# Patient Record
Sex: MALE | Race: WHITE | NOT HISPANIC OR LATINO
[De-identification: names, ages, dates, MRNs, and addresses within clinical notes are randomized per-mention and may not be internally consistent; named-entity substitution may affect disease eponyms.]

---

## 2017-10-09 ENCOUNTER — RX ONLY (RX ONLY)
Age: 62
End: 2017-10-09

## 2017-10-09 ENCOUNTER — DOCTOR'S OFFICE (OUTPATIENT)
Dept: URBAN - NONMETROPOLITAN AREA CLINIC 1 | Facility: CLINIC | Age: 62
Setting detail: OPHTHALMOLOGY
End: 2017-10-09
Payer: COMMERCIAL

## 2017-10-09 DIAGNOSIS — E11.3393: ICD-10-CM

## 2017-10-09 DIAGNOSIS — H43.11: ICD-10-CM

## 2017-10-09 DIAGNOSIS — H25.13: ICD-10-CM

## 2017-10-09 PROCEDURE — 92225 OPHTHALMOSCOPY EXTENDED INITIAL: CPT | Performed by: OPHTHALMOLOGY

## 2017-10-09 PROCEDURE — 99204 OFFICE O/P NEW MOD 45 MIN: CPT | Performed by: OPHTHALMOLOGY

## 2017-10-09 PROCEDURE — 92134 CPTRZ OPH DX IMG PST SGM RTA: CPT | Performed by: OPHTHALMOLOGY

## 2017-10-09 ASSESSMENT — REFRACTION_MANIFEST
OS_VA3: 20/
OD_VA2: 20/
OD_VA1: 20/
OS_VA3: 20/
OD_VA3: 20/
OD_VA1: 20/
OU_VA: 20/
OS_VA2: 20/
OS_VA3: 20/
OD_VA3: 20/
OS_VA2: 20/
OU_VA: 20/
OU_VA: 20/
OD_VA2: 20/
OS_VA1: 20/
OD_VA3: 20/
OD_VA2: 20/
OD_VA1: 20/
OS_VA2: 20/
OS_VA1: 20/
OS_VA1: 20/

## 2017-10-09 ASSESSMENT — REFRACTION_CURRENTRX
OD_OVR_VA: 20/
OS_OVR_VA: 20/

## 2017-10-09 ASSESSMENT — VISUAL ACUITY
OD_BCVA: 20/40-2
OS_BCVA: 20/60

## 2017-10-09 ASSESSMENT — CONFRONTATIONAL VISUAL FIELD TEST (CVF)
OD_FINDINGS: FULL
OS_FINDINGS: FULL

## 2017-10-20 ENCOUNTER — DOCTOR'S OFFICE (OUTPATIENT)
Dept: URBAN - NONMETROPOLITAN AREA CLINIC 1 | Facility: CLINIC | Age: 62
Setting detail: OPHTHALMOLOGY
End: 2017-10-20

## 2017-10-20 ENCOUNTER — DOCTOR'S OFFICE (OUTPATIENT)
Dept: URBAN - NONMETROPOLITAN AREA CLINIC 1 | Facility: CLINIC | Age: 62
Setting detail: OPHTHALMOLOGY
End: 2017-10-20
Payer: COMMERCIAL

## 2017-10-20 DIAGNOSIS — H52.4: ICD-10-CM

## 2017-10-20 DIAGNOSIS — H33.311: ICD-10-CM

## 2017-10-20 DIAGNOSIS — H43.811: ICD-10-CM

## 2017-10-20 DIAGNOSIS — H43.11: ICD-10-CM

## 2017-10-20 DIAGNOSIS — H25.12: ICD-10-CM

## 2017-10-20 DIAGNOSIS — H25.11: ICD-10-CM

## 2017-10-20 DIAGNOSIS — E11.3393: ICD-10-CM

## 2017-10-20 DIAGNOSIS — H43.812: ICD-10-CM

## 2017-10-20 PROCEDURE — 92250 FUNDUS PHOTOGRAPHY W/I&R: CPT | Performed by: OPHTHALMOLOGY

## 2017-10-20 PROCEDURE — 92014 COMPRE OPH EXAM EST PT 1/>: CPT | Performed by: OPHTHALMOLOGY

## 2017-10-20 PROCEDURE — 92226 OPHTHALMOSCOPY EXT SUBSEQUENT: CPT | Performed by: OPHTHALMOLOGY

## 2017-10-20 PROCEDURE — 92235 FLUORESCEIN ANGRPH MLTIFRAME: CPT | Performed by: OPHTHALMOLOGY

## 2017-10-20 PROCEDURE — CATARACT K CATARACT KIT: Performed by: OPHTHALMOLOGY

## 2017-10-20 PROCEDURE — 67145 PROPH RTA DTCHMNT PC: CPT | Performed by: OPHTHALMOLOGY

## 2017-10-20 ASSESSMENT — REFRACTION_MANIFEST
OD_VA1: 20/
OD_VA1: 20/
OS_VA2: 20/
OD_VA3: 20/
OS_VA3: 20/
OS_VA3: 20/
OS_VA2: 20/
OU_VA: 20/
OU_VA: 20/
OS_VA1: 20/
OD_VA2: 20/
OD_VA2: 20/
OS_VA1: 20/
OS_VA1: 20/
OD_VA3: 20/
OU_VA: 20/
OS_VA2: 20/
OD_VA1: 20/
OD_VA2: 20/
OD_VA3: 20/
OS_VA3: 20/

## 2017-10-20 ASSESSMENT — REFRACTION_CURRENTRX
OD_OVR_VA: 20/
OS_OVR_VA: 20/
OS_OVR_VA: 20/
OD_OVR_VA: 20/
OS_OVR_VA: 20/
OD_OVR_VA: 20/

## 2017-10-20 ASSESSMENT — VISUAL ACUITY
OS_BCVA: 20/60
OD_BCVA: 20/40-2

## 2017-10-20 ASSESSMENT — CONFRONTATIONAL VISUAL FIELD TEST (CVF)
OS_FINDINGS: FULL
OD_FINDINGS: FULL

## 2017-11-30 ENCOUNTER — DOCTOR'S OFFICE (OUTPATIENT)
Dept: URBAN - NONMETROPOLITAN AREA CLINIC 1 | Facility: CLINIC | Age: 62
Setting detail: OPHTHALMOLOGY
End: 2017-11-30

## 2017-11-30 ENCOUNTER — DOCTOR'S OFFICE (OUTPATIENT)
Dept: URBAN - NONMETROPOLITAN AREA CLINIC 1 | Facility: CLINIC | Age: 62
Setting detail: OPHTHALMOLOGY
End: 2017-11-30
Payer: COMMERCIAL

## 2017-11-30 DIAGNOSIS — H43.811: ICD-10-CM

## 2017-11-30 DIAGNOSIS — E11.3392: ICD-10-CM

## 2017-11-30 DIAGNOSIS — H52.4: ICD-10-CM

## 2017-11-30 DIAGNOSIS — E11.3393: ICD-10-CM

## 2017-11-30 DIAGNOSIS — H43.11: ICD-10-CM

## 2017-11-30 DIAGNOSIS — H43.812: ICD-10-CM

## 2017-11-30 DIAGNOSIS — E11.3391: ICD-10-CM

## 2017-11-30 DIAGNOSIS — G45.9: ICD-10-CM

## 2017-11-30 DIAGNOSIS — H33.311: ICD-10-CM

## 2017-11-30 PROCEDURE — 99024 POSTOP FOLLOW-UP VISIT: CPT | Performed by: OPHTHALMOLOGY

## 2017-11-30 PROCEDURE — 92134 CPTRZ OPH DX IMG PST SGM RTA: CPT | Performed by: OPHTHALMOLOGY

## 2017-11-30 PROCEDURE — 92226 OPHTHALMOSCOPY EXT SUBSEQUENT: CPT | Performed by: OPHTHALMOLOGY

## 2017-11-30 PROCEDURE — CATARACT K CATARACT KIT: Performed by: OPHTHALMOLOGY

## 2017-11-30 ASSESSMENT — CONFRONTATIONAL VISUAL FIELD TEST (CVF)
OS_FINDINGS: FULL
OD_FINDINGS: FULL

## 2017-11-30 ASSESSMENT — REFRACTION_MANIFEST
OS_VA1: 20/
OD_VA3: 20/
OS_VA3: 20/
OU_VA: 20/
OD_VA1: 20/
OS_VA1: 20/
OU_VA: 20/
OD_VA2: 20/
OD_VA2: 20/
OD_VA3: 20/
OS_VA3: 20/
OD_VA2: 20/
OD_VA3: 20/
OD_VA1: 20/
OS_VA2: 20/
OD_VA1: 20/
OS_VA3: 20/
OS_VA2: 20/
OU_VA: 20/
OS_VA1: 20/
OS_VA2: 20/

## 2017-11-30 ASSESSMENT — REFRACTION_CURRENTRX
OD_OVR_VA: 20/
OS_OVR_VA: 20/
OS_OVR_VA: 20/
OD_OVR_VA: 20/
OS_OVR_VA: 20/
OD_OVR_VA: 20/

## 2017-11-30 ASSESSMENT — VISUAL ACUITY
OS_BCVA: 20/60
OD_BCVA: 20/50-2

## 2017-12-15 ENCOUNTER — DOCTOR'S OFFICE (OUTPATIENT)
Dept: URBAN - NONMETROPOLITAN AREA CLINIC 1 | Facility: CLINIC | Age: 62
Setting detail: OPHTHALMOLOGY
End: 2017-12-15
Payer: COMMERCIAL

## 2017-12-15 DIAGNOSIS — G45.9: ICD-10-CM

## 2017-12-15 PROCEDURE — 92083 EXTENDED VISUAL FIELD XM: CPT | Performed by: OPHTHALMOLOGY

## 2018-01-08 ENCOUNTER — DOCTOR'S OFFICE (OUTPATIENT)
Dept: URBAN - NONMETROPOLITAN AREA CLINIC 1 | Facility: CLINIC | Age: 63
Setting detail: OPHTHALMOLOGY
End: 2018-01-08
Payer: COMMERCIAL

## 2018-01-08 DIAGNOSIS — G45.9: ICD-10-CM

## 2018-01-08 DIAGNOSIS — E11.3391: ICD-10-CM

## 2018-01-08 DIAGNOSIS — H43.812: ICD-10-CM

## 2018-01-08 DIAGNOSIS — H43.11: ICD-10-CM

## 2018-01-08 DIAGNOSIS — H33.311: ICD-10-CM

## 2018-01-08 DIAGNOSIS — H43.811: ICD-10-CM

## 2018-01-08 DIAGNOSIS — E11.3392: ICD-10-CM

## 2018-01-08 DIAGNOSIS — E11.3393: ICD-10-CM

## 2018-01-08 PROCEDURE — 92226 OPHTHALMOSCOPY EXT SUBSEQUENT: CPT | Performed by: OPHTHALMOLOGY

## 2018-01-08 PROCEDURE — 92134 CPTRZ OPH DX IMG PST SGM RTA: CPT | Performed by: OPHTHALMOLOGY

## 2018-01-08 PROCEDURE — 99024 POSTOP FOLLOW-UP VISIT: CPT | Performed by: OPHTHALMOLOGY

## 2018-01-08 ASSESSMENT — REFRACTION_MANIFEST
OS_VA1: 20/
OD_VA3: 20/
OD_VA1: 20/
OS_VA2: 20/
OS_VA3: 20/
OU_VA: 20/
OD_VA2: 20/
OS_VA1: 20/
OD_VA3: 20/
OD_VA1: 20/
OS_VA2: 20/
OS_VA2: 20/
OU_VA: 20/
OD_VA1: 20/
OU_VA: 20/
OD_VA2: 20/
OS_VA3: 20/
OS_VA1: 20/
OS_VA3: 20/
OD_VA3: 20/
OD_VA2: 20/

## 2018-01-08 ASSESSMENT — REFRACTION_CURRENTRX
OS_OVR_VA: 20/
OD_OVR_VA: 20/
OS_OVR_VA: 20/
OD_OVR_VA: 20/
OS_OVR_VA: 20/
OD_OVR_VA: 20/

## 2018-01-08 ASSESSMENT — VISUAL ACUITY
OS_BCVA: 20/60
OD_BCVA: 20/50-2

## 2018-01-08 ASSESSMENT — CONFRONTATIONAL VISUAL FIELD TEST (CVF)
OD_FINDINGS: FULL
OS_FINDINGS: FULL

## 2018-02-16 ENCOUNTER — DOCTOR'S OFFICE (OUTPATIENT)
Dept: URBAN - NONMETROPOLITAN AREA CLINIC 1 | Facility: CLINIC | Age: 63
Setting detail: OPHTHALMOLOGY
End: 2018-02-16
Payer: COMMERCIAL

## 2018-02-16 DIAGNOSIS — H04.121: ICD-10-CM

## 2018-02-16 DIAGNOSIS — H01.005: ICD-10-CM

## 2018-02-16 DIAGNOSIS — H43.813: ICD-10-CM

## 2018-02-16 DIAGNOSIS — G45.9: ICD-10-CM

## 2018-02-16 DIAGNOSIS — E11.3393: ICD-10-CM

## 2018-02-16 DIAGNOSIS — H01.002: ICD-10-CM

## 2018-02-16 DIAGNOSIS — H43.812: ICD-10-CM

## 2018-02-16 DIAGNOSIS — H01.004: ICD-10-CM

## 2018-02-16 DIAGNOSIS — H43.811: ICD-10-CM

## 2018-02-16 DIAGNOSIS — H01.001: ICD-10-CM

## 2018-02-16 DIAGNOSIS — H33.311: ICD-10-CM

## 2018-02-16 PROCEDURE — 92226 OPHTHALMOSCOPY EXT SUBSEQUENT: CPT | Performed by: OPHTHALMOLOGY

## 2018-02-16 PROCEDURE — 92134 CPTRZ OPH DX IMG PST SGM RTA: CPT | Performed by: OPHTHALMOLOGY

## 2018-02-16 PROCEDURE — 92014 COMPRE OPH EXAM EST PT 1/>: CPT | Performed by: OPHTHALMOLOGY

## 2018-02-16 ASSESSMENT — CONFRONTATIONAL VISUAL FIELD TEST (CVF)
OS_FINDINGS: FULL
OD_FINDINGS: FULL

## 2018-02-16 ASSESSMENT — REFRACTION_MANIFEST
OS_VA1: 20/
OS_VA3: 20/
OU_VA: 20/
OS_VA3: 20/
OS_VA2: 20/
OD_VA2: 20/
OD_VA1: 20/
OD_VA2: 20/
OD_VA3: 20/
OS_VA1: 20/
OU_VA: 20/
OD_VA1: 20/
OD_VA1: 20/
OD_VA2: 20/
OU_VA: 20/
OD_VA3: 20/
OS_VA2: 20/
OS_VA1: 20/
OS_VA2: 20/
OD_VA3: 20/
OS_VA3: 20/

## 2018-02-16 ASSESSMENT — REFRACTION_CURRENTRX
OS_OVR_VA: 20/
OD_OVR_VA: 20/
OS_OVR_VA: 20/
OD_OVR_VA: 20/
OD_OVR_VA: 20/
OS_OVR_VA: 20/

## 2018-02-16 ASSESSMENT — VISUAL ACUITY
OD_BCVA: 20/50
OS_BCVA: 20/50

## 2018-02-16 ASSESSMENT — LID EXAM ASSESSMENTS
OD_BLEPHARITIS: 2+ 3+
OS_BLEPHARITIS: 2+ 3+

## 2018-02-23 ENCOUNTER — DOCTOR'S OFFICE (OUTPATIENT)
Dept: URBAN - NONMETROPOLITAN AREA CLINIC 1 | Facility: CLINIC | Age: 63
Setting detail: OPHTHALMOLOGY
End: 2018-02-23
Payer: COMMERCIAL

## 2018-02-23 ENCOUNTER — RX ONLY (RX ONLY)
Age: 63
End: 2018-02-23

## 2018-02-23 DIAGNOSIS — H04.121: ICD-10-CM

## 2018-02-23 DIAGNOSIS — H01.001: ICD-10-CM

## 2018-02-23 DIAGNOSIS — H01.004: ICD-10-CM

## 2018-02-23 DIAGNOSIS — H04.122: ICD-10-CM

## 2018-02-23 DIAGNOSIS — H01.002: ICD-10-CM

## 2018-02-23 DIAGNOSIS — H01.005: ICD-10-CM

## 2018-02-23 PROCEDURE — 99213 OFFICE O/P EST LOW 20 MIN: CPT | Performed by: OPHTHALMOLOGY

## 2018-02-23 ASSESSMENT — LID EXAM ASSESSMENTS
OS_BLEPHARITIS: LLL LUL 1+
OD_BLEPHARITIS: RLL RUL 1+

## 2018-03-09 ASSESSMENT — REFRACTION_MANIFEST
OS_VA3: 20/
OD_VA1: 20/
OS_VA1: 20/
OS_VA3: 20/
OD_VA2: 20/
OD_VA3: 20/
OU_VA: 20/
OS_VA2: 20/
OS_VA2: 20/
OD_VA2: 20/
OU_VA: 20/
OD_VA1: 20/
OS_VA2: 20/
OU_VA: 20/
OD_VA2: 20/
OS_VA3: 20/
OS_VA1: 20/
OD_VA1: 20/
OD_VA3: 20/
OD_VA3: 20/
OS_VA1: 20/

## 2018-03-09 ASSESSMENT — REFRACTION_CURRENTRX
OS_OVR_VA: 20/
OD_OVR_VA: 20/
OS_OVR_VA: 20/
OS_OVR_VA: 20/

## 2018-03-09 ASSESSMENT — VISUAL ACUITY
OS_BCVA: 20/60
OD_BCVA: 20/50

## 2018-03-16 ENCOUNTER — DOCTOR'S OFFICE (OUTPATIENT)
Dept: URBAN - NONMETROPOLITAN AREA CLINIC 1 | Facility: CLINIC | Age: 63
Setting detail: OPHTHALMOLOGY
End: 2018-03-16
Payer: COMMERCIAL

## 2018-03-16 DIAGNOSIS — H01.004: ICD-10-CM

## 2018-03-16 DIAGNOSIS — H43.811: ICD-10-CM

## 2018-03-16 DIAGNOSIS — H01.001: ICD-10-CM

## 2018-03-16 DIAGNOSIS — H04.123: ICD-10-CM

## 2018-03-16 DIAGNOSIS — H43.813: ICD-10-CM

## 2018-03-16 DIAGNOSIS — H43.812: ICD-10-CM

## 2018-03-16 DIAGNOSIS — H01.002: ICD-10-CM

## 2018-03-16 DIAGNOSIS — E11.3393: ICD-10-CM

## 2018-03-16 DIAGNOSIS — H33.311: ICD-10-CM

## 2018-03-16 DIAGNOSIS — H01.005: ICD-10-CM

## 2018-03-16 PROCEDURE — 92014 COMPRE OPH EXAM EST PT 1/>: CPT | Performed by: OPHTHALMOLOGY

## 2018-03-16 PROCEDURE — 92134 CPTRZ OPH DX IMG PST SGM RTA: CPT | Performed by: OPHTHALMOLOGY

## 2018-03-16 PROCEDURE — 92226 OPHTHALMOSCOPY EXT SUBSEQUENT: CPT | Performed by: OPHTHALMOLOGY

## 2018-03-16 ASSESSMENT — REFRACTION_MANIFEST
OD_VA3: 20/
OU_VA: 20/
OS_VA3: 20/
OD_VA1: 20/
OD_VA3: 20/
OU_VA: 20/
OS_VA3: 20/
OS_VA2: 20/
OS_VA2: 20/
OD_VA2: 20/
OS_VA1: 20/
OD_VA3: 20/
OS_VA1: 20/
OD_VA2: 20/
OS_VA1: 20/
OD_VA1: 20/
OD_VA2: 20/
OS_VA3: 20/
OS_VA2: 20/
OU_VA: 20/
OD_VA1: 20/

## 2018-03-16 ASSESSMENT — LID EXAM ASSESSMENTS
OD_BLEPHARITIS: RLL RUL 1+
OS_BLEPHARITIS: LLL LUL 1+

## 2018-03-16 ASSESSMENT — VISUAL ACUITY
OS_BCVA: 20/60
OD_BCVA: 20/50

## 2018-03-16 ASSESSMENT — CONFRONTATIONAL VISUAL FIELD TEST (CVF)
OS_FINDINGS: FULL
OD_FINDINGS: FULL

## 2018-03-16 ASSESSMENT — REFRACTION_CURRENTRX
OS_OVR_VA: 20/
OD_OVR_VA: 20/
OS_OVR_VA: 20/
OS_OVR_VA: 20/
OD_OVR_VA: 20/
OD_OVR_VA: 20/

## 2018-04-03 ENCOUNTER — RX ONLY (RX ONLY)
Age: 63
End: 2018-04-03

## 2018-04-03 ENCOUNTER — DOCTOR'S OFFICE (OUTPATIENT)
Dept: URBAN - NONMETROPOLITAN AREA CLINIC 1 | Facility: CLINIC | Age: 63
Setting detail: OPHTHALMOLOGY
End: 2018-04-03
Payer: COMMERCIAL

## 2018-04-03 DIAGNOSIS — H04.121: ICD-10-CM

## 2018-04-03 DIAGNOSIS — H01.002: ICD-10-CM

## 2018-04-03 DIAGNOSIS — H01.001: ICD-10-CM

## 2018-04-03 DIAGNOSIS — H04.122: ICD-10-CM

## 2018-04-03 DIAGNOSIS — H01.005: ICD-10-CM

## 2018-04-03 DIAGNOSIS — H01.004: ICD-10-CM

## 2018-04-03 PROCEDURE — 92012 INTRM OPH EXAM EST PATIENT: CPT | Performed by: OPHTHALMOLOGY

## 2018-04-03 ASSESSMENT — LID EXAM ASSESSMENTS
OS_BLEPHARITIS: LLL LUL T
OD_BLEPHARITIS: RLL RUL T

## 2018-04-04 ASSESSMENT — REFRACTION_CURRENTRX
OS_OVR_VA: 20/
OD_OVR_VA: 20/
OD_OVR_VA: 20/
OS_OVR_VA: 20/
OS_OVR_VA: 20/
OD_OVR_VA: 20/

## 2018-04-04 ASSESSMENT — REFRACTION_MANIFEST
OD_VA1: 20/
OD_VA1: 20/
OS_VA2: 20/
OS_VA2: 20/
OD_VA2: 20/
OU_VA: 20/
OS_VA2: 20/
OS_VA1: 20/
OD_VA3: 20/
OD_VA1: 20/
OS_VA1: 20/
OS_VA3: 20/
OS_VA3: 20/
OU_VA: 20/
OS_VA3: 20/
OD_VA3: 20/
OU_VA: 20/
OS_VA1: 20/
OD_VA3: 20/

## 2018-04-04 ASSESSMENT — VISUAL ACUITY
OS_BCVA: 20/70-1
OD_BCVA: 20/50

## 2018-05-11 ENCOUNTER — DOCTOR'S OFFICE (OUTPATIENT)
Dept: URBAN - NONMETROPOLITAN AREA CLINIC 1 | Facility: CLINIC | Age: 63
Setting detail: OPHTHALMOLOGY
End: 2018-05-11
Payer: COMMERCIAL

## 2018-05-11 ENCOUNTER — RX ONLY (RX ONLY)
Age: 63
End: 2018-05-11

## 2018-05-11 DIAGNOSIS — H01.004: ICD-10-CM

## 2018-05-11 DIAGNOSIS — H01.005: ICD-10-CM

## 2018-05-11 DIAGNOSIS — H25.13: ICD-10-CM

## 2018-05-11 DIAGNOSIS — H04.123: ICD-10-CM

## 2018-05-11 DIAGNOSIS — E11.3393: ICD-10-CM

## 2018-05-11 DIAGNOSIS — H01.001: ICD-10-CM

## 2018-05-11 DIAGNOSIS — H01.002: ICD-10-CM

## 2018-05-11 PROCEDURE — 92014 COMPRE OPH EXAM EST PT 1/>: CPT | Performed by: OPHTHALMOLOGY

## 2018-05-11 PROCEDURE — CATARACT K CATARACT KIT: Performed by: OPHTHALMOLOGY

## 2018-05-11 PROCEDURE — 92134 CPTRZ OPH DX IMG PST SGM RTA: CPT | Performed by: OPHTHALMOLOGY

## 2018-05-11 ASSESSMENT — REFRACTION_AUTOREFRACTION
OS_CYLINDER: -0.75
OD_CYLINDER: -1.00
OS_AXIS: 38
OS_SPHERE: -6.25
OD_SPHERE: -6.75
OD_AXIS: 129

## 2018-05-11 ASSESSMENT — REFRACTION_MANIFEST
OS_VA2: 20/
OD_VA2: 20/
OD_VA2: 20/
OS_VA1: 20/
OU_VA: 20/
OD_VA3: 20/
OS_VA2: 20/
OD_VA3: 20/
OU_VA: 20/
OD_VA1: 20/
OS_VA3: 20/
OU_VA: 20/
OD_VA3: 20/
OS_VA2: 20/
OS_VA3: 20/
OS_VA1: 20/
OD_VA1: 20/
OS_VA3: 20/
OS_VA1: 20/
OD_VA2: 20/
OD_VA1: 20/

## 2018-05-11 ASSESSMENT — VISUAL ACUITY
OS_BCVA: 20/70+1
OD_BCVA: 20/50+2

## 2018-05-11 ASSESSMENT — REFRACTION_CURRENTRX
OS_OVR_VA: 20/
OD_OVR_VA: 20/
OS_OVR_VA: 20/
OS_AXIS: 13
OS_CYLINDER: -1.50
OS_SPHERE: -7.25
OD_AXIS: 130
OD_OVR_VA: 20/
OD_SPHERE: -6.75
OD_OVR_VA: 20/
OD_CYLINDER: -1.00
OS_OVR_VA: 20/

## 2018-05-11 ASSESSMENT — LID EXAM ASSESSMENTS
OS_BLEPHARITIS: LLL LUL T
OD_BLEPHARITIS: RLL RUL T

## 2018-05-11 ASSESSMENT — CONFRONTATIONAL VISUAL FIELD TEST (CVF)
OD_FINDINGS: FULL
OS_FINDINGS: FULL

## 2018-05-11 ASSESSMENT — SPHEQUIV_DERIVED
OS_SPHEQUIV: -6.625
OD_SPHEQUIV: -7.25

## 2018-06-05 ENCOUNTER — DOCTOR'S OFFICE (OUTPATIENT)
Dept: URBAN - NONMETROPOLITAN AREA CLINIC 1 | Facility: CLINIC | Age: 63
Setting detail: OPHTHALMOLOGY
End: 2018-06-05
Payer: COMMERCIAL

## 2018-06-05 DIAGNOSIS — H25.11: ICD-10-CM

## 2018-06-05 PROCEDURE — 92136 OPHTHALMIC BIOMETRY: CPT | Performed by: OPHTHALMOLOGY

## 2018-06-14 ENCOUNTER — AMBUL SURGICAL CARE (OUTPATIENT)
Dept: URBAN - NONMETROPOLITAN AREA SURGERY 1 | Facility: SURGERY | Age: 63
Setting detail: OPHTHALMOLOGY
End: 2018-06-14
Payer: COMMERCIAL

## 2018-06-14 DIAGNOSIS — H25.11: ICD-10-CM

## 2018-06-14 DIAGNOSIS — H25.041: ICD-10-CM

## 2018-06-14 PROCEDURE — 66984 XCAPSL CTRC RMVL W/O ECP: CPT | Performed by: OPHTHALMOLOGY

## 2018-06-14 PROCEDURE — G8918 PT W/O PREOP ORDER IV AB PRO: HCPCS | Performed by: OPHTHALMOLOGY

## 2018-06-14 PROCEDURE — G8907 PT DOC NO EVENTS ON DISCHARG: HCPCS | Performed by: OPHTHALMOLOGY

## 2018-06-15 ENCOUNTER — OPTICAL OFFICE (OUTPATIENT)
Dept: URBAN - NONMETROPOLITAN AREA CLINIC 4 | Facility: CLINIC | Age: 63
Setting detail: OPHTHALMOLOGY
End: 2018-06-15

## 2018-06-15 ENCOUNTER — RX ONLY (RX ONLY)
Age: 63
End: 2018-06-15

## 2018-06-15 ENCOUNTER — DOCTOR'S OFFICE (OUTPATIENT)
Dept: URBAN - NONMETROPOLITAN AREA CLINIC 1 | Facility: CLINIC | Age: 63
Setting detail: OPHTHALMOLOGY
End: 2018-06-15
Payer: COMMERCIAL

## 2018-06-15 ENCOUNTER — DOCTOR'S OFFICE (OUTPATIENT)
Dept: URBAN - NONMETROPOLITAN AREA CLINIC 1 | Facility: CLINIC | Age: 63
Setting detail: OPHTHALMOLOGY
End: 2018-06-15

## 2018-06-15 DIAGNOSIS — H25.12: ICD-10-CM

## 2018-06-15 DIAGNOSIS — Z96.1: ICD-10-CM

## 2018-06-15 DIAGNOSIS — H52.01: ICD-10-CM

## 2018-06-15 PROCEDURE — V2020 VISION SVCS FRAMES PURCHASES: HCPCS | Performed by: OPTOMETRIST

## 2018-06-15 PROCEDURE — 92136 OPHTHALMIC BIOMETRY: CPT | Performed by: OPHTHALMOLOGY

## 2018-06-15 PROCEDURE — 99024 POSTOP FOLLOW-UP VISIT: CPT | Performed by: PHYSICIAN ASSISTANT

## 2018-06-15 PROCEDURE — V2100 LENS SPHER SINGLE PLANO 4.00: HCPCS | Performed by: OPTOMETRIST

## 2018-06-15 ASSESSMENT — LID EXAM ASSESSMENTS
OS_BLEPHARITIS: LLL LUL T
OD_BLEPHARITIS: RLL RUL T

## 2018-06-18 ASSESSMENT — REFRACTION_MANIFEST
OD_VA2: 20/
OS_VA2: 20/
OS_VA3: 20/
OU_VA: 20/
OD_VA3: 20/
OD_VA3: 20/
OS_VA1: 20/
OD_VA1: 20/
OD_VA3: 20/
OS_VA2: 20/
OU_VA: 20/
OD_VA1: 20/
OD_VA2: 20/
OD_VA2: 20/
OU_VA: 20/
OS_VA3: 20/
OD_VA1: 20/
OS_VA1: 20/
OS_VA3: 20/
OS_VA1: 20/
OS_VA2: 20/

## 2018-06-18 ASSESSMENT — REFRACTION_CURRENTRX
OD_OVR_VA: 20/
OD_SPHERE: -6.75
OS_OVR_VA: 20/
OS_SPHERE: -7.25
OS_CYLINDER: -1.50
OS_AXIS: 13
OD_OVR_VA: 20/
OS_OVR_VA: 20/
OS_OVR_VA: 20/
OD_CYLINDER: -1.00
OD_AXIS: 130
OD_OVR_VA: 20/

## 2018-06-18 ASSESSMENT — REFRACTION_AUTOREFRACTION
OD_CYLINDER: -2.00
OD_AXIS: 98
OD_SPHERE: -0.50
OS_SPHERE: -6.25
OS_AXIS: 38
OS_CYLINDER: -0.75

## 2018-06-18 ASSESSMENT — SPHEQUIV_DERIVED
OD_SPHEQUIV: -1.5
OS_SPHEQUIV: -6.625

## 2018-06-18 ASSESSMENT — VISUAL ACUITY
OS_BCVA: 20/50-1
OD_BCVA: 20/50+2

## 2018-06-21 ENCOUNTER — AMBUL SURGICAL CARE (OUTPATIENT)
Dept: URBAN - NONMETROPOLITAN AREA SURGERY 1 | Facility: SURGERY | Age: 63
Setting detail: OPHTHALMOLOGY
End: 2018-06-21
Payer: COMMERCIAL

## 2018-06-21 ENCOUNTER — OPTICAL OFFICE (OUTPATIENT)
Dept: URBAN - NONMETROPOLITAN AREA CLINIC 4 | Facility: CLINIC | Age: 63
Setting detail: OPHTHALMOLOGY
End: 2018-06-21

## 2018-06-21 DIAGNOSIS — H25.042: ICD-10-CM

## 2018-06-21 DIAGNOSIS — H52.02: ICD-10-CM

## 2018-06-21 DIAGNOSIS — H25.12: ICD-10-CM

## 2018-06-21 PROCEDURE — 66984 XCAPSL CTRC RMVL W/O ECP: CPT | Performed by: OPHTHALMOLOGY

## 2018-06-21 PROCEDURE — V2020 VISION SVCS FRAMES PURCHASES: HCPCS | Performed by: OPTOMETRIST

## 2018-06-21 PROCEDURE — G8918 PT W/O PREOP ORDER IV AB PRO: HCPCS | Performed by: OPHTHALMOLOGY

## 2018-06-21 PROCEDURE — V2100 LENS SPHER SINGLE PLANO 4.00: HCPCS | Performed by: OPTOMETRIST

## 2018-06-21 PROCEDURE — G8907 PT DOC NO EVENTS ON DISCHARG: HCPCS | Performed by: OPHTHALMOLOGY

## 2018-06-22 ENCOUNTER — DOCTOR'S OFFICE (OUTPATIENT)
Dept: URBAN - NONMETROPOLITAN AREA CLINIC 1 | Facility: CLINIC | Age: 63
Setting detail: OPHTHALMOLOGY
End: 2018-06-22

## 2018-06-22 DIAGNOSIS — H26.491: ICD-10-CM

## 2018-06-22 DIAGNOSIS — Z96.1: ICD-10-CM

## 2018-06-22 PROBLEM — H25.12 CATARACT NUCLEAR SCLEROSIS; LEFT EYE: Status: RESOLVED | Noted: 2017-10-09 | Resolved: 2018-06-22

## 2018-06-22 PROCEDURE — 99024 POSTOP FOLLOW-UP VISIT: CPT | Performed by: OPTOMETRIST

## 2018-06-22 ASSESSMENT — REFRACTION_CURRENTRX
OD_OVR_VA: 20/
OD_CYLINDER: -1.00
OS_AXIS: 13
OD_SPHERE: -6.75
OS_SPHERE: -7.25
OD_OVR_VA: 20/
OS_CYLINDER: -1.50
OD_OVR_VA: 20/
OD_AXIS: 130
OS_OVR_VA: 20/

## 2018-06-22 ASSESSMENT — REFRACTION_MANIFEST
OD_VA1: 20/
OS_VA2: 20/
OD_VA2: 20/
OS_VA1: 20/
OD_VA1: 20/
OS_VA3: 20/
OS_VA1: 20/
OD_VA2: 20/
OS_VA2: 20/
OS_VA3: 20/
OD_VA3: 20/
OU_VA: 20/
OS_VA2: 20/
OD_VA3: 20/
OU_VA: 20/
OD_VA3: 20/
OD_VA1: 20/
OD_VA2: 20/
OU_VA: 20/
OS_VA1: 20/
OS_VA3: 20/

## 2018-06-22 ASSESSMENT — LID EXAM ASSESSMENTS
OS_BLEPHARITIS: LLL LUL T
OD_BLEPHARITIS: RLL RUL T

## 2018-06-22 ASSESSMENT — REFRACTION_AUTOREFRACTION
OD_SPHERE: -0.75
OS_AXIS: 009
OD_AXIS: 175
OD_CYLINDER: -1.25
OS_CYLINDER: -2.25
OS_SPHERE: +0.50

## 2018-06-22 ASSESSMENT — SPHEQUIV_DERIVED
OD_SPHEQUIV: -1.375
OS_SPHEQUIV: -0.625

## 2018-06-22 ASSESSMENT — VISUAL ACUITY
OD_BCVA: 20/30-2
OS_BCVA: 20/70+1

## 2018-07-10 ENCOUNTER — DOCTOR'S OFFICE (OUTPATIENT)
Dept: URBAN - NONMETROPOLITAN AREA CLINIC 1 | Facility: CLINIC | Age: 63
Setting detail: OPHTHALMOLOGY
End: 2018-07-10

## 2018-07-10 DIAGNOSIS — Z96.1: ICD-10-CM

## 2018-07-10 PROCEDURE — 99024 POSTOP FOLLOW-UP VISIT: CPT | Performed by: PHYSICIAN ASSISTANT

## 2018-07-10 ASSESSMENT — LID EXAM ASSESSMENTS
OD_BLEPHARITIS: RLL RUL T
OS_BLEPHARITIS: LLL LUL T

## 2018-07-10 ASSESSMENT — LID POSITION - PTOSIS
OD_PTOSIS: T
OS_PTOSIS: T

## 2018-07-11 ASSESSMENT — REFRACTION_MANIFEST
OD_VA2: 20/
OD_VA1: 20/
OS_VA3: 20/
OS_VA1: 20/
OS_VA2: 20/
OD_VA3: 20/
OS_VA1: 20/
OS_VA3: 20/
OS_VA3: 20/
OU_VA: 20/
OD_VA3: 20/
OS_VA2: 20/
OD_VA3: 20/
OU_VA: 20/
OS_VA1: 20/
OD_VA2: 20/
OD_VA2: 20/
OD_VA1: 20/
OS_VA2: 20/
OU_VA: 20/
OD_VA1: 20/

## 2018-07-11 ASSESSMENT — VISUAL ACUITY
OD_BCVA: 20/30+2
OS_BCVA: 20/70+2

## 2018-07-11 ASSESSMENT — REFRACTION_CURRENTRX
OD_SPHERE: -6.75
OS_OVR_VA: 20/
OD_OVR_VA: 20/
OS_SPHERE: -7.25
OS_CYLINDER: -1.50
OS_OVR_VA: 20/
OD_OVR_VA: 20/
OS_AXIS: 13
OD_AXIS: 130
OD_OVR_VA: 20/
OD_CYLINDER: -1.00
OS_OVR_VA: 20/

## 2018-07-11 ASSESSMENT — REFRACTION_AUTOREFRACTION
OS_SPHERE: +0.50
OS_AXIS: 009
OD_SPHERE: -0.75
OD_AXIS: 175
OS_CYLINDER: -2.25
OD_CYLINDER: -1.25

## 2018-07-11 ASSESSMENT — SPHEQUIV_DERIVED
OD_SPHEQUIV: -1.375
OS_SPHEQUIV: -0.625

## 2018-08-17 ENCOUNTER — OPTICAL OFFICE (OUTPATIENT)
Dept: URBAN - NONMETROPOLITAN AREA CLINIC 4 | Facility: CLINIC | Age: 63
Setting detail: OPHTHALMOLOGY
End: 2018-08-17

## 2018-08-17 ENCOUNTER — DOCTOR'S OFFICE (OUTPATIENT)
Dept: URBAN - NONMETROPOLITAN AREA CLINIC 1 | Facility: CLINIC | Age: 63
Setting detail: OPHTHALMOLOGY
End: 2018-08-17
Payer: COMMERCIAL

## 2018-08-17 DIAGNOSIS — Z96.1: ICD-10-CM

## 2018-08-17 DIAGNOSIS — H52.223: ICD-10-CM

## 2018-08-17 PROBLEM — H04.122 DRY EYE; RIGHT EYE, LEFT EYE: Status: ACTIVE | Noted: 2018-02-16

## 2018-08-17 PROBLEM — H04.121 DRY EYE; RIGHT EYE, LEFT EYE: Status: ACTIVE | Noted: 2018-02-16

## 2018-08-17 PROBLEM — H01.005 BLEPHARITIS; RIGHT UPPER LID, RIGHT LOWER LID, LEFT UPPER LID, LEFT LOWER LID: Status: ACTIVE | Noted: 2018-02-16

## 2018-08-17 PROBLEM — H26.491 AFTER-CATARACT,OBSCURING VISION; RIGHT EYE: Status: ACTIVE | Noted: 2018-06-22

## 2018-08-17 PROBLEM — H01.002 BLEPHARITIS; RIGHT UPPER LID, RIGHT LOWER LID, LEFT UPPER LID, LEFT LOWER LID: Status: ACTIVE | Noted: 2018-02-16

## 2018-08-17 PROBLEM — E11.3391 DM TYPE 2; RIGHT MOD WITHOUT ME, LEFT MOD WITHOUT ME: Status: ACTIVE | Noted: 2017-10-20

## 2018-08-17 PROBLEM — G45.9 TIA: Status: ACTIVE | Noted: 2017-11-30

## 2018-08-17 PROBLEM — H43.811 POSTERIOR VITREOUS DETACHMENT; RIGHT EYE, LEFT EYE: Status: ACTIVE | Noted: 2017-10-09

## 2018-08-17 PROBLEM — H01.001 BLEPHARITIS; RIGHT UPPER LID, RIGHT LOWER LID, LEFT UPPER LID, LEFT LOWER LID: Status: ACTIVE | Noted: 2018-02-16

## 2018-08-17 PROBLEM — E11.3393 DM TYPE 2; RIGHT MOD WITHOUT ME, LEFT MOD WITHOUT ME: Status: ACTIVE | Noted: 2017-10-20

## 2018-08-17 PROBLEM — H33.311 HORSESHOE TEAR WITHOUT DETACHMENT; RIGHT EYE: Status: ACTIVE | Noted: 2017-10-20

## 2018-08-17 PROBLEM — H43.812 POSTERIOR VITREOUS DETACHMENT; RIGHT EYE, LEFT EYE: Status: ACTIVE | Noted: 2017-10-09

## 2018-08-17 PROBLEM — H01.004 BLEPHARITIS; RIGHT UPPER LID, RIGHT LOWER LID, LEFT UPPER LID, LEFT LOWER LID: Status: ACTIVE | Noted: 2018-02-16

## 2018-08-17 PROBLEM — E11.3392 DM TYPE 2; RIGHT MOD WITHOUT ME, LEFT MOD WITHOUT ME: Status: ACTIVE | Noted: 2017-10-20

## 2018-08-17 PROCEDURE — 92015 DETERMINE REFRACTIVE STATE: CPT | Performed by: OPTOMETRIST

## 2018-08-17 PROCEDURE — V2103 SPHEROCYLINDR 4.00D/12-2.00D: HCPCS | Performed by: OPTOMETRIST

## 2018-08-17 PROCEDURE — V2020 VISION SVCS FRAMES PURCHASES: HCPCS | Performed by: OPTOMETRIST

## 2018-08-17 ASSESSMENT — VISUAL ACUITY
OD_BCVA: 20/25
OS_BCVA: 20/50

## 2018-08-17 ASSESSMENT — REFRACTION_CURRENTRX
OS_OVR_VA: 20/
OS_AXIS: 13
OS_CYLINDER: -1.50
OS_OVR_VA: 20/
OD_OVR_VA: 20/
OD_CYLINDER: -1.00
OS_OVR_VA: 20/
OD_OVR_VA: 20/
OS_SPHERE: -7.25
OD_SPHERE: -6.75
OD_AXIS: 130
OD_OVR_VA: 20/

## 2018-08-17 ASSESSMENT — REFRACTION_AUTOREFRACTION
OS_SPHERE: 0.00
OD_AXIS: 165
OD_CYLINDER: -1.00
OD_SPHERE: -0.25
OS_CYLINDER: -1.75
OS_AXIS: 005

## 2018-08-17 ASSESSMENT — REFRACTION_MANIFEST
OD_VA2: 20/
OS_VA2: 20/
OD_VA1: 20/
OD_VA3: 20/
OS_VA1: 20/
OD_VA2: 20/
OS_VA1: 20/
OD_VA3: 20/
OU_VA: 20/
OS_VA3: 20/
OD_VA1: 20/
OS_VA3: 20/
OS_VA2: 20/
OU_VA: 20/

## 2018-08-17 ASSESSMENT — REFRACTION_OUTSIDERX
OD_SPHERE: -0.75
OD_VA2: 20/40+1
OU_VA: 20/
OS_SPHERE: PLANO
OS_AXIS: 005
OD_ADD: +2.50
OD_VA1: 20/40+1
OS_VA2: 20/25
OS_CYLINDER: -0.50
OS_ADD: +2.50
OS_VA1: 20/25
OD_VA3: 20/
OD_AXIS: 155
OD_CYLINDER: -0.75
OS_VA3: 20/

## 2018-08-17 ASSESSMENT — SPHEQUIV_DERIVED
OS_SPHEQUIV: -0.875
OD_SPHEQUIV: -0.75

## 2020-05-26 DIAGNOSIS — C34.11 MALIGNANT NEOPLASM OF UPPER LOBE, RIGHT BRONCHUS OR LUNG (HCC): ICD-10-CM

## 2020-06-22 ENCOUNTER — APPOINTMENT (OUTPATIENT)
Dept: LAB | Facility: HOSPITAL | Age: 65
End: 2020-06-22
Payer: MEDICARE

## 2020-06-22 ENCOUNTER — TRANSCRIBE ORDERS (OUTPATIENT)
Dept: ADMINISTRATIVE | Facility: HOSPITAL | Age: 65
End: 2020-06-22

## 2020-06-22 ENCOUNTER — HOSPITAL ENCOUNTER (OUTPATIENT)
Dept: CT IMAGING | Facility: HOSPITAL | Age: 65
Discharge: HOME | End: 2020-06-22
Payer: MEDICARE

## 2020-06-22 DIAGNOSIS — C34.11 MALIGNANT NEOPLASM OF UPPER LOBE OF RIGHT LUNG (HCC): Primary | ICD-10-CM

## 2020-06-22 DIAGNOSIS — C34.11 MALIGNANT NEOPLASM OF UPPER LOBE, RIGHT BRONCHUS OR LUNG (HCC): ICD-10-CM

## 2020-06-22 DIAGNOSIS — C34.11 MALIGNANT NEOPLASM OF UPPER LOBE OF RIGHT LUNG (HCC): ICD-10-CM

## 2020-06-22 LAB
ALBUMIN SERPL BCP-MCNC: 3.8 G/DL (ref 3.5–5)
ALP SERPL-CCNC: 64 U/L (ref 46–116)
ALT SERPL W P-5'-P-CCNC: 51 U/L (ref 12–78)
ANION GAP SERPL CALCULATED.3IONS-SCNC: 5 MMOL/L (ref 4–13)
AST SERPL W P-5'-P-CCNC: 26 U/L (ref 5–45)
BASOPHILS # BLD AUTO: 0.1 THOUSANDS/ΜL (ref 0–0.1)
BASOPHILS NFR BLD AUTO: 1 % (ref 0–1)
BILIRUB SERPL-MCNC: 0.66 MG/DL (ref 0.2–1)
BUN SERPL-MCNC: 15 MG/DL (ref 5–25)
CALCIUM SERPL-MCNC: 8.8 MG/DL (ref 8.3–10.1)
CHLORIDE SERPL-SCNC: 101 MMOL/L (ref 100–108)
CO2 SERPL-SCNC: 34 MMOL/L (ref 21–32)
CREAT SERPL-MCNC: 0.91 MG/DL (ref 0.6–1.3)
EOSINOPHIL # BLD AUTO: 0.5 THOUSAND/ΜL (ref 0–0.61)
EOSINOPHIL NFR BLD AUTO: 7 % (ref 0–6)
ERYTHROCYTE [DISTWIDTH] IN BLOOD BY AUTOMATED COUNT: 16.5 % (ref 11.6–15.1)
GFR SERPL CREATININE-BSD FRML MDRD: 88 ML/MIN/1.73SQ M
GLUCOSE P FAST SERPL-MCNC: 130 MG/DL (ref 65–99)
HCT VFR BLD AUTO: 39.4 % (ref 36.5–49.3)
HGB BLD-MCNC: 13.4 G/DL (ref 12–17)
IMM GRANULOCYTES # BLD AUTO: 0.03 THOUSAND/UL (ref 0–0.2)
IMM GRANULOCYTES NFR BLD AUTO: 0 % (ref 0–2)
LYMPHOCYTES # BLD AUTO: 1.58 THOUSANDS/ΜL (ref 0.6–4.47)
LYMPHOCYTES NFR BLD AUTO: 21 % (ref 14–44)
MCH RBC QN AUTO: 33.7 PG (ref 26.8–34.3)
MCHC RBC AUTO-ENTMCNC: 34 G/DL (ref 31.4–37.4)
MCV RBC AUTO: 99 FL (ref 82–98)
MONOCYTES # BLD AUTO: 0.87 THOUSAND/ΜL (ref 0.17–1.22)
MONOCYTES NFR BLD AUTO: 12 % (ref 4–12)
NEUTROPHILS # BLD AUTO: 4.49 THOUSANDS/ΜL (ref 1.85–7.62)
NEUTS SEG NFR BLD AUTO: 59 % (ref 43–75)
NRBC BLD AUTO-RTO: 0 /100 WBCS
PLATELET # BLD AUTO: 173 THOUSANDS/UL (ref 149–390)
PMV BLD AUTO: 9 FL (ref 8.9–12.7)
POTASSIUM SERPL-SCNC: 3.5 MMOL/L (ref 3.5–5.3)
PROT SERPL-MCNC: 7 G/DL (ref 6.4–8.2)
RBC # BLD AUTO: 3.98 MILLION/UL (ref 3.88–5.62)
SODIUM SERPL-SCNC: 140 MMOL/L (ref 136–145)
WBC # BLD AUTO: 7.57 THOUSAND/UL (ref 4.31–10.16)

## 2020-06-22 PROCEDURE — 71260 CT THORAX DX C+: CPT

## 2020-06-22 PROCEDURE — 80053 COMPREHEN METABOLIC PANEL: CPT

## 2020-06-22 PROCEDURE — 85025 COMPLETE CBC W/AUTO DIFF WBC: CPT

## 2020-06-22 PROCEDURE — 36415 COLL VENOUS BLD VENIPUNCTURE: CPT

## 2020-06-22 RX ADMIN — IOHEXOL 85 ML: 350 INJECTION, SOLUTION INTRAVENOUS at 08:06

## 2021-09-14 ENCOUNTER — APPOINTMENT (EMERGENCY)
Dept: CT IMAGING | Facility: HOSPITAL | Age: 66
DRG: 871 | End: 2021-09-14
Payer: MEDICARE

## 2021-09-14 ENCOUNTER — APPOINTMENT (EMERGENCY)
Dept: RADIOLOGY | Facility: HOSPITAL | Age: 66
DRG: 871 | End: 2021-09-14
Payer: MEDICARE

## 2021-09-14 ENCOUNTER — HOSPITAL ENCOUNTER (INPATIENT)
Facility: HOSPITAL | Age: 66
LOS: 4 days | Discharge: HOME | DRG: 871 | End: 2021-09-18
Attending: EMERGENCY MEDICINE | Admitting: FAMILY MEDICINE
Payer: MEDICARE

## 2021-09-14 DIAGNOSIS — R09.02 HYPOXIA: ICD-10-CM

## 2021-09-14 DIAGNOSIS — J44.9 CHRONIC OBSTRUCTIVE PULMONARY DISEASE, UNSPECIFIED COPD TYPE (HCC): ICD-10-CM

## 2021-09-14 DIAGNOSIS — J18.9 PNEUMONIA: Primary | ICD-10-CM

## 2021-09-14 DIAGNOSIS — J96.01 ACUTE HYPOXEMIC RESPIRATORY FAILURE DUE TO COVID-19 (HCC): ICD-10-CM

## 2021-09-14 DIAGNOSIS — U07.1 ACUTE HYPOXEMIC RESPIRATORY FAILURE DUE TO COVID-19 (HCC): ICD-10-CM

## 2021-09-14 DIAGNOSIS — U07.1 PNEUMONIA DUE TO COVID-19 VIRUS: ICD-10-CM

## 2021-09-14 DIAGNOSIS — E11.9 TYPE 2 DIABETES MELLITUS WITHOUT COMPLICATION, WITHOUT LONG-TERM CURRENT USE OF INSULIN (HCC): ICD-10-CM

## 2021-09-14 DIAGNOSIS — J44.1 COPD WITH ACUTE EXACERBATION (HCC): ICD-10-CM

## 2021-09-14 DIAGNOSIS — R59.0 HILAR ADENOPATHY: ICD-10-CM

## 2021-09-14 DIAGNOSIS — E87.6 HYPOKALEMIA: ICD-10-CM

## 2021-09-14 DIAGNOSIS — Z72.0 TOBACCO USE: ICD-10-CM

## 2021-09-14 DIAGNOSIS — J12.82 PNEUMONIA DUE TO COVID-19 VIRUS: ICD-10-CM

## 2021-09-14 DIAGNOSIS — U07.1 COVID-19 VIRUS INFECTION: ICD-10-CM

## 2021-09-14 DIAGNOSIS — Z85.118 HISTORY OF LUNG CANCER: ICD-10-CM

## 2021-09-14 LAB
ALBUMIN SERPL BCP-MCNC: 3.8 G/DL (ref 3.5–5)
ALP SERPL-CCNC: 80 U/L (ref 46–116)
ALT SERPL W P-5'-P-CCNC: 94 U/L (ref 12–78)
ANION GAP SERPL CALCULATED.3IONS-SCNC: 11 MMOL/L (ref 4–13)
APTT PPP: 29 SECONDS (ref 23–37)
AST SERPL W P-5'-P-CCNC: 58 U/L (ref 5–45)
BACTERIA UR QL AUTO: ABNORMAL /HPF
BASOPHILS # BLD AUTO: 0.09 THOUSANDS/ΜL (ref 0–0.1)
BASOPHILS NFR BLD AUTO: 1 % (ref 0–1)
BILIRUB SERPL-MCNC: 1.15 MG/DL (ref 0.2–1)
BILIRUB UR QL STRIP: NEGATIVE
BUN SERPL-MCNC: 16 MG/DL (ref 5–25)
CALCIUM SERPL-MCNC: 9.4 MG/DL (ref 8.3–10.1)
CHLORIDE SERPL-SCNC: 95 MMOL/L (ref 100–108)
CLARITY UR: ABNORMAL
CO2 SERPL-SCNC: 31 MMOL/L (ref 21–32)
COLOR UR: YELLOW
CREAT SERPL-MCNC: 1.17 MG/DL (ref 0.6–1.3)
CRP SERPL QL: 19.7 MG/L
D DIMER PPP FEU-MCNC: 2.67 UG/ML FEU
EOSINOPHIL # BLD AUTO: 0.24 THOUSAND/ΜL (ref 0–0.61)
EOSINOPHIL NFR BLD AUTO: 2 % (ref 0–6)
ERYTHROCYTE [DISTWIDTH] IN BLOOD BY AUTOMATED COUNT: 12.9 % (ref 11.6–15.1)
FLUAV RNA RESP QL NAA+PROBE: NEGATIVE
FLUBV RNA RESP QL NAA+PROBE: NEGATIVE
GFR SERPL CREATININE-BSD FRML MDRD: 65 ML/MIN/1.73SQ M
GLUCOSE SERPL-MCNC: 132 MG/DL (ref 65–140)
GLUCOSE SERPL-MCNC: 216 MG/DL (ref 65–140)
GLUCOSE UR STRIP-MCNC: NEGATIVE MG/DL
HCT VFR BLD AUTO: 50.1 % (ref 36.5–49.3)
HGB BLD-MCNC: 16.6 G/DL (ref 12–17)
HGB UR QL STRIP.AUTO: ABNORMAL
IMM GRANULOCYTES # BLD AUTO: 0.11 THOUSAND/UL (ref 0–0.2)
IMM GRANULOCYTES NFR BLD AUTO: 1 % (ref 0–2)
INR PPP: 0.94 (ref 0.84–1.19)
KETONES UR STRIP-MCNC: NEGATIVE MG/DL
LACTATE SERPL-SCNC: 1.6 MMOL/L (ref 0.5–2)
LACTATE SERPL-SCNC: 4.3 MMOL/L (ref 0.5–2)
LEUKOCYTE ESTERASE UR QL STRIP: ABNORMAL
LIPASE SERPL-CCNC: 78 U/L (ref 73–393)
LYMPHOCYTES # BLD AUTO: 2.67 THOUSANDS/ΜL (ref 0.6–4.47)
LYMPHOCYTES NFR BLD AUTO: 27 % (ref 14–44)
MCH RBC QN AUTO: 30 PG (ref 26.8–34.3)
MCHC RBC AUTO-ENTMCNC: 33.1 G/DL (ref 31.4–37.4)
MCV RBC AUTO: 90 FL (ref 82–98)
MONOCYTES # BLD AUTO: 0.82 THOUSAND/ΜL (ref 0.17–1.22)
MONOCYTES NFR BLD AUTO: 8 % (ref 4–12)
NEUTROPHILS # BLD AUTO: 6.09 THOUSANDS/ΜL (ref 1.85–7.62)
NEUTS SEG NFR BLD AUTO: 61 % (ref 43–75)
NITRITE UR QL STRIP: POSITIVE
NON-SQ EPI CELLS URNS QL MICRO: ABNORMAL /HPF
NRBC BLD AUTO-RTO: 0 /100 WBCS
NT-PROBNP SERPL-MCNC: 192 PG/ML
PH UR STRIP.AUTO: 7 [PH]
PLATELET # BLD AUTO: 407 THOUSANDS/UL (ref 149–390)
PMV BLD AUTO: 9.3 FL (ref 8.9–12.7)
POTASSIUM SERPL-SCNC: 3 MMOL/L (ref 3.5–5.3)
PROT SERPL-MCNC: 8.4 G/DL (ref 6.4–8.2)
PROT UR STRIP-MCNC: NEGATIVE MG/DL
PROTHROMBIN TIME: 12.5 SECONDS (ref 11.6–14.5)
RBC # BLD AUTO: 5.54 MILLION/UL (ref 3.88–5.62)
RBC #/AREA URNS AUTO: ABNORMAL /HPF
RSV RNA RESP QL NAA+PROBE: NEGATIVE
SARS-COV-2 RNA RESP QL NAA+PROBE: POSITIVE
SODIUM SERPL-SCNC: 137 MMOL/L (ref 136–145)
SP GR UR STRIP.AUTO: <=1.005 (ref 1–1.03)
TROPONIN I SERPL-MCNC: 0.02 NG/ML
UROBILINOGEN UR QL STRIP.AUTO: 1 E.U./DL
WBC # BLD AUTO: 10.02 THOUSAND/UL (ref 4.31–10.16)
WBC #/AREA URNS AUTO: ABNORMAL /HPF
WBC CLUMPS # UR AUTO: PRESENT /UL

## 2021-09-14 PROCEDURE — 99285 EMERGENCY DEPT VISIT HI MDM: CPT | Performed by: EMERGENCY MEDICINE

## 2021-09-14 PROCEDURE — 1124F ACP DISCUSS-NO DSCNMKR DOCD: CPT | Performed by: EMERGENCY MEDICINE

## 2021-09-14 PROCEDURE — 81001 URINALYSIS AUTO W/SCOPE: CPT | Performed by: EMERGENCY MEDICINE

## 2021-09-14 PROCEDURE — 80053 COMPREHEN METABOLIC PANEL: CPT | Performed by: EMERGENCY MEDICINE

## 2021-09-14 PROCEDURE — 71275 CT ANGIOGRAPHY CHEST: CPT

## 2021-09-14 PROCEDURE — 85025 COMPLETE CBC W/AUTO DIFF WBC: CPT | Performed by: EMERGENCY MEDICINE

## 2021-09-14 PROCEDURE — 99285 EMERGENCY DEPT VISIT HI MDM: CPT

## 2021-09-14 PROCEDURE — 87086 URINE CULTURE/COLONY COUNT: CPT | Performed by: EMERGENCY MEDICINE

## 2021-09-14 PROCEDURE — 93005 ELECTROCARDIOGRAM TRACING: CPT

## 2021-09-14 PROCEDURE — 0241U HB NFCT DS VIR RESP RNA 4 TRGT: CPT | Performed by: EMERGENCY MEDICINE

## 2021-09-14 PROCEDURE — 71045 X-RAY EXAM CHEST 1 VIEW: CPT

## 2021-09-14 PROCEDURE — XW033E5 INTRODUCTION OF REMDESIVIR ANTI-INFECTIVE INTO PERIPHERAL VEIN, PERCUTANEOUS APPROACH, NEW TECHNOLOGY GROUP 5: ICD-10-PCS | Performed by: STUDENT IN AN ORGANIZED HEALTH CARE EDUCATION/TRAINING PROGRAM

## 2021-09-14 PROCEDURE — 83605 ASSAY OF LACTIC ACID: CPT | Performed by: EMERGENCY MEDICINE

## 2021-09-14 PROCEDURE — 99223 1ST HOSP IP/OBS HIGH 75: CPT | Performed by: NURSE PRACTITIONER

## 2021-09-14 PROCEDURE — 85379 FIBRIN DEGRADATION QUANT: CPT | Performed by: EMERGENCY MEDICINE

## 2021-09-14 PROCEDURE — 84484 ASSAY OF TROPONIN QUANT: CPT | Performed by: EMERGENCY MEDICINE

## 2021-09-14 PROCEDURE — 83690 ASSAY OF LIPASE: CPT | Performed by: EMERGENCY MEDICINE

## 2021-09-14 PROCEDURE — 85610 PROTHROMBIN TIME: CPT | Performed by: EMERGENCY MEDICINE

## 2021-09-14 PROCEDURE — 86140 C-REACTIVE PROTEIN: CPT | Performed by: NURSE PRACTITIONER

## 2021-09-14 PROCEDURE — 85730 THROMBOPLASTIN TIME PARTIAL: CPT | Performed by: EMERGENCY MEDICINE

## 2021-09-14 PROCEDURE — 36415 COLL VENOUS BLD VENIPUNCTURE: CPT | Performed by: EMERGENCY MEDICINE

## 2021-09-14 PROCEDURE — 83880 ASSAY OF NATRIURETIC PEPTIDE: CPT | Performed by: EMERGENCY MEDICINE

## 2021-09-14 PROCEDURE — 87040 BLOOD CULTURE FOR BACTERIA: CPT | Performed by: EMERGENCY MEDICINE

## 2021-09-14 PROCEDURE — 82948 REAGENT STRIP/BLOOD GLUCOSE: CPT

## 2021-09-14 RX ORDER — FINASTERIDE 5 MG/1
5 TABLET, FILM COATED ORAL DAILY
Status: DISCONTINUED | OUTPATIENT
Start: 2021-09-15 | End: 2021-09-18 | Stop reason: HOSPADM

## 2021-09-14 RX ORDER — CEFTRIAXONE 1 G/50ML
1000 INJECTION, SOLUTION INTRAVENOUS ONCE
Status: COMPLETED | OUTPATIENT
Start: 2021-09-14 | End: 2021-09-14

## 2021-09-14 RX ORDER — FLUTICASONE FUROATE AND VILANTEROL 200; 25 UG/1; UG/1
1 POWDER RESPIRATORY (INHALATION) DAILY
Status: DISCONTINUED | OUTPATIENT
Start: 2021-09-15 | End: 2021-09-18 | Stop reason: HOSPADM

## 2021-09-14 RX ORDER — POTASSIUM CHLORIDE 20 MEQ/1
40 TABLET, EXTENDED RELEASE ORAL ONCE
Status: COMPLETED | OUTPATIENT
Start: 2021-09-14 | End: 2021-09-14

## 2021-09-14 RX ORDER — DEXAMETHASONE SODIUM PHOSPHATE 4 MG/ML
6 INJECTION, SOLUTION INTRA-ARTICULAR; INTRALESIONAL; INTRAMUSCULAR; INTRAVENOUS; SOFT TISSUE EVERY 24 HOURS
Status: DISCONTINUED | OUTPATIENT
Start: 2021-09-14 | End: 2021-09-18

## 2021-09-14 RX ORDER — FINASTERIDE 5 MG/1
TABLET, FILM COATED ORAL
COMMUNITY
Start: 2021-07-07

## 2021-09-14 RX ORDER — ATORVASTATIN CALCIUM 40 MG/1
40 TABLET, FILM COATED ORAL DAILY
COMMUNITY

## 2021-09-14 RX ORDER — ASPIRIN 81 MG/1
81 TABLET ORAL DAILY
Status: DISCONTINUED | OUTPATIENT
Start: 2021-09-15 | End: 2021-09-18 | Stop reason: HOSPADM

## 2021-09-14 RX ORDER — FUROSEMIDE 40 MG/1
TABLET ORAL
COMMUNITY
Start: 2021-06-10

## 2021-09-14 RX ORDER — ALBUTEROL SULFATE 2.5 MG/3ML
1 SOLUTION RESPIRATORY (INHALATION) ONCE
Status: COMPLETED | OUTPATIENT
Start: 2021-09-14 | End: 2021-09-14

## 2021-09-14 RX ORDER — ALBUTEROL SULFATE 90 UG/1
2 AEROSOL, METERED RESPIRATORY (INHALATION) EVERY 4 HOURS PRN
Status: DISCONTINUED | OUTPATIENT
Start: 2021-09-14 | End: 2021-09-18 | Stop reason: HOSPADM

## 2021-09-14 RX ORDER — TAMSULOSIN HYDROCHLORIDE 0.4 MG/1
0.4 CAPSULE ORAL DAILY
Status: DISCONTINUED | OUTPATIENT
Start: 2021-09-15 | End: 2021-09-18 | Stop reason: HOSPADM

## 2021-09-14 RX ORDER — FOLIC ACID 1 MG/1
TABLET ORAL DAILY
COMMUNITY
End: 2022-04-08 | Stop reason: HOSPADM

## 2021-09-14 RX ORDER — IPRATROPIUM BROMIDE AND ALBUTEROL SULFATE .5; 3 MG/3ML; MG/3ML
1 SOLUTION RESPIRATORY (INHALATION) ONCE
Status: COMPLETED | OUTPATIENT
Start: 2021-09-14 | End: 2021-09-14

## 2021-09-14 RX ORDER — GABAPENTIN 100 MG/1
CAPSULE ORAL
COMMUNITY
Start: 2021-07-12 | End: 2022-04-08 | Stop reason: HOSPADM

## 2021-09-14 RX ORDER — ALBUTEROL SULFATE 90 UG/1
2 AEROSOL, METERED RESPIRATORY (INHALATION) EVERY 6 HOURS PRN
COMMUNITY

## 2021-09-14 RX ORDER — ATORVASTATIN CALCIUM 40 MG/1
40 TABLET, FILM COATED ORAL
Status: DISCONTINUED | OUTPATIENT
Start: 2021-09-14 | End: 2021-09-18 | Stop reason: HOSPADM

## 2021-09-14 RX ORDER — FUROSEMIDE 40 MG/1
40 TABLET ORAL DAILY
Status: DISCONTINUED | OUTPATIENT
Start: 2021-09-15 | End: 2021-09-18 | Stop reason: HOSPADM

## 2021-09-14 RX ORDER — ONDANSETRON HYDROCHLORIDE 8 MG/1
TABLET, FILM COATED ORAL EVERY 8 HOURS PRN
COMMUNITY

## 2021-09-14 RX ORDER — MELOXICAM 15 MG/1
TABLET ORAL
COMMUNITY
Start: 2021-07-01

## 2021-09-14 RX ORDER — GABAPENTIN 100 MG/1
100 CAPSULE ORAL 2 TIMES DAILY
Status: DISCONTINUED | OUTPATIENT
Start: 2021-09-14 | End: 2021-09-18 | Stop reason: HOSPADM

## 2021-09-14 RX ORDER — TAMSULOSIN HYDROCHLORIDE 0.4 MG/1
CAPSULE ORAL
COMMUNITY
Start: 2021-05-05

## 2021-09-14 RX ADMIN — GABAPENTIN 100 MG: 100 CAPSULE ORAL at 18:39

## 2021-09-14 RX ADMIN — ENOXAPARIN SODIUM 105 MG: 120 INJECTION SUBCUTANEOUS at 21:03

## 2021-09-14 RX ADMIN — INSULIN LISPRO 2 UNITS: 100 INJECTION, SOLUTION INTRAVENOUS; SUBCUTANEOUS at 22:56

## 2021-09-14 RX ADMIN — REMDESIVIR 200 MG: 100 INJECTION, POWDER, LYOPHILIZED, FOR SOLUTION INTRAVENOUS at 19:48

## 2021-09-14 RX ADMIN — DEXAMETHASONE SODIUM PHOSPHATE 6 MG: 4 INJECTION INTRA-ARTICULAR; INTRALESIONAL; INTRAMUSCULAR; INTRAVENOUS; SOFT TISSUE at 18:38

## 2021-09-14 RX ADMIN — SODIUM CHLORIDE 1000 ML: 0.9 INJECTION, SOLUTION INTRAVENOUS at 16:16

## 2021-09-14 RX ADMIN — POTASSIUM CHLORIDE 40 MEQ: 1500 TABLET, EXTENDED RELEASE ORAL at 16:16

## 2021-09-14 RX ADMIN — CEFTRIAXONE 1000 MG: 1 INJECTION, SOLUTION INTRAVENOUS at 17:00

## 2021-09-14 RX ADMIN — DOXYCYCLINE 100 MG: 100 INJECTION, POWDER, LYOPHILIZED, FOR SOLUTION INTRAVENOUS at 18:39

## 2021-09-14 RX ADMIN — ATORVASTATIN CALCIUM 40 MG: 40 TABLET, FILM COATED ORAL at 21:03

## 2021-09-14 RX ADMIN — IOHEXOL 100 ML: 350 INJECTION, SOLUTION INTRAVENOUS at 16:17

## 2021-09-14 RX ADMIN — SODIUM CHLORIDE 1500 ML: 0.9 INJECTION, SOLUTION INTRAVENOUS at 16:16

## 2021-09-14 NOTE — ED PROVIDER NOTES
History  Chief Complaint   Patient presents with    Shortness of Breath     Pt comes from his PCP office with SOB for 3 weeks, on arrival pt was saturating at 83% on RA  Hx of lung cancer as well     Patient complains of worsening shortness of breath over the past 3 weeks  States he has a cough that is mainly nonproductive but occasionally brings up clear phlegm  No fevers or chills  No nausea or vomiting or diarrhea  Does have a history of COPD but no home oxygen  Was at his PCPs office today and his pulse ox was reading 83%  No leg swelling  Takes Lasix because of leg swelling  No history of CHF  Complains of dyspnea on exertion and PND and orthopnea  History provided by:  Patient   used: No    Shortness of Breath  Severity:  Mild  Onset quality:  Gradual  Duration:  1 month  Timing:  Constant  Progression:  Worsening  Chronicity:  New  Context: URI    Context: not animal exposure and not occupational exposure    Relieved by:  Nothing  Exacerbated by: Exertion and sitting up  Ineffective treatments:  None tried  Associated symptoms: cough and sputum production    Associated symptoms: no abdominal pain, no chest pain, no diaphoresis, no ear pain, no fever, no headaches, no hemoptysis, no neck pain, no rash, no sore throat, no syncope, no swollen glands, no vomiting and no wheezing        Prior to Admission Medications   Prescriptions Last Dose Informant Patient Reported? Taking?    Aspirin Buf,CaCarb-MgCarb-MgO, 81 MG TABS   Yes No   Sig: Take 81 mg by mouth   albuterol (PROVENTIL HFA,VENTOLIN HFA) 90 mcg/act inhaler   Yes No   Sig: Inhale 2 puffs every 6 (six) hours as needed   finasteride (PROSCAR) 5 mg tablet   Yes Yes   Sig: take 1 tablet by mouth once daily   folic acid (FOLVITE) 1 mg tablet   Yes Yes   Sig: Take by mouth daily   furosemide (LASIX) 40 mg tablet   Yes Yes   Sig: take 1 tablet by mouth once daily   gabapentin (NEURONTIN) 100 mg capsule   Yes Yes   Sig: take 1 capsule by mouth twice a day   meloxicam (MOBIC) 15 mg tablet   Yes Yes   Sig: take 1 tablet by mouth once daily   metFORMIN (GLUCOPHAGE) 500 mg tablet   Yes No   Sig: Take 500 mg by mouth   ondansetron (ZOFRAN) 8 mg tablet   Yes Yes   Sig: Take by mouth every 8 (eight) hours as needed for nausea or vomiting   tamsulosin (FLOMAX) 0 4 mg   Yes Yes   Sig: take 1 capsule by mouth once daily   tiotropium (SPIRIVA) 18 mcg inhalation capsule   Yes Yes   Sig: Place 18 mcg into inhaler and inhale daily      Facility-Administered Medications: None       Past Medical History:   Diagnosis Date    CHF (congestive heart failure) ([facility] )     Diabetes mellitus ([facility] )     Lung cancer ([place] [address] )        History reviewed  No pertinent surgical history  History reviewed  No pertinent family history  I have reviewed and agree with the history as documented  E-Cigarette/Vaping     E-Cigarette/Vaping Substances     Social History     Tobacco Use    Smoking status: Current Every Day Smoker     Packs/day: 1 00    Smokeless tobacco: Never Used   Substance Use Topics    Alcohol use: Not Currently    Drug use: Not Currently       Review of Systems   Constitutional: Negative for chills, diaphoresis and fever  HENT: Negative for ear pain, hearing loss, sore throat, trouble swallowing and voice change  Eyes: Negative for pain and discharge  Respiratory: Positive for cough, sputum production and shortness of breath  Negative for hemoptysis and wheezing  Cardiovascular: Negative for chest pain, palpitations and syncope  Gastrointestinal: Negative for abdominal pain, blood in stool, constipation, diarrhea, nausea and vomiting  Genitourinary: Negative for dysuria, flank pain, frequency and hematuria  Musculoskeletal: Negative for joint swelling, neck pain and neck stiffness  Skin: Negative for rash and wound  Neurological: Negative for dizziness, seizures, syncope, facial asymmetry and headaches     Psychiatric/Behavioral: Negative for hallucinations, self-injury and suicidal ideas  All other systems reviewed and are negative  Physical Exam  Physical Exam  Vitals and nursing note reviewed  Constitutional:       General: He is not in acute distress  Appearance: He is well-developed  HENT:      Head: Normocephalic and atraumatic  Right Ear: External ear normal       Left Ear: External ear normal    Eyes:      General: No scleral icterus  Right eye: No discharge  Left eye: No discharge  Extraocular Movements: Extraocular movements intact  Conjunctiva/sclera: Conjunctivae normal    Cardiovascular:      Rate and Rhythm: Normal rate and regular rhythm  Heart sounds: Normal heart sounds  No murmur heard  Pulmonary:      Effort: Tachypnea and accessory muscle usage present  Breath sounds: No wheezing or rales  Comments: Decreased left base with crackles on bibasilar regions  Abdominal:      General: Bowel sounds are normal  There is no distension  Palpations: Abdomen is soft  Tenderness: There is no abdominal tenderness  There is no guarding or rebound  Musculoskeletal:         General: No deformity  Normal range of motion  Cervical back: Normal range of motion and neck supple  Skin:     General: Skin is warm and dry  Findings: No rash  Neurological:      General: No focal deficit present  Mental Status: He is alert and oriented to person, place, and time  Cranial Nerves: No cranial nerve deficit  Psychiatric:         Mood and Affect: Mood normal          Behavior: Behavior normal          Thought Content:  Thought content normal          Judgment: Judgment normal          Vital Signs  ED Triage Vitals [09/14/21 1511]   Temperature Pulse Respirations Blood Pressure SpO2   98 6 °F (37 °C) 94 20 125/81 93 %      Temp Source Heart Rate Source Patient Position - Orthostatic VS BP Location FiO2 (%)   Oral Monitor Lying Left arm --      Pain Score No Pain           Vitals:    09/14/21 1511 09/14/21 1615 09/14/21 1630   BP: 125/81 124/81 134/85   Pulse: 94 105 92   Patient Position - Orthostatic VS: Lying Lying Lying         Visual Acuity      ED Medications  Medications   sodium chloride 0 9 % bolus 1,000 mL (1,000 mL Intravenous New Bag 9/14/21 1616)   sodium chloride 0 9 % bolus 1,500 mL (1,500 mL Intravenous New Bag 9/14/21 1616)   cefTRIAXone (ROCEPHIN) IVPB (premix in dextrose) 1,000 mg 50 mL (has no administration in time range)   doxycycline (VIBRAMYCIN) 100 mg in sodium chloride 0 9 % 100 mL IVPB (has no administration in time range)   ipratropium-albuterol (FOR EMS ONLY) (DUO-NEB) 0 5-2 5 mg/3 mL inhalation solution 3 mL (0 mL Does not apply Given to EMS 9/14/21 1610)   albuterol (FOR EMS ONLY) (2 5 mg/3 mL) 0 083 % inhalation solution 2 5 mg (0 mg Does not apply Given to EMS 9/14/21 1610)   potassium chloride (K-DUR,KLOR-CON) CR tablet 40 mEq (40 mEq Oral Given 9/14/21 1616)   iohexol (OMNIPAQUE) 350 MG/ML injection (SINGLE-DOSE) 100 mL (100 mL Intravenous Given 9/14/21 1617)       Diagnostic Studies  Results Reviewed     Procedure Component Value Units Date/Time    Blood culture #1 [[id number]] Collected: 09/14/21 1615    Lab Status: In process Specimen: Blood from Arm, Right Updated: 09/14/21 1622    Blood culture #2 [[id number]] Collected: 09/14/21 1615    Lab Status: In process Specimen: Blood from Arm, Right Updated: 09/14/21 1622    COVID19, Influ[facility] A/B, [facility] [[id number]]  (Abnormal) Collected: 09/14/21 1523    Lab Status: Final result Specimen: Nares from Nasopharyngeal Swab Updated: 09/14/21 1620     SARS-CoV-2 Positive     INFLUENZA A PCR Negative     INFLUENZA B PCR Negative     RSV PCR Negative    Narrative: This test has been authorized by FDA under an EUA (Emergency Use Assay) for use by authorized laboratories    Clinical caution and judgement should be used with the interpretation of these results with consideration of the clinical impression and other laboratory testing  Testing reported as "Positive" or "Negative" has been proven to be accurate according to standard laboratory validation requirements  All testing is performed with control materials showing appropriate reactivity at standard intervals  D-Dimer [[id number]]  (Abnormal) Collected: 09/14/21 1523    Lab Status: Final result Specimen: Blood from Arm, Right Updated: 09/14/21 1609     D-Dimer, Quant 2 67 ug/ml FEU     Protime-INR [[id number]]  (Normal) Collected: 09/14/21 1523    Lab Status: Final result Specimen: Blood from Arm, Right Updated: 09/14/21 1605     Protime 12 5 seconds      INR 0 94    APTT [[id number]]  (Normal) Collected: 09/14/21 1523    Lab Status: Final result Specimen: Blood from Arm, Right Updated: 09/14/21 1605     PTT 29 seconds     Lactic acid [[id number]]  (Abnormal) Collected: 09/14/21 1523    Lab Status: Final result Specimen: Blood from Arm, Right Updated: 09/14/21 1601     LACTIC ACID 4 3 mmol/L     Narrative:      Result may be elevated if tourniquet was used during collection      Lactic acid 2 Hours [[id number]]     Lab Status: No result Specimen: Blood     Comprehensive metabolic panel [[id number]]  (Abnormal) Collected: 09/14/21 1523    Lab Status: Final result Specimen: Blood from Arm, Right Updated: 09/14/21 1559     Sodium 137 mmol/L      Potassium 3 0 mmol/L      Chloride 95 mmol/L      CO2 31 mmol/L      ANION GAP 11 mmol/L      BUN 16 mg/dL      Creatinine 1 17 mg/dL      Glucose 132 mg/dL      Calcium 9 4 mg/dL      AST 58 U/L      ALT 94 U/L      Alkaline Phosphatase 80 U/L      Total Protein 8 4 g/dL      Albumin 3 8 g/dL      Total Bilirubin 1 15 mg/dL      eGFR 65 ml/min/1 73sq m     Narrative:      [place] guidelines for Chronic Kidney Disease (CKD):     Stage 1 with normal or high GFR (GFR > 90 mL/min/1 73 square meters)    Stage 2 Mild CKD (GFR = 60-89 mL/min/1 73 square meters)    Stage 3A Moderate CKD (GFR = 45-59 mL/min/1 73 square meters)    Stage 3B Moderate CKD (GFR = 30-44 mL/min/1 73 square meters)    Stage 4 Severe CKD (GFR = 15-29 mL/min/1 73 square meters)    Stage 5 End Stage CKD (GFR <15 mL/min/1 73 square meters)  Note: GFR calculation is accurate only with a steady state creatinine    Lipase [[id number]]  (Normal) Collected: 09/14/21 1523    Lab Status: Final result Specimen: Blood from Arm, Right Updated: 09/14/21 1559     Lipase 78 u/L     NT-BNP PRO [[id number]]  (Abnormal) Collected: 09/14/21 1523    Lab Status: Final result Specimen: Blood from Arm, Right Updated: 09/14/21 1559     NT-proBNP 192 pg/mL     Troponin I [[id number]]  (Normal) Collected: 09/14/21 1523    Lab Status: Final result Specimen: Blood from Arm, Right Updated: 09/14/21 1554     Troponin I 0 02 ng/mL     CBC and differential [[id number]]  (Abnormal) Collected: 09/14/21 1523    Lab Status: Final result Specimen: Blood from Arm, Right Updated: 09/14/21 1535     WBC 10 02 Thousand/uL      RBC 5 54 Million/uL      Hemoglobin 16 6 g/dL      Hematocrit 50 1 %      MCV 90 fL      MCH 30 0 pg      MCHC 33 1 g/dL      RDW 12 9 %      MPV 9 3 fL      Platelets 753 Thousands/uL      nRBC 0 /100 WBCs      Neutrophils Relative 61 %      Immat GRANS % 1 %      Lymphocytes Relative 27 %      Monocytes Relative 8 %      Eosinophils Relative 2 %      Basophils Relative 1 %      Neutrophils Absolute 6 09 Thousands/µL      Immature Grans Absolute 0 11 Thousand/uL      Lymphocytes Absolute 2 67 Thousands/µL      Monocytes Absolute 0 82 Thousand/µL      Eosinophils Absolute 0 24 Thousand/µL      Basophils Absolute 0 09 Thousands/µL     UA w Reflex to Microscopic w Reflex to Culture [[id number]]     Lab Status: No result Specimen: Urine                  CTA ED chest PE study   Final Result by Robin Prince MD (09/14 1647)      No PE identified  New hilar and mediastinal lymphadenopathy        Chronic treated neoplasm in the right upper lobe, questioned contour change largest right upper lobe opacity  Recurrent neoplasm cannot be completely excluded  Consider short interval follow-up in 3 months and/or PET/CT  No new mass identified  Other nonemergent and chronic findings above  Workstation performed: [id number]         XR chest 1 view portable   Final Result by Shira Ocasio MD (09/14 7861)      Vague groundglass opacity in left  Right upper lobe opacity probably represents scarring from the patient's treated lung carcinoma  Some additional density could be related to some superimposed inflammation  Early pneumonia is possible  Covid 19 is not excluded      This was discussed with Dr Marla Angelo at 3:50 PM                  Workstation performed: [id number]                    Procedures  ECG 12 Lead Documentation Only    Date/Time: 9/14/2021 3:14 PM  Performed by: Ericka Jeong MD  Authorized by: Ericka Jeong MD     ECG reviewed by me, the ED Provider: yes    Patient location:  ED  Previous ECG:     Previous ECG:  Unavailable  Interpretation:     Interpretation: non-specific    Rate:     ECG rate:  90  Rhythm:     Rhythm: sinus rhythm    Ectopy:     Ectopy: none    QRS:     QRS axis:  Normal             ED Course  ED Course as of Sep 14 1649   Tue Sep 14, 2021   1606 Patient is morbidly obese with a BMI greater than 30  Will use a ideal body weight for the IV fluid bolus  SBIRT 20yo+      Most Recent Value   SBIRT (24 yo +)   In order to provide better care to our patients, we are screening all of our patients for alcohol and drug use  Would it be okay to ask you these screening questions? Yes Filed at: 09/14/2021 1625   Initial Alcohol Screen: US AUDIT-C    1  How often do you have a drink containing alcohol?  0 Filed at: 09/14/2021 1625   2  How many drinks containing alcohol do you have on a typical day you are drinking? 0 Filed at: 09/14/2021 1625   3a   Male UNDER 65: How often do you have five or more drinks on one occasion? 0 Filed at: 09/14/2021 1625   3b  FEMALE Any Age, or MALE 65+: How often do you have 4 or more drinks on one occassion? 0 Filed at: 09/14/2021 1625   Audit-C Score  0 Filed at: 09/14/2021 1625   WILDA: How many times in the past year have you    Used an illegal drug or used a prescription medication for non-medical reasons? Never Filed at: 09/14/2021 1625                    MDM  Number of Diagnoses or Management Options     Amount and/or Complexity of Data Reviewed  Clinical lab tests: reviewed  Review and summarize past medical records: yes        Disposition  Final diagnoses:   Pneumonia   Hypokalemia   Hypoxia   COVID-19 virus infection   Pneumonia due to COVID-19 virus   Hilar adenopathy     Time reflects when diagnosis was documented in both MDM as applicable and the Disposition within this note     Time User Action Codes Description Comment    9/14/2021  4:09 PM Alta Lambing Add [J18 9] Pneumonia     9/14/2021  4:09 PM Visperas, Perla Pouch Add [E87 6] Hypokalemia     9/14/2021  4:21 PM Visperas, Perla Pouch Add [R09 02] Hypoxia     9/14/2021  4:21 PM VisperasNinfa Add [U07 1] COVID-19 virus infection     9/14/2021  4:21 PM Visperas, Perla Pouch Add [U07 1,  J12 82] Pneumonia due to COVID-19 virus     9/14/2021  4:49 PM Visperas, Perla Pouch Add [R59 0] Hilar adenopathy       ED Disposition     ED Disposition Condition Date/Time Comment    Admit Stable Tue Sep 14, 2021  4:27 PM Case was discussed with Dr Mamta Gomez and the patient's admission status was agreed to be to the service of Dr Candida Chavira    None         Patient's Medications   Discharge Prescriptions    No medications on file     No discharge procedures on file      PDMP Review     None          ED Provider  Electronically Signed by           Joesph Waters MD  09/14/21 2997

## 2021-09-15 PROBLEM — A41.9 SEPSIS WITH ACUTE ORGAN DYSFUNCTION (HCC): Status: ACTIVE | Noted: 2021-09-15

## 2021-09-15 PROBLEM — E11.9 TYPE 2 DIABETES MELLITUS, WITHOUT LONG-TERM CURRENT USE OF INSULIN (HCC): Status: ACTIVE | Noted: 2021-09-15

## 2021-09-15 PROBLEM — N40.1 BPH WITH OBSTRUCTION/LOWER URINARY TRACT SYMPTOMS: Status: ACTIVE | Noted: 2021-09-15

## 2021-09-15 PROBLEM — J44.1 COPD WITH ACUTE EXACERBATION (HCC): Status: ACTIVE | Noted: 2021-09-15

## 2021-09-15 PROBLEM — N13.8 BPH WITH OBSTRUCTION/LOWER URINARY TRACT SYMPTOMS: Status: ACTIVE | Noted: 2021-09-15

## 2021-09-15 PROBLEM — Z72.0 TOBACCO ABUSE: Status: ACTIVE | Noted: 2021-09-15

## 2021-09-15 PROBLEM — Z85.118 HISTORY OF LUNG CANCER: Status: ACTIVE | Noted: 2021-09-15

## 2021-09-15 PROBLEM — R65.20 SEPSIS WITH ACUTE ORGAN DYSFUNCTION (HCC): Status: ACTIVE | Noted: 2021-09-15

## 2021-09-15 LAB
ALBUMIN SERPL BCP-MCNC: 2.8 G/DL (ref 3.5–5)
ALP SERPL-CCNC: 66 U/L (ref 46–116)
ALT SERPL W P-5'-P-CCNC: 76 U/L (ref 12–78)
ANION GAP SERPL CALCULATED.3IONS-SCNC: 8 MMOL/L (ref 4–13)
AST SERPL W P-5'-P-CCNC: 39 U/L (ref 5–45)
BASOPHILS # BLD AUTO: 0.06 THOUSANDS/ΜL (ref 0–0.1)
BASOPHILS NFR BLD AUTO: 1 % (ref 0–1)
BILIRUB SERPL-MCNC: 0.56 MG/DL (ref 0.2–1)
BUN SERPL-MCNC: 18 MG/DL (ref 5–25)
CALCIUM ALBUM COR SERPL-MCNC: 9.4 MG/DL (ref 8.3–10.1)
CALCIUM SERPL-MCNC: 8.4 MG/DL (ref 8.3–10.1)
CHLORIDE SERPL-SCNC: 101 MMOL/L (ref 100–108)
CO2 SERPL-SCNC: 28 MMOL/L (ref 21–32)
CREAT SERPL-MCNC: 1.04 MG/DL (ref 0.6–1.3)
CRP SERPL QL: 17.4 MG/L
EOSINOPHIL # BLD AUTO: 0.02 THOUSAND/ΜL (ref 0–0.61)
EOSINOPHIL NFR BLD AUTO: 0 % (ref 0–6)
ERYTHROCYTE [DISTWIDTH] IN BLOOD BY AUTOMATED COUNT: 12.9 % (ref 11.6–15.1)
EST. AVERAGE GLUCOSE BLD GHB EST-MCNC: 180 MG/DL
GFR SERPL CREATININE-BSD FRML MDRD: 74 ML/MIN/1.73SQ M
GLUCOSE SERPL-MCNC: 140 MG/DL (ref 65–140)
GLUCOSE SERPL-MCNC: 172 MG/DL (ref 65–140)
GLUCOSE SERPL-MCNC: 193 MG/DL (ref 65–140)
GLUCOSE SERPL-MCNC: 206 MG/DL (ref 65–140)
GLUCOSE SERPL-MCNC: 220 MG/DL (ref 65–140)
HBA1C MFR BLD: 7.9 %
HCT VFR BLD AUTO: 43.5 % (ref 36.5–49.3)
HGB BLD-MCNC: 14.2 G/DL (ref 12–17)
IMM GRANULOCYTES # BLD AUTO: 0.08 THOUSAND/UL (ref 0–0.2)
IMM GRANULOCYTES NFR BLD AUTO: 1 % (ref 0–2)
LYMPHOCYTES # BLD AUTO: 1.35 THOUSANDS/ΜL (ref 0.6–4.47)
LYMPHOCYTES NFR BLD AUTO: 16 % (ref 14–44)
MAGNESIUM SERPL-MCNC: 1.9 MG/DL (ref 1.6–2.6)
MCH RBC QN AUTO: 29.6 PG (ref 26.8–34.3)
MCHC RBC AUTO-ENTMCNC: 32.6 G/DL (ref 31.4–37.4)
MCV RBC AUTO: 91 FL (ref 82–98)
MONOCYTES # BLD AUTO: 0.61 THOUSAND/ΜL (ref 0.17–1.22)
MONOCYTES NFR BLD AUTO: 7 % (ref 4–12)
NEUTROPHILS # BLD AUTO: 6.4 THOUSANDS/ΜL (ref 1.85–7.62)
NEUTS SEG NFR BLD AUTO: 75 % (ref 43–75)
NRBC BLD AUTO-RTO: 0 /100 WBCS
PLATELET # BLD AUTO: 398 THOUSANDS/UL (ref 149–390)
PMV BLD AUTO: 9.5 FL (ref 8.9–12.7)
POTASSIUM SERPL-SCNC: 3.6 MMOL/L (ref 3.5–5.3)
PROT SERPL-MCNC: 6.7 G/DL (ref 6.4–8.2)
RBC # BLD AUTO: 4.8 MILLION/UL (ref 3.88–5.62)
SODIUM SERPL-SCNC: 137 MMOL/L (ref 136–145)
WBC # BLD AUTO: 8.52 THOUSAND/UL (ref 4.31–10.16)

## 2021-09-15 PROCEDURE — 82948 REAGENT STRIP/BLOOD GLUCOSE: CPT

## 2021-09-15 PROCEDURE — 80053 COMPREHEN METABOLIC PANEL: CPT | Performed by: NURSE PRACTITIONER

## 2021-09-15 PROCEDURE — 99233 SBSQ HOSP IP/OBS HIGH 50: CPT | Performed by: FAMILY MEDICINE

## 2021-09-15 PROCEDURE — 83735 ASSAY OF MAGNESIUM: CPT | Performed by: NURSE PRACTITIONER

## 2021-09-15 PROCEDURE — 86140 C-REACTIVE PROTEIN: CPT | Performed by: NURSE PRACTITIONER

## 2021-09-15 PROCEDURE — 85025 COMPLETE CBC W/AUTO DIFF WBC: CPT | Performed by: NURSE PRACTITIONER

## 2021-09-15 PROCEDURE — 83036 HEMOGLOBIN GLYCOSYLATED A1C: CPT | Performed by: NURSE PRACTITIONER

## 2021-09-15 RX ORDER — ALBUTEROL SULFATE 90 UG/1
2 AEROSOL, METERED RESPIRATORY (INHALATION) 4 TIMES DAILY
Status: DISCONTINUED | OUTPATIENT
Start: 2021-09-15 | End: 2021-09-18 | Stop reason: HOSPADM

## 2021-09-15 RX ORDER — INSULIN GLARGINE 100 [IU]/ML
10 INJECTION, SOLUTION SUBCUTANEOUS EVERY MORNING
Status: DISCONTINUED | OUTPATIENT
Start: 2021-09-15 | End: 2021-09-18 | Stop reason: HOSPADM

## 2021-09-15 RX ADMIN — INSULIN LISPRO 2 UNITS: 100 INJECTION, SOLUTION INTRAVENOUS; SUBCUTANEOUS at 17:31

## 2021-09-15 RX ADMIN — REMDESIVIR 100 MG: 100 INJECTION, POWDER, LYOPHILIZED, FOR SOLUTION INTRAVENOUS at 17:31

## 2021-09-15 RX ADMIN — ALBUTEROL SULFATE 2 PUFF: 90 AEROSOL, METERED RESPIRATORY (INHALATION) at 08:49

## 2021-09-15 RX ADMIN — ALBUTEROL SULFATE 2 PUFF: 90 AEROSOL, METERED RESPIRATORY (INHALATION) at 21:32

## 2021-09-15 RX ADMIN — DEXAMETHASONE SODIUM PHOSPHATE 6 MG: 4 INJECTION INTRA-ARTICULAR; INTRALESIONAL; INTRAMUSCULAR; INTRAVENOUS; SOFT TISSUE at 17:31

## 2021-09-15 RX ADMIN — INSULIN LISPRO 2 UNITS: 100 INJECTION, SOLUTION INTRAVENOUS; SUBCUTANEOUS at 21:32

## 2021-09-15 RX ADMIN — INSULIN GLARGINE 10 UNITS: 100 INJECTION, SOLUTION SUBCUTANEOUS at 08:44

## 2021-09-15 RX ADMIN — ALBUTEROL SULFATE 2 PUFF: 90 AEROSOL, METERED RESPIRATORY (INHALATION) at 12:02

## 2021-09-15 RX ADMIN — TIOTROPIUM BROMIDE 18 MCG: 18 CAPSULE ORAL; RESPIRATORY (INHALATION) at 08:48

## 2021-09-15 RX ADMIN — GABAPENTIN 100 MG: 100 CAPSULE ORAL at 17:31

## 2021-09-15 RX ADMIN — ASPIRIN 81 MG: 81 TABLET, COATED ORAL at 08:44

## 2021-09-15 RX ADMIN — GABAPENTIN 100 MG: 100 CAPSULE ORAL at 08:44

## 2021-09-15 RX ADMIN — ENOXAPARIN SODIUM 105 MG: 120 INJECTION SUBCUTANEOUS at 08:45

## 2021-09-15 RX ADMIN — ENOXAPARIN SODIUM 105 MG: 120 INJECTION SUBCUTANEOUS at 21:32

## 2021-09-15 RX ADMIN — INSULIN LISPRO 1 UNITS: 100 INJECTION, SOLUTION INTRAVENOUS; SUBCUTANEOUS at 12:02

## 2021-09-15 RX ADMIN — FLUTICASONE FUROATE AND VILANTEROL TRIFENATATE 1 PUFF: 200; 25 POWDER RESPIRATORY (INHALATION) at 08:48

## 2021-09-15 RX ADMIN — FUROSEMIDE 40 MG: 40 TABLET ORAL at 08:44

## 2021-09-15 RX ADMIN — ATORVASTATIN CALCIUM 40 MG: 40 TABLET, FILM COATED ORAL at 21:32

## 2021-09-15 RX ADMIN — TAMSULOSIN HYDROCHLORIDE 0.4 MG: 0.4 CAPSULE ORAL at 08:44

## 2021-09-15 RX ADMIN — FINASTERIDE 5 MG: 5 TABLET, FILM COATED ORAL at 08:44

## 2021-09-15 RX ADMIN — ALBUTEROL SULFATE 2 PUFF: 90 AEROSOL, METERED RESPIRATORY (INHALATION) at 17:31

## 2021-09-15 NOTE — ASSESSMENT & PLAN NOTE
Lab Results   Component Value Date    HGBA1C 7 9 (H) 09/15/2021       Recent Labs     09/14/21  2210 09/15/21  0757 09/15/21  1036   POCGLU 216* 140 172*       Blood Sugar Average: Last 72 hrs:  (P) 176     Sliding scale coverage and Accu-Cheks  Hold home regimen metformin and glipizide  Initiate basal insulin coverage in setting of glucocorticoid use  Hypoglycemia protocol

## 2021-09-15 NOTE — ASSESSMENT & PLAN NOTE
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

## 2021-09-15 NOTE — ASSESSMENT & PLAN NOTE
· COPD with exacerbation   · Albuterol MDI q i d   With positive COVID  · Spiriva daily  · Inhaled corticosteroid daily  · Decadron per COVID protocol  · Wean to room air as tolerated

## 2021-09-15 NOTE — PLAN OF CARE
Problem: PAIN - ADULT  Goal: Verbalizes/displays adequate comfort level or baseline comfort level  Description: Interventions:  - Encourage patient to monitor pain and request assistance  - Assess pain using appropriate pain scale  - Administer analgesics based on type and severity of pain and evaluate response  - Implement non-pharmacological measures as appropriate and evaluate response  - Consider cultural and social influences on pain and pain management  - Notify physician/advanced practitioner if interventions unsuccessful or patient reports new pain  Outcome: Progressing     Problem: INFECTION - ADULT  Goal: Absence or prevention of progression during hospitalization  Description: INTERVENTIONS:  - Assess and monitor for signs and symptoms of infection  - Monitor lab/diagnostic results  - Monitor all insertion sites, i e  indwelling lines, tubes, and drains  - Monitor endotracheal if appropriate and nasal secretions for changes in amount and color  - [place] appropriate cooling/warming therapies per order  - Administer medications as ordered  - Instruct and encourage patient and family to use good hand hygiene technique  - Identify and instruct in appropriate isolation precautions for identified infection/condition  Outcome: Progressing  Goal: Absence of fever/infection during neutropenic period  Description: INTERVENTIONS:  - Monitor WBC    Outcome: Progressing     Problem: SAFETY ADULT  Goal: Patient will remain free of falls  Description: INTERVENTIONS:  - Educate patient/family on patient safety including physical limitations  - Instruct patient to call for assistance with activity   - Consult OT/PT to assist with strengthening/mobility   - Keep Call bell within reach  - Keep bed low and locked with side rails adjusted as appropriate  - Keep care items and personal belongings within reach  - Initiate and maintain comfort rounds  - Make Fall Risk Sign visible to staff  - Offer Toileting every 2 Hours, in advance of need  - Initiate/Maintain bed alarm  - Apply yellow socks and bracelet for high fall risk patients  - Consider moving patient to room near nurses station  Outcome: Progressing  Goal: Maintain or return to baseline ADL function  Description: INTERVENTIONS:  -  Assess patient's ability to carry out ADLs; assess patient's baseline for ADL function and identify physical deficits which impact ability to perform ADLs (bathing, care of mouth/teeth, toileting, grooming, dressing, etc )  - Assess/evaluate cause of self-care deficits   - Assess range of motion  - Assess patient's mobility; develop plan if impaired  - Assess patient's need for assistive devices and provide as appropriate  - Encourage maximum independence but intervene and supervise when necessary  - Involve family in performance of ADLs  - Assess for home care needs following discharge   - Consider OT consult to assist with ADL evaluation and planning for discharge  - Provide patient education as appropriate  Outcome: Progressing  Goal: Maintains/Returns to pre admission functional level  Description: INTERVENTIONS:  - Perform BMAT or MOVE assessment daily    - Set and communicate daily mobility goal to care team and patient/family/caregiver     - Collaborate with rehabilitation services on mobility goals if consulted  - Out of bed for toileting  - Record patient progress and toleration of activity level   Outcome: Progressing     Problem: DISCHARGE PLANNING  Goal: Discharge to home or other facility with appropriate resources  Description: INTERVENTIONS:  - Identify barriers to discharge w/patient and caregiver  - Arrange for needed discharge resources and transportation as appropriate  - Identify discharge learning needs (meds, wound care, etc )  - Arrange for interpretive services to assist at discharge as needed  - Refer to Case Management Department for coordinating discharge planning if the patient needs post-hospital services based on physician/advanced practitioner order or complex needs related to functional status, cognitive ability, or social support system  Outcome: Progressing     Problem: Knowledge Deficit  Goal: Patient/family/caregiver demonstrates understanding of disease process, treatment plan, medications, and discharge instructions  Description: Complete learning assessment and assess knowledge base    Interventions:  - Provide teaching at level of understanding  - Provide teaching via preferred learning methods  Outcome: Progressing     Problem: RESPIRATORY - ADULT  Goal: Achieves optimal ventilation and oxygenation  Description: INTERVENTIONS:  - Assess for changes in respiratory status  - Assess for changes in mentation and behavior  - Position to facilitate oxygenation and minimize respiratory effort  - Oxygen administered by appropriate delivery if ordered  - Initiate smoking cessation education as indicated  - Encourage broncho-pulmonary hygiene including cough, deep breathe, Incentive Spirometry  - Assess the need for suctioning and aspirate as needed  - Assess and instruct to report SOB or any respiratory difficulty  - Respiratory Therapy support as indicated  Outcome: Progressing     Problem: MOBILITY - ADULT  Goal: Maintain or return to baseline ADL function  Description: INTERVENTIONS:  -  Assess patient's ability to carry out ADLs; assess patient's baseline for ADL function and identify physical deficits which impact ability to perform ADLs (bathing, care of mouth/teeth, toileting, grooming, dressing, etc )  - Assess/evaluate cause of self-care deficits   - Assess range of motion  - Assess patient's mobility; develop plan if impaired  - Assess patient's need for assistive devices and provide as appropriate  - Encourage maximum independence but intervene and supervise when necessary  - Involve family in performance of ADLs  - Assess for home care needs following discharge   - Consider OT consult to assist with ADL evaluation and planning for discharge  - Provide patient education as appropriate  Outcome: Progressing  Goal: Maintains/Returns to pre admission functional level  Description: INTERVENTIONS:  - Perform BMAT or MOVE assessment daily    - Set and communicate daily mobility goal to care team and patient/family/caregiver     - Collaborate with rehabilitation services on mobility goals if consulted    - Out of bed for toileting  - Record patient progress and toleration of activity level   Outcome: Progressing

## 2021-09-15 NOTE — ASSESSMENT & PLAN NOTE
· Lactic acidosis 4 6, tachypnea, tachycardia, hypoxia  · COVID pneumonia  · Trend leukocytosis, procalcitonin, blood cultures  · Lactic acidosis resolved with fluid resuscitation for ideal body weight with BMI 31 82

## 2021-09-15 NOTE — ASSESSMENT & PLAN NOTE
Lab Results   Component Value Date    HGBA1C 7 0 (H) 12/09/2018       Recent Labs     09/14/21  2210   POCGLU 216*       Blood Sugar Average: Last 72 hrs:  (P) 216     Check hemoglobin A1c  Sliding scale coverage and Accu-Cheks  Hold home regimen metformin and glipizide  Initiate basal insulin coverage in setting of glucocorticoid use  Hypoglycemia protocol

## 2021-09-15 NOTE — ASSESSMENT & PLAN NOTE
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   · S/p SBRT and chemotherapy  · Currently under active surveillance  · Follows with Dr Sabiha Cannon oncology

## 2021-09-15 NOTE — H&P
[address]  H&P- [facility] [place] 1955, 76 y o  male MRN: [id number]  Unit/Bed#: -01 Encounter: [id number]  Primary Care Provider: Nidhi Love MD   Date and time admitted to hospital: 9/14/2021  3:04 PM    * Acute hypoxemic respiratory failure due to COVID-19 [facility])  Assessment & Plan  Background:  flu-like symptoms over the past 3 weeks but significant dyspnea and turning gray with coughing per significant other prompting PCP eval where he was found to be hypoxic requiring 3 L and sent to ER  · COVID19- PCR positive 9/14  · Supplemental O2 with  4LNC saturating  90 %; wean as tolerated to maintain saturations >88%  · Imaging     · Started on moderate COVID pathway for treatment  ? Start dexamethasone 6 mg x 10 Days  ? AC full strength enoxaparin  · Daily CBC and CMP  · Continue check inflammatory markers as indicated- CRP is 17 4 on admission  · Remdesivir 200 mg IV day 1 and 100 mg IV on day 2-5   · Encourage ISP/flutter valve  · Encourage proning and early mobilization  · Blood cultures pending; follow up with results   · Consideration for convalescent plasma - No longer eligible as symptom onset >5 days   · Airborne/Droplet precautions ordered    Due to extended length of symptoms approximately 3 weeks and now with significantly worsening dyspnea with minimally elevated CRP, appreciate pulmonary consultation    History of lung cancer  Assessment & Plan  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   · S/p SBRT and chemotherapy  · Currently under active surveillance  · Follows with Dr Bobbi Masterson oncology      BPH with obstruction/lower urinary tract symptoms  Assessment & Plan  · History of TURP  · Continue Flomax and Proscar home regimen  · Urinary retention protocol    COPD with acute exacerbation ([facility] [address] )  Assessment & Plan  · COPD with exacerbation   · Albuterol MDI q i d   With positive COVID  · Spiriva daily  · Inhaled corticosteroid daily  · Decadron per COVID protocol  · Wean to room air as tolerated    Tobacco abuse  Assessment & Plan  · Has not smoked since beginning of COVID illness  · Cessation counseling      Type 2 diabetes mellitus, without long-term current use of insulin ([facility])  Assessment & Plan  Lab Results   Component Value Date    HGBA1C 7 0 (H) 12/09/2018       Recent Labs     09/14/21  2210   POCGLU 216*       Blood Sugar Average: Last 72 hrs:  (P) 216     Check hemoglobin A1c  Sliding scale coverage and Accu-Cheks  Hold home regimen metformin and glipizide  Initiate basal insulin coverage in setting of glucocorticoid use  Hypoglycemia protocol    VTE Pharmacologic Prophylaxis: VTE Score: 5 High Risk (Score >/= 5) - Pharmacological DVT Prophylaxis Ordered: enoxaparin (Lovenox)  Sequential Compression Devices Ordered  Code Status: Level 1 - Full Code     Anticipated Length of Stay: Patient will be admitted on an inpatient basis with an anticipated length of stay of greater than 2 midnights secondary to Respiratory failure, COVID  Total Time for Visit, including Counseling / Coordination of Care: 30 minutes Greater than 50% of this total time spent on direct patient counseling and coordination of care  Chief Complaint:  Dyspnea    History of Present Illness:  Sonja Limon is a 76 y o  male with a PMH of bladder cancer, BPH, COPD, diabetes mellitus, hyperlipidemia, lung cancer who presents with dyspnea  He reports symptoms actually started 3 weeks ago with flu-like illness, he was so ill that he stopped smoking tobacco   He reports feeling somewhat better and was actually preparing to go back to work for his company when he became very dyspneic  His girlfriend told him that he became gray with coughing and he was evaluated by his PCP that found him hypoxic requiring 3 L supplemental oxygen  In the emergency department he was found to have COVID CTA chest negative for PE  Denies loss of taste or smell      Review of Systems:  Review of Systems Constitutional: Positive for activity change, appetite change and fatigue  Negative for chills and fever  HENT: Negative for ear pain and sore throat  Eyes: Negative for pain and visual disturbance  Respiratory: Positive for cough and shortness of breath  Cardiovascular: Negative for chest pain and palpitations  Gastrointestinal: Negative for abdominal pain and vomiting  Genitourinary: Negative for dysuria and hematuria  Musculoskeletal: Negative for arthralgias and back pain  Skin: Negative for color change and rash  Neurological: Positive for weakness  Negative for seizures and syncope  All other systems reviewed and are negative  Past Medical and Surgical History:   Past Medical History:   Diagnosis Date    Bladder cancer ([place] [address] )     BPH (benign prostatic hyperplasia)     COPD (chronic obstructive pulmonary disease) ([place] [address] )     Diabetes mellitus ([place] [address] )     Hyperlipidemia     Lung cancer ([place] [address] )     Rib fractures        Past Surgical History:   Procedure Laterality Date    BRONCHOSCOPY      CATARACT EXTRACTION, BILATERAL      CYSTOSCOPY      TONSILLECTOMY      TRANSURETHRAL RESECTION OF PROSTATE         Meds/Allergies:  Prior to Admission medications    Medication Sig Start Date End Date Taking?  Authorizing Provider   albuterol (PROVENTIL HFA,VENTOLIN HFA) 90 mcg/act inhaler Inhale 2 puffs every 6 (six) hours as needed   Yes Historical Provider, MD   Aspirin Buf,CaCarb-MgCarb-MgO, 81 MG TABS Take 81 mg by mouth   Yes Historical Provider, MD   atorvastatin (LIPITOR) 40 mg tablet Take 40 mg by mouth daily   Yes Historical Provider, MD   finasteride (PROSCAR) 5 mg tablet take 1 tablet by mouth once daily 7/7/21  Yes Historical Provider, MD   folic acid (FOLVITE) 1 mg tablet Take by mouth daily   Yes Historical Provider, MD   furosemide (LASIX) 40 mg tablet take 1 tablet by mouth once daily 6/10/21  Yes Historical Provider, MD   gabapentin (NEURONTIN) 100 mg capsule take 1 capsule by mouth twice a day 7/12/21  Yes Historical Provider, MD   meloxicam (MOBIC) 15 mg tablet take 1 tablet by mouth once daily 7/1/21  Yes Historical Provider, MD   metFORMIN (GLUCOPHAGE) 500 mg tablet Take 500 mg by mouth daily with breakfast    Yes Historical Provider, MD   tamsulosin (FLOMAX) 0 4 mg take 1 capsule by mouth once daily 5/5/21  Yes Historical Provider, MD   ondansetron (ZOFRAN) 8 mg tablet Take by mouth every 8 (eight) hours as needed for nausea or vomiting    Historical Provider, MD   tiotropium (SPIRIVA) 18 mcg inhalation capsule Place 18 mcg into inhaler and inhale daily  Patient not taking: Reported on 9/14/2021    Historical Provider, MD     I have reviewed home medications with patient personally  Allergies: No Known Allergies    Social History:  Marital Status: Single   Patient Pre-hospital Living Situation: Home  Patient Pre-hospital Level of Mobility: walks  Patient Pre-hospital Diet Restrictions:  None  Substance Use History:   Social History     Substance and Sexual Activity   Alcohol Use Not Currently     Social History     Tobacco Use   Smoking Status Current Every Day Smoker    Packs/day: 1 00   Smokeless Tobacco Never Used     Social History     Substance and Sexual Activity   Drug Use Not Currently       Family History:  Family History   Problem Relation Age of Onset    Hypertension Mother     No Known Problems Father        Physical Exam:     Vitals:   Blood Pressure: 121/92 (09/14/21 2207)  Pulse: 81 (09/14/21 2207)  Temperature: 98 5 °F (36 9 °C) (09/14/21 2207)  Temp Source: Oral (09/14/21 1813)  Respirations: 18 (09/14/21 2207)  Height: 6' 1" (185 4 cm) (09/14/21 1511)  Weight - Scale: 109 kg (241 lb 2 9 oz) (09/14/21 1511)  SpO2: 90 % (09/14/21 2207)    Physical Exam  Vitals and nursing note reviewed  Constitutional:       Appearance: Normal appearance  He is obese  He is ill-appearing  HENT:      Head: Normocephalic and atraumatic        Mouth/Throat:      Mouth: Mucous membranes are dry  Eyes:      Conjunctiva/sclera: Conjunctivae normal    Cardiovascular:      Rate and Rhythm: Normal rate and regular rhythm  Pulses: Normal pulses  Heart sounds: Normal heart sounds  Pulmonary:      Effort: Respiratory distress present  Breath sounds: Wheezing, rhonchi and rales present  Abdominal:      General: Abdomen is flat  Palpations: Abdomen is soft  Tenderness: There is no abdominal tenderness  Musculoskeletal:         General: Normal range of motion  Cervical back: Normal range of motion and neck supple  Right lower leg: Edema present  Left lower leg: Edema present  Skin:     General: Skin is warm and dry  Capillary Refill: Capillary refill takes less than 2 seconds  Neurological:      General: No focal deficit present  Mental Status: He is alert and oriented to person, place, and time  Cranial Nerves: No cranial nerve deficit  Sensory: No sensory deficit  Motor: No weakness        Coordination: Coordination normal    Psychiatric:         Mood and Affect: Mood normal          Behavior: Behavior normal        Additional Data:     Lab Results:  Results from last 7 days   Lab Units 09/15/21  0459   WBC Thousand/uL 8 52   HEMOGLOBIN g/dL 14 2   HEMATOCRIT % 43 5   PLATELETS Thousands/uL 398*   NEUTROS PCT % 75   LYMPHS PCT % 16   MONOS PCT % 7   EOS PCT % 0     Results from last 7 days   Lab Units 09/15/21  0459   SODIUM mmol/L 137   POTASSIUM mmol/L 3 6   CHLORIDE mmol/L 101   CO2 mmol/L 28   BUN mg/dL 18   CREATININE mg/dL 1 04   ANION GAP mmol/L 8   CALCIUM mg/dL 8 4   ALBUMIN g/dL 2 8*   TOTAL BILIRUBIN mg/dL 0 56   ALK PHOS U/L 66   ALT U/L 76   AST U/L 39   GLUCOSE RANDOM mg/dL 206*     Results from last 7 days   Lab Units 09/14/21  1523   INR  0 94     Results from last 7 days   Lab Units 09/14/21  2210   POC GLUCOSE mg/dl 216*         Results from last 7 days   Lab Units 09/14/21  1825 09/14/21  1523   LACTIC ACID mmol/L 1 6 4 3*       Imaging: Reviewed radiology reports from this admission including: chest CT scan  CTA ED chest PE study   Final Result by Amy Dixon MD (09/14 2937)      No PE identified  New hilar and mediastinal lymphadenopathy  Chronic treated neoplasm in the right upper lobe, questioned contour change largest right upper lobe opacity  Recurrent neoplasm cannot be completely excluded  Consider short interval follow-up in 3 months and/or PET/CT  No new mass identified  Other nonemergent and chronic findings above  Workstation performed: [id number]         XR chest 1 view portable   Final Result by Anne-Marie Todd MD (09/14 9266)      Vague groundglass opacity in left  Right upper lobe opacity probably represents scarring from the patient's treated lung carcinoma  Some additional density could be related to some superimposed inflammation  Early pneumonia is possible  Covid 19 is not excluded      This was discussed with Dr Miguelina Knapp at 3:50 PM                  Workstation performed: [id number]             EKG and Other Studies Reviewed on Admission:   · EKG: NSR  HR 90     ** Please Note: This note has been constructed using a voice recognition system   **

## 2021-09-15 NOTE — PROGRESS NOTES
[address]  Progress Note - Bob Santiago 1955, 76 y o  male MRN: [id number]  Unit/Bed#: -01 Encounter: [id number]  Primary Care Provider: Jenel Mortimer, MD   Date and time admitted to hospital: 9/14/2021  3:04 PM    COPD with acute exacerbation ([address] )  Assessment & Plan  · COPD with exacerbation   · Albuterol MDI q i d  With positive COVID  · Spiriva daily  · Inhaled corticosteroid daily  · Decadron per COVID protocol  · Wean to room air as tolerated    Acute hypoxemic respiratory failure due to COVID-19 [facility])  Assessment & Plan  Background:  flu-like symptoms over the past 3 weeks but significant dyspnea and turning gray with coughing per significant other prompting PCP eval where he was found to be hypoxic requiring 3 L and sent to ER  · COVID19- PCR positive 9/14  · Supplemental O2 with  4LNC saturating  90 %; wean as tolerated to maintain saturations >88%  · Imaging     · Started on moderate COVID pathway for treatment  ? Start dexamethasone 6 mg x 10 Days  ?  AC full strength enoxaparin  · Daily CBC and CMP  · Continue check inflammatory markers as indicated- CRP is 17 4 on admission  · Remdesivir 200 mg IV day 1 and 100 mg IV on day 2-5   · Encourage ISP/flutter valve  · Encourage proning and early mobilization  · Blood cultures pending; follow up with results   · Consideration for convalescent plasma - No longer eligible as symptom onset >5 days   · Airborne/Droplet precautions ordered    Due to extended length of symptoms approximately 3 weeks and now with significantly worsening dyspnea with minimally elevated CRP, appreciate pulmonary consultation    * Sepsis   Assessment & Plan  · Lactic acidosis 4 6, tachypnea, tachycardia, hypoxia  · COVID pneumonia  · Trend leukocytosis, procalcitonin, blood cultures  · Lactic acidosis resolved with fluid resuscitation for ideal body weight with BMI 31 82    History of lung cancer  Assessment & Plan  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   · S/p SBRT and chemotherapy  · Currently under active surveillance  · Follows with Dr Phan Stone oncology      BPH with obstruction/lower urinary tract symptoms  Assessment & Plan  · History of TURP  · Continue Flomax and Proscar home regimen  · Urinary retention protocol    Tobacco abuse  Assessment & Plan  · Has not smoked since beginning of COVID illness  · Cessation counseling      Type 2 diabetes mellitus, without long-term current use of insulin [facility])  Assessment & Plan  Lab Results   Component Value Date    HGBA1C 7 9 (H) 09/15/2021       Recent Labs     21  2210 09/15/21  0757 09/15/21  1036   POCGLU 216* 140 172*       Blood Sugar Average: Last 72 hrs:  (P) 176     Sliding scale coverage and Accu-Cheks  Hold home regimen metformin and glipizide  Initiate basal insulin coverage in setting of glucocorticoid use  Hypoglycemia protocol          VTE Pharmacologic Prophylaxis: VTE Score: 5 High Risk (Score >/= 5) - Pharmacological DVT Prophylaxis Ordered: enoxaparin (Lovenox)  Sequential Compression Devices Ordered  Patient Centered Rounds: I performed bedside rounds with nursing staff today  Discussions with Specialists or Other Care Team Provider:  None    Education and Discussions with Family / Patient: With patient  Time Spent for Care: 20 minutes  More than 50% of total time spent on counseling and coordination of care as described above  Current Length of Stay: 1 day(s)  Current Patient Status: Inpatient   Certification Statement: The patient will continue to require additional inpatient hospital stay due to To monitor above condition  Discharge Plan: Anticipate discharge in >72 hrs to home  Code Status: Level 1 - Full Code    Subjective:   Seen and evaluated during the round  Patient denies any significant complaint other than mild cough and short of breath      Objective:     Vitals:   Temp (24hrs), Av 2 °F (36 8 °C), Min:97 7 °F (36 5 °C), Max:98 6 °F (37 °C)    Temp:  [97 7 °F (36 5 °C)-98 6 °F (37 °C)] 97 7 °F (36 5 °C)  HR:  [] 78  Resp:  [16-31] 20  BP: (118-140)/(80-97) 122/90  SpO2:  [90 %-95 %] 90 %  Body mass index is 31 82 kg/m²  Input and Output Summary (last 24 hours): Intake/Output Summary (Last 24 hours) at 9/15/2021 1327  Last data filed at 9/15/2021 1319  Gross per 24 hour   Intake 560 ml   Output 2350 ml   Net -1790 ml       Physical Exam:   Physical Exam  Vitals and nursing note reviewed  Constitutional:       Appearance: He is not diaphoretic  HENT:      Head: Normocephalic  Nose: No congestion  Mouth/Throat:      Mouth: Mucous membranes are moist       Pharynx: No oropharyngeal exudate  Eyes:      General: No scleral icterus  Conjunctiva/sclera: Conjunctivae normal       Pupils: Pupils are equal, round, and reactive to light  Cardiovascular:      Rate and Rhythm: Normal rate  Heart sounds: No friction rub  No gallop  Pulmonary:      Effort: Pulmonary effort is normal       Breath sounds: Wheezing and rales present  Abdominal:      General: Abdomen is flat  Bowel sounds are normal  There is no distension  Palpations: There is no mass  Tenderness: There is no abdominal tenderness  Hernia: No hernia is present  Musculoskeletal:         General: Normal range of motion  Cervical back: Normal range of motion  Right lower leg: No edema  Lymphadenopathy:      Cervical: No cervical adenopathy  Skin:     General: Skin is warm  Neurological:      General: No focal deficit present  Mental Status: He is alert and oriented to person, place, and time           Additional Data:     Labs:  Results from last 7 days   Lab Units 09/15/21  0459   WBC Thousand/uL 8 52   HEMOGLOBIN g/dL 14 2   HEMATOCRIT % 43 5   PLATELETS Thousands/uL 398*   NEUTROS PCT % 75   LYMPHS PCT % 16   MONOS PCT % 7   EOS PCT % 0     Results from last 7 days   Lab Units 09/15/21  0459   SODIUM mmol/L 137 POTASSIUM mmol/L 3 6   CHLORIDE mmol/L 101   CO2 mmol/L 28   BUN mg/dL 18   CREATININE mg/dL 1 04   ANION GAP mmol/L 8   CALCIUM mg/dL 8 4   ALBUMIN g/dL 2 8*   TOTAL BILIRUBIN mg/dL 0 56   ALK PHOS U/L 66   ALT U/L 76   AST U/L 39   GLUCOSE RANDOM mg/dL 206*     Results from last 7 days   Lab Units 09/14/21  1523   INR  0 94     Results from last 7 days   Lab Units 09/15/21  1036 09/15/21  0757 09/14/21  2210   POC GLUCOSE mg/dl 172* 140 216*     Results from last 7 days   Lab Units 09/15/21  0459   HEMOGLOBIN A1C % 7 9*     Results from last 7 days   Lab Units 09/14/21  1825 09/14/21  1523   LACTIC ACID mmol/L 1 6 4 3*       Lines/Drains:  Invasive Devices     Peripheral Intravenous Line            Peripheral IV 09/14/21 Right Antecubital <1 day                      Imaging: No pertinent imaging reviewed  Recent Cultures (last 7 days):   Results from last 7 days   Lab Units 09/14/21  1615   BLOOD CULTURE  Received in Microbiology Lab  Culture in Progress  Received in Microbiology Lab  Culture in Progress         Last 24 Hours Medication List:   Current Facility-Administered Medications   Medication Dose Route Frequency Provider Last Rate    albuterol  2 puff Inhalation Q4H PRN Romi Forbes MD      albuterol  2 puff Inhalation 4x Daily Camille S Yusef, CRNP      aspirin  81 mg Oral Daily Romi Forbes MD      atorvastatin  40 mg Oral HS Romi Forbes MD      dexamethasone  6 mg Intravenous Q24H Romi Forbes MD      enoxaparin  1 mg/kg Subcutaneous Q12H [address] Romi Forbes MD      finasteride  5 mg Oral Daily Romi Forbes MD      fluticasone-vilanterol  1 puff Inhalation Daily Camille S Yusef, CRNP      furosemide  40 mg Oral Daily Romi Forbes MD      gabapentin  100 mg Oral BID Romi Forbes MD      insulin glargine  10 Units Subcutaneous QAM Camille S Yusef, CRNP      insulin lispro  1-6 Units Subcutaneous TID AC Camille S Yusef, CRNP      insulin lispro  1-6 Units Subcutaneous HS Camille S Yusef, CRNP      remdesivir  100 mg Intravenous Q24H Thor Penaloza MD      tamsulosin  0 4 mg Oral Daily Thor Penaloza MD      tiotropium  18 mcg Inhalation Daily Thor Penaloza MD          Today, Patient Was Seen By: Ubaldo Almodovar MD    **Please Note: This note may have been constructed using a voice recognition system  **

## 2021-09-15 NOTE — ASSESSMENT & PLAN NOTE
Background:  flu-like symptoms over the past 3 weeks but significant dyspnea and turning gray with coughing per significant other prompting PCP rickey where he was found to be hypoxic requiring 3 L and sent to ER  · COVID19- PCR positive 9/14  · Supplemental O2 with  4LNC saturating  90 %; wean as tolerated to maintain saturations >88%  · Imaging     · Started on moderate COVID pathway for treatment  ? Start dexamethasone 6 mg x 10 Days  ?  AC full strength enoxaparin  · Daily CBC and CMP  · Continue check inflammatory markers as indicated- CRP is 17 4 on admission  · Remdesivir 200 mg IV day 1 and 100 mg IV on day 2-5   · Encourage ISP/flutter valve  · Encourage proning and early mobilization  · Blood cultures pending; follow up with results   · Consideration for convalescent plasma - No longer eligible as symptom onset >5 days   · Airborne/Droplet precautions ordered    Due to extended length of symptoms approximately 3 weeks and now with significantly worsening dyspnea with minimally elevated CRP, appreciate pulmonary consultation

## 2021-09-15 NOTE — PLAN OF CARE
Problem: PAIN - ADULT  Goal: Verbalizes/displays adequate comfort level or baseline comfort level  Description: Interventions:  - Encourage patient to monitor pain and request assistance  - Assess pain using appropriate pain scale  - Administer analgesics based on type and severity of pain and evaluate response  - Implement non-pharmacological measures as appropriate and evaluate response  - Consider cultural and social influences on pain and pain management  - Notify physician/advanced practitioner if interventions unsuccessful or patient reports new pain  9/15/2021 0843 by Denise Monet RN  Outcome: Progressing  9/14/2021 1846 by Denise Monet RN  Outcome: Progressing     Problem: INFECTION - ADULT  Goal: Absence or prevention of progression during hospitalization  Description: INTERVENTIONS:  - Assess and monitor for signs and symptoms of infection  - Monitor lab/diagnostic results  - Monitor all insertion sites, i e  indwelling lines, tubes, and drains  - Monitor endotracheal if appropriate and nasal secretions for changes in amount and color  - [place] appropriate cooling/warming therapies per order  - Administer medications as ordered  - Instruct and encourage patient and family to use good hand hygiene technique  - Identify and instruct in appropriate isolation precautions for identified infection/condition  9/15/2021 0843 by Denise Monet RN  Outcome: Progressing  9/14/2021 1846 by Denise Monet RN  Outcome: Progressing  Goal: Absence of fever/infection during neutropenic period  Description: INTERVENTIONS:  - Monitor WBC    9/15/2021 0843 by Denise Monet RN  Outcome: Progressing  9/14/2021 1846 by Denise Monet RN  Outcome: Progressing     Problem: SAFETY ADULT  Goal: Patient will remain free of falls  Description: INTERVENTIONS:  - Educate patient/family on patient safety including physical limitations  - Instruct patient to call for assistance with activity   - Consult OT/PT to assist with strengthening/mobility   - Keep Call bell within reach  - Keep bed low and locked with side rails adjusted as appropriate  - Keep care items and personal belongings within reach  - Initiate and maintain comfort rounds  - Make Fall Risk Sign visible to staff  - Offer Toileting every   Hours, in advance of need  - Initiate/Maintain   alarm  - Obtain necessary fall risk management equipment:   - Apply yellow socks and bracelet for high fall risk patients  - Consider moving patient to room near nurses station  9/15/2021 0843 by Shalom Lundy RN  Outcome: Progressing  9/14/2021 1846 by Shalom Lundy RN  Outcome: Progressing  Goal: Maintain or return to baseline ADL function  Description: INTERVENTIONS:  -  Assess patient's ability to carry out ADLs; assess patient's baseline for ADL function and identify physical deficits which impact ability to perform ADLs (bathing, care of mouth/teeth, toileting, grooming, dressing, etc )  - Assess/evaluate cause of self-care deficits   - Assess range of motion  - Assess patient's mobility; develop plan if impaired  - Assess patient's need for assistive devices and provide as appropriate  - Encourage maximum independence but intervene and supervise when necessary  - Involve family in performance of ADLs  - Assess for home care needs following discharge   - Consider OT consult to assist with ADL evaluation and planning for discharge  - Provide patient education as appropriate  9/15/2021 0843 by Shalom Lundy RN  Outcome: Progressing  9/14/2021 1846 by Shalom Lundy RN  Outcome: Progressing  Goal: Maintains/Returns to pre admission functional level  Description: INTERVENTIONS:  - Perform BMAT or MOVE assessment daily    - Set and communicate daily mobility goal to care team and patient/family/caregiver  - Collaborate with rehabilitation services on mobility goals if consulted  - Perform Range of Motion   times a day  - Reposition patient every   hours  - Dangle patient    times a day  - Stand patient   times a day  - Ambulate patient     Beka Balint Beka Balint Beka Balint times a day  - Out of bed to chair   times a day   - Out of bed for meals   times a day  - Out of bed for toileting  - Record patient progress and toleration of activity level   9/15/2021 0843 by Randy Paiz RN  Outcome: Progressing  9/14/2021 1846 by Randy Paiz RN  Outcome: Progressing     Problem: DISCHARGE PLANNING  Goal: Discharge to home or other facility with appropriate resources  Description: INTERVENTIONS:  - Identify barriers to discharge w/patient and caregiver  - Arrange for needed discharge resources and transportation as appropriate  - Identify discharge learning needs (meds, wound care, etc )  - Arrange for interpretive services to assist at discharge as needed  - Refer to Case Management Department for coordinating discharge planning if the patient needs post-hospital services based on physician/advanced practitioner order or complex needs related to functional status, cognitive ability, or social support system  9/15/2021 0843 by Randy Paiz RN  Outcome: Progressing  9/14/2021 1846 by Randy Paiz RN  Outcome: Progressing     Problem: Knowledge Deficit  Goal: Patient/family/caregiver demonstrates understanding of disease process, treatment plan, medications, and discharge instructions  Description: Complete learning assessment and assess knowledge base    Interventions:  - Provide teaching at level of understanding  - Provide teaching via preferred learning methods  9/15/2021 0843 by Randy Paiz RN  Outcome: Progressing  9/14/2021 1846 by Randy aPiz RN  Outcome: Progressing     Problem: RESPIRATORY - ADULT  Goal: Achieves optimal ventilation and oxygenation  Description: INTERVENTIONS:  - Assess for changes in respiratory status  - Assess for changes in mentation and behavior  - Position to facilitate oxygenation and minimize respiratory effort  - Oxygen administered by appropriate delivery if ordered  - Initiate smoking cessation education as indicated  - Encourage broncho-pulmonary hygiene including cough, deep breathe, Incentive Spirometry  - Assess the need for suctioning and aspirate as needed  - Assess and instruct to report SOB or any respiratory difficulty  - Respiratory Therapy support as indicated  9/15/2021 0843 by Leno Byrne RN  Outcome: Progressing  9/14/2021 1846 by Leno Byrne RN  Outcome: Progressing     Problem: MOBILITY - ADULT  Goal: Maintain or return to baseline ADL function  Description: INTERVENTIONS:  -  Assess patient's ability to carry out ADLs; assess patient's baseline for ADL function and identify physical deficits which impact ability to perform ADLs (bathing, care of mouth/teeth, toileting, grooming, dressing, etc )  - Assess/evaluate cause of self-care deficits   - Assess range of motion  - Assess patient's mobility; develop plan if impaired  - Assess patient's need for assistive devices and provide as appropriate  - Encourage maximum independence but intervene and supervise when necessary  - Involve family in performance of ADLs  - Assess for home care needs following discharge   - Consider OT consult to assist with ADL evaluation and planning for discharge  - Provide patient education as appropriate  9/15/2021 0843 by Leno Byrne RN  Outcome: Progressing  9/14/2021 1846 by Leno Byrne RN  Outcome: Progressing  Goal: Maintains/Returns to pre admission functional level  Description: INTERVENTIONS:  - Perform BMAT or MOVE assessment daily    - Set and communicate daily mobility goal to care team and patient/family/caregiver  - Collaborate with rehabilitation services on mobility goals if consulted  - Perform Range of Motion   times a day  - Reposition patient every   hours  - Dangle patient   times a day  - Stand patient   times a day  - Ambulate patient   times a day  - Out of bed to chair   times a day   - Out of bed for meals    times a day  - Out of bed for toileting  - Record patient progress and toleration of activity level   9/15/2021 0843 by Jose Wheeler, RN  Outcome: Progressing  9/14/2021 1846 by Jose Wheeler, RN  Outcome: Progressing

## 2021-09-15 NOTE — CASE MANAGEMENT
Case Management Assessment & Discharge Planning Note    Patient name Davis Rordiguez  Location /-65 MRN [id number]  : 1955 Date 9/15/2021       Current Admission Date: 2021  Current Admission Diagnosis:  Acute hypoxemic respiratory failure due to COVID-19 [facility])  Previous Admission - Discharge Date:    LOS (days): 1  Geometric Mean LOS (GMLOS) (days):   Days to GMLOS: Previous Discharge Diagnosis:  No discharge information exists for this patient       OBJECTIVE:    Risk of Unplanned Readmission Score: 9   Bundle(if applicable):    Current admission status: Inpatient       Preferred Pharmacy:   [address], [address]  [address]  [address] [facility] [address]  Phone: [phone] Fax: [phone]    Primary Care Provider: Paxton Lan MD    Primary Insurance: MEDICARE  Secondary Insurance: [address]    ASSESSMENT:  Maricruz Coppola [address] [place] [address] Representative - Other   Primary Phone: [phone] (Mobile)               Advance Directives  Does patient have a [address] [place] [address] Avenue?: No  Was patient offered paperwork?: Yes (pt would like information, paperwork provided)  Does patient currently have a Health Care decision maker?: Yes, please see Health Care Proxy section  Does patient have Advance Directives?: No  Was patient offered paperwork?: Yes (paperwork provided)         Jacinto Abreu [address] of Residence: [facility]  30 Day Readmission: No    Patient Information  Mental Status: Alert  During Assessment patient was accompanied by: Not accompanied during assessment  Assessment information provided by[de-identified] Patient (spoke with pt via phone in room due to +covid)  Primary Caregiver: Family  Caregiver's Name[de-identified] Derk Familia hosler  Caregiver's Relationship to Patient[de-identified] Other (Specify) (significant other)  Caregiver's Telephone Number[de-identified] [phone]  Support Systems: Self, Spouse/significant other  What city do you live in?: [address] entry access options   Select all that apply : Stairs  Number of steps to enter home : 4  Do the steps have railings?: Yes  Type of Current Residence: [address] [place] home  Upon entering residence, is there a bedroom on the main floor (no further steps)?: No  A bedroom is located on the following floor levels of residence (select all that apply):: 2nd [address] [facility] which floors of current residence have a bathroom (select all the apply):: 2nd Floor (1/2 bath on first floor)  Number of steps to 2nd floor from main floor: One Flight  Living Arrangements: Lives w/ Spouse/significant other  Is patient a ?: Yes  Is patient active with [facility])?: No (pt states he use to be active with [facility] but currently has not been seeing them)    Activities of Daily Living Prior to Admission  Functional Status: Independent  Completes ADLs independently?: Yes  Ambulates independently?: Yes  Does patient use assisted devices?: No  Does patient currently own DME?: No  Does patient have a history of Outpatient Therapy (PT/OT)?: No  Does the patient have a history of Short-Term Rehab?: No  Does patient currently have [facility]?: No         Patient Information Continued  Income Source: Pension/residential  Does patient have prescription coverage?: Yes  Does patient receive dialysis treatments?: No  Does patient have a history of substance abuse?: No  Does patient have a history of Mental Health Diagnosis?: No         Means of Transportation  Means of Transport to Appts[de-identified] Family transport  In the past 12 months, has lack of transportation kept you from medical appointments or from getting medications?: No  In the past 12 months, has lack of transportation kept you from meetings, work, or from getting things needed for daily living?: No  Was application for public transport provided?: No    DISCHARGE DETAILS:    Discharge planning discussed with[de-identified] patient       Contacts  Contact Method: Phone (spoke with pt via phone in room)  Reason/Outcome: Discharge [address]         Is the patient interested in Rudy[facility] Bonilla at discharge?: No    DME Referral Provided  Referral made for DME?: No              Discharge Destination Plan[de-identified] Home  Transportation at Discharge?: Yes  Type of Transport: Auto with designated  (pt states he needs his cell phone dropped off by s/o so he can get his contacts   Inquiring about paying for transport home and infored him that it would be a fee)

## 2021-09-16 PROBLEM — N39.0 UTI (URINARY TRACT INFECTION): Status: ACTIVE | Noted: 2021-09-16

## 2021-09-16 LAB
BACTERIA UR CULT: NORMAL
GLUCOSE SERPL-MCNC: 145 MG/DL (ref 65–140)
GLUCOSE SERPL-MCNC: 152 MG/DL (ref 65–140)
GLUCOSE SERPL-MCNC: 198 MG/DL (ref 65–140)
GLUCOSE SERPL-MCNC: 220 MG/DL (ref 65–140)

## 2021-09-16 PROCEDURE — 99223 1ST HOSP IP/OBS HIGH 75: CPT | Performed by: PHYSICIAN ASSISTANT

## 2021-09-16 PROCEDURE — 99232 SBSQ HOSP IP/OBS MODERATE 35: CPT | Performed by: PHYSICIAN ASSISTANT

## 2021-09-16 PROCEDURE — 82948 REAGENT STRIP/BLOOD GLUCOSE: CPT

## 2021-09-16 RX ORDER — CEFTRIAXONE 1 G/50ML
1000 INJECTION, SOLUTION INTRAVENOUS EVERY 24 HOURS
Status: DISCONTINUED | OUTPATIENT
Start: 2021-09-16 | End: 2021-09-18

## 2021-09-16 RX ADMIN — GABAPENTIN 100 MG: 100 CAPSULE ORAL at 08:59

## 2021-09-16 RX ADMIN — ENOXAPARIN SODIUM 105 MG: 120 INJECTION SUBCUTANEOUS at 09:03

## 2021-09-16 RX ADMIN — ENOXAPARIN SODIUM 105 MG: 120 INJECTION SUBCUTANEOUS at 20:02

## 2021-09-16 RX ADMIN — FINASTERIDE 5 MG: 5 TABLET, FILM COATED ORAL at 08:59

## 2021-09-16 RX ADMIN — INSULIN LISPRO 2 UNITS: 100 INJECTION, SOLUTION INTRAVENOUS; SUBCUTANEOUS at 21:40

## 2021-09-16 RX ADMIN — CEFTRIAXONE 1000 MG: 1 INJECTION, SOLUTION INTRAVENOUS at 18:06

## 2021-09-16 RX ADMIN — TAMSULOSIN HYDROCHLORIDE 0.4 MG: 0.4 CAPSULE ORAL at 08:59

## 2021-09-16 RX ADMIN — FLUTICASONE FUROATE AND VILANTEROL TRIFENATATE 1 PUFF: 200; 25 POWDER RESPIRATORY (INHALATION) at 09:00

## 2021-09-16 RX ADMIN — ALBUTEROL SULFATE 2 PUFF: 90 AEROSOL, METERED RESPIRATORY (INHALATION) at 17:45

## 2021-09-16 RX ADMIN — INSULIN LISPRO 1 UNITS: 100 INJECTION, SOLUTION INTRAVENOUS; SUBCUTANEOUS at 17:17

## 2021-09-16 RX ADMIN — FUROSEMIDE 40 MG: 40 TABLET ORAL at 08:59

## 2021-09-16 RX ADMIN — ALBUTEROL SULFATE 2 PUFF: 90 AEROSOL, METERED RESPIRATORY (INHALATION) at 09:00

## 2021-09-16 RX ADMIN — TIOTROPIUM BROMIDE 18 MCG: 18 CAPSULE ORAL; RESPIRATORY (INHALATION) at 09:00

## 2021-09-16 RX ADMIN — ALBUTEROL SULFATE 2 PUFF: 90 AEROSOL, METERED RESPIRATORY (INHALATION) at 20:06

## 2021-09-16 RX ADMIN — GABAPENTIN 100 MG: 100 CAPSULE ORAL at 17:16

## 2021-09-16 RX ADMIN — ASPIRIN 81 MG: 81 TABLET, COATED ORAL at 08:59

## 2021-09-16 RX ADMIN — INSULIN GLARGINE 10 UNITS: 100 INJECTION, SOLUTION SUBCUTANEOUS at 08:59

## 2021-09-16 RX ADMIN — REMDESIVIR 100 MG: 100 INJECTION, POWDER, LYOPHILIZED, FOR SOLUTION INTRAVENOUS at 17:16

## 2021-09-16 RX ADMIN — ALBUTEROL SULFATE 2 PUFF: 90 AEROSOL, METERED RESPIRATORY (INHALATION) at 11:49

## 2021-09-16 RX ADMIN — INSULIN LISPRO 2 UNITS: 100 INJECTION, SOLUTION INTRAVENOUS; SUBCUTANEOUS at 11:49

## 2021-09-16 RX ADMIN — DEXAMETHASONE SODIUM PHOSPHATE 6 MG: 4 INJECTION INTRA-ARTICULAR; INTRALESIONAL; INTRAMUSCULAR; INTRAVENOUS; SOFT TISSUE at 17:16

## 2021-09-16 RX ADMIN — ATORVASTATIN CALCIUM 40 MG: 40 TABLET, FILM COATED ORAL at 20:02

## 2021-09-16 NOTE — ASSESSMENT & PLAN NOTE
Background:  flu-like symptoms over the past 3 weeks but significant dyspnea and turning gray with coughing per significant other prompting [facility] where he was found to be hypoxic requiring 3 L and sent to ER  · COVID19- PCR positive 9/14  · Supplemental O2 with  4LNC saturating  90 %; wean as tolerated to maintain saturations >88%  · Imaging     ? 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 CXR: Vague groundglass opacity in left suspicious for COVID-19 pneumonia  · Started on moderate COVID pathway for treatment  ? Start dexamethasone 6 mg x 10 Days  ?  AC full strength enoxaparin  · Daily CBC and CMP  · Continue check inflammatory markers as indicated- CRP is 17 4 on admission  · Remdesivir 200 mg IV day 1 and 100 mg IV on day 3/5  · Encourage ISP/flutter valve  · Encourage proning and early mobilization  · Blood cultures no growth to date; follow up with results   · Consideration for convalescent plasma - No longer eligible as symptom onset >5 days   · Airborne/Droplet precautions ordered  · Will check desaturation screen in the morning  · Due to extended length of symptoms approximately 3 weeks and now with significantly worsening dyspnea with minimally elevated CRP, appreciate pulmonary consultation- follow recommendations

## 2021-09-16 NOTE — PROGRESS NOTES
[address]  Progress Note - [facility] 1955, 76 y o  male MRN: [id number]  Unit/Bed#: -01 Encounter: [id number]  Primary Care Provider: Nidhi Love MD   Date and time admitted to hospital: 9/14/2021  3:04 PM    * Sepsis [facility])  Assessment & Plan  · Present on admission as evidenced by Lactic acidosis 4 6, tachypnea, tachycardia, hypoxia  · COVID pneumonia  · Trend leukocytosis, procalcitonin, blood cultures  · Lactic acidosis resolved with fluid resuscitation for ideal body weight with BMI 31 82  · Improving, patient is afebrile, tachycardia and tachypnea resolved    Acute hypoxemic respiratory failure due to COVID-19 [facility])  Assessment & Plan  Background:  flu-like symptoms over the past 3 weeks but significant dyspnea and turning gray with coughing per significant other prompting PCP eval where he was found to be hypoxic requiring 3 L and sent to ER  · COVID19- PCR positive 9/14  · Supplemental O2 with  4LNC saturating  90 %; wean as tolerated to maintain saturations >88%  · Imaging     ? 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 CXR: Vague groundglass opacity in left suspicious for COVID-19 pneumonia  · Started on moderate COVID pathway for treatment  ? Start dexamethasone 6 mg x 10 Days  ?  AC full strength enoxaparin  · Daily CBC and CMP  · Continue check inflammatory markers as indicated- CRP is 17 4 on admission  · Remdesivir 200 mg IV day 1 and 100 mg IV on day 2-5   · Encourage ISP/flutter valve  · Encourage proning and early mobilization  · Blood cultures no growth to date; follow up with results   · Consideration for convalescent plasma - No longer eligible as symptom onset >5 days · Airborne/Droplet precautions ordered  · Due to extended length of symptoms approximately 3 weeks and now with significantly worsening dyspnea with minimally elevated CRP, appreciate pulmonary consultation- follow recommendations    COPD with acute exacerbation ([facility] [address] )  Assessment & Plan  · COPD with exacerbation   · Albuterol MDI q i d  With positive COVID  · Spiriva daily  · Inhaled corticosteroid daily  · Decadron per COVID protocol  · Wean to room air as tolerated  · Continue follow-up with pulmonology as an outpatient    History of lung cancer  Assessment & Plan  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   · S/p SBRT and chemotherapy  · Currently under active surveillance  · Follows with Dr Trinity Colindres oncology  · CTA PE study shows:   · New hilar and mediastinal lymphadenopathy  · Chronic treated neoplasm in the right upper lobe, questioned contour change largest right upper lobe opacity  Recurrent neoplasm cannot be completely excluded  Consider short interval follow-up in 3 months and/or PET/CT  No new mass identified  · Patient is to follow-up in regards to the new hilar and mediastinal lymphadenopathy in 3-6 months with oncologist, pulmonologist, and thoracic surgeon for further evaluation      Tobacco abuse  Assessment & Plan  · Has not smoked since beginning of COVID illness which was 3 weeks ago  · Cessation counseling  · If patient is to be discharged with home oxygen- patient was educated that he cannot smoke while on oxygen or near the tank  No flammable substances near the oxygen tank      UTI (urinary tract infection)  Assessment & Plan  · UA on admission with large leukocytes, positive nitrites  · Urine culture does show 30,000-39,000 mixed contaminants x4  · Will treat with IV ceftriaxone    BPH with obstruction/lower urinary tract symptoms  Assessment & Plan  · History of TURP  · Continue Flomax and Proscar home regimen  · Urinary retention protocol    Type 2 diabetes mellitus, without long-term current use of insulin [facility])  Assessment & Plan  Lab Results   Component Value Date    HGBA1C 7 9 (H) 09/15/2021       Recent Labs     09/15/21  2032 09/16/21  0741 09/16/21  1054 09/16/21  1553   POCGLU 220* 145* 198* 152*       Blood Sugar Average: Last 72 hrs:  (P) 179 5     Sliding scale coverage and Accu-Cheks  Hold home regimen metformin and glipizide  Initiate basal insulin coverage in setting of glucocorticoid use  Hypoglycemia protocol          VTE Pharmacologic Prophylaxis: VTE Score: 5 High Risk (Score >/= 5) - Pharmacological DVT Prophylaxis Ordered: enoxaparin (Lovenox)  Sequential Compression Devices Ordered  Patient Centered Rounds: I performed bedside rounds with nursing staff today  Discussions with Specialists or Other Care Team Provider: none    Education and Discussions with Family / Patient: Patient declined call to   Time Spent for Care: 30 minutes  More than 50% of total time spent on counseling and coordination of care as described above  Current Length of Stay: 2 day(s)  Current Patient Status: Inpatient   Certification Statement: The patient will continue to require additional inpatient hospital stay due to Acute hypoxic respiratory failure secondary to COVID-19 pneumonia  Discharge Plan: Anticipate discharge in 24-48 hrs to home  Code Status: Level 1 - Full Code    Subjective:   Patient was resting in bed comfortably  Is currently on 3 liters/minute via nasal cannula  Patient states that he would like to go home  Encourage patient to stay another day to maximize on his treatment with remdesivir and IV steroids  Patient states that he would like to be evaluated for home oxygen, states that he has been having issues with his breathing since even before he was diagnosed with COVID  Patient does have a history of lung cancer and COPD  Patient states that his shortness of breath has improved since admission    Denies any abdominal pain, nausea, vomiting, diarrhea  Tolerating p o  Diet  Objective:     Vitals:   Temp (24hrs), Av 1 °F (36 7 °C), Min:98 °F (36 7 °C), Max:98 3 °F (36 8 °C)    Temp:  [98 °F (36 7 °C)-98 3 °F (36 8 °C)] 98 1 °F (36 7 °C)  HR:  [66-82] 82  Resp:  [17-18] 17  BP: (112-136)/(68-78) 112/68  SpO2:  [90 %-93 %] 93 %  Body mass index is 31 82 kg/m²  Input and Output Summary (last 24 hours): Intake/Output Summary (Last 24 hours) at 2021 1731  Last data filed at 2021 1550  Gross per 24 hour   Intake 240 ml   Output 3415 ml   Net -3175 ml       Physical Exam:   Physical Exam  Constitutional:       General: He is not in acute distress  HENT:      Mouth/Throat:      Mouth: Mucous membranes are moist    Eyes:      General: No scleral icterus  Cardiovascular:      Rate and Rhythm: Normal rate and regular rhythm  Pulses: Normal pulses  Heart sounds: Normal heart sounds  Pulmonary:      Effort: Pulmonary effort is normal       Breath sounds: Normal breath sounds  No wheezing, rhonchi or rales  Abdominal:      General: Abdomen is flat  Bowel sounds are normal       Palpations: Abdomen is soft  Tenderness: There is no abdominal tenderness  Musculoskeletal:         General: Normal range of motion  Right lower leg: No edema  Left lower leg: No edema  Skin:     General: Skin is warm and dry  Capillary Refill: Capillary refill takes less than 2 seconds  Neurological:      General: No focal deficit present  Mental Status: He is alert and oriented to person, place, and time     Psychiatric:         Mood and Affect: Mood normal           Additional Data:     Labs:  Results from last 7 days   Lab Units 09/15/21  0459   WBC Thousand/uL 8 52   HEMOGLOBIN g/dL 14 2   HEMATOCRIT % 43 5   PLATELETS Thousands/uL 398*   NEUTROS PCT % 75   LYMPHS PCT % 16   MONOS PCT % 7   EOS PCT % 0     Results from last 7 days   Lab Units 09/15/21  0459   SODIUM mmol/L 137   POTASSIUM mmol/L 3 6   CHLORIDE mmol/L 101   CO2 mmol/L 28   BUN mg/dL 18   CREATININE mg/dL 1 04   ANION GAP mmol/L 8   CALCIUM mg/dL 8 4   ALBUMIN g/dL 2 8*   TOTAL BILIRUBIN mg/dL 0 56   ALK PHOS U/L 66   ALT U/L 76   AST U/L 39   GLUCOSE RANDOM mg/dL 206*     Results from last 7 days   Lab Units 09/14/21  1523   INR  0 94     Results from last 7 days   Lab Units 09/16/21  1553 09/16/21  1054 09/16/21  0741 09/15/21  2032 09/15/21  1615 09/15/21  1036 09/15/21  0757 09/14/21  2210   POC GLUCOSE mg/dl 152* 198* 145* 220* 193* 172* 140 216*     Results from last 7 days   Lab Units 09/15/21  0459   HEMOGLOBIN A1C % 7 9*     Results from last 7 days   Lab Units 09/14/21  1825 09/14/21  1523   LACTIC ACID mmol/L 1 6 4 3*       Lines/Drains:  Invasive Devices     Peripheral Intravenous Line            Peripheral IV 09/14/21 Right Antecubital 2 days                      Imaging: No pertinent imaging reviewed  Recent Cultures (last 7 days):   Results from last 7 days   Lab Units 09/14/21  2213 09/14/21  1615   BLOOD CULTURE   --  No Growth at 24 hrs  No Growth at 24 hrs     URINE CULTURE  30,000-39,000 cfu/ml   --        Last 24 Hours Medication List:   Current Facility-Administered Medications   Medication Dose Route Frequency Provider Last Rate    albuterol  2 puff Inhalation Q4H PRN Radha Holloway MD      albuterol  2 puff Inhalation 4x Daily Camille S Yusef, CRNP      aspirin  81 mg Oral Daily Radha Holloway MD      atorvastatin  40 mg Oral HS Radha Liz MD      cefTRIAXone  1,000 mg Intravenous Q24H Hudson Nunez PA-C      dexamethasone  6 mg Intravenous Q24H Radha Holloway MD      enoxaparin  1 mg/kg Subcutaneous Q12H [address] Radha Holloway MD      finasteride  5 mg Oral Daily Radha Liz MD      fluticasone-vilanterol  1 puff Inhalation Daily Camille S Yusef, CRNP      furosemide  40 mg Oral Daily Devyn Miller MD      gabapentin  100 mg Oral BID Devyn Liz MD      insulin glargine  10 Units Subcutaneous QAM Camille S Yusef, CRNP      insulin lispro  1-6 Units Subcutaneous TID AC Camille S Yusef, CRNP      insulin lispro  1-6 Units Subcutaneous HS Camille S Yusef, CRNP      remdesivir  100 mg Intravenous Q24H Devyn Liz  mg (09/16/21 1716)    tamsulosin  0 4 mg Oral Daily Devyn Miller MD      tiotropium  18 mcg Inhalation Daily Devyn Miller MD          Today, Patient Was Seen By: Sudhir Lazaro PA-C    **Please Note: This note may have been constructed using a voice recognition system  **

## 2021-09-16 NOTE — ASSESSMENT & PLAN NOTE
· Has not smoked since beginning of COVID illness which was 3 weeks ago  · Cessation counseling  · If patient is to be discharged with home oxygen- patient was educated that he cannot smoke while on oxygen or near the tank  No flammable substances near the oxygen tank

## 2021-09-16 NOTE — ASSESSMENT & PLAN NOTE
· UA on admission with large leukocytes, positive nitrites  · Urine culture does show 30,000-39,000 mixed contaminants x4  · Will treat with IV ceftriaxone

## 2021-09-16 NOTE — PLAN OF CARE
Problem: PAIN - ADULT  Goal: Verbalizes/displays adequate comfort level or baseline comfort level  Description: Interventions:  - Encourage patient to monitor pain and request assistance  - Assess pain using appropriate pain scale  - Administer analgesics based on type and severity of pain and evaluate response  - Implement non-pharmacological measures as appropriate and evaluate response  - Consider cultural and social influences on pain and pain management  - Notify physician/advanced practitioner if interventions unsuccessful or patient reports new pain  Outcome: Progressing     Problem: INFECTION - ADULT  Goal: Absence or prevention of progression during hospitalization  Description: INTERVENTIONS:  - Assess and monitor for signs and symptoms of infection  - Monitor lab/diagnostic results  - Monitor all insertion sites, i e  indwelling lines, tubes, and drains  - Monitor endotracheal if appropriate and nasal secretions for changes in amount and color  - [place] appropriate cooling/warming therapies per order  - Administer medications as ordered  - Instruct and encourage patient and family to use good hand hygiene technique  - Identify and instruct in appropriate isolation precautions for identified infection/condition  Outcome: Progressing  Goal: Absence of fever/infection during neutropenic period  Description: INTERVENTIONS:  - Monitor WBC    Outcome: Progressing     Problem: SAFETY ADULT  Goal: Patient will remain free of falls  Description: INTERVENTIONS:  - Educate patient/family on patient safety including physical limitations  - Instruct patient to call for assistance with activity   - Consult OT/PT to assist with strengthening/mobility   - Keep Call bell within reach  - Keep bed low and locked with side rails adjusted as appropriate  - Keep care items and personal belongings within reach  - Initiate and maintain comfort rounds  - Make Fall Risk Sign visible to staff  - Offer Toileting every    Hours, in advance of need  - Initiate/Maintain   alarm  - Obtain necessary fall risk management equipment:   - Apply yellow socks and bracelet for high fall risk patients  - Consider moving patient to room near nurses station  Outcome: Progressing  Goal: Maintain or return to baseline ADL function  Description: INTERVENTIONS:  -  Assess patient's ability to carry out ADLs; assess patient's baseline for ADL function and identify physical deficits which impact ability to perform ADLs (bathing, care of mouth/teeth, toileting, grooming, dressing, etc )  - Assess/evaluate cause of self-care deficits   - Assess range of motion  - Assess patient's mobility; develop plan if impaired  - Assess patient's need for assistive devices and provide as appropriate  - Encourage maximum independence but intervene and supervise when necessary  - Involve family in performance of ADLs  - Assess for home care needs following discharge   - Consider OT consult to assist with ADL evaluation and planning for discharge  - Provide patient education as appropriate  Outcome: Progressing  Goal: Maintains/Returns to pre admission functional level  Description: INTERVENTIONS:  - Perform BMAT or MOVE assessment daily    - Set and communicate daily mobility goal to care team and patient/family/caregiver  - Collaborate with rehabilitation services on mobility goals if consulted  - Perform Range of Motion   times a day  - Reposition patient every     hours  - Dangle patient   times a day  - Stand patient   times a day  - Ambulate patient   times a day  - Out of bed to chair   times a day   - Out of bed for meals    times a day  - Out of bed for toileting  - Record patient progress and toleration of activity level   Outcome: Progressing     Problem: DISCHARGE PLANNING  Goal: Discharge to home or other facility with appropriate resources  Description: INTERVENTIONS:  - Identify barriers to discharge w/patient and caregiver  - Arrange for needed discharge resources and transportation as appropriate  - Identify discharge learning needs (meds, wound care, etc )  - Arrange for interpretive services to assist at discharge as needed  - Refer to Case Management Department for coordinating discharge planning if the patient needs post-hospital services based on physician/advanced practitioner order or complex needs related to functional status, cognitive ability, or social support system  Outcome: Progressing     Problem: Knowledge Deficit  Goal: Patient/family/caregiver demonstrates understanding of disease process, treatment plan, medications, and discharge instructions  Description: Complete learning assessment and assess knowledge base    Interventions:  - Provide teaching at level of understanding  - Provide teaching via preferred learning methods  Outcome: Progressing     Problem: RESPIRATORY - ADULT  Goal: Achieves optimal ventilation and oxygenation  Description: INTERVENTIONS:  - Assess for changes in respiratory status  - Assess for changes in mentation and behavior  - Position to facilitate oxygenation and minimize respiratory effort  - Oxygen administered by appropriate delivery if ordered  - Initiate smoking cessation education as indicated  - Encourage broncho-pulmonary hygiene including cough, deep breathe, Incentive Spirometry  - Assess the need for suctioning and aspirate as needed  - Assess and instruct to report SOB or any respiratory difficulty  - Respiratory Therapy support as indicated  Outcome: Progressing     Problem: MOBILITY - ADULT  Goal: Maintain or return to baseline ADL function  Description: INTERVENTIONS:  -  Assess patient's ability to carry out ADLs; assess patient's baseline for ADL function and identify physical deficits which impact ability to perform ADLs (bathing, care of mouth/teeth, toileting, grooming, dressing, etc )  - Assess/evaluate cause of self-care deficits   - Assess range of motion  - Assess patient's mobility; develop plan if impaired  - Assess patient's need for assistive devices and provide as appropriate  - Encourage maximum independence but intervene and supervise when necessary  - Involve family in performance of ADLs  - Assess for home care needs following discharge   - Consider OT consult to assist with ADL evaluation and planning for discharge  - Provide patient education as appropriate  Outcome: Progressing  Goal: Maintains/Returns to pre admission functional level  Description: INTERVENTIONS:  - Perform BMAT or MOVE assessment daily    - Set and communicate daily mobility goal to care team and patient/family/caregiver  - Collaborate with rehabilitation services on mobility goals if consulted  - Perform Range of Motion   times a day  - Reposition patient every   hours  - Dangle patient   times a day  - Stand patient   times a day  - Ambulate patient   times a day  - Out of bed to chair   times a day   - Out of bed for meals   times a day  - Out of bed for toileting  - Record patient progress and toleration of activity level   Outcome: Progressing     Problem: Potential for Falls  Goal: Patient will remain free of falls  Description: INTERVENTIONS:  - Educate patient/family on patient safety including physical limitations  - Instruct patient to call for assistance with activity   - Consult OT/PT to assist with strengthening/mobility   - Keep Call bell within reach  - Keep bed low and locked with side rails adjusted as appropriate  - Keep care items and personal belongings within reach  - Initiate and maintain comfort rounds  - Make Fall Risk Sign visible to staff  - Offer Toileting every   Hours, in advance of need  - Initiate/Maintain    alarm  - Obtain necessary fall risk management equipment:     - Apply yellow socks and bracelet for high fall risk patients  - Consider moving patient to room near nurses station  Outcome: Progressing

## 2021-09-16 NOTE — ASSESSMENT & PLAN NOTE
Lab Results   Component Value Date    HGBA1C 7 9 (H) 09/15/2021       Recent Labs     09/15/21  2032 09/16/21  0741 09/16/21  1054 09/16/21  1553   POCGLU 220* 145* 198* 152*       Blood Sugar Average: Last 72 hrs:  (P) 179 5     Sliding scale coverage and Accu-Cheks  Hold home regimen metformin and glipizide  Initiate basal insulin coverage in setting of glucocorticoid use  Hypoglycemia protocol

## 2021-09-16 NOTE — ASSESSMENT & PLAN NOTE
· COPD with exacerbation   · Albuterol MDI q i d   With positive COVID  · Spiriva daily  · Inhaled corticosteroid daily  · Decadron per COVID protocol  · Wean to room air as tolerated  · Continue follow-up with pulmonology as an outpatient

## 2021-09-16 NOTE — ASSESSMENT & PLAN NOTE
· Present on admission as evidenced by Lactic acidosis 4 6, tachypnea, tachycardia, hypoxia  · COVID pneumonia  · Trend leukocytosis, procalcitonin, blood cultures  · Lactic acidosis resolved with fluid resuscitation for ideal body weight with BMI 31 82  · Improving, patient is afebrile, tachycardia and tachypnea resolved

## 2021-09-16 NOTE — CONSULTS
Consultation - Pulmonary Medicine   Bob Santiago 76 y o  male MRN: [id number]  Unit/Bed#: -01 Encounter: [id number]      Assessment / Plan :  1  Acute hypoxic respiratory failure  · Continue maintain SpO2 greater than equal to 88%  · Continue to encourage cough and deep breathing  · Continue self prone positioning    2  COVID-19  · PATIENT IS ON 3 LITERS/MINUTE NASAL CANNULA AT THIS TIME  · Continue COVID-19 moderate pathway  · Continue dexamethasone 6 mg x 10 days  · Continue remdesivir x5 days  · Continue to encourage self prone positioning, early mobilization, incentive spirometry, flutter valve  · Trend procalcitonin  · Patient is no longer a candidate for convalescent plasma  · His therapeutic Lovenox dosing as D-dimer is elevated at 2 67  No PE noted on CT scan  · BNP elevated at 192  · Follow-up blood culture  · CRP 19 7    3  History of lung cancer    4  Urinary tract infection  · Patient with large leukocytes, positive nitrates, blood, and innumerable WBCs  Continue antibiotics per primary care team   Follow-up urine cultures  5  COPD without acute exacerbation  · Continue Spiriva, albuterol, and inhaled steroids  · Follow-up with his pulmonologist as an outpatient    6  Abnormal CT scan of the chest  · Patient has some new lymphadenopathy on CT imaging which is possible in in 3-6 months and following up with his oncologist, pulmonologist, and thoracic surgeon for further evaluation  · Patient does have a chronic neoplasm in his right upper lobe along with new hilar mediastinal lymphadenopathy, no pulmonary embolism      7  Tobacco abuse  · Continue to encourage smoking cessation  · Continue nicotine replacement therapy    History of Present Illness   Physician Requesting Consult: Hugo Lentz MD  Reason for Consult / Principal Problem:  COVID-19  Hx and PE limited by: NA   HPI: Bob Santiago is a 76y o  year old male who presents to the hospital on 09/14/2021 with complaints of shortness of breath and flu-like illness  He reports that this started approximately 3 weeks ago  He stopped smoking tobacco at that time due to his shortness of breath  He became very short of breath and reported that he change to a gray color so he decided to come to the hospital for further evaluation  In the ED he was found to be positive for COVID  CTA was negative for pneumonia or pulmonary embolism  He was started on 3 liters/minute nasal cannula and admitted to the hospital for further evaluation  Patient does report that he follows with a pulmonary doctor  He also follows with a thoracic surgeon  He does have a history of lung cancer and has been in remission for approximately 1 year  He unfortunately still smokes however he quit approximately 3 weeks ago when he started not feeling well  Consults    Review of Systems   Constitutional: Positive for activity change, chills and fatigue  HENT: Positive for congestion and postnasal drip  Respiratory: Positive for chest tightness, shortness of breath and wheezing  Negative for apnea  Cardiovascular: Negative for chest pain, palpitations and leg swelling  Gastrointestinal: Negative for abdominal distention, constipation, diarrhea, nausea and vomiting  Musculoskeletal: Negative for arthralgias  Skin: Negative for rash  Allergic/Immunologic: Negative for immunocompromised state  Neurological: Negative for dizziness  Hematological: Negative for adenopathy  Psychiatric/Behavioral: Negative for agitation  All other systems reviewed and are negative        Historical Information   Past Medical History:   Diagnosis Date    Bladder cancer ([facility] )     BPH (benign prostatic hyperplasia)     COPD (chronic obstructive pulmonary disease) ([facility] )     Diabetes mellitus ([facility] )     Hyperlipidemia     Lung cancer ([facility] )     Rib fractures      Past Surgical History:   Procedure Laterality Date    BRONCHOSCOPY      CATARACT EXTRACTION, BILATERAL      CYSTOSCOPY      TONSILLECTOMY      TRANSURETHRAL RESECTION OF PROSTATE       Social History   Social History     Substance and Sexual Activity   Alcohol Use Not Currently     Social History     Substance and Sexual Activity   Drug Use Not Currently     Social History     Tobacco Use   Smoking Status Current Every Day Smoker    Packs/day: 1 00   Smokeless Tobacco Never Used     Occupational History:  Patient is a previous smoker  He denies any significant environmental or occupational exposure history is      Family History:   Family History   Problem Relation Age of Onset    Hypertension Mother     No Known Problems Father        Meds/Allergies   current meds:   Current Facility-Administered Medications   Medication Dose Route Frequency    albuterol (PROVENTIL HFA,VENTOLIN HFA) inhaler 2 puff  2 puff Inhalation Q4H PRN    albuterol (PROVENTIL HFA,VENTOLIN HFA) inhaler 2 puff  2 puff Inhalation 4x Daily    aspirin (ECOTRIN LOW STRENGTH) EC tablet 81 mg  81 mg Oral Daily    atorvastatin (LIPITOR) tablet 40 mg  40 mg Oral HS    dexamethasone (DECADRON) injection 6 mg  6 mg Intravenous Q24H    enoxaparin (LOVENOX) subcutaneous injection 105 mg  1 mg/kg Subcutaneous Q12H KELI    finasteride (PROSCAR) tablet 5 mg  5 mg Oral Daily    fluticasone-vilanterol (BREO ELLIPTA) 200-25 MCG/INH inhaler 1 puff  1 puff Inhalation Daily    furosemide (LASIX) tablet 40 mg  40 mg Oral Daily    gabapentin (NEURONTIN) capsule 100 mg  100 mg Oral BID    insulin glargine (LANTUS) subcutaneous injection 10 Units 0 1 mL  10 Units Subcutaneous QAM    insulin lispro (HumaLOG) 100 units/mL subcutaneous injection 1-6 Units  1-6 Units Subcutaneous TID AC    insulin lispro (HumaLOG) 100 units/mL subcutaneous injection 1-6 Units  1-6 Units Subcutaneous HS    remdesivir (Veklury) 100 mg in sodium chloride 0 9 % 250 mL IVPB  100 mg Intravenous Q24H    tamsulosin (FLOMAX) capsule 0 4 mg  0 4 mg Oral Daily    tiotropium (SPIRIVA) capsule for inhaler 18 mcg  18 mcg Inhalation Daily       No Known Allergies    Objective   Vitals: Blood pressure 136/73, pulse 66, temperature 98 °F (36 7 °C), resp  rate 18, height 6' 1" (1 854 m), weight 109 kg (241 lb 2 9 oz), SpO2 90 %  ,Body mass index is 31 82 kg/m²  3 liters/minute nasal cannula    Intake/Output Summary (Last 24 hours) at 9/16/2021 1438  Last data filed at 9/16/2021 1201  Gross per 24 hour   Intake 240 ml   Output 2590 ml   Net -2350 ml     Invasive Devices     Peripheral Intravenous Line            Peripheral IV 09/14/21 Right Antecubital 1 day                Physical Exam  Vitals and nursing note reviewed  Constitutional:       Appearance: He is normal weight  He is not toxic-appearing or diaphoretic  HENT:      Head: Normocephalic and atraumatic  Mouth/Throat:      Pharynx: No pharyngeal swelling or oropharyngeal exudate  Neck:      Thyroid: No thyromegaly  Trachea: No tracheal deviation  Cardiovascular:      Rate and Rhythm: Normal rate and regular rhythm  No extrasystoles are present  Pulses: No decreased pulses  Heart sounds: No murmur heard  No friction rub  No gallop  Pulmonary:      Effort: Pulmonary effort is normal       Breath sounds: Normal breath sounds  No decreased breath sounds  Chest:      Chest wall: No mass or deformity  Musculoskeletal:         General: Normal range of motion  Cervical back: Normal range of motion  Right lower leg: No edema  Left lower leg: No edema  Skin:     General: Skin is warm and dry  Capillary Refill: Capillary refill takes less than 2 seconds  Neurological:      General: No focal deficit present  Mental Status: He is alert  Psychiatric:         Mood and Affect: Mood normal          Lab Results: I have personally reviewed pertinent lab results  , ABG: No results found for: PHART, EXM5VPP, PO2ART, NWJ6MMG, U6HGEVUB, BEART, SOURCE, BNP: No results found for: BNP, CBC: No results found for: WBC, HGB, HCT, MCV, PLT, ADJUSTEDWBC, MCH, MCHC, RDW, MPV, NRBC, CMP: No results found for: SODIUM, K, CL, CO2, ANIONGAP, BUN, CREATININE, GLUCOSE, CALCIUM, AST, ALT, ALKPHOS, PROT, BILITOT, EGFR, PT/INR: No results found for: PT, INR, Troponin: No results found for: TROPONINI     Imaging Studies: I have personally reviewed pertinent reports  CT Chest :   FINDINGS:     PULMONARY ARTERIAL TREE:  No pulmonary embolus is seen       LUNGS:    Chronic left upper lobe emphysema      Chronic irregular masslike opacities in the right upper lobe, compatible with known treated neoplasm  The larger opacity has a similar appearance in the coronal and sagittal planes, but in the axial plane has a more convex margin, measures 5 7 x 3 4 cm   axial diameters (3/37)  Accompanying right upper lobe volume loss      Right lower lobe peripheral groundglass patchy opacity has mildly increased  No consolidation or mass      New mild patchy groundglass opacity adjacent to a chronic left rib fracture, most compatible with scarring      PLEURA:  Within normal limits      HEART/GREAT VESSELS:  Mild atherosclerotic change  No acute or suspicious findings      MEDIASTINUM AND MATTHEW:  New enlarged right hilar lymph nodes, greatest 1 4 cm short axis  Smaller left hilar, pretracheal and subcarinal nodes  No supraclavicular or axillary adenopathy      CHEST WALL AND LOWER NECK:   Within normal limits      VISUALIZED STRUCTURES IN THE UPPER ABDOMEN:  Fatty liver and renal cysts  Chronic bilateral renal masses  No acute findings      OSSEOUS STRUCTURES:  No acute or suspicious findings      The study was marked in EPIC for immediate notification      IMPRESSION:     No PE identified      New hilar and mediastinal lymphadenopathy      Chronic treated neoplasm in the right upper lobe, questioned contour change largest right upper lobe opacity  Recurrent neoplasm cannot be completely excluded    Consider short interval follow-up in

## 2021-09-16 NOTE — ASSESSMENT & PLAN NOTE
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   · S/p SBRT and chemotherapy  · Currently under active surveillance  · Follows with Dr Sobeida Cervantes oncology  · CTA PE study shows:   · New hilar and mediastinal lymphadenopathy  · Chronic treated neoplasm in the right upper lobe, questioned contour change largest right upper lobe opacity  Recurrent neoplasm cannot be completely excluded  Consider short interval follow-up in 3 months and/or PET/CT  No new mass identified    · Patient is to follow-up in regards to the new hilar and mediastinal lymphadenopathy in 3-6 months with oncologist, pulmonologist, and thoracic surgeon for further evaluation

## 2021-09-17 LAB
ALBUMIN SERPL BCP-MCNC: 3 G/DL (ref 3.5–5)
ALP SERPL-CCNC: 72 U/L (ref 46–116)
ALT SERPL W P-5'-P-CCNC: 66 U/L (ref 12–78)
ANION GAP SERPL CALCULATED.3IONS-SCNC: 11 MMOL/L (ref 4–13)
AST SERPL W P-5'-P-CCNC: 26 U/L (ref 5–45)
BASOPHILS # BLD AUTO: 0.05 THOUSANDS/ΜL (ref 0–0.1)
BASOPHILS NFR BLD AUTO: 0 % (ref 0–1)
BILIRUB SERPL-MCNC: 0.51 MG/DL (ref 0.2–1)
BUN SERPL-MCNC: 18 MG/DL (ref 5–25)
CALCIUM ALBUM COR SERPL-MCNC: 9.6 MG/DL (ref 8.3–10.1)
CALCIUM SERPL-MCNC: 8.8 MG/DL (ref 8.3–10.1)
CHLORIDE SERPL-SCNC: 101 MMOL/L (ref 100–108)
CO2 SERPL-SCNC: 26 MMOL/L (ref 21–32)
CREAT SERPL-MCNC: 0.9 MG/DL (ref 0.6–1.3)
CRP SERPL QL: 5.7 MG/L
DME PARACHUTE DELIVERY DATE ACTUAL: NORMAL
DME PARACHUTE DELIVERY DATE EXPECTED: NORMAL
DME PARACHUTE DELIVERY DATE REQUESTED: NORMAL
DME PARACHUTE DELIVERY NOTE: NORMAL
DME PARACHUTE ITEM DESCRIPTION: NORMAL
DME PARACHUTE ORDER STATUS: NORMAL
DME PARACHUTE SUPPLIER NAME: NORMAL
DME PARACHUTE SUPPLIER PHONE: NORMAL
EOSINOPHIL # BLD AUTO: 0.02 THOUSAND/ΜL (ref 0–0.61)
EOSINOPHIL NFR BLD AUTO: 0 % (ref 0–6)
ERYTHROCYTE [DISTWIDTH] IN BLOOD BY AUTOMATED COUNT: 12.9 % (ref 11.6–15.1)
GFR SERPL CREATININE-BSD FRML MDRD: 89 ML/MIN/1.73SQ M
GLUCOSE SERPL-MCNC: 107 MG/DL (ref 65–140)
GLUCOSE SERPL-MCNC: 136 MG/DL (ref 65–140)
GLUCOSE SERPL-MCNC: 150 MG/DL (ref 65–140)
GLUCOSE SERPL-MCNC: 170 MG/DL (ref 65–140)
GLUCOSE SERPL-MCNC: 252 MG/DL (ref 65–140)
HCT VFR BLD AUTO: 44 % (ref 36.5–49.3)
HGB BLD-MCNC: 14.8 G/DL (ref 12–17)
IMM GRANULOCYTES # BLD AUTO: 0.11 THOUSAND/UL (ref 0–0.2)
IMM GRANULOCYTES NFR BLD AUTO: 1 % (ref 0–2)
LYMPHOCYTES # BLD AUTO: 1.86 THOUSANDS/ΜL (ref 0.6–4.47)
LYMPHOCYTES NFR BLD AUTO: 15 % (ref 14–44)
MCH RBC QN AUTO: 30.3 PG (ref 26.8–34.3)
MCHC RBC AUTO-ENTMCNC: 33.6 G/DL (ref 31.4–37.4)
MCV RBC AUTO: 90 FL (ref 82–98)
MONOCYTES # BLD AUTO: 0.8 THOUSAND/ΜL (ref 0.17–1.22)
MONOCYTES NFR BLD AUTO: 7 % (ref 4–12)
NEUTROPHILS # BLD AUTO: 9.22 THOUSANDS/ΜL (ref 1.85–7.62)
NEUTS SEG NFR BLD AUTO: 77 % (ref 43–75)
NRBC BLD AUTO-RTO: 0 /100 WBCS
PLATELET # BLD AUTO: 391 THOUSANDS/UL (ref 149–390)
PMV BLD AUTO: 9.3 FL (ref 8.9–12.7)
POTASSIUM SERPL-SCNC: 3.6 MMOL/L (ref 3.5–5.3)
PROCALCITONIN SERPL-MCNC: 0.14 NG/ML
PROT SERPL-MCNC: 7 G/DL (ref 6.4–8.2)
RBC # BLD AUTO: 4.89 MILLION/UL (ref 3.88–5.62)
SODIUM SERPL-SCNC: 138 MMOL/L (ref 136–145)
WBC # BLD AUTO: 12.06 THOUSAND/UL (ref 4.31–10.16)

## 2021-09-17 PROCEDURE — 99232 SBSQ HOSP IP/OBS MODERATE 35: CPT | Performed by: INTERNAL MEDICINE

## 2021-09-17 PROCEDURE — 84145 PROCALCITONIN (PCT): CPT | Performed by: PHYSICIAN ASSISTANT

## 2021-09-17 PROCEDURE — 85025 COMPLETE CBC W/AUTO DIFF WBC: CPT | Performed by: PHYSICIAN ASSISTANT

## 2021-09-17 PROCEDURE — 94760 N-INVAS EAR/PLS OXIMETRY 1: CPT

## 2021-09-17 PROCEDURE — 82948 REAGENT STRIP/BLOOD GLUCOSE: CPT

## 2021-09-17 PROCEDURE — 86140 C-REACTIVE PROTEIN: CPT | Performed by: PHYSICIAN ASSISTANT

## 2021-09-17 PROCEDURE — 99232 SBSQ HOSP IP/OBS MODERATE 35: CPT | Performed by: PHYSICIAN ASSISTANT

## 2021-09-17 PROCEDURE — 80053 COMPREHEN METABOLIC PANEL: CPT | Performed by: PHYSICIAN ASSISTANT

## 2021-09-17 RX ADMIN — CEFTRIAXONE 1000 MG: 1 INJECTION, SOLUTION INTRAVENOUS at 17:31

## 2021-09-17 RX ADMIN — GABAPENTIN 100 MG: 100 CAPSULE ORAL at 17:29

## 2021-09-17 RX ADMIN — ALBUTEROL SULFATE 2 PUFF: 90 AEROSOL, METERED RESPIRATORY (INHALATION) at 17:29

## 2021-09-17 RX ADMIN — ATORVASTATIN CALCIUM 40 MG: 40 TABLET, FILM COATED ORAL at 21:14

## 2021-09-17 RX ADMIN — FINASTERIDE 5 MG: 5 TABLET, FILM COATED ORAL at 08:43

## 2021-09-17 RX ADMIN — DEXAMETHASONE SODIUM PHOSPHATE 6 MG: 4 INJECTION INTRA-ARTICULAR; INTRALESIONAL; INTRAMUSCULAR; INTRAVENOUS; SOFT TISSUE at 17:29

## 2021-09-17 RX ADMIN — FUROSEMIDE 40 MG: 40 TABLET ORAL at 08:43

## 2021-09-17 RX ADMIN — GABAPENTIN 100 MG: 100 CAPSULE ORAL at 08:43

## 2021-09-17 RX ADMIN — ENOXAPARIN SODIUM 105 MG: 120 INJECTION SUBCUTANEOUS at 08:42

## 2021-09-17 RX ADMIN — INSULIN LISPRO 1 UNITS: 100 INJECTION, SOLUTION INTRAVENOUS; SUBCUTANEOUS at 11:46

## 2021-09-17 RX ADMIN — FLUTICASONE FUROATE AND VILANTEROL TRIFENATATE 1 PUFF: 200; 25 POWDER RESPIRATORY (INHALATION) at 08:46

## 2021-09-17 RX ADMIN — INSULIN GLARGINE 10 UNITS: 100 INJECTION, SOLUTION SUBCUTANEOUS at 08:42

## 2021-09-17 RX ADMIN — INSULIN LISPRO 3 UNITS: 100 INJECTION, SOLUTION INTRAVENOUS; SUBCUTANEOUS at 21:17

## 2021-09-17 RX ADMIN — TAMSULOSIN HYDROCHLORIDE 0.4 MG: 0.4 CAPSULE ORAL at 08:43

## 2021-09-17 RX ADMIN — REMDESIVIR 100 MG: 100 INJECTION, POWDER, LYOPHILIZED, FOR SOLUTION INTRAVENOUS at 18:19

## 2021-09-17 RX ADMIN — TIOTROPIUM BROMIDE 18 MCG: 18 CAPSULE ORAL; RESPIRATORY (INHALATION) at 08:46

## 2021-09-17 RX ADMIN — ALBUTEROL SULFATE 2 PUFF: 90 AEROSOL, METERED RESPIRATORY (INHALATION) at 08:48

## 2021-09-17 RX ADMIN — ALBUTEROL SULFATE 2 PUFF: 90 AEROSOL, METERED RESPIRATORY (INHALATION) at 11:46

## 2021-09-17 RX ADMIN — ALBUTEROL SULFATE 2 PUFF: 90 AEROSOL, METERED RESPIRATORY (INHALATION) at 21:18

## 2021-09-17 RX ADMIN — ENOXAPARIN SODIUM 105 MG: 120 INJECTION SUBCUTANEOUS at 20:13

## 2021-09-17 RX ADMIN — ASPIRIN 81 MG: 81 TABLET, COATED ORAL at 08:43

## 2021-09-17 NOTE — RESPIRATORY THERAPY NOTE
Home Oxygen Qualifying Test       Patient name: Molly Lr        : 1955   Date of Test:  2021  Diagnosis:      Home Oxygen Test:    **Medicare Guidelines require item(s) 1-5 on all ambulatory patients or 1 and 2 on non-ambulatory patients  1   Baseline SPO2 on Room Air at rest 85 %  2   SPO2 during exercise on Room Air N/A %  During exercise monitor SpO2  If SPO2 increases >=89% with ambulation do not add supplemental             oxygen  If <= 88% on room air add O2 via NC and titrate patient  Patient must be ambulated with O2 and titrated to > 88% with exertion  3   SPO2 on Oxygen at rest 90 % 3 lpm     4   SPO2 during exercise on Oxygen  90% a liter flow of 4 lpm     5   Exercise performed:          walking          [x]  Supplemental Home Oxygen is indicated  []  Client does not qualify for home oxygen  Respiratory Additional Notes- Pt requires 3LPM at rest and 4LPM NC during ambulation to maintain SpO2>88%    Duane Montana, RT

## 2021-09-17 NOTE — ASSESSMENT & PLAN NOTE
Lab Results   Component Value Date    HGBA1C 7 9 (H) 09/15/2021       Recent Labs     09/16/21 2057 09/17/21  0744 09/17/21  1047 09/17/21  1604   POCGLU 220* 136 150* 107       Blood Sugar Average: Last 72 hrs:  (P) 170 75     Sliding scale coverage and Accu-Cheks  Hold home regimen metformin and glipizide  Initiate basal insulin coverage in setting of glucocorticoid use  Hypoglycemia protocol

## 2021-09-17 NOTE — PROGRESS NOTES
[address]  Progress Note - Pablito Bello 1955, 76 y o  male MRN: [id number]  Unit/Bed#: -01 Encounter: [id number]  Primary Care Provider: Juma Lazcano MD   Date and time admitted to hospital: 9/14/2021  3:04 PM    * Sepsis [facility])  Assessment & Plan  · Present on admission as evidenced by Lactic acidosis 4 6, tachypnea, tachycardia, hypoxia  · COVID pneumonia  · Trend leukocytosis, procalcitonin, blood cultures  · Lactic acidosis resolved with fluid resuscitation for ideal body weight with BMI 31 82  · Improving, patient is afebrile, tachycardia and tachypnea resolved  · Leukocytosis noted today- likely secondary to IV steroid use, afebrile     Acute hypoxemic respiratory failure due to COVID-19 [facility])  Assessment & Plan  Background:  flu-like symptoms over the past 3 weeks but significant dyspnea and turning gray with coughing per significant other prompting PCP eval where he was found to be hypoxic requiring 3 L and sent to ER  · COVID19- PCR positive 9/14  · Supplemental O2 with  4LNC saturating  90 %; wean as tolerated to maintain saturations >88%  · Imaging     ? 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 CXR: Vague groundglass opacity in left suspicious for COVID-19 pneumonia  · Started on moderate COVID pathway for treatment  ? Start dexamethasone 6 mg x 10 Days  ?  AC full strength enoxaparin  · Daily CBC and CMP  · Continue check inflammatory markers as indicated- CRP is 17 4 on admission  · Remdesivir 200 mg IV day 1 and 100 mg IV on day 4/5  · Encourage ISP/flutter valve  · Encourage proning and early mobilization  · Blood cultures no growth to date; follow up with results · Consideration for convalescent plasma - No longer eligible as symptom onset >5 days   · Airborne/Droplet precautions ordered  · Will check desaturation screen 09/17:  Patient will require 3 L of oxygen at rest, 4 L with ambulation- consider repeat desaturation screen tomorrow prior to discharge after receiving last dose of remdesivir   · Due to extended length of symptoms approximately 3 weeks and now with significantly worsening dyspnea with minimally elevated CRP, appreciate pulmonary consultation- follow recommendations    COPD with acute exacerbation ([facility] )  Assessment & Plan  · COPD with exacerbation   · Albuterol MDI q i d  With positive COVID  · Spiriva daily  · Inhaled corticosteroid daily  · Decadron per COVID protocol  · Wean to room air as tolerated  · Continue follow-up with pulmonology as an outpatient    History of lung cancer  Assessment & Plan  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   · S/p SBRT and chemotherapy  · Currently under active surveillance  · Follows with Dr Jimenez Antis oncology  · CTA PE study shows:   · New hilar and mediastinal lymphadenopathy  · Chronic treated neoplasm in the right upper lobe, questioned contour change largest right upper lobe opacity  Recurrent neoplasm cannot be completely excluded  Consider short interval follow-up in 3 months and/or PET/CT  No new mass identified  · Patient is to follow-up in regards to the new hilar and mediastinal lymphadenopathy in 3-6 months with oncologist, pulmonologist, and thoracic surgeon for further evaluation      Tobacco abuse  Assessment & Plan  · Has not smoked since beginning of COVID illness which was 3 weeks ago  · Cessation counseling  · If patient is to be discharged with home oxygen- patient was educated that he cannot smoke while on oxygen or near the tank  No flammable substances near the oxygen tank      UTI (urinary tract infection)  Assessment & Plan  · UA on admission with large leukocytes, positive nitrites  · Urine culture does show 30,000-39,000 mixed contaminants x4  · Will treat with IV ceftriaxone    BPH with obstruction/lower urinary tract symptoms  Assessment & Plan  · History of TURP  · Continue Flomax and Proscar home regimen  · Urinary retention protocol    Type 2 diabetes mellitus, without long-term current use of insulin [facility])  Assessment & Plan  Lab Results   Component Value Date    HGBA1C 7 9 (H) 09/15/2021       Recent Labs     09/16/21  2057 09/17/21  0744 09/17/21  1047 09/17/21  1604   POCGLU 220* 136 150* 107       Blood Sugar Average: Last 72 hrs:  (P) 170 75     Sliding scale coverage and Accu-Cheks  Hold home regimen metformin and glipizide  Initiate basal insulin coverage in setting of glucocorticoid use  Hypoglycemia protocol          VTE Pharmacologic Prophylaxis: VTE Score: 5 High Risk (Score >/= 5) - Pharmacological DVT Prophylaxis Ordered: enoxaparin (Lovenox)  Sequential Compression Devices Ordered  Patient Centered Rounds: I performed bedside rounds with nursing staff today  Discussions with Specialists or Other Care Team Provider: pulmonology     Education and Discussions with Family / Patient: Patient declined call to   Time Spent for Care: 30 minutes  More than 50% of total time spent on counseling and coordination of care as described above  Current Length of Stay: 3 day(s)  Current Patient Status: Inpatient   Certification Statement: The patient will continue to require additional inpatient hospital stay due to Acute hypoxic respiratory failure secondary to COVID-19 infection  Discharge Plan: Anticipate discharge tomorrow to home  Code Status: Level 1 - Full Code    Subjective:   Patient was resting in bed comfortably  No complaints at this time  States he is feeling much better than when he 1st came in  Patient is still requiring 3 L of oxygen at rest and 4 L with ambulation    Discussed with pulmonology and we are in agreement that due to his comorbidities with lung cancer and COPD would benefit from completing the full 5 day course of remdesivir  Patient is agreeable with this plan  Would like to go home tomorrow after his 5th dose remdesivir  Objective:     Vitals:   Temp (24hrs), Av 2 °F (36 8 °C), Min:97 5 °F (36 4 °C), Max:98 7 °F (37 1 °C)    Temp:  [97 5 °F (36 4 °C)-98 7 °F (37 1 °C)] 98 1 °F (36 7 °C)  HR:  [61-81] 76  Resp:  [17-21] 17  BP: (116-137)/(78-80) 137/79  SpO2:  [89 %-92 %] 89 %  Body mass index is 31 82 kg/m²  Input and Output Summary (last 24 hours): Intake/Output Summary (Last 24 hours) at 2021 192  Last data filed at 2021 1700  Gross per 24 hour   Intake 716 ml   Output 2975 ml   Net -2259 ml       Physical Exam:   Physical Exam  Constitutional:       General: He is not in acute distress  HENT:      Mouth/Throat:      Mouth: Mucous membranes are moist    Eyes:      General: No scleral icterus  Cardiovascular:      Rate and Rhythm: Normal rate and regular rhythm  Pulses: Normal pulses  Heart sounds: Normal heart sounds  Pulmonary:      Effort: Pulmonary effort is normal  No respiratory distress  Breath sounds: Normal breath sounds  No wheezing, rhonchi or rales  Abdominal:      General: Abdomen is flat  Bowel sounds are normal       Palpations: Abdomen is soft  Tenderness: There is no abdominal tenderness  Musculoskeletal:         General: Normal range of motion  Right lower leg: No edema  Left lower leg: No edema  Skin:     General: Skin is warm  Neurological:      General: No focal deficit present  Mental Status: He is alert and oriented to person, place, and time     Psychiatric:         Mood and Affect: Mood normal           Additional Data:     Labs:  Results from last 7 days   Lab Units 21  0450   WBC Thousand/uL 12 06*   HEMOGLOBIN g/dL 14 8   HEMATOCRIT % 44 0   PLATELETS Thousands/uL 391*   NEUTROS PCT % 77*   LYMPHS PCT % 15   MONOS PCT % 7 EOS PCT % 0     Results from last 7 days   Lab Units 09/17/21  0450   SODIUM mmol/L 138   POTASSIUM mmol/L 3 6   CHLORIDE mmol/L 101   CO2 mmol/L 26   BUN mg/dL 18   CREATININE mg/dL 0 90   ANION GAP mmol/L 11   CALCIUM mg/dL 8 8   ALBUMIN g/dL 3 0*   TOTAL BILIRUBIN mg/dL 0 51   ALK PHOS U/L 72   ALT U/L 66   AST U/L 26   GLUCOSE RANDOM mg/dL 170*     Results from last 7 days   Lab Units 09/14/21  1523   INR  0 94     Results from last 7 days   Lab Units 09/17/21  1604 09/17/21  1047 09/17/21  0744 09/16/21  2057 09/16/21  1553 09/16/21  1054 09/16/21  0741 09/15/21  2032 09/15/21  1615 09/15/21  1036 09/15/21  0757 09/14/21  2210   POC GLUCOSE mg/dl 107 150* 136 220* 152* 198* 145* 220* 193* 172* 140 216*     Results from last 7 days   Lab Units 09/15/21  0459   HEMOGLOBIN A1C % 7 9*     Results from last 7 days   Lab Units 09/17/21  0450 09/14/21  1825 09/14/21  1523   LACTIC ACID mmol/L  --  1 6 4 3*   PROCALCITONIN ng/ml 0 14  --   --        Lines/Drains:  Invasive Devices     None                       Imaging: No pertinent imaging reviewed  Recent Cultures (last 7 days):   Results from last 7 days   Lab Units 09/14/21  2213 09/14/21  1615   BLOOD CULTURE   --  No Growth at 48 hrs  No Growth at 48 hrs     URINE CULTURE  30,000-39,000 cfu/ml   --        Last 24 Hours Medication List:   Current Facility-Administered Medications   Medication Dose Route Frequency Provider Last Rate    albuterol  2 puff Inhalation Q4H PRN Romelia Meckel Orlena Etienne, MD      albuterol  2 puff Inhalation 4x Daily JADEN Chou      aspirin  81 mg Oral Daily Romelia Meckel Orlena Etienne, MD      atorvastatin  40 mg Oral HS Romelia Meckel Antoinette Pena-Teotico, MD      cefTRIAXone  1,000 mg Intravenous Q24H rIene Watkins PA-C 1,000 mg (09/17/21 1731)    dexamethasone  6 mg Intravenous Q24H Eulalio Liz MD      enoxaparin  1 mg/kg Subcutaneous Q12H [address] Romelia Meckel Charles Alvarenga MD      finasteride  5 mg Oral Daily Monica Anayaoinpeter Liz MD      fluticasone-vilanterol  1 puff Inhalation Daily Camille S Yusef, CRNP      furosemide  40 mg Oral Daily Monica Alvarenga MD      gabapentin  100 mg Oral BID Monica Liz MD      insulin glargine  10 Units Subcutaneous QAM Camille S Yusef, CRNP      insulin lispro  1-6 Units Subcutaneous TID AC Camille S Yusef, CRNP      insulin lispro  1-6 Units Subcutaneous HS Camille S Yusef, CRNP      remdesivir  100 mg Intravenous Q24H Monica iLz  mg (09/16/21 1716)    tamsulosin  0 4 mg Oral Daily Monica Alvarenga MD      tiotropium  18 mcg Inhalation Daily Monica Alvarenga MD          Today, Patient Was Seen By: Alonzo Paz PA-C    **Please Note: This note may have been constructed using a voice recognition system  **

## 2021-09-17 NOTE — ASSESSMENT & PLAN NOTE
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   · S/p SBRT and chemotherapy  · Currently under active surveillance  · Follows with Dr Janina Mckeon oncology  · CTA PE study shows:   · New hilar and mediastinal lymphadenopathy  · Chronic treated neoplasm in the right upper lobe, questioned contour change largest right upper lobe opacity  Recurrent neoplasm cannot be completely excluded  Consider short interval follow-up in 3 months and/or PET/CT  No new mass identified    · Patient is to follow-up in regards to the new hilar and mediastinal lymphadenopathy in 3-6 months with oncologist, pulmonologist, and thoracic surgeon for further evaluation

## 2021-09-17 NOTE — CASE MANAGEMENT
Case Management Progress Note    Patient name Ishaan Carcamo  Location /-09 MRN [id number]  : 1955 Date 2021       LOS (days): 3  Geometric Mean LOS (GMLOS) (days): 5 40  Days to GMLOS:2 5        PROGRESS NOTE:      Pt requires home O2, 3L at rest and 4L with exertion  CM discussed DME companies with pt, pt does not have a preference to any DME company   CM placed O2 order in [place], awaiting reply   [facility] has accepted O2 order and can provide O2 for pt    CM placed two O2 tanks in front of pts door, O2 to go home with pt  CM informed bedside nurse  Pt given a pulse ox also for home use    Freedom of choice was provided in regards to needing a home medical equipment company, pt does not have preference  Pt is aware that we frequently utilized [employer] as we have a working relationship with this company  Patients choice is to use [employer]

## 2021-09-17 NOTE — PROGRESS NOTES
Progress Note - Pulmonary   Samanthalashawn Kent 76 y o  male MRN: [id number]  Unit/Bed#: -01 Encounter: [id number]    Assessment:  Acute hypoxic respiratory failure  COVID pneumonia  History of lung cancer  COPD with exacerbation    Plan:  From a COVID perspective I recommend continued hopitalization to assure stability of oxygen requirements and Remdesivir/Decadron  If stabel in next 24 hours, consider d/c on Trelegy and use nebulizer TID with albuterol at home  Needs followup CT chest in 8 weeks to assure this new adenopathy is reactive from COVID and not cancer recurrence    Chief Complaint:   I am OK    Subjective:   Patient feels breathing is improved but still with o2 reqwuiremetns of 4 liters and wheeze    Objective:     Vitals: Blood pressure 116/80, pulse 61, temperature 97 5 °F (36 4 °C), resp  rate 18, height 6' 1" (1 854 m), weight 109 kg (241 lb 2 9 oz), SpO2 90 %  ,Body mass index is 31 82 kg/m²        Intake/Output Summary (Last 24 hours) at 9/17/2021 1551  Last data filed at 9/17/2021 1435  Gross per 24 hour   Intake 766 ml   Output 3175 ml   Net -2409 ml       Invasive Devices     Peripheral Intravenous Line            Peripheral IV 09/14/21 Right Antecubital 3 days                Physical Exam: /80   Pulse 61   Temp 97 5 °F (36 4 °C)   Resp 18   Ht 6' 1" (1 854 m)   Wt 109 kg (241 lb 2 9 oz)   SpO2 90%   BMI 31 82 kg/m²   General appearance: alert and oriented, in no acute distress  Neck: no adenopathy, no carotid bruit, no JVD, supple, symmetrical, trachea midline and thyroid not enlarged, symmetric, no tenderness/mass/nodules  Lungs: wheezes  Heart: regular rate and rhythm, S1, S2 normal, no murmur, click, rub or gallop  Abdomen: soft, non-tender; bowel sounds normal; no masses,  no organomegaly  Extremities: extremities normal, warm and well-perfused; no cyanosis, clubbing, or edema  Pulses: 2+ and symmetric  Skin: Skin color, texture, turgor normal  No rashes or lesions  Neurologic: Grossly normal     Labs:   CBC:   Lab Results   Component Value Date    WBC 12 06 (H) 09/17/2021    HGB 14 8 09/17/2021    HCT 44 0 09/17/2021    MCV 90 09/17/2021     (H) 09/17/2021    MCH 30 3 09/17/2021    MCHC 33 6 09/17/2021    RDW 12 9 09/17/2021    MPV 9 3 09/17/2021    NRBC 0 09/17/2021   , CMP:   Lab Results   Component Value Date    SODIUM 138 09/17/2021    K 3 6 09/17/2021     09/17/2021    CO2 26 09/17/2021    BUN 18 09/17/2021    CREATININE 0 90 09/17/2021    CALCIUM 8 8 09/17/2021    AST 26 09/17/2021    ALT 66 09/17/2021    ALKPHOS 72 09/17/2021    EGFR 89 09/17/2021     Imaging and other studies: I have personally reviewed pertinent films in PACS

## 2021-09-17 NOTE — PLAN OF CARE
Problem: PAIN - ADULT  Goal: Verbalizes/displays adequate comfort level or baseline comfort level  Description: Interventions:  - Encourage patient to monitor pain and request assistance  - Assess pain using appropriate pain scale  - Administer analgesics based on type and severity of pain and evaluate response  - Implement non-pharmacological measures as appropriate and evaluate response  - Consider cultural and social influences on pain and pain management  - Notify physician/advanced practitioner if interventions unsuccessful or patient reports new pain  Outcome: Progressing     Problem: INFECTION - ADULT  Goal: Absence or prevention of progression during hospitalization  Description: INTERVENTIONS:  - Assess and monitor for signs and symptoms of infection  - Monitor lab/diagnostic results  - Monitor all insertion sites, i e  indwelling lines, tubes, and drains  - Monitor endotracheal if appropriate and nasal secretions for changes in amount and color  - [place] appropriate cooling/warming therapies per order  - Administer medications as ordered  - Instruct and encourage patient and family to use good hand hygiene technique  - Identify and instruct in appropriate isolation precautions for identified infection/condition  Outcome: Progressing  Goal: Absence of fever/infection during neutropenic period  Description: INTERVENTIONS:  - Monitor WBC    Outcome: Progressing     Problem: SAFETY ADULT  Goal: Patient will remain free of falls  Description: INTERVENTIONS:  - Educate patient/family on patient safety including physical limitations  - Instruct patient to call for assistance with activity   - Consult OT/PT to assist with strengthening/mobility   - Keep Call bell within reach  - Keep bed low and locked with side rails adjusted as appropriate  - Keep care items and personal belongings within reach  - Initiate and maintain comfort rounds  - Make Fall Risk Sign visible to staff  - Offer Toileting every 2 Hours, in advance of need  - Initiate/Maintain bed alarm  - Apply yellow socks and bracelet for high fall risk patients  - Consider moving patient to room near nurses station  Outcome: Progressing  Goal: Maintain or return to baseline ADL function  Description: INTERVENTIONS:  -  Assess patient's ability to carry out ADLs; assess patient's baseline for ADL function and identify physical deficits which impact ability to perform ADLs (bathing, care of mouth/teeth, toileting, grooming, dressing, etc )  - Assess/evaluate cause of self-care deficits   - Assess range of motion  - Assess patient's mobility; develop plan if impaired  - Assess patient's need for assistive devices and provide as appropriate  - Encourage maximum independence but intervene and supervise when necessary  - Involve family in performance of ADLs  - Assess for home care needs following discharge   - Consider OT consult to assist with ADL evaluation and planning for discharge  - Provide patient education as appropriate  Outcome: Progressing  Goal: Maintains/Returns to pre admission functional level  Description: INTERVENTIONS:  - Perform BMAT or MOVE assessment daily    - Set and communicate daily mobility goal to care team and patient/family/caregiver     - Collaborate with rehabilitation services on mobility goals if consulted  - Out of bed for toileting  - Record patient progress and toleration of activity level   Outcome: Progressing     Problem: DISCHARGE PLANNING  Goal: Discharge to home or other facility with appropriate resources  Description: INTERVENTIONS:  - Identify barriers to discharge w/patient and caregiver  - Arrange for needed discharge resources and transportation as appropriate  - Identify discharge learning needs (meds, wound care, etc )  - Arrange for interpretive services to assist at discharge as needed  - Refer to Case Management Department for coordinating discharge planning if the patient needs post-hospital services based on physician/advanced practitioner order or complex needs related to functional status, cognitive ability, or social support system  Outcome: Progressing     Problem: Knowledge Deficit  Goal: Patient/family/caregiver demonstrates understanding of disease process, treatment plan, medications, and discharge instructions  Description: Complete learning assessment and assess knowledge base    Interventions:  - Provide teaching at level of understanding  - Provide teaching via preferred learning methods  Outcome: Progressing     Problem: RESPIRATORY - ADULT  Goal: Achieves optimal ventilation and oxygenation  Description: INTERVENTIONS:  - Assess for changes in respiratory status  - Assess for changes in mentation and behavior  - Position to facilitate oxygenation and minimize respiratory effort  - Oxygen administered by appropriate delivery if ordered  - Initiate smoking cessation education as indicated  - Encourage broncho-pulmonary hygiene including cough, deep breathe, Incentive Spirometry  - Assess the need for suctioning and aspirate as needed  - Assess and instruct to report SOB or any respiratory difficulty  - Respiratory Therapy support as indicated  Outcome: Progressing     Problem: MOBILITY - ADULT  Goal: Maintain or return to baseline ADL function  Description: INTERVENTIONS:  -  Assess patient's ability to carry out ADLs; assess patient's baseline for ADL function and identify physical deficits which impact ability to perform ADLs (bathing, care of mouth/teeth, toileting, grooming, dressing, etc )  - Assess/evaluate cause of self-care deficits   - Assess range of motion  - Assess patient's mobility; develop plan if impaired  - Assess patient's need for assistive devices and provide as appropriate  - Encourage maximum independence but intervene and supervise when necessary  - Involve family in performance of ADLs  - Assess for home care needs following discharge   - Consider OT consult to assist with ADL evaluation and planning for discharge  - Provide patient education as appropriate  Outcome: Progressing  Goal: Maintains/Returns to pre admission functional level  Description: INTERVENTIONS:  - Perform BMAT or MOVE assessment daily    - Set and communicate daily mobility goal to care team and patient/family/caregiver     - Collaborate with rehabilitation services on mobility goals if consulted  - Out of bed for toileting  - Record patient progress and toleration of activity level   Outcome: Progressing     Problem: Potential for Falls  Goal: Patient will remain free of falls  Description: INTERVENTIONS:  - Educate patient/family on patient safety including physical limitations  - Instruct patient to call for assistance with activity   - Consult OT/PT to assist with strengthening/mobility   - Keep Call bell within reach  - Keep bed low and locked with side rails adjusted as appropriate  - Keep care items and personal belongings within reach  - Initiate and maintain comfort rounds  - Make Fall Risk Sign visible to staff  - Offer Toileting every 2 Hours, in advance of need  - Initiate/Maintain bed alarm  - Apply yellow socks and bracelet for high fall risk patients  - Consider moving patient to room near nurses station  Outcome: Progressing

## 2021-09-17 NOTE — ASSESSMENT & PLAN NOTE
Background:  flu-like symptoms over the past 3 weeks but significant dyspnea and turning gray with coughing per significant other prompting PCP rickey where he was found to be hypoxic requiring 3 L and sent to ER  · COVID19- PCR positive 9/14  · Supplemental O2 with  4LNC saturating  90 %; wean as tolerated to maintain saturations >88%  · Imaging     ? 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 CXR: Vague groundglass opacity in left suspicious for COVID-19 pneumonia  · Started on moderate COVID pathway for treatment  ? Start dexamethasone 6 mg x 10 Days  ?  AC full strength enoxaparin  · Daily CBC and CMP  · Continue check inflammatory markers as indicated- CRP is 17 4 on admission  · Remdesivir 200 mg IV day 1 and 100 mg IV on day 4/5  · Encourage ISP/flutter valve  · Encourage proning and early mobilization  · Blood cultures no growth to date; follow up with results   · Consideration for convalescent plasma - No longer eligible as symptom onset >5 days   · Airborne/Droplet precautions ordered  · Will check desaturation screen 09/17:  Patient will require 3 L of oxygen at rest, 4 L with ambulation- consider repeat desaturation screen tomorrow prior to discharge after receiving last dose of remdesivir   · Due to extended length of symptoms approximately 3 weeks and now with significantly worsening dyspnea with minimally elevated CRP, appreciate pulmonary consultation- follow recommendations

## 2021-09-17 NOTE — ASSESSMENT & PLAN NOTE
· Present on admission as evidenced by Lactic acidosis 4 6, tachypnea, tachycardia, hypoxia  · COVID pneumonia  · Trend leukocytosis, procalcitonin, blood cultures  · Lactic acidosis resolved with fluid resuscitation for ideal body weight with BMI 31 82  · Improving, patient is afebrile, tachycardia and tachypnea resolved  · Leukocytosis noted today- likely secondary to IV steroid use, afebrile

## 2021-09-18 VITALS
SYSTOLIC BLOOD PRESSURE: 118 MMHG | RESPIRATION RATE: 18 BRPM | HEART RATE: 66 BPM | BODY MASS INDEX: 31.96 KG/M2 | WEIGHT: 241.18 LBS | TEMPERATURE: 97.9 F | DIASTOLIC BLOOD PRESSURE: 78 MMHG | OXYGEN SATURATION: 91 % | HEIGHT: 73 IN

## 2021-09-18 PROBLEM — R65.20 SEVERE SEPSIS (HCC): Status: RESOLVED | Noted: 2021-09-15 | Resolved: 2021-09-18

## 2021-09-18 PROBLEM — A41.9 SEVERE SEPSIS (HCC): Status: RESOLVED | Noted: 2021-09-15 | Resolved: 2021-09-18

## 2021-09-18 LAB
ALBUMIN SERPL BCP-MCNC: 3.3 G/DL (ref 3.5–5)
ALP SERPL-CCNC: 72 U/L (ref 46–116)
ALT SERPL W P-5'-P-CCNC: 88 U/L (ref 12–78)
ANION GAP SERPL CALCULATED.3IONS-SCNC: 8 MMOL/L (ref 4–13)
AST SERPL W P-5'-P-CCNC: 37 U/L (ref 5–45)
BASOPHILS # BLD AUTO: 0.06 THOUSANDS/ΜL (ref 0–0.1)
BASOPHILS NFR BLD AUTO: 1 % (ref 0–1)
BILIRUB SERPL-MCNC: 0.59 MG/DL (ref 0.2–1)
BUN SERPL-MCNC: 19 MG/DL (ref 5–25)
CALCIUM ALBUM COR SERPL-MCNC: 9.7 MG/DL (ref 8.3–10.1)
CALCIUM SERPL-MCNC: 9.1 MG/DL (ref 8.3–10.1)
CHLORIDE SERPL-SCNC: 101 MMOL/L (ref 100–108)
CO2 SERPL-SCNC: 28 MMOL/L (ref 21–32)
CREAT SERPL-MCNC: 0.91 MG/DL (ref 0.6–1.3)
CRP SERPL QL: 3.7 MG/L
EOSINOPHIL # BLD AUTO: 0.01 THOUSAND/ΜL (ref 0–0.61)
EOSINOPHIL NFR BLD AUTO: 0 % (ref 0–6)
ERYTHROCYTE [DISTWIDTH] IN BLOOD BY AUTOMATED COUNT: 13 % (ref 11.6–15.1)
GFR SERPL CREATININE-BSD FRML MDRD: 88 ML/MIN/1.73SQ M
GLUCOSE SERPL-MCNC: 139 MG/DL (ref 65–140)
GLUCOSE SERPL-MCNC: 162 MG/DL (ref 65–140)
GLUCOSE SERPL-MCNC: 173 MG/DL (ref 65–140)
HCT VFR BLD AUTO: 47.2 % (ref 36.5–49.3)
HGB BLD-MCNC: 15.8 G/DL (ref 12–17)
IMM GRANULOCYTES # BLD AUTO: 0.14 THOUSAND/UL (ref 0–0.2)
IMM GRANULOCYTES NFR BLD AUTO: 1 % (ref 0–2)
LYMPHOCYTES # BLD AUTO: 1.85 THOUSANDS/ΜL (ref 0.6–4.47)
LYMPHOCYTES NFR BLD AUTO: 16 % (ref 14–44)
MCH RBC QN AUTO: 30 PG (ref 26.8–34.3)
MCHC RBC AUTO-ENTMCNC: 33.5 G/DL (ref 31.4–37.4)
MCV RBC AUTO: 90 FL (ref 82–98)
MONOCYTES # BLD AUTO: 0.75 THOUSAND/ΜL (ref 0.17–1.22)
MONOCYTES NFR BLD AUTO: 7 % (ref 4–12)
NEUTROPHILS # BLD AUTO: 8.77 THOUSANDS/ΜL (ref 1.85–7.62)
NEUTS SEG NFR BLD AUTO: 75 % (ref 43–75)
NRBC BLD AUTO-RTO: 0 /100 WBCS
PLATELET # BLD AUTO: 405 THOUSANDS/UL (ref 149–390)
PMV BLD AUTO: 9.4 FL (ref 8.9–12.7)
POTASSIUM SERPL-SCNC: 3.9 MMOL/L (ref 3.5–5.3)
PROCALCITONIN SERPL-MCNC: 0.06 NG/ML
PROT SERPL-MCNC: 7.3 G/DL (ref 6.4–8.2)
RBC # BLD AUTO: 5.27 MILLION/UL (ref 3.88–5.62)
SODIUM SERPL-SCNC: 137 MMOL/L (ref 136–145)
WBC # BLD AUTO: 11.58 THOUSAND/UL (ref 4.31–10.16)

## 2021-09-18 PROCEDURE — 86140 C-REACTIVE PROTEIN: CPT | Performed by: PHYSICIAN ASSISTANT

## 2021-09-18 PROCEDURE — 82948 REAGENT STRIP/BLOOD GLUCOSE: CPT

## 2021-09-18 PROCEDURE — 84145 PROCALCITONIN (PCT): CPT | Performed by: PHYSICIAN ASSISTANT

## 2021-09-18 PROCEDURE — 85025 COMPLETE CBC W/AUTO DIFF WBC: CPT | Performed by: PHYSICIAN ASSISTANT

## 2021-09-18 PROCEDURE — 80053 COMPREHEN METABOLIC PANEL: CPT | Performed by: PHYSICIAN ASSISTANT

## 2021-09-18 PROCEDURE — 99239 HOSP IP/OBS DSCHRG MGMT >30: CPT | Performed by: PHYSICIAN ASSISTANT

## 2021-09-18 RX ORDER — ALBUTEROL SULFATE 2.5 MG/3ML
2.5 SOLUTION RESPIRATORY (INHALATION) EVERY 6 HOURS
Qty: 84 ML | Refills: 0 | Status: SHIPPED | OUTPATIENT
Start: 2021-09-18 | End: 2021-09-25

## 2021-09-18 RX ORDER — CEFTRIAXONE 1 G/50ML
1000 INJECTION, SOLUTION INTRAVENOUS EVERY 24 HOURS
Status: DISCONTINUED | OUTPATIENT
Start: 2021-09-18 | End: 2021-09-18 | Stop reason: HOSPADM

## 2021-09-18 RX ORDER — FLUTICASONE FUROATE, UMECLIDINIUM BROMIDE AND VILANTEROL TRIFENATATE 200; 62.5; 25 UG/1; UG/1; UG/1
1 POWDER RESPIRATORY (INHALATION) DAILY
Qty: 60 BLISTER | Refills: 0 | Status: SHIPPED | OUTPATIENT
Start: 2021-09-18 | End: 2021-10-18

## 2021-09-18 RX ADMIN — ALBUTEROL SULFATE 2 PUFF: 90 AEROSOL, METERED RESPIRATORY (INHALATION) at 11:32

## 2021-09-18 RX ADMIN — FLUTICASONE FUROATE AND VILANTEROL TRIFENATATE 1 PUFF: 200; 25 POWDER RESPIRATORY (INHALATION) at 08:29

## 2021-09-18 RX ADMIN — ALBUTEROL SULFATE 2 PUFF: 90 AEROSOL, METERED RESPIRATORY (INHALATION) at 08:28

## 2021-09-18 RX ADMIN — REMDESIVIR 100 MG: 100 INJECTION, POWDER, LYOPHILIZED, FOR SOLUTION INTRAVENOUS at 12:03

## 2021-09-18 RX ADMIN — INSULIN GLARGINE 10 UNITS: 100 INJECTION, SOLUTION SUBCUTANEOUS at 08:28

## 2021-09-18 RX ADMIN — INSULIN LISPRO 1 UNITS: 100 INJECTION, SOLUTION INTRAVENOUS; SUBCUTANEOUS at 11:32

## 2021-09-18 RX ADMIN — FUROSEMIDE 40 MG: 40 TABLET ORAL at 08:32

## 2021-09-18 RX ADMIN — FINASTERIDE 5 MG: 5 TABLET, FILM COATED ORAL at 08:32

## 2021-09-18 RX ADMIN — TAMSULOSIN HYDROCHLORIDE 0.4 MG: 0.4 CAPSULE ORAL at 08:33

## 2021-09-18 RX ADMIN — CEFTRIAXONE 1000 MG: 1 INJECTION, SOLUTION INTRAVENOUS at 11:32

## 2021-09-18 RX ADMIN — ASPIRIN 81 MG: 81 TABLET, COATED ORAL at 08:32

## 2021-09-18 RX ADMIN — ENOXAPARIN SODIUM 105 MG: 120 INJECTION SUBCUTANEOUS at 08:32

## 2021-09-18 RX ADMIN — GABAPENTIN 100 MG: 100 CAPSULE ORAL at 08:32

## 2021-09-18 RX ADMIN — TIOTROPIUM BROMIDE 18 MCG: 18 CAPSULE ORAL; RESPIRATORY (INHALATION) at 08:30

## 2021-09-18 NOTE — DISCHARGE SUMMARY
[address]  Discharge- Marcus Light 1955, 76 y o  male MRN: [id number]  Unit/Bed#: -Ac Encounter: [id number]  Primary Care Provider: Frank Melara MD   Date and time admitted to hospital: 9/14/2021  3:04 PM    * Acute hypoxemic respiratory failure due to COVID-19 [facility])  Assessment & Plan  Background:  flu-like symptoms over the past 3 weeks but significant dyspnea and turning gray with coughing per significant other prompting PCP eval where he was found to be hypoxic requiring 3 L and sent to ER  · COVID19- PCR positive 9/14  · Supplemental O2 with  4LNC saturating  90 %; wean as tolerated to maintain saturations >88%  Home O2 eval today  · CTA PE study: No PE identified  New hilar and mediastinal lymphadenopathy  Chronic treated neoplasm in the right upper lobe, questioned contour change largest right upper lobe opacity  Recurrent neoplasm cannot be completely excluded  Consider short interval follow-up in 3 months and/or PET/CT  No new mass identified  · CXR: Vague groundglass opacity in left suspicious for COVID-19 pneumonia  · Started on moderate COVID pathway for treatment  ? Start dexamethasone 6 mg x 10 Days  No need for prednisone on discharge  Remdesivir completed  ? AC full strength [facility]aparin, [facility]  · Daily CBC and CMP - stable  · CRP downtrended appropriately  · Encourage ISP/flutter valve  Encourage proning and early mobilization  · Blood cultures no growth to date  · Home O2 has been delivered and ready for discharge to home  · Pulm cleared for discharge since stable O2 requirements  · Trelegy home inhaler & Albuterol neb t i d     UTI (urinary tract infection)  Assessment & Plan  · UA on admission with large leukocytes, positive nitrites  · Urine culture showing 30,000-39,000 mixed contaminants x4  Patient has BPH and no urinary symptoms which are deviating from his baseline  · Received 2 doses rocephin so far   Will complete for 3 doses total     History of lung cancer  Assessment & Plan  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   · S/p SBRT and chemotherapy  · Currently under active surveillance  · Follows with Dr Kristin Gama oncology  · CTA PE study shows:   · New hilar and mediastinal lymphadenopathy  · Chronic treated neoplasm in the right upper lobe, questioned contour change largest right upper lobe opacity  Recurrent neoplasm cannot be completely excluded  Consider short interval follow-up in 3 months and/or PET/CT  No new mass identified  · Patient is to follow-up in regards to the new hilar and mediastinal lymphadenopathy in 3-6 months with oncologist, pulmonologist, and thoracic surgeon for further evaluation    BPH with obstruction/lower urinary tract symptoms  Assessment & Plan  · History of TURP  · Continue Flomax and Proscar home regimen  · Urinary retention protocol    COPD with acute exacerbation ([facility] [address] )  Assessment & Plan  · COPD with exacerbation   · O2 on discharge according to desat screen  · Trelegy & albuterol nebs t i d  on discharge  · Continue follow-up with pulmonology as an outpatient    Tobacco abuse  Assessment & Plan  · Has not smoked since beginning of COVID illness which was 3 weeks ago  · Cessation counseling  · If patient is to be discharged with home oxygen- patient was educated that he cannot smoke while on oxygen or near the tank  No flammable substances near the oxygen tank      Type 2 diabetes mellitus, without long-term current use of insulin [facility])  Assessment & Plan  Lab Results   Component Value Date    HGBA1C 7 9 (H) 09/15/2021       Recent Labs     09/17/21  1047 09/17/21  1604 09/17/21  2116 09/18/21  0741   POCGLU 150* 107 252* 139       Blood Sugar Average: Last 72 hrs:  (P) 212 [id number]     Sliding scale coverage and Accu-Cheks  Hold home regimen metformin and glipizide  Initiate basal insulin coverage in setting of glucocorticoid use  Hypoglycemia protocol    Severe sepsis (HCC)-resolved as of 9/18/2021  Assessment & Plan  · Present on admission as evidenced by Lactic acidosis 4 6, tachypnea, tachycardia, hypoxia 2/2 COVID pneumonia  · Lactic cleared  · Leukocytosis still elevated likely 2/2 steroid use  · Blood cultures - negative to date  · Improving, patient is afebrile, tachycardia and tachypnea resolved    Medical Problems     Resolved Problems  Date Reviewed: 9/17/2021        Resolved    Severe sepsis ([address] ) 9/18/2021     Resolved by  Shayy Murray PA-C              Discharging Physician / Practitioner: Shayy Murray PA-C  PCP: Jenel Mortimer, MD  Admission Date:   Admission Orders (From admission, onward)     Ordered        09/14/21 1627  Inpatient Admission  Once                   Discharge Date: 09/18/21    Consultations During Hospital Stay:  · none    Procedures Performed:   · none    Significant Findings / Test Results:   · Elevated hematocrit 50 1 on admission, resolved prior to discharge  Elevated platelets 556, stable prior to discharge  · COVID positive  · Hypokalemia 3 0, the hypochloremia 95, resolved prior to discharge  Elevated AST and ALT, elevated protein, resolved prior to discharge  · Elevated bilirubin 1 15, stable prior to discharge  · Lactic acid 4 3, resolved prior to discharge  · D-dimer elevated 2 67  · CRP 19 7 trended to-17  · Blood culture x2 negative  · UA Leukocytosis large, positive nitrites, trace blood  Culture with mixed contaminant  · Hemoglobin A1c 7 9  · Procalcitonin with reflex normal x2  · Leukocytosis 11 58 on day of discharge secondary to systemic steroid use  CXR Vague groundglass opacity in left      Right upper lobe opacity probably represents scarring from the patient's treated lung carcinoma  Some additional density could be related to some superimposed inflammation      · Early pneumonia is possible    Covid 19 is not excluded  CTA PE - No PE identified      New hilar and mediastinal lymphadenopathy      · Chronic treated neoplasm in the right upper lobe, questioned contour change largest right upper lobe opacity  Recurrent neoplasm cannot be completely excluded  Consider short interval follow-up in 3 months and/or PET/CT  No new mass identified  Incidental Findings:   · Chronic treated neoplasm in the right upper lobe, questioned contour change largest right upper lobe opacity  Recurrent neoplasm cannot be completely excluded  Consider short interval follow-up in 3 months and/or PET/CT  No new mass identified  To be followed up with PCP, pulmonologist as well as oncologist     Test Results Pending at Discharge (will require follow up): · None     Outpatient Tests Requested:  · CT chest in 6 weeks- repeat imaging according to PCP, palm, oncologist    Complications:  none    Reason for Admission:  Dyspnea    Hospital Course:   Griselda Ta is a 76 y o  male patient with past medical history bladder cancer, BPH, COPD, tobacco use, diabetes mellitus, hyperlipidemia, lung cancer who originally presented to the hospital on 9/14/2021 due to dyspnea x3 weeks with flu-like symptoms  Found to be COVID positive the emergency room, requiring and a L of supplemental oxygen for acute respiratory failure with hypoxia  Patient underwent full course of IV remdesivir with Decadron  Pulmonology was consulted, recommended that he be discharged on [facility] instead of his home [facility], as well as T9 the albuterol nebulizing treatments at home for the next week since this COVID pneumonia has also induced COPD exacerbation  He had 3 days ceftriaxone for possible UTI but urine culture grew mixed contaminants, so he was monitored off of IV antibiotics  No urinary symptoms  He was experiencing some hyperglycemia secondary to steroid use for COVID, which was treated with insulin injections while inpatient    This patient was instructed to increase his metformin dose for the next week, as well as follow-up with his primary care provider in order to better manage his type 2 diabetes in the outpatient setting  This patient had incidental finding of hilar lymphadenopathy which might be related to the COVID but should be followed up with repeat CT imaging in 6 weeks ordered by his oncologist, pulmonologist, or PCP in order to monitor in case of recurrent lung cancer  He is still requiring oxygen supplementation for hypoxia, qualify for home oxygen diagnosis case management currently had home oxygen supplies delivered to his house prior to discharge  This patient has had all his questions answered he is amenable to discharge home today  Please see above list of diagnoses and related plan for additional information  Condition at Discharge: good    Discharge Day Visit / Exam:   Subjective:  Patient denies chest pain, shortness of breath, lightheaded or dizziness  Patient is not weak or fatigued  He has good p o  Appetite and has been ambulating around his room without trouble  Vitals: Blood Pressure: 118/78 (09/18/21 0738)  Pulse: 66 (09/18/21 0738)  Temperature: 97 9 °F (36 6 °C) (09/18/21 0738)  Temp Source: Oral (09/14/21 1813)  Respirations: 18 (09/18/21 0738)  Height: 6' 1" (185 4 cm) (09/14/21 1511)  Weight - Scale: 109 kg (241 lb 2 9 oz) (09/14/21 1511)  SpO2: 91 % (09/18/21 0738)  Exam:   Physical Exam  Vitals and nursing note reviewed  Constitutional:       General: He is not in acute distress  Appearance: He is well-developed  He is obese  He is not ill-appearing, toxic-appearing or diaphoretic  Comments: Nad, cheerful   HENT:      Head: Normocephalic and atraumatic  Nose: Nose normal       Mouth/Throat:      Mouth: Mucous membranes are moist    Eyes:      Extraocular Movements: Extraocular movements intact  Conjunctiva/sclera: Conjunctivae normal    Cardiovascular:      Rate and Rhythm: Normal rate and regular rhythm  Heart sounds: Normal heart sounds   No murmur heard  Pulmonary:      Effort: Pulmonary effort is normal  No respiratory distress  Breath sounds: Normal breath sounds  No wheezing, rhonchi or rales  Abdominal:      Palpations: Abdomen is soft  Tenderness: There is no abdominal tenderness  Comments: Central obesity   Musculoskeletal:         General: No swelling or tenderness  Normal range of motion  Cervical back: Neck supple  Skin:     General: Skin is warm and dry  Neurological:      General: No focal deficit present  Mental Status: He is alert  Psychiatric:         Mood and Affect: Mood normal          Behavior: Behavior normal           Discussion with Family: Patient declined call to   Discharge instructions/Information to patient and family:   See after visit summary for information provided to patient and family  Provisions for Follow-Up Care:  See after visit summary for information related to follow-up care and any pertinent home health orders  Disposition:   Home    Planned Readmission: none     Discharge Statement:  I spent 45 minutes discharging the patient  This time was spent on the day of discharge  I had direct contact with the patient on the day of discharge  Greater than 50% of the total time was spent examining patient, answering all patient questions, arranging and discussing plan of care with patient as well as directly providing post-discharge instructions  Additional time then spent on discharge activities  Discharge Medications:  See after visit summary for reconciled discharge medications provided to patient and/or family        **Please Note: This note may have been constructed using a voice recognition system**

## 2021-09-18 NOTE — ASSESSMENT & PLAN NOTE
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   · S/p SBRT and chemotherapy  · Currently under active surveillance  · Follows with Dr Dangelo King oncology  · CTA PE study shows:   · New hilar and mediastinal lymphadenopathy  · Chronic treated neoplasm in the right upper lobe, questioned contour change largest right upper lobe opacity  Recurrent neoplasm cannot be completely excluded  Consider short interval follow-up in 3 months and/or PET/CT  No new mass identified    · Patient is to follow-up in regards to the new hilar and mediastinal lymphadenopathy in 3-6 months with oncologist, pulmonologist, and thoracic surgeon for further evaluation

## 2021-09-18 NOTE — ASSESSMENT & PLAN NOTE
· UA on admission with large leukocytes, positive nitrites  · Urine culture showing 30,000-39,000 mixed contaminants x4  Patient has BPH and no urinary symptoms which are deviating from his baseline  · Received 2 doses rocephin so far   Will complete for 3 doses total

## 2021-09-18 NOTE — ASSESSMENT & PLAN NOTE
Lab Results   Component Value Date    HGBA1C 7 9 (H) 09/15/2021       Recent Labs     09/17/21  1047 09/17/21  1604 09/17/21  2116 09/18/21  0741   POCGLU 150* 107 252* 139       Blood Sugar Average: Last 72 hrs:  (P) 888 [id number]     Sliding scale coverage and Accu-Cheks  Hold home regimen metformin and glipizide  Initiate basal insulin coverage in setting of glucocorticoid use  Hypoglycemia protocol

## 2021-09-18 NOTE — DISCHARGE INSTR - AVS FIRST PAGE
Dear Dallas Nava,     It was our pleasure to care for you here at [facility]  It is our hope that we were always able to exceed the expected standards for your care during your stay  You were hospitalized due to covid 19 pneumonia  You were cared for on the 3rd floor by Manuel Lopez PA-C under the service of Lisa Narayan MD with the [facility] who covers for your primary care physician (PCP), Torito Jacobson MD, while you were hospitalized  If you have any questions or concerns related to this hospitalization, you may contact us at [phone]  For follow up as well as any medication refills, we recommend that you follow up with your primary care physician  A registered nurse will reach out to you by phone within a few days after your discharge to answer any additional questions that you may have after going home  However, at this time we provide for you here, the most important instructions / recommendations at discharge:     · Notable Medication Adjustments -   · Please take metformin 1000 mg daily  According to Wei Bustos nurse practitioner family medicine latest office visit note in the computer, metformin 1000 mg daily is the medication you been taking for diabetes  Continue taking as you were prior to this admission  You can monitor your blood glucose with glucometer at home, and for persistent elevation beyond 2 days after discharge, please call Clarisse Jung for re-evaluation or change to medication regimen  · Please stop taking Breo Ellipta inhaler, and replace it with trilogy inhaler instead  Trilogy inhaler is 1 puff once a day  You can continue Breo Ellipta inhaler until the Trelegy inhaler gets into your pharmacy, then replace it with Trelegy  · Please continue albuterol nebulizing solutions 3 times a day for the next week    Then you can transition to using albuterol nebulizing solutions as needed for wheezing and shortness of breath the way you were using it prior to this admission  · Testing Required after Discharge -   · PFT according to your pulmonologist Dr Johnie Leon  Please call his office in order to schedule a follow-up appointment to discuss this hospitalization as well as to review the abnormal CT scan  He may wish to further adjust your inhaler regimen  · Important follow up information -   · Please call your primary care provider, Jose Medina to schedule a follow-up appointment in approximately 1 week, she will provide medication refills to you  · Please call your heme Onc, Dr Clemente Brown, in order to tell him about the CT scan finding an ask if he needs your next follow-up appointment to be pushed to a sooner date than prior scheduled  · Other Instructions -   · Please continue to have good oral nutrition in order to heal from COVID-19, including adequate fluid intake 52 fluid oz daily of water  · For issues with home oxygen tanks, please call the oxygen supply company whose information is listed in the "follow-up provider" section of this discharge paperwork  · The humidifier attachment to the home oxygen tank, is simply for your comfort if you start to have dry nose or dry sinuses, otherwise you can wear the oxygen without the humidifier  · Please review this entire after visit summary as additional general instructions including medication list, appointments, activity, diet, any pertinent wound care, and other additional recommendations from your care team that may be provided for you        Sincerely,     Leilani Saldana PA-C

## 2021-09-18 NOTE — PLAN OF CARE
Problem: PAIN - ADULT  Goal: Verbalizes/displays adequate comfort level or baseline comfort level  Description: Interventions:  - Encourage patient to monitor pain and request assistance  - Assess pain using appropriate pain scale  - Administer analgesics based on type and severity of pain and evaluate response  - Implement non-pharmacological measures as appropriate and evaluate response  - Consider cultural and social influences on pain and pain management  - Notify physician/advanced practitioner if interventions unsuccessful or patient reports new pain  Outcome: Progressing     Problem: INFECTION - ADULT  Goal: Absence or prevention of progression during hospitalization  Description: INTERVENTIONS:  - Assess and monitor for signs and symptoms of infection  - Monitor lab/diagnostic results  - Monitor all insertion sites, i e  indwelling lines, tubes, and drains  - Monitor endotracheal if appropriate and nasal secretions for changes in amount and color  - Bigelow appropriate cooling/warming therapies per order  - Administer medications as ordered  - Instruct and encourage patient and family to use good hand hygiene technique  - Identify and instruct in appropriate isolation precautions for identified infection/condition  Outcome: Progressing  Goal: Absence of fever/infection during neutropenic period  Description: INTERVENTIONS:  - Monitor WBC    Outcome: Progressing     Problem: SAFETY ADULT  Goal: Patient will remain free of falls  Description: INTERVENTIONS:  - Educate patient/family on patient safety including physical limitations  - Instruct patient to call for assistance with activity   - Consult OT/PT to assist with strengthening/mobility   - Keep Call bell within reach  - Keep bed low and locked with side rails adjusted as appropriate  - Keep care items and personal belongings within reach  - Initiate and maintain comfort rounds  - Make Fall Risk Sign visible to staff  - Apply yellow socks and bracelet for high fall risk patients  - Consider moving patient to room near nurses station  Outcome: Progressing  Goal: Maintain or return to baseline ADL function  Description: INTERVENTIONS:  -  Assess patient's ability to carry out ADLs; assess patient's baseline for ADL function and identify physical deficits which impact ability to perform ADLs (bathing, care of mouth/teeth, toileting, grooming, dressing, etc )  - Assess/evaluate cause of self-care deficits   - Assess range of motion  - Assess patient's mobility; develop plan if impaired  - Assess patient's need for assistive devices and provide as appropriate  - Encourage maximum independence but intervene and supervise when necessary  - Involve family in performance of ADLs  - Assess for home care needs following discharge   - Consider OT consult to assist with ADL evaluation and planning for discharge  - Provide patient education as appropriate  Outcome: Progressing  Goal: Maintains/Returns to pre admission functional level  Description: INTERVENTIONS:  - Perform BMAT or MOVE assessment daily    - Set and communicate daily mobility goal to care team and patient/family/caregiver     - Collaborate with rehabilitation services on mobility goals if consulted  - Out of bed for toileting  - Record patient progress and toleration of activity level   Outcome: Progressing     Problem: DISCHARGE PLANNING  Goal: Discharge to home or other facility with appropriate resources  Description: INTERVENTIONS:  - Identify barriers to discharge w/patient and caregiver  - Arrange for needed discharge resources and transportation as appropriate  - Identify discharge learning needs (meds, wound care, etc )  - Arrange for interpretive services to assist at discharge as needed  - Refer to Case Management Department for coordinating discharge planning if the patient needs post-hospital services based on physician/advanced practitioner order or complex needs related to functional status, cognitive ability, or social support system  Outcome: Progressing     Problem: Knowledge Deficit  Goal: Patient/family/caregiver demonstrates understanding of disease process, treatment plan, medications, and discharge instructions  Description: Complete learning assessment and assess knowledge base    Interventions:  - Provide teaching at level of understanding  - Provide teaching via preferred learning methods  Outcome: Progressing     Problem: RESPIRATORY - ADULT  Goal: Achieves optimal ventilation and oxygenation  Description: INTERVENTIONS:  - Assess for changes in respiratory status  - Assess for changes in mentation and behavior  - Position to facilitate oxygenation and minimize respiratory effort  - Oxygen administered by appropriate delivery if ordered  - Initiate smoking cessation education as indicated  - Encourage broncho-pulmonary hygiene including cough, deep breathe, Incentive Spirometry  - Assess the need for suctioning and aspirate as needed  - Assess and instruct to report SOB or any respiratory difficulty  - Respiratory Therapy support as indicated  Outcome: Progressing     Problem: MOBILITY - ADULT  Goal: Maintain or return to baseline ADL function  Description: INTERVENTIONS:  -  Assess patient's ability to carry out ADLs; assess patient's baseline for ADL function and identify physical deficits which impact ability to perform ADLs (bathing, care of mouth/teeth, toileting, grooming, dressing, etc )  - Assess/evaluate cause of self-care deficits   - Assess range of motion  - Assess patient's mobility; develop plan if impaired  - Assess patient's need for assistive devices and provide as appropriate  - Encourage maximum independence but intervene and supervise when necessary  - Involve family in performance of ADLs  - Assess for home care needs following discharge   - Consider OT consult to assist with ADL evaluation and planning for discharge  - Provide patient education as appropriate  Outcome: Progressing  Goal: Maintains/Returns to pre admission functional level  Description: INTERVENTIONS:  - Perform BMAT or MOVE assessment daily    - Set and communicate daily mobility goal to care team and patient/family/caregiver     - Collaborate with rehabilitation services on mobility goals if consulted  - Out of bed for toileting  - Record patient progress and toleration of activity level   Outcome: Progressing     Problem: Potential for Falls  Goal: Patient will remain free of falls  Description: INTERVENTIONS:  - Educate patient/family on patient safety including physical limitations  - Instruct patient to call for assistance with activity   - Consult OT/PT to assist with strengthening/mobility   - Keep Call bell within reach  - Keep bed low and locked with side rails adjusted as appropriate  - Keep care items and personal belongings within reach  - Initiate and maintain comfort rounds  - Make Fall Risk Sign visible to staff  - Offer Toileting everHours, in advance of need  - Apply yellow socks and bracelet for high fall risk patients  - Consider moving patient to room near nurses station  Outcome: Progressing

## 2021-09-18 NOTE — INCIDENTAL FINDINGS
The following findings require follow up:  Radiographic finding   Finding: New hilar and mediastinal lymphadenopathy      Chronic treated neoplasm in the right upper lobe, questioned contour change largest right upper lobe opacity  Recurrent neoplasm cannot be completely excluded  Consider short interval follow-up in 3 months and/or PET/CT  No new mass identified     Follow up required: Needs followup CT chest in 8 weeks to assure this new adenopathy is reactive from COVID and not cancer recurrence   Follow up should be done within 8 week(s)    Please notify the following clinician to assist with the follow up:   Dr Altagracia Zaman, Evelia Tineo PCP & Dr Viktoria Boland

## 2021-09-18 NOTE — DISCHARGE INSTRUCTIONS
Cigarette Smoking and Your Health, Ambulatory Care   GENERAL INFORMATION:   What are the risks to my health if I smoke? Chemicals in tobacco are addictive and damage every cell in your body  All tobacco products are dangerous to you and to nonsmokers who breathe your secondhand smoke  Even if you are a light smoker or a social smoker, you have an increased risk for cancer, heart disease, and lung disease  If you are pregnant or have diabetes, smoking increases your risk for complications  What are the benefits to my health if I stop smoking? · Lower risk of cancer, heart disease, blood clots, heart attack, and stroke    · Lower risk of diabetes complications, such as kidney, artery, or eye disease, and nerve damage that can result in amputations    · Lower risk of lung infections and diseases, such as pneumonia, asthma, chronic bronchitis, and emphysema           · Increased benefits of chemotherapy if you already have cancer, and decreased risk for cancer returning after treatment    · Improved circulation, allowing more oxygen to be delivered to your body    · Improved ability to heal and to fight infections  What are the benefits to the health of others if I stop smoking? · Lower risk of lung cancer and heart disease in nonsmokers    · Lower risk of miscarriage, early delivery, low birth weight, and stillbirth    · Lower risk of SIDS, obesity, developmental delay, ear infections, colds, pneumonia, bronchitis, asthma, and ADHD in your child  Where can I find more information and support to stop smoking? It is never too late to stop smoking  Ask your healthcare provider for more information if you need help  · [employer]  [employer]  Phone: 1- 388 - 102-1392  Web Address: [employer]  Follow up with your healthcare provider as directed:  Write down your questions so you remember to ask them during your visits  CARE AGREEMENT:   You have the right to help plan your care   Learn about your health condition and how it may be treated  Discuss treatment options with your caregivers to decide what care you want to receive  You always have the right to refuse treatment  The above information is an  only  It is not intended as medical advice for individual conditions or treatments  Talk to your doctor, nurse or pharmacist before following any medical regimen to see if it is safe and effective for you  © 2014 [address] is for End User's use only and may not be sold, redistributed or otherwise used for commercial purposes  All illustrations and images included in CareNotes® are the copyrighted property of A D A M [employer] Inc  or [employer]  COVID-19 (Coronavirus Disease 2019)   WHAT YOU NEED TO KNOW:   Coronavirus disease 2019 (COVID-19) is the disease caused by a coronavirus first discovered in December 2019  Coronaviruses generally cause upper respiratory (nose, throat, and lung) infections, such as a cold  The new virus spreads quickly and easily  The virus can be spread starting 2 days before symptoms even begin  The virus has also changed into several new forms (called variants) since it was discovered  The variants may be more contagious (easily spread) than the original form  Some may also cause more severe illness than others  It is important to follow local, national, and worldwide measures to protect yourself and others from infection  DISCHARGE INSTRUCTIONS:   If you think you or someone you know may be infected:  Do the following to protect others:  · If emergency care is needed,  tell the  about the possible infection, or call ahead and tell the emergency department  · Call a healthcare provider  for instructions if symptoms are mild  Anyone who may be infected should not  arrive without calling first  The provider will need to protect staff members and other patients      · The person who may be infected needs to wear a face covering  while getting medical care  This will help lower the risk of infecting others  Coverings are not used for anyone who is younger than 2 years, has breathing problems, or cannot remove it  The provider can give you instructions for anyone who cannot wear a covering  Call your local emergency number (911 in the [facility] [facility]) or an emergency department if:   · You have trouble breathing or shortness of breath at rest     · You have chest pain or pressure that lasts longer than 5 minutes  · You become confused or hard to wake  · Your lips or face are blue  · You have a fever of 104°F (40°C) or higher  Call your doctor if:   · You do not  have symptoms of COVID-19 but had close physical contact within 14 days with someone who tested positive  · You have questions or concerns about your condition or care  Medicines: You may need any of the following:  · Decongestants  help reduce nasal congestion and help you breathe more easily  If you take decongestant pills, they may make you feel restless or cause problems with your sleep  Do not use decongestant sprays for more than a few days  · Cough suppressants  help reduce coughing  Ask your healthcare provider which type of cough medicine is best for you  · Acetaminophen  decreases pain and fever  It is available without a doctor's order  Ask how much to take and how often to take it  Follow directions  Read the labels of all other medicines you are using to see if they also contain acetaminophen, or ask your doctor or pharmacist  Acetaminophen can cause liver damage if not taken correctly  Do not use more than 4 grams (4,000 milligrams) total of acetaminophen in one day  · NSAIDs , such as ibuprofen, help decrease swelling, pain, and fever  NSAIDs can cause stomach bleeding or kidney problems in certain people  If you take blood thinner medicine, always ask your healthcare provider if NSAIDs are safe for you   Always read the medicine label and follow directions  · Take your medicine as directed  Contact your healthcare provider if you think your medicine is not helping or if you have side effects  Tell him or her if you are allergic to any medicine  Keep a list of the medicines, vitamins, and herbs you take  Include the amounts, and when and why you take them  Bring the list or the pill bottles to follow-up visits  Carry your medicine list with you in case of an emergency  How the 2019 coronavirus spreads: The following are ways the virus is thought to spread, but more information may be coming:  · Droplets are the main way all coronaviruses spread  The virus travels in droplets that form when a person talks, coughs, or sneezes  The droplets can also float in the air for minutes or hours  Infection happens when you breathe in the droplets or get them in your eyes or nose  Close personal contact with an infected person increases your risk for infection  This means being within 6 feet (2 meters) of the person for at least 15 minutes over 24 hours  · Person-to-person contact can spread the virus  For example, a person with the virus on his or her hands can spread it by shaking hands with someone  · The virus can stay on objects and surfaces for a short time  You may become infected by touching the object or surface and then touching your eyes or mouth  · An infected animal may be able to infect a person who touches it  This may happen at live markets or on a farm  Help lower the risk for COVID-19:  The best way to prevent infection is to avoid anyone who is infected, but this can be hard to do  An infected person can spread the virus before signs or symptoms begin, or even if signs or symptoms never develop  The following can help lower the risk for infection:      · Wash your hands often throughout the day  Use soap and water  Rub your soapy hands together, lacing your fingers, for at least 20 seconds  Rinse with warm, running water   Dry your hands with a clean towel or paper towel  Use hand  that contains alcohol if soap and water are not available  Teach children how to wash their hands and use hand   · Cover sneezes and coughs  Turn your face away and cover your mouth and nose with a tissue  Throw the tissue away  Use the bend of your arm if a tissue is not available  Then wash your hands well with soap and water or use hand   Teach children how to cover a cough or sneeze  · Wear a face covering (mask) around anyone who does not live in your home  Use a cloth covering with at least 2 layers  You can also create layers by putting a cloth covering over a disposable non-medical mask  Cover your mouth and your nose  The covering should fit snugly against the bridge of your nose  Securely fasten it under your chin and on the sides of your face  Do not  wear a plastic face shield instead of a covering  Continue social distancing and washing your hands often  A face covering is not a substitute for social distancing safety measures  · Follow worldwide, national, and local social distancing guidelines  Keep at least 6 feet (2 meters) between you and others  Also keep this distance from anyone who comes to your home, such as someone making a delivery  Wear a face covering while you are around others  You will need to wear a covering in restaurants, stores, and other public buildings  You will also need a covering on mass transit, such as a bus, subway, or airplane  Remember to use a covering made from thick material or wear 2 coverings together  · Make a habit of not touching your face  If you get the virus on your hands, you can transfer it to your eyes, nose, or mouth and become infected  You can also transfer it to objects, surfaces, or people  Do not touch your eyes, nose, or mouth without washing your hands first     · Clean and disinfect high-touch surfaces and objects often    Use disinfecting wipes, or make a solution of 4 teaspoons of bleach in 1 quart (4 cups) of water  Clean and disinfect even if you think no one living in or coming to your home is infected with the virus  · Ask about vaccines you may need  Get a COVID-19 vaccine when it is available to you  The current vaccines are given as a shot in 1 or 2 doses  Get the influenza (flu) vaccine as soon as recommended each year, usually starting in September or October  Get the pneumonia vaccine if recommended  Follow social distancing guidelines:  National and local social distancing rules vary  Rules may change over time as restrictions are lifted  Restrictions may return if an outbreak happens where you live  It is important to know and follow all current social distancing rules in your area  The following are general guidelines:  · Stay home if you are sick or think you may have COVID-19  It is important to stay home if you are waiting for a testing appointment or for test results  Even if you do not have symptoms, you can pass the virus to others  · Limit trips out of your home  Have food, medicines, and other supplies delivered and left at your door or other area, if possible  Plan trips out of your home so you make the fewest stops possible to limit close personal contact  Keep track of places you go  This will help contact tracers notify others if you become infected  · Avoid close physical contact with anyone who does not live in your home  Do not shake hands with, hug, or kiss a person as a greeting  If you must use public transportation (such as a bus or subway), try to sit or stand away from others  Only allow necessary people into your home  Wear your face covering, and remind others to wear a face covering  Remind them to wash their hands when they arrive and before they leave  Do not  let someone into your home or go to someone's home just to visit   Even if you both do not feel sick, the virus can pass from one of you to the other  · Avoid in-person gatherings and crowds  Gatherings or crowds of 10 or more individuals can cause the virus to spread  Avoid places such as [place], beaches, sporting events, and tourist attractions  For events such as parties, holiday meals, [employer] services, and conferences, attend virtually (on a computer), if possible  · Ask your healthcare provider for other ways to have appointments  Some providers offer phone, video, or other types of appointments  You may also be able to get prescriptions for a few months of your medicines at a time  · Stay safe if you must go out to work  Keep physical distance between you and other workers as much as possible  Follow your employer's rules so everyone stays safe  If you have COVID-19 and are recovering at home,  healthcare providers will give you specific instructions to follow  The following are general guidelines to remind you how to keep others safe until you are well:  · Wash your hands often  Use soap and water as much as possible  Use hand  that contains alcohol if soap and water are not available  Dry your hands with a clean towel or paper towel  Do not share towels with anyone  If you use paper towels, throw them away in a lined trash can kept in your room or area  Use a covered trash can, if possible  · Do not go out of your home unless it is necessary  Ask someone who is not infected to go out for groceries or supplies, or have them delivered  Do not go to your healthcare provider's office without an appointment  · Only have close physical contact with a person giving direct care, or a baby or child you must care for  Family members and friends should not visit you  If possible, stay in a separate area or room of your home if you live with others  No one should go into the area or room except to give you care  You can visit with others by phone, video chat, e-mail, or similar systems      · Wear a face covering while others are near you  This can help prevent droplets from spreading the virus when you talk, sneeze, or cough  Put the covering on before anyone comes into your room or area  Remind the person to cover his or her nose and mouth before coming in to provide care for you  · Do not share items  Do not share dishes, towels, or other items with anyone  Items need to be washed after you use them  · Protect your baby  Some newborns have tested positive for the virus  It is not known if they became infected before or after birth  The highest risk is when a  has close contact with an infected person  If you are pregnant or breastfeeding, talk to your healthcare provider or obstetrician about any concerns you have  He or she will tell you when to bring your baby in for check-ups and vaccines  He or she will also tell you what to do if you think your baby was infected with the coronavirus  Wash your hands and put on a clean face covering before you breastfeed or care for your baby  · Do not handle live animals unless it is necessary  Some animals, including pets, have been infected with the new coronavirus  Ask someone who is not infected to take care of your pet until you are well  If you must care for a pet, wear a face covering  Wash your hands before and after you give care  Talk to your healthcare provider about how to keep a service animal safe, if needed  · Follow directions from your healthcare provider for being around others after you recover  It is not known if or for how long a recovered person can pass the virus to others  Your provider may give you instructions, such as continuing social distancing and wearing a face covering  He or she will tell you when it is okay to be around others again  This may be 10 to 20 days after symptoms started or you had a positive test  Most symptoms will also need to be gone  Your provider will give you more information      Follow up with your doctor as directed:  Write down your questions so you remember to ask them during your visits  For more information:   · Centers for Disease Control and Prevention  [address] , [address]  Phone: 7- 698 - 594-8642  Web Address: [employer]    © [address] [facility] 2021 Information is for End User's use only and may not be sold, redistributed or otherwise used for commercial purposes  All illustrations and images included in CareNotes® are the copyrighted property of [employer]  or [address] [place], [place] [address]jae magaly   The above information is an  only  It is not intended as medical advice for individual conditions or treatments  Talk to your doctor, nurse or pharmacist before following any medical regimen to see if it is safe and effective for you

## 2021-09-18 NOTE — ASSESSMENT & PLAN NOTE
· Present on admission as evidenced by Lactic acidosis 4 6, tachypnea, tachycardia, hypoxia 2/2 COVID pneumonia  · Lactic cleared  · Leukocytosis still elevated likely 2/2 steroid use    · Blood cultures - negative to date  · Improving, patient is afebrile, tachycardia and tachypnea resolved

## 2021-09-18 NOTE — ASSESSMENT & PLAN NOTE
· COPD with exacerbation   · O2 on discharge according to desat screen  · Trelegy & albuterol nebs t i d  on discharge    · Continue follow-up with pulmonology as an outpatient

## 2021-09-19 LAB
ATRIAL RATE: 91 BPM
BACTERIA BLD CULT: NORMAL
BACTERIA BLD CULT: NORMAL
P AXIS: 63 DEGREES
PR INTERVAL: 146 MS
QRS AXIS: 81 DEGREES
QRSD INTERVAL: 86 MS
QT INTERVAL: 378 MS
QTC INTERVAL: 464 MS
T WAVE AXIS: 74 DEGREES
VENTRICULAR RATE: 91 BPM

## 2021-09-19 PROCEDURE — 93010 ELECTROCARDIOGRAM REPORT: CPT | Performed by: INTERNAL MEDICINE

## 2021-09-22 LAB
ATRIAL RATE: 83 BPM
P AXIS: 47 DEGREES
PR INTERVAL: 142 MS
QRS AXIS: 64 DEGREES
QRSD INTERVAL: 90 MS
QT INTERVAL: 370 MS
QTC INTERVAL: 434 MS
T WAVE AXIS: 82 DEGREES
VENTRICULAR RATE: 83 BPM

## 2022-03-30 ENCOUNTER — HOSPITAL ENCOUNTER (INPATIENT)
Facility: HOSPITAL | Age: 67
LOS: 9 days | Discharge: HOME | DRG: 871 | End: 2022-04-08
Attending: EMERGENCY MEDICINE | Admitting: FAMILY MEDICINE
Payer: MEDICARE

## 2022-03-30 ENCOUNTER — OFFICE VISIT (OUTPATIENT)
Dept: URGENT CARE | Facility: CLINIC | Age: 67
End: 2022-03-30
Payer: MEDICARE

## 2022-03-30 ENCOUNTER — APPOINTMENT (OUTPATIENT)
Dept: RADIOLOGY | Facility: CLINIC | Age: 67
End: 2022-03-30
Payer: MEDICARE

## 2022-03-30 ENCOUNTER — APPOINTMENT (INPATIENT)
Dept: CT IMAGING | Facility: HOSPITAL | Age: 67
DRG: 871 | End: 2022-03-30
Payer: MEDICARE

## 2022-03-30 VITALS
TEMPERATURE: 98.7 F | HEIGHT: 73 IN | RESPIRATION RATE: 30 BRPM | WEIGHT: 258 LBS | DIASTOLIC BLOOD PRESSURE: 80 MMHG | HEART RATE: 114 BPM | BODY MASS INDEX: 34.19 KG/M2 | OXYGEN SATURATION: 87 % | SYSTOLIC BLOOD PRESSURE: 132 MMHG

## 2022-03-30 DIAGNOSIS — Z85.118 HISTORY OF LUNG CANCER: ICD-10-CM

## 2022-03-30 DIAGNOSIS — R09.02 HYPOXEMIA: ICD-10-CM

## 2022-03-30 DIAGNOSIS — J96.01 ACUTE RESPIRATORY FAILURE WITH HYPOXIA (HCC): ICD-10-CM

## 2022-03-30 DIAGNOSIS — N30.00 ACUTE CYSTITIS WITHOUT HEMATURIA: ICD-10-CM

## 2022-03-30 DIAGNOSIS — J44.1 COPD EXACERBATION (HCC): Primary | ICD-10-CM

## 2022-03-30 DIAGNOSIS — J96.01 ACUTE HYPOXEMIC RESPIRATORY FAILURE DUE TO COVID-19 (HCC): ICD-10-CM

## 2022-03-30 DIAGNOSIS — J18.9 PNEUMONIA OF BOTH LUNGS DUE TO INFECTIOUS ORGANISM, UNSPECIFIED PART OF LUNG: Primary | ICD-10-CM

## 2022-03-30 DIAGNOSIS — J44.1 COPD WITH ACUTE EXACERBATION (HCC): ICD-10-CM

## 2022-03-30 DIAGNOSIS — U07.1 ACUTE HYPOXEMIC RESPIRATORY FAILURE DUE TO COVID-19 (HCC): ICD-10-CM

## 2022-03-30 DIAGNOSIS — R09.02 HYPOXIA: ICD-10-CM

## 2022-03-30 DIAGNOSIS — R06.02 SOB (SHORTNESS OF BREATH): ICD-10-CM

## 2022-03-30 DIAGNOSIS — E11.9 TYPE 2 DIABETES MELLITUS WITHOUT COMPLICATION, WITHOUT LONG-TERM CURRENT USE OF INSULIN (HCC): ICD-10-CM

## 2022-03-30 PROBLEM — B97.89 SEPSIS, VIRAL (HCC): Status: ACTIVE | Noted: 2021-09-15

## 2022-03-30 PROBLEM — J10.1 INFLUENZA A: Status: ACTIVE | Noted: 2022-03-30

## 2022-03-30 PROBLEM — R65.20 PUERPERAL SEPSIS WITH ACUTE HYPOXIC RESPIRATORY FAILURE (HCC): Status: ACTIVE | Noted: 2022-03-30

## 2022-03-30 PROBLEM — R65.20 PUERPERAL SEPSIS WITH ACUTE HYPOXIC RESPIRATORY FAILURE (HCC): Status: RESOLVED | Noted: 2022-03-30 | Resolved: 2022-03-30

## 2022-03-30 PROBLEM — A41.89 SEPSIS, VIRAL (HCC): Status: ACTIVE | Noted: 2021-09-15

## 2022-03-30 LAB
4HR DELTA HS TROPONIN: 2 NG/L
ALBUMIN SERPL BCP-MCNC: 4 G/DL (ref 3.5–5)
ALP SERPL-CCNC: 54 U/L (ref 46–116)
ALT SERPL W P-5'-P-CCNC: 44 U/L (ref 12–78)
ANION GAP SERPL CALCULATED.3IONS-SCNC: 10 MMOL/L (ref 4–13)
AST SERPL W P-5'-P-CCNC: 44 U/L (ref 5–45)
BACTERIA UR QL AUTO: ABNORMAL /HPF
BASOPHILS # BLD AUTO: 0.08 THOUSANDS/ΜL (ref 0–0.1)
BASOPHILS NFR BLD AUTO: 1 % (ref 0–1)
BILIRUB SERPL-MCNC: 0.61 MG/DL (ref 0.2–1)
BILIRUB UR QL STRIP: NEGATIVE
BUN SERPL-MCNC: 14 MG/DL (ref 5–25)
CALCIUM SERPL-MCNC: 8.3 MG/DL (ref 8.3–10.1)
CARDIAC TROPONIN I PNL SERPL HS: 21 NG/L
CARDIAC TROPONIN I PNL SERPL HS: 23 NG/L
CHLORIDE SERPL-SCNC: 97 MMOL/L (ref 100–108)
CLARITY UR: ABNORMAL
CO2 SERPL-SCNC: 31 MMOL/L (ref 21–32)
COLOR UR: YELLOW
CREAT SERPL-MCNC: 1.16 MG/DL (ref 0.6–1.3)
EOSINOPHIL # BLD AUTO: 0.01 THOUSAND/ΜL (ref 0–0.61)
EOSINOPHIL NFR BLD AUTO: 0 % (ref 0–6)
ERYTHROCYTE [DISTWIDTH] IN BLOOD BY AUTOMATED COUNT: 13.3 % (ref 11.6–15.1)
FLUAV RNA RESP QL NAA+PROBE: POSITIVE
FLUBV RNA RESP QL NAA+PROBE: NEGATIVE
GFR SERPL CREATININE-BSD FRML MDRD: 64 ML/MIN/1.73SQ M
GLUCOSE SERPL-MCNC: 123 MG/DL (ref 65–140)
GLUCOSE SERPL-MCNC: 184 MG/DL (ref 65–140)
GLUCOSE SERPL-MCNC: 224 MG/DL (ref 65–140)
GLUCOSE UR STRIP-MCNC: NEGATIVE MG/DL
HCT VFR BLD AUTO: 46.6 % (ref 36.5–49.3)
HGB BLD-MCNC: 15.4 G/DL (ref 12–17)
HGB UR QL STRIP.AUTO: ABNORMAL
IMM GRANULOCYTES # BLD AUTO: 0.02 THOUSAND/UL (ref 0–0.2)
IMM GRANULOCYTES NFR BLD AUTO: 0 % (ref 0–2)
KETONES UR STRIP-MCNC: NEGATIVE MG/DL
LACTATE SERPL-SCNC: 1.9 MMOL/L (ref 0.5–2)
LACTATE SERPL-SCNC: 2.7 MMOL/L (ref 0.5–2)
LEUKOCYTE ESTERASE UR QL STRIP: ABNORMAL
LYMPHOCYTES # BLD AUTO: 1.49 THOUSANDS/ΜL (ref 0.6–4.47)
LYMPHOCYTES NFR BLD AUTO: 19 % (ref 14–44)
MAGNESIUM SERPL-MCNC: 1.7 MG/DL (ref 1.6–2.6)
MCH RBC QN AUTO: 30.1 PG (ref 26.8–34.3)
MCHC RBC AUTO-ENTMCNC: 33 G/DL (ref 31.4–37.4)
MCV RBC AUTO: 91 FL (ref 82–98)
MONOCYTES # BLD AUTO: 0.92 THOUSAND/ΜL (ref 0.17–1.22)
MONOCYTES NFR BLD AUTO: 12 % (ref 4–12)
NEUTROPHILS # BLD AUTO: 5.45 THOUSANDS/ΜL (ref 1.85–7.62)
NEUTS SEG NFR BLD AUTO: 68 % (ref 43–75)
NITRITE UR QL STRIP: NEGATIVE
NON-SQ EPI CELLS URNS QL MICRO: ABNORMAL /HPF
NRBC BLD AUTO-RTO: 0 /100 WBCS
NT-PROBNP SERPL-MCNC: 670 PG/ML
PH UR STRIP.AUTO: 7 [PH]
PLATELET # BLD AUTO: 196 THOUSANDS/UL (ref 149–390)
PMV BLD AUTO: 9.6 FL (ref 8.9–12.7)
POTASSIUM SERPL-SCNC: 3.4 MMOL/L (ref 3.5–5.3)
PROCALCITONIN SERPL-MCNC: 0.11 NG/ML
PROT SERPL-MCNC: 7.7 G/DL (ref 6.4–8.2)
PROT UR STRIP-MCNC: ABNORMAL MG/DL
RBC # BLD AUTO: 5.11 MILLION/UL (ref 3.88–5.62)
RBC #/AREA URNS AUTO: ABNORMAL /HPF
RSV RNA RESP QL NAA+PROBE: NEGATIVE
SARS-COV-2 RNA RESP QL NAA+PROBE: NEGATIVE
SODIUM SERPL-SCNC: 138 MMOL/L (ref 136–145)
SP GR UR STRIP.AUTO: 1.01 (ref 1–1.03)
UROBILINOGEN UR QL STRIP.AUTO: 0.2 E.U./DL
WBC # BLD AUTO: 7.97 THOUSAND/UL (ref 4.31–10.16)
WBC #/AREA URNS AUTO: ABNORMAL /HPF

## 2022-03-30 PROCEDURE — 87040 BLOOD CULTURE FOR BACTERIA: CPT | Performed by: EMERGENCY MEDICINE

## 2022-03-30 PROCEDURE — 94664 DEMO&/EVAL PT USE INHALER: CPT

## 2022-03-30 PROCEDURE — 85025 COMPLETE CBC W/AUTO DIFF WBC: CPT | Performed by: EMERGENCY MEDICINE

## 2022-03-30 PROCEDURE — 81001 URINALYSIS AUTO W/SCOPE: CPT | Performed by: FAMILY MEDICINE

## 2022-03-30 PROCEDURE — 84145 PROCALCITONIN (PCT): CPT | Performed by: FAMILY MEDICINE

## 2022-03-30 PROCEDURE — 94760 N-INVAS EAR/PLS OXIMETRY 1: CPT

## 2022-03-30 PROCEDURE — 82948 REAGENT STRIP/BLOOD GLUCOSE: CPT

## 2022-03-30 PROCEDURE — 83735 ASSAY OF MAGNESIUM: CPT | Performed by: EMERGENCY MEDICINE

## 2022-03-30 PROCEDURE — 71275 CT ANGIOGRAPHY CHEST: CPT

## 2022-03-30 PROCEDURE — 87186 SC STD MICRODIL/AGAR DIL: CPT | Performed by: FAMILY MEDICINE

## 2022-03-30 PROCEDURE — 94640 AIRWAY INHALATION TREATMENT: CPT

## 2022-03-30 PROCEDURE — 94644 CONT INHLJ TX 1ST HOUR: CPT

## 2022-03-30 PROCEDURE — 84484 ASSAY OF TROPONIN QUANT: CPT | Performed by: FAMILY MEDICINE

## 2022-03-30 PROCEDURE — 99285 EMERGENCY DEPT VISIT HI MDM: CPT | Performed by: EMERGENCY MEDICINE

## 2022-03-30 PROCEDURE — 96374 THER/PROPH/DIAG INJ IV PUSH: CPT

## 2022-03-30 PROCEDURE — 0241U HB NFCT DS VIR RESP RNA 4 TRGT: CPT | Performed by: EMERGENCY MEDICINE

## 2022-03-30 PROCEDURE — 80053 COMPREHEN METABOLIC PANEL: CPT | Performed by: EMERGENCY MEDICINE

## 2022-03-30 PROCEDURE — 36415 COLL VENOUS BLD VENIPUNCTURE: CPT | Performed by: EMERGENCY MEDICINE

## 2022-03-30 PROCEDURE — 84484 ASSAY OF TROPONIN QUANT: CPT | Performed by: EMERGENCY MEDICINE

## 2022-03-30 PROCEDURE — 87077 CULTURE AEROBIC IDENTIFY: CPT | Performed by: FAMILY MEDICINE

## 2022-03-30 PROCEDURE — 99213 OFFICE O/P EST LOW 20 MIN: CPT

## 2022-03-30 PROCEDURE — 71046 X-RAY EXAM CHEST 2 VIEWS: CPT

## 2022-03-30 PROCEDURE — 83880 ASSAY OF NATRIURETIC PEPTIDE: CPT | Performed by: EMERGENCY MEDICINE

## 2022-03-30 PROCEDURE — 99285 EMERGENCY DEPT VISIT HI MDM: CPT

## 2022-03-30 PROCEDURE — 83605 ASSAY OF LACTIC ACID: CPT | Performed by: FAMILY MEDICINE

## 2022-03-30 PROCEDURE — 93005 ELECTROCARDIOGRAM TRACING: CPT

## 2022-03-30 PROCEDURE — 87086 URINE CULTURE/COLONY COUNT: CPT | Performed by: FAMILY MEDICINE

## 2022-03-30 PROCEDURE — G0463 HOSPITAL OUTPT CLINIC VISIT: HCPCS

## 2022-03-30 PROCEDURE — 83605 ASSAY OF LACTIC ACID: CPT | Performed by: EMERGENCY MEDICINE

## 2022-03-30 PROCEDURE — 87449 NOS EACH ORGANISM AG IA: CPT | Performed by: FAMILY MEDICINE

## 2022-03-30 PROCEDURE — 99223 1ST HOSP IP/OBS HIGH 75: CPT | Performed by: FAMILY MEDICINE

## 2022-03-30 RX ORDER — BENZONATATE 100 MG/1
100 CAPSULE ORAL 3 TIMES DAILY PRN
Status: DISCONTINUED | OUTPATIENT
Start: 2022-03-30 | End: 2022-04-08 | Stop reason: HOSPADM

## 2022-03-30 RX ORDER — FINASTERIDE 5 MG/1
5 TABLET, FILM COATED ORAL DAILY
Status: DISCONTINUED | OUTPATIENT
Start: 2022-03-31 | End: 2022-04-08 | Stop reason: HOSPADM

## 2022-03-30 RX ORDER — LEVALBUTEROL 1.25 MG/.5ML
1.25 SOLUTION, CONCENTRATE RESPIRATORY (INHALATION)
Status: DISCONTINUED | OUTPATIENT
Start: 2022-03-31 | End: 2022-03-31 | Stop reason: CLARIF

## 2022-03-30 RX ORDER — GABAPENTIN 100 MG/1
100 CAPSULE ORAL 2 TIMES DAILY
Status: DISCONTINUED | OUTPATIENT
Start: 2022-03-30 | End: 2022-04-06

## 2022-03-30 RX ORDER — ATORVASTATIN CALCIUM 40 MG/1
40 TABLET, FILM COATED ORAL DAILY
Status: DISCONTINUED | OUTPATIENT
Start: 2022-03-31 | End: 2022-04-08 | Stop reason: HOSPADM

## 2022-03-30 RX ORDER — BUDESONIDE 0.5 MG/2ML
0.5 INHALANT ORAL
Status: DISCONTINUED | OUTPATIENT
Start: 2022-03-30 | End: 2022-04-05

## 2022-03-30 RX ORDER — IPRATROPIUM BROMIDE AND ALBUTEROL SULFATE .5; 3 MG/3ML; MG/3ML
1 SOLUTION RESPIRATORY (INHALATION) ONCE
Status: COMPLETED | OUTPATIENT
Start: 2022-03-30 | End: 2022-03-30

## 2022-03-30 RX ORDER — SODIUM CHLORIDE, SODIUM GLUCONATE, SODIUM ACETATE, POTASSIUM CHLORIDE, MAGNESIUM CHLORIDE, SODIUM PHOSPHATE, DIBASIC, AND POTASSIUM PHOSPHATE .53; .5; .37; .037; .03; .012; .00082 G/100ML; G/100ML; G/100ML; G/100ML; G/100ML; G/100ML; G/100ML
75 INJECTION, SOLUTION INTRAVENOUS CONTINUOUS
Status: DISCONTINUED | OUTPATIENT
Start: 2022-03-30 | End: 2022-04-01

## 2022-03-30 RX ORDER — SODIUM CHLORIDE FOR INHALATION 0.9 %
12 VIAL, NEBULIZER (ML) INHALATION ONCE
Status: COMPLETED | OUTPATIENT
Start: 2022-03-30 | End: 2022-03-30

## 2022-03-30 RX ORDER — LEVALBUTEROL 1.25 MG/.5ML
1.25 SOLUTION, CONCENTRATE RESPIRATORY (INHALATION) EVERY 4 HOURS
Status: DISCONTINUED | OUTPATIENT
Start: 2022-03-30 | End: 2022-03-30

## 2022-03-30 RX ORDER — POTASSIUM CHLORIDE 20 MEQ/1
40 TABLET, EXTENDED RELEASE ORAL ONCE
Status: COMPLETED | OUTPATIENT
Start: 2022-03-30 | End: 2022-03-30

## 2022-03-30 RX ORDER — OSELTAMIVIR PHOSPHATE 75 MG/1
75 CAPSULE ORAL EVERY 12 HOURS SCHEDULED
Status: DISCONTINUED | OUTPATIENT
Start: 2022-03-30 | End: 2022-04-05

## 2022-03-30 RX ORDER — TAMSULOSIN HYDROCHLORIDE 0.4 MG/1
0.4 CAPSULE ORAL DAILY
Status: DISCONTINUED | OUTPATIENT
Start: 2022-03-31 | End: 2022-04-08 | Stop reason: HOSPADM

## 2022-03-30 RX ORDER — AZITHROMYCIN 250 MG/1
500 TABLET, FILM COATED ORAL EVERY 24 HOURS
Status: DISCONTINUED | OUTPATIENT
Start: 2022-03-30 | End: 2022-04-02

## 2022-03-30 RX ORDER — GUAIFENESIN 600 MG
600 TABLET, EXTENDED RELEASE 12 HR ORAL EVERY 12 HOURS SCHEDULED
Status: DISCONTINUED | OUTPATIENT
Start: 2022-03-30 | End: 2022-04-08 | Stop reason: HOSPADM

## 2022-03-30 RX ORDER — ACETAMINOPHEN 325 MG/1
650 TABLET ORAL EVERY 6 HOURS PRN
Status: DISCONTINUED | OUTPATIENT
Start: 2022-03-30 | End: 2022-04-08 | Stop reason: HOSPADM

## 2022-03-30 RX ORDER — CEFTRIAXONE 2 G/50ML
2000 INJECTION, SOLUTION INTRAVENOUS EVERY 24 HOURS
Status: DISCONTINUED | OUTPATIENT
Start: 2022-03-30 | End: 2022-03-31

## 2022-03-30 RX ORDER — NICOTINE 21 MG/24HR
21 PATCH, TRANSDERMAL 24 HOURS TRANSDERMAL DAILY
Status: DISCONTINUED | OUTPATIENT
Start: 2022-03-31 | End: 2022-04-08 | Stop reason: HOSPADM

## 2022-03-30 RX ORDER — METHYLPREDNISOLONE SODIUM SUCCINATE 40 MG/ML
40 INJECTION, POWDER, LYOPHILIZED, FOR SOLUTION INTRAMUSCULAR; INTRAVENOUS EVERY 12 HOURS SCHEDULED
Status: DISCONTINUED | OUTPATIENT
Start: 2022-03-31 | End: 2022-03-31

## 2022-03-30 RX ORDER — METHYLPREDNISOLONE SODIUM SUCCINATE 125 MG/2ML
125 INJECTION, POWDER, LYOPHILIZED, FOR SOLUTION INTRAMUSCULAR; INTRAVENOUS ONCE
Status: COMPLETED | OUTPATIENT
Start: 2022-03-30 | End: 2022-03-30

## 2022-03-30 RX ORDER — OXYCODONE HYDROCHLORIDE AND ACETAMINOPHEN 5; 325 MG/1; MG/1
1 TABLET ORAL EVERY 4 HOURS PRN
Status: DISCONTINUED | OUTPATIENT
Start: 2022-03-30 | End: 2022-04-08 | Stop reason: HOSPADM

## 2022-03-30 RX ADMIN — BUDESONIDE 0.5 MG: 0.5 INHALANT ORAL at 19:17

## 2022-03-30 RX ADMIN — AZITHROMYCIN 500 MG: 250 TABLET, FILM COATED ORAL at 18:20

## 2022-03-30 RX ADMIN — ALBUTEROL SULFATE 10 MG: 2.5 SOLUTION RESPIRATORY (INHALATION) at 14:37

## 2022-03-30 RX ADMIN — IPRATROPIUM BROMIDE 1 MG: 0.5 SOLUTION RESPIRATORY (INHALATION) at 14:38

## 2022-03-30 RX ADMIN — INSULIN LISPRO 4 UNITS: 100 INJECTION, SOLUTION INTRAVENOUS; SUBCUTANEOUS at 18:31

## 2022-03-30 RX ADMIN — GUAIFENESIN 600 MG: 600 TABLET ORAL at 21:02

## 2022-03-30 RX ADMIN — INSULIN LISPRO 5 UNITS: 100 INJECTION, SOLUTION INTRAVENOUS; SUBCUTANEOUS at 18:31

## 2022-03-30 RX ADMIN — ISODIUM CHLORIDE 12 ML: 0.03 SOLUTION RESPIRATORY (INHALATION) at 14:37

## 2022-03-30 RX ADMIN — SODIUM CHLORIDE, SODIUM GLUCONATE, SODIUM ACETATE, POTASSIUM CHLORIDE, MAGNESIUM CHLORIDE, SODIUM PHOSPHATE, DIBASIC, AND POTASSIUM PHOSPHATE 75 ML/HR: .53; .5; .37; .037; .03; .012; .00082 INJECTION, SOLUTION INTRAVENOUS at 18:21

## 2022-03-30 RX ADMIN — IOHEXOL 100 ML: 350 INJECTION, SOLUTION INTRAVENOUS at 15:35

## 2022-03-30 RX ADMIN — CEFTRIAXONE 2000 MG: 2 INJECTION, SOLUTION INTRAVENOUS at 18:20

## 2022-03-30 RX ADMIN — METHYLPREDNISOLONE SODIUM SUCCINATE 125 MG: 125 INJECTION, POWDER, FOR SOLUTION INTRAMUSCULAR; INTRAVENOUS at 14:34

## 2022-03-30 RX ADMIN — GABAPENTIN 100 MG: 100 CAPSULE ORAL at 18:20

## 2022-03-30 RX ADMIN — OSELTAMIVIR PHOSPHATE 75 MG: 75 CAPSULE ORAL at 21:02

## 2022-03-30 RX ADMIN — INSULIN LISPRO 2 UNITS: 100 INJECTION, SOLUTION INTRAVENOUS; SUBCUTANEOUS at 21:02

## 2022-03-30 RX ADMIN — POTASSIUM CHLORIDE 40 MEQ: 1500 TABLET, EXTENDED RELEASE ORAL at 18:20

## 2022-03-30 NOTE — PROGRESS NOTES
[facility]        NAME: Erica Munson is a 78 y o  male  : 1955    MRN: [id number]  DATE: 2022  TIME: 4:41 PM    Assessment and Plan   Pneumonia of both lungs due to infectious organism, unspecified part of lung [J18 9]  1  Pneumonia of both lungs due to infectious organism, unspecified part of lung     2  COPD with acute exacerbation ([facility] )     3  SOB (shortness of breath)  XR chest pa & lateral    ECG 12 lead    Transfer to other facility   4  Hypoxia       cxr- bilateral pneumonia- possible COVID  Pt requires O2, does not have O2 at home  Recommended transport to hospital    ecg- sinus tachycardia    Patient Instructions     Pt decision to be transported to [address] [place] [address] ED in [facility]  Pt did call family and make aware of transport to ED  EMS called for patient  Pt transported via [facility]  Attempted to call [address] [place] [address] for report  No answer  Chief Complaint     Chief Complaint   Patient presents with    Shortness of Breath         History of Present Illness       Pt is a 78year old male who presents to the office today for evaluation of SOB  He has a history of lung CA- had radiation and chemo currently in remission for 1 year  Last saw cancer doctor about 8 months ago-sees Dr Derick Coles  He also has a history of COPD  Is currently smoking about 1-1 5 pack per day  States he started with sickness yesterday, girlfriend is sick at home too  Went to PCP today for wellness check and was unable to see provider  Does have a history of oxygen use at home, currently does not have any O2  Is on trellogy daily, has been using the albuterol inhaler for the past 2 days 7-8 times a day  Also using albuterol nebulizer twice a day  States he feels unsteady, cough, rhinorrhea, congestion, fever, chills    Denies chest pain, ear pain, n/v/d, body aches  Did have flu vaccine  Has had pneumonia vaccine in past 5 years  Had two doses of moderna vaccine   Had negative covid test 3-4 days ago because girlfriend was sick  Had covid in august, was hospitalized for 5-6 days  Denies hx of heart failure, c/o bilateral leg swelling, is taking lasix daily without resolution  Review of Systems   Review of Systems   Constitutional: Positive for activity change, chills, fatigue and fever  HENT: Positive for congestion, postnasal drip, rhinorrhea and sneezing  Negative for ear discharge, ear pain, sore throat, tinnitus and trouble swallowing  Respiratory: Positive for cough, shortness of breath and wheezing  Cardiovascular: Positive for leg swelling  Negative for chest pain and palpitations  Gastrointestinal: Negative for diarrhea, nausea and vomiting  Musculoskeletal: Positive for myalgias  Skin: Negative for color change, pallor and rash  Neurological: Positive for headaches  All other systems reviewed and are negative  Current Medications     No current facility-administered medications for this visit  No current outpatient medications on file      Current Allergies     Allergies as of 03/30/2022 - Reviewed 03/30/2022   Allergen Reaction Noted    Tetracyclines & related Other (See Comments) 02/21/2005            The following portions of the patient's history were reviewed and updated as appropriate: allergies, current medications, past family history, past medical history, past social history, past surgical history and problem list      Past Medical History:   Diagnosis Date    [facility]     BPH (benign prostatic hyperplasia)     [facility] disease[facility]     Diabetes mellitus ([facility] )     Hyperlipidemia     [facility]     Rib fractures        Past Surgical History:   Procedure Laterality Date    BRONCHOSCOPY      CATARACT EXTRACTION, BILATERAL      CYSTOSCOPY      TONSILLECTOMY      TRANSURETHRAL RESECTION OF PROSTATE         Family History   Problem Relation Age of Onset    Hypertension Mother     No Known Problems Father Medications have been verified  Objective   /80   Pulse (!) 114   Temp 98 7 °F (37 1 °C)   Resp (!) 30   Ht 6' 1" (1 854 m)   Wt 117 kg (258 lb)   SpO2 (!) 87% Comment: 2lpm nC  BMI 34 04 kg/m²        Physical Exam     Physical Exam  Vitals and nursing note reviewed  Constitutional:       General: He is in acute distress  Appearance: He is obese  He is ill-appearing and toxic-appearing  HENT:      Head: Normocephalic and atraumatic  Right Ear: A middle ear effusion is present  Left Ear: A middle ear effusion is present  Nose: Congestion present  Right Turbinates: Enlarged  Left Turbinates: Enlarged  Mouth/Throat:      Comments: Dry lips  Cardiovascular:      Rate and Rhythm: Regular rhythm  Tachycardia present  No extrasystoles are present  Pulses: Normal pulses  No decreased pulses  Heart sounds: Normal heart sounds  No murmur heard  No friction rub  No gallop  Pulmonary:      Effort: Tachypnea and accessory muscle usage present  Breath sounds: Wheezing and rhonchi present  Comments: Audible wheezing inspiratory and expiratory  Unable to speak in complete sentences  Dyspnea at rest, worse with exertion  Chest:      Chest wall: No mass, deformity, tenderness, crepitus or edema  There is no dullness to percussion  Abdominal:      General: Abdomen is protuberant  Musculoskeletal:      Right lower le+ Pitting Edema present  Left lower le+ Pitting Edema present  Skin:     General: Skin is warm  Coloration: Skin is pale  Neurological:      Mental Status: He is alert

## 2022-03-30 NOTE — ASSESSMENT & PLAN NOTE
Most likely secondary to COPD exacerbation secondary to influenza a  Patient also has history of lung cancer currently in remission for 1 year  Influenza A positive, COVID negative  Patient was saturating 88% on room air, continue 4 L of supplemental oxygen  CTA PE negative,  Follow respiratory protocol

## 2022-03-30 NOTE — ED PROVIDER NOTES
History  Chief Complaint   Patient presents with    Shortness of Breath     seen at urgent care in [facility] with bilateral pneumonia  76-year-old male via EMS from urgent care complains of worsening cough, dyspnea over the past few days  Notes incompletely amenable to albuterol nebulized treatments and rescue inhaler as well as DuoNeb via EMS just prior to arrival   Urgent care notes room air pulse oximetry 87%, does not use home oxygen  History of lung cancer and COPD  He is an active smoker up until few days ago when URI symptoms began  Did not take diuretic today and notes less effective/decreased urine output recently as well as decreased oral intake  History provided by:  Patient and EMS personnel  Shortness of Breath  Severity:  Severe  Onset quality:  Gradual  Timing:  Constant  Progression:  Worsening  Chronicity:  New  Context: URI    Relieved by:  Oxygen and inhaler  Worsened by:  Coughing, activity and exertion  Ineffective treatments:  Diuretics  Associated symptoms: chest pain and sputum production    Associated symptoms: no abdominal pain and no hemoptysis    Risk factors: tobacco use        Prior to Admission Medications   Prescriptions Last Dose Informant Patient Reported? Taking? Aspirin Buf,CaCarb-MgCarb-MgO, 81 MG TABS   Yes No   Sig: Take 81 mg by mouth   albuterol (PROVENTIL HFA,VENTOLIN HFA) 90 mcg/act inhaler   Yes No   Sig: Inhale 2 puffs every 6 (six) hours as needed   atorvastatin (LIPITOR) 40 mg tablet   Yes No   Sig: Take 40 mg by mouth daily   finasteride (PROSCAR) 5 mg tablet   Yes No   Sig: take 1 tablet by mouth once daily   fluticasone-umeclidinium-vilanterol (Trelegy Ellipta) 200-62 5-25 MCG/INH AEPB inhaler   No No   Sig: Inhale 1 puff daily Rinse mouth after use     folic acid (FOLVITE) 1 mg tablet   Yes No   Sig: Take by mouth daily   furosemide (LASIX) 40 mg tablet   Yes No   Sig: take 1 tablet by mouth once daily   gabapentin (NEURONTIN) 100 mg capsule Yes No   Sig: take 1 capsule by mouth twice a day   meloxicam (MOBIC) 15 mg tablet   Yes No   Sig: take 1 tablet by mouth once daily   metFORMIN (GLUCOPHAGE) 500 mg tablet   No No   Sig: Take 2 tablets (1,000 mg total) by mouth daily with breakfast   ondansetron (ZOFRAN) 8 mg tablet   Yes No   Sig: Take by mouth every 8 (eight) hours as needed for nausea or vomiting   tamsulosin (FLOMAX) 0 4 mg   Yes No   Sig: take 1 capsule by mouth once daily      Facility-Administered Medications: None       Past Medical History:   Diagnosis Date    Bl[place] ([place] [address] )     BPH (benign prostatic hyperplasia)     COPD (chronic obstructive pulmonary disease) ([place] [address] )     Diabetes mellitus ([place] [address] )     Hyperlipidemia     Lung cancer ([place] [address] )     Rib fractures        Past Surgical History:   Procedure Laterality Date    BRONCHOSCOPY      CATARACT EXTRACTION, BILATERAL      CYSTOSCOPY      TONSILLECTOMY      TRANSURETHRAL RESECTION OF PROSTATE         Family History   Problem Relation Age of Onset    Hypertension Mother     No Known Problems Father      I have reviewed and agree with the history as documented  E-Cigarette/Vaping    E-Cigarette Use Never User      E-Cigarette/Vaping Substances     Social History     Tobacco Use    Smoking status: Current Every Day Smoker     Packs/day: 1 00    Smokeless tobacco: Never Used   Vaping Use    Vaping Use: Never used   Substance Use Topics    Alcohol use: Not Currently    Drug use: Not Currently       Review of Systems   Respiratory: Positive for sputum production and shortness of breath  Negative for hemoptysis  Cardiovascular: Positive for chest pain  Gastrointestinal: Negative for abdominal pain  All other systems reviewed and are negative  Physical Exam  Physical Exam  Vitals and nursing note reviewed  Constitutional:       Comments: Pleasant, comfortable-appearing   HENT:      Head: Normocephalic and atraumatic     Eyes:      Conjunctiva/sclera: Conjunctivae normal       Pupils: Pupils are equal, round, and reactive to light  Cardiovascular:      Rate and Rhythm: Regular rhythm  Tachycardia present  Heart sounds: Normal heart sounds  Pulmonary:      Effort: Tachypnea and accessory muscle usage present  Breath sounds: Decreased breath sounds and wheezing present  Chest:      Chest wall: No deformity or crepitus  Abdominal:      General: Bowel sounds are normal  There is no distension  Palpations: Abdomen is soft  Tenderness: There is no abdominal tenderness  Musculoskeletal:         General: Normal range of motion  Cervical back: Neck supple  Right lower leg: Edema present  Left lower leg: Edema present  Skin:     General: Skin is warm and dry  Neurological:      General: No focal deficit present  Mental Status: He is alert and oriented to person, place, and time  Cranial Nerves: No cranial nerve deficit  Coordination: Coordination normal    Psychiatric:         Mood and Affect: Mood normal          Behavior: Behavior normal          Thought Content:  Thought content normal          Judgment: Judgment normal          Vital Signs  ED Triage Vitals   Temperature Pulse Respirations Blood Pressure SpO2   03/30/22 1405 03/30/22 1405 03/30/22 1405 03/30/22 1405 03/30/22 1400   100 °F (37 8 °C) (!) 110 18 142/75 97 %      Temp Source Heart Rate Source Patient Position - Orthostatic VS BP Location FiO2 (%)   03/30/22 1405 03/30/22 1405 -- -- --   Temporal Monitor         Pain Score       --                  Vitals:    03/30/22 1405   BP: 142/75   Pulse: (!) 110         Visual Acuity      ED Medications  Medications   ipratropium-albuterol (FOR EMS ONLY) (DUO-NEB) 0 5-2 5 mg/3 mL inhalation solution 3 mL (0 mL Does not apply Given to EMS 3/30/22 1410)   albuterol inhalation solution 10 mg (10 mg Nebulization Given 3/30/22 1437)   ipratropium (ATROVENT) 0 02 % inhalation solution 1 mg (1 mg Nebulization Given 3/30/22 1438)   sodium chloride 0 9 % inhalation solution 12 mL (12 mL Nebulization Given 3/30/22 1437)   methylPREDNISolone sodium succinate (Solu-MEDROL) injection 125 mg (125 mg Intravenous Given 3/30/22 1434)       Diagnostic Studies  Results Reviewed     Procedure Component Value Units Date/Time    COVID/FLU/RSV - 2 hour TAT [[id number]]  (Abnormal) Collected: 03/30/22 1424    Lab Status: Final result Specimen: Nares from Nose Updated: 03/30/22 1510     SARS-CoV-2 Negative     INFLUENZA A PCR Positive     INFLUENZA B PCR Negative     RSV PCR Negative    Narrative:      FOR PEDIATRIC PATIENTS - copy/paste COVID Guidelines URL to browser: [email or web address]  [employer]    SARS-CoV-2 assay is a Nucleic Acid Amplification assay intended for the  qualitative detection of nucleic acid from SARS-CoV-2 in nasopharyngeal  swabs  Results are for the presumptive identification of SARS-CoV-2 RNA  Positive results are indicative of infection with SARS-CoV-2, the virus  causing COVID-19, but do not rule out bacterial infection or co-infection  with other viruses  Laboratories within the [place] and its  territories are required to report all positive results to the appropriate  public health authorities  Negative results do not preclude SARS-CoV-2  infection and should not be used as the sole basis for treatment or other  patient management decisions  Negative results must be combined with  clinical observations, patient history, and epidemiological information  This test has not been FDA cleared or approved  This test has been authorized by FDA under an Emergency Use Authorization  (EUA)   This test is only authorized for the duration of time the  declaration that circumstances exist justifying the authorization of the  emergency use of an in vitro diagnostic tests for detection of SARS-CoV-2  virus and/or diagnosis of COVID-19 infection under section 564(b)(1) of  the Act, [place]  360bbb-3(b)(1), unless the authorization is terminated  or revoked sooner  The test has been validated but independent review by FDA  and CLIA is pending  Test performed using [employer] GeneXpert: This RT-PCR assay targets N2,  a region unique to SARS-CoV-2  A conserved region in the E-gene was chosen  for pan-Sarbecovirus detection which includes SARS-CoV-2  Lactic acid [[id number]]  (Abnormal) Collected: 03/30/22 1425    Lab Status: Final result Specimen: Blood from Arm, Left Updated: 03/30/22 1459     LACTIC ACID 2 7 mmol/L     Narrative:      Result may be elevated if tourniquet was used during collection      Lactic acid 2 Hours [[id number]]     Lab Status: No result Specimen: Blood     HS Troponin I 2hr [[id number]]     Lab Status: No result Specimen: Blood     HS Troponin 0hr (reflex protocol) [[id number]]  (Normal) Collected: 03/30/22 1425    Lab Status: Final result Specimen: Blood from Arm, Left Updated: 03/30/22 1455     hs TnI 0hr 21 ng/L     Comprehensive metabolic panel [[id number]]  (Abnormal) Collected: 03/30/22 1424    Lab Status: Final result Specimen: Blood from Arm, Left Updated: 03/30/22 1454     Sodium 138 mmol/L      Potassium 3 4 mmol/L      Chloride 97 mmol/L      CO2 31 mmol/L      ANION GAP 10 mmol/L      BUN 14 mg/dL      Creatinine 1 16 mg/dL      Glucose 123 mg/dL      Calcium 8 3 mg/dL      AST 44 U/L      ALT 44 U/L      Alkaline Phosphatase 54 U/L      Total Protein 7 7 g/dL      Albumin 4 0 g/dL      Total Bilirubin 0 61 mg/dL      eGFR 64 ml/min/1 73sq m     Narrative:      Abioal guidelines for Chronic Kidney Disease (CKD):     Stage 1 with normal or high GFR (GFR > 90 mL/min/1 73 square meters)    Stage 2 Mild CKD (GFR = 60-89 mL/min/1 73 square meters)    Stage 3A Moderate CKD (GFR = 45-59 mL/min/1 73 square meters)    Stage 3B Moderate CKD (GFR = 30-44 mL/min/1 73 square meters)    Stage 4 Severe CKD (GFR = 15-29 mL/min/1 73 square meters)    Stage 5 End Stage CKD (GFR <15 mL/min/1 73 square meters)  Note: GFR calculation is accurate only with a steady state creatinine    NT-BNP PRO [[id number]]  (Abnormal) Collected: 03/30/22 1424    Lab Status: Final result Specimen: Blood from Arm, Left Updated: 03/30/22 1454     NT-proBNP 670 pg/mL     Magnesium [[id number]]  (Normal) Collected: 03/30/22 1424    Lab Status: Final result Specimen: Blood from Arm, Left Updated: 03/30/22 1454     Magnesium 1 7 mg/dL     CBC and differential [[id number]] Collected: 03/30/22 1424    Lab Status: Final result Specimen: Blood from Arm, Left Updated: 03/30/22 1433     WBC 7 97 Thousand/uL      RBC 5 11 Million/uL      Hemoglobin 15 4 g/dL      Hematocrit 46 6 %      MCV 91 fL      MCH 30 1 pg      MCHC 33 0 g/dL      RDW 13 3 %      MPV 9 6 fL      Platelets 642 Thousands/uL      nRBC 0 /100 WBCs      Neutrophils Relative 68 %      Immat GRANS % 0 %      Lymphocytes Relative 19 %      Monocytes Relative 12 %      Eosinophils Relative 0 %      Basophils Relative 1 %      Neutrophils Absolute 5 45 Thousands/µL      Immature Grans Absolute 0 02 Thousand/uL      Lymphocytes Absolute 1 49 Thousands/µL      Monocytes Absolute 0 92 Thousand/µL      Eosinophils Absolute 0 01 Thousand/µL      Basophils Absolute 0 08 Thousands/µL     Blood culture #1 [[id number]] Collected: 03/30/22 1424    Lab Status: In process Specimen: Blood from Arm, Left Updated: 03/30/22 1431    Blood culture #2 [[id number]] Collected: 03/30/22 1424    Lab Status: In process Specimen: Blood from Arm, Right Updated: 03/30/22 1431    UA w Reflex to Microscopic w Reflex to Culture [[id number]]     Lab Status: No result Specimen: Urine                  CTA ED chest PE study    (Results Pending)              Procedures  Procedures         ED Course  ED Course as of 03/30/22 1522   Wed Mar 30, 2022   1424 CXR RUL scar, no infiltrate   1456 EKG 2:08 p m   Sinus tachycardia rate 104 normal axis normal intervals age indeterminate Q-waves aVL PVCs no ST elevation or depression interpreted by   1457 hs TnI 0hr: 21   1457 NT-proBNP(!): 670   1457 Magnesium: 1 7   1457 Potassium(!): 3 4   1522 Improves with nebulized treatment, oxygen  Agrees with hospitalization  MARIE Sadler accepts, request CTA r/o PE                                             MDM    Disposition  Final diagnoses:   [facility] )   Hypoxemia     Time reflects when diagnosis was documented in both MDM as applicable and the Disposition within this note     Time User Action Codes Description Comment    3/30/2022  3:04 PM Ginger Castillo Add [J44 1] COPD exacerbation ([facility] )     3/30/2022  3:04 PM Ginger Castillo Add [R09 02] Hypoxemia       ED Disposition     ED Disposition Condition Date/Time Comment    Admit Stable Wed Mar 30, 2022  3:20 PM Case was discussed with Viktoriya and the patient's admission status was agreed to be Admission Status: inpatient status to the service of Dr Dick Roque         Follow-up Information    None         Patient's Medications   Discharge Prescriptions    No medications on file       No discharge procedures on file      PDMP Review     None          ED Provider  Electronically Signed by           Sarah Mosquera DO  03/30/22 1523

## 2022-03-30 NOTE — ASSESSMENT & PLAN NOTE
Lab Results   Component Value Date    HGBA1C 8 1 (H) 12/28/2021       No results for input(s): POCGLU in the last 72 hours      Blood Sugar Average: Last 72 hrs:   patient placed on Solu-Medrol due to COPD, which may affect patient blood sugar  Continue sliding scale, adjust insulin dose based on response  Follow hypoglycemia precaution

## 2022-03-30 NOTE — H&P
[address]  H&P- Jennifer Kunal 1955, 78 y o  male MRN: [id number]  Unit/Bed#: -01 Encounter: [id number]  Primary Care Provider: Caesar Lilly DO   Date and time admitted to hospital: 3/30/2022  1:58 PM    Acute respiratory failure with hypoxia [facility])  Assessment & Plan  Most likely secondary to COPD exacerbation secondary to influenza a  Patient also has history of lung cancer currently in remission for 1 year  Influenza A positive, COVID negative  Patient was saturating 88% on room air, continue 4 L of supplemental oxygen  CTA PE negative,  Follow respiratory protocol    COPD with acute exacerbation ([facility] )  Assessment & Plan  Most likely secondary to influenza a, patient also has history of lung cancer in remission  Continue respiratory protocol  Maintain saturation 89% or above    * Sepsis, viral ([facility] )  Assessment & Plan  Most likely secondary to influenza a  Patient was tachycardic, tachypneic, influenza A positive, lactic acid elevated  For continue treatment as per sepsis protocol  Follow-up blood culture, sputum culture, urine Legionella, pneumonia, procalcitonin    Influenza A  Assessment & Plan  Maintain isolation  Continue supportive care  Will place patient on Tamiflu    History of lung cancer  Assessment & Plan  Follows with Dr Ava Gosselin note reviewed, documentation as below:  HEMATOLOGY/ONCOLOGY DIAGNOSIS:  1  Unresectable adenocarcinoma the right upper lung, 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, PDL1 <1% --> DR     Cancer Staging  Malignant neoplasm of upper lobe of right lung ([facility] )  Staging form: Lung, AJCC 8th Edition  - Clinical stage from 1/23/2020: Stage IB (cT2a, cN0, cM0) - Unsigned     Past Therapy:   1  Definitive external beam radiotherapy to the right upper lobe of the lung to a dose of 54 Gy delivered in 3 fractions between 02/12/2020 through 02/21/2020  2  Carboplatin/Alimta s/p 4 cycles from 3/3/2020 through 5/5/2020     Current Therapy:  Active Surveillance    CT chest 07/19/2021 with evidence progression        Tobacco abuse  Assessment & Plan  Continue Nicoderm patch    Type 2 diabetes mellitus, without long-term current use of insulin (HCC)  Assessment & Plan  Lab Results   Component Value Date    HGBA1C 8 1 (H) 12/28/2021       No results for input(s): POCGLU in the last 72 hours  Blood Sugar Average: Last 72 hrs:   patient placed on Solu-Medrol due to COPD, which may affect patient blood sugar  Continue sliding scale, adjust insulin dose based on response  Follow hypoglycemia precaution      VTE Pharmacologic Prophylaxis: VTE Score: 7 High Risk (Score >/= 5) - Pharmacological DVT Prophylaxis Ordered: enoxaparin (Lovenox)  Sequential Compression Devices Ordered  Code Status: Level 1 - Full Code as per patient  Discussion with family: Try to left voice message-but mailbox did not set up  Anticipated Length of Stay: Patient will be admitted on an inpatient basis with an anticipated length of stay of greater than 2 midnights secondary to To monitor above condition  Total Time for Visit, including Counseling / Coordination of Care: 45 minutes Greater than 50% of this total time spent on direct patient counseling and coordination of care  Chief Complaint:  Shortness of breath    History of Present Illness:  Ray Welch is a 78 y o  male with a PMH of lung cancer, tobacco abuse who presents with shortness of breath  Patient reports his having feeling sick for last 2-3 days, went to see his primary doctor was send her to urgent care where he was found saturating low  And also wheezing  Patient reports for the past few days is getting worse  Also having runny nose  Also having bloody 8  Denies any nausea, vomiting, diarrhea  Denies any previous history of blood clot  Still smoking, 1 5 pack per day       Review of Systems:  Review of Systems   Constitutional: Positive for activity change and fatigue   Negative for appetite change, chills, diaphoresis, fever and unexpected weight change  HENT: Positive for rhinorrhea  Respiratory: Positive for apnea, cough, chest tightness, shortness of breath and wheezing  Cardiovascular: Negative for chest pain, palpitations and leg swelling  Gastrointestinal: Negative for abdominal distention, abdominal pain, anal bleeding, blood in stool and constipation  Genitourinary: Negative for difficulty urinating, dysuria and enuresis  Musculoskeletal: Negative for arthralgias and back pain  Skin: Negative for color change, pallor and wound  Neurological: Negative for dizziness, tremors, seizures, facial asymmetry, numbness and headaches  Hematological: Negative for adenopathy  Does not bruise/bleed easily  Psychiatric/Behavioral: Negative for agitation and behavioral problems  All other systems reviewed and are negative  Past Medical and Surgical History:   Past Medical History:   Diagnosis Date    Bladder cancer ([place] [address] )     BPH (benign prostatic hyperplasia)     COPD (chronic obstructive pulmonary disease) ([place] [address] )     Diabetes mellitus ([place] [address] )     Hyperlipidemia     Lung cancer ([place] [address] )     Rib fractures        Past Surgical History:   Procedure Laterality Date    BRONCHOSCOPY      CATARACT EXTRACTION, BILATERAL      CYSTOSCOPY      TONSILLECTOMY      TRANSURETHRAL RESECTION OF PROSTATE         Meds/Allergies:  Prior to Admission medications    Medication Sig Start Date End Date Taking?  Authorizing Provider   albuterol (PROVENTIL HFA,VENTOLIN HFA) 90 mcg/act inhaler Inhale 2 puffs every 6 (six) hours as needed   Yes Historical Provider, MD   Aspirin Buf,CaCarb-MgCarb-MgO, 81 MG TABS Take 81 mg by mouth   Yes Historical Provider, MD   atorvastatin (LIPITOR) 40 mg tablet Take 40 mg by mouth daily   Yes Historical Provider, MD   finasteride (PROSCAR) 5 mg tablet take 1 tablet by mouth once daily 7/7/21  Yes Historical Provider, MD   furosemide (LASIX) 40 mg tablet take 1 tablet by mouth once daily 6/10/21 Yes Historical Provider, MD   gabapentin (NEURONTIN) 100 mg capsule take 1 capsule by mouth twice a day 7/12/21  Yes Historical Provider, MD   meloxicam (MOBIC) 15 mg tablet take 1 tablet by mouth once daily 7/1/21  Yes Historical Provider, MD   metFORMIN (GLUCOPHAGE) 500 mg tablet Take 2 tablets (1,000 mg total) by mouth daily with breakfast 9/18/21  Yes Dharmesh Mccord PA-C   tamsulosin (FLOMAX) 0 4 mg take 1 capsule by mouth once daily 5/5/21  Yes Historical Provider, MD   fluticasone-umeclidinium-vilanterol (Trelegy Ellipta) 200-62 5-25 MCG/INH AEPB inhaler Inhale 1 puff daily Rinse mouth after use  9/18/21 10/18/21  Dharmesh Mccord PA-C   folic acid (FOLVITE) 1 mg tablet Take by mouth daily  Patient not taking: Reported on 3/30/2022     Historical Provider, MD   ondansetron (ZOFRAN) 8 mg tablet Take by mouth every 8 (eight) hours as needed for nausea or vomiting    Historical Provider, MD     I have reviewed home medications with patient personally  Allergies:    Allergies   Allergen Reactions    Tetracyclines & Related Other (See Comments)       Social History:  Marital Status: Single   Occupation:  Unknown  Patient Pre-hospital Living Situation: Home  Patient Pre-hospital Level of Mobility: walks  Patient Pre-hospital Diet Restrictions:  Cardiac diet  Substance Use History:   Social History     Substance and Sexual Activity   Alcohol Use Not Currently     Social History     Tobacco Use   Smoking Status Current Every Day Smoker    Packs/day: 1 00   Smokeless Tobacco Never Used     Social History     Substance and Sexual Activity   Drug Use Not Currently       Family History:  Non contributory    Physical Exam:     Vitals:   Blood Pressure: 144/75 (03/30/22 1606)  Pulse: (!) 110 (03/30/22 1606)  Temperature: 98 2 °F (36 8 °C) (03/30/22 1606)  Temp Source: Temporal (03/30/22 1405)  Respirations: 18 (03/30/22 1606)  Weight - Scale: 123 kg (271 lb 2 7 oz) (03/30/22 1405)  SpO2: (!) 88 % (03/30/22 1606)    Physical Exam  Vitals and nursing note reviewed  Exam conducted with a chaperone present  Constitutional:       Appearance: He is obese  HENT:      Head: Normocephalic  Nose: No congestion  Mouth/Throat:      Mouth: Mucous membranes are moist       Pharynx: No oropharyngeal exudate  Eyes:      General: No scleral icterus  Conjunctiva/sclera: Conjunctivae normal       Pupils: Pupils are equal, round, and reactive to light  Cardiovascular:      Rate and Rhythm: Tachycardia present  Heart sounds: No friction rub  No gallop  Pulmonary:      Effort: Pulmonary effort is normal       Breath sounds: Wheezing and rhonchi present  Abdominal:      General: Abdomen is flat  Bowel sounds are normal  There is no distension  Palpations: There is no mass  Tenderness: There is no abdominal tenderness  Hernia: No hernia is present  Musculoskeletal:         General: Normal range of motion  Cervical back: Normal range of motion  Right lower leg: No edema  Lymphadenopathy:      Cervical: No cervical adenopathy  Skin:     General: Skin is warm  Neurological:      General: No focal deficit present  Mental Status: He is alert and oriented to person, place, and time  Cranial Nerves: No cranial nerve deficit  Sensory: No sensory deficit  Motor: No weakness        Coordination: Coordination normal    Psychiatric:         Mood and Affect: Mood normal          Additional Data:     Lab Results:  Results from last 7 days   Lab Units 03/30/22  1424   WBC Thousand/uL 7 97   HEMOGLOBIN g/dL 15 4   HEMATOCRIT % 46 6   PLATELETS Thousands/uL 196   NEUTROS PCT % 68   LYMPHS PCT % 19   MONOS PCT % 12   EOS PCT % 0     Results from last 7 days   Lab Units 03/30/22  1424   SODIUM mmol/L 138   POTASSIUM mmol/L 3 4*   CHLORIDE mmol/L 97*   CO2 mmol/L 31   BUN mg/dL 14   CREATININE mg/dL 1 16   ANION GAP mmol/L 10   CALCIUM mg/dL 8 3   ALBUMIN g/dL 4 0   TOTAL BILIRUBIN mg/dL 0 61 ALK PHOS U/L 54   ALT U/L 44   AST U/L 44   GLUCOSE RANDOM mg/dL 123                 Results from last 7 days   Lab Units 03/30/22  1425   LACTIC ACID mmol/L 2 7*       Imaging: No pertinent imaging reviewed  CTA ED chest PE study   Final Result by Lynn Mack MD (03/30 1624)      No definite pulmonary emboli, but interpretation is compromised by respiratory motion and segmental and subsegmental emboli can be obscured  8 mm left upper lobe nodule, increased from 4 mm in June 2020 and slight enlargement of an AP window node since June 2020  Metastatic disease cannot be excluded  2 8 x 1 8 cm septated thin-walled cyst in the left upper lobe, stable since January 2020  Attention will be directed to this on follow-up to exclude a cystic adenocarcinoma  No change in masslike opacity in the right upper lobe and adjacent nodule, the site of treated tumor  The study was marked in [facility] for immediate notification  Workstation performed: [id number]             EKG and Other Studies Reviewed on Admission:   · EKG: Sinus tachycardia  ** Please Note: This note has been constructed using a voice recognition system   **

## 2022-03-30 NOTE — RESPIRATORY THERAPY NOTE
RT Protocol Note  Mara Cockayne 78 y o  male MRN: [id number]  Unit/Bed#: -01 Encounter: [id number]    Assessment    Principal Problem:    Sepsis, viral ([place] [address] )  Active Problems:    Type 2 diabetes mellitus, without long-term current use of insulin (HCC)    Tobacco abuse    COPD with acute exacerbation ([place] [address] )    History of lung cancer    Acute respiratory failure with hypoxia (HCC)    Influenza A      Home Pulmonary Medications:  Breo daily, prn albuterol       Past Medical History:   Diagnosis Date    Bladder cancer ([place] [address] )     BPH (benign prostatic hyperplasia)     COPD (chronic obstructive pulmonary disease) ([place] [address] )     Diabetes mellitus ([place] [address] )     Hyperlipidemia     Lung cancer ([place] [address] )     Rib fractures      Social History     Socioeconomic History    Marital status: Single     Spouse name: None    Number of children: None    Years of education: None    Highest education level: None   Occupational History    None   Tobacco Use    Smoking status: Current Every Day Smoker     Packs/day: 1 00    Smokeless tobacco: Never Used   Vaping Use    Vaping Use: Never used   Substance and Sexual Activity    Alcohol use: Not Currently    Drug use: Not Currently    Sexual activity: None   Other Topics Concern    None   Social History Narrative    None     Social Determinants of Health     Financial Resource Strain: Not on file   Food Insecurity: Not on file   Transportation Needs: No Transportation Needs    Lack of Transportation (Medical): No    Lack of Transportation (Non-Medical):  No   Physical Activity: Not on file   Stress: Not on file   Social Connections: Not on file   Intimate Partner Violence: Not on file   Housing Stability: Not on file       Subjective         Objective    Physical Exam:   Assessment Type: Assess only  General Appearance: Alert,Awake  Respiratory Pattern: Tachypneic  Chest Assessment: Chest expansion symmetrical  Bilateral Breath Sounds: Expiratory wheezes,Inspiratory wheezes,Coarse  Cough: Non-productive  O2 Device: 4L nc    Vitals:  Blood pressure 144/75, pulse (!) 110, temperature 98 2 °F (36 8 °C), resp  rate 18, weight 123 kg (271 lb 2 7 oz), SpO2 (!) 88 %  Imaging and other studies: I have personally reviewed pertinent reports  O2 Device: 4L nc     Plan    Respiratory Plan: Mild Distress pathway          Pt admitted for sepsis  Pt positive for influenza  Pt does have hx of COPD take breo daily at home and prn inhaler which he was using more often lately  Pt currently ordered Q6 Xop/Atrovent and BID pulmicort  Pt does not use home O2 but is currently on 4 L NC  Hx of lung CA

## 2022-03-30 NOTE — ASSESSMENT & PLAN NOTE
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

## 2022-03-30 NOTE — PLAN OF CARE
Problem: PAIN - ADULT  Goal: Verbalizes/displays adequate comfort level or baseline comfort level  Description: Interventions:  - Encourage patient to monitor pain and request assistance  - Assess pain using appropriate pain scale  - Administer analgesics based on type and severity of pain and evaluate response  - Implement non-pharmacological measures as appropriate and evaluate response  - Consider cultural and social influences on pain and pain management  - Notify physician/advanced practitioner if interventions unsuccessful or patient reports new pain  Outcome: Progressing     Problem: INFECTION - ADULT  Goal: Absence or prevention of progression during hospitalization  Description: INTERVENTIONS:  - Assess and monitor for signs and symptoms of infection  - Monitor lab/diagnostic results  - Monitor all insertion sites, i e  indwelling lines, tubes, and drains  - Monitor endotracheal if appropriate and nasal secretions for changes in amount and color  - Eros appropriate cooling/warming therapies per order  - Administer medications as ordered  - Instruct and encourage patient and family to use good hand hygiene technique  - Identify and instruct in appropriate isolation precautions for identified infection/condition  Outcome: Progressing     Problem: SAFETY ADULT  Goal: Patient will remain free of falls  Description: INTERVENTIONS:  - Educate patient/family on patient safety including physical limitations  - Instruct patient to call for assistance with activity   - Consult OT/PT to assist with strengthening/mobility   - Keep Call bell within reach  - Keep bed low and locked with side rails adjusted as appropriate  - Keep care items and personal belongings within reach  - Initiate and maintain comfort rounds  - Make Fall Risk Sign visible to staff  - Offer Toileting every   Hours, in advance of need  - Initiate/Maintain   alarm  - Obtain necessary fall risk management equipment:     - Apply yellow socks and bracelet for high fall risk patients  - Consider moving patient to room near nurses station  Outcome: Progressing  Goal: Maintain or return to baseline ADL function  Description: INTERVENTIONS:  -  Assess patient's ability to carry out ADLs; assess patient's baseline for ADL function and identify physical deficits which impact ability to perform ADLs (bathing, care of mouth/teeth, toileting, grooming, dressing, etc )  - Assess/evaluate cause of self-care deficits   - Assess range of motion  - Assess patient's mobility; develop plan if impaired  - Assess patient's need for assistive devices and provide as appropriate  - Encourage maximum independence but intervene and supervise when necessary  - Involve family in performance of ADLs  - Assess for home care needs following discharge   - Consider OT consult to assist with ADL evaluation and planning for discharge  - Provide patient education as appropriate  Outcome: Progressing  Goal: Maintains/Returns to pre admission functional level  Description: INTERVENTIONS:  - Perform BMAT or MOVE assessment daily    - Set and communicate daily mobility goal to care team and patient/family/caregiver  - Collaborate with rehabilitation services on mobility goals if consulted  - Perform Range of Motion   times a day  - Reposition patient every   hours  - Dangle patient   times a day  - Stand patient   times a day  - Ambulate patient   times a day  - Out of bed to chair   times a day   - Out of bed for meals   Jessy Aspen    times a day  - Out of bed for toileting  - Record patient progress and toleration of activity level   Outcome: Progressing     Problem: DISCHARGE PLANNING  Goal: Discharge to home or other facility with appropriate resources  Description: INTERVENTIONS:  - Identify barriers to discharge w/patient and caregiver  - Arrange for needed discharge resources and transportation as appropriate  - Identify discharge learning needs (meds, wound care, etc )  - Arrange for interpretive services to assist at discharge as needed  - Refer to Case Management Department for coordinating discharge planning if the patient needs post-hospital services based on physician/advanced practitioner order or complex needs related to functional status, cognitive ability, or social support system  Outcome: Progressing     Problem: Knowledge Deficit  Goal: Patient/family/caregiver demonstrates understanding of disease process, treatment plan, medications, and discharge instructions  Description: Complete learning assessment and assess knowledge base  Interventions:  - Provide teaching at level of understanding  - Provide teaching via preferred learning methods  Outcome: Progressing     Problem: Potential for Falls  Goal: Patient will remain free of falls  Description: INTERVENTIONS:  - Educate patient/family on patient safety including physical limitations  - Instruct patient to call for assistance with activity   - Consult OT/PT to assist with strengthening/mobility   - Keep Call bell within reach  - Keep bed low and locked with side rails adjusted as appropriate  - Keep care items and personal belongings within reach  - Initiate and maintain comfort rounds  - Make Fall Risk Sign visible to staff  - Offer Toileting every   Hours, in advance of need  - Initiate/Maintain   alarm  - Obtain necessary fall risk management equipment:     - Apply yellow socks and bracelet for high fall risk patients  - Consider moving patient to room near nurses station  Outcome: Progressing

## 2022-03-30 NOTE — ASSESSMENT & PLAN NOTE
Most likely secondary to influenza a, patient also has history of lung cancer in remission  Continue respiratory protocol  Maintain saturation 89% or above

## 2022-03-30 NOTE — ASSESSMENT & PLAN NOTE
Most likely secondary to influenza a  Patient was tachycardic, tachypneic, influenza A positive, lactic acid elevated  For continue treatment as per sepsis protocol  Follow-up blood culture, sputum culture, urine Legionella, pneumonia, procalcitonin

## 2022-03-31 LAB
ALBUMIN SERPL BCP-MCNC: 3.5 G/DL (ref 3.5–5)
ALP SERPL-CCNC: 51 U/L (ref 46–116)
ALT SERPL W P-5'-P-CCNC: 51 U/L (ref 12–78)
ANION GAP SERPL CALCULATED.3IONS-SCNC: 6 MMOL/L (ref 4–13)
AST SERPL W P-5'-P-CCNC: 46 U/L (ref 5–45)
ATRIAL RATE: 104 BPM
BASOPHILS # BLD AUTO: 0.01 THOUSANDS/ΜL (ref 0–0.1)
BASOPHILS NFR BLD AUTO: 0 % (ref 0–1)
BILIRUB SERPL-MCNC: 0.44 MG/DL (ref 0.2–1)
BUN SERPL-MCNC: 14 MG/DL (ref 5–25)
CALCIUM SERPL-MCNC: 8.4 MG/DL (ref 8.3–10.1)
CHLORIDE SERPL-SCNC: 101 MMOL/L (ref 100–108)
CO2 SERPL-SCNC: 32 MMOL/L (ref 21–32)
CREAT SERPL-MCNC: 0.85 MG/DL (ref 0.6–1.3)
EOSINOPHIL # BLD AUTO: 0.15 THOUSAND/ΜL (ref 0–0.61)
EOSINOPHIL NFR BLD AUTO: 2 % (ref 0–6)
ERYTHROCYTE [DISTWIDTH] IN BLOOD BY AUTOMATED COUNT: 13.3 % (ref 11.6–15.1)
GFR SERPL CREATININE-BSD FRML MDRD: 90 ML/MIN/1.73SQ M
GLUCOSE SERPL-MCNC: 127 MG/DL (ref 65–140)
GLUCOSE SERPL-MCNC: 155 MG/DL (ref 65–140)
GLUCOSE SERPL-MCNC: 162 MG/DL (ref 65–140)
GLUCOSE SERPL-MCNC: 164 MG/DL (ref 65–140)
GLUCOSE SERPL-MCNC: 203 MG/DL (ref 65–140)
HCT VFR BLD AUTO: 46 % (ref 36.5–49.3)
HGB BLD-MCNC: 15.3 G/DL (ref 12–17)
IMM GRANULOCYTES # BLD AUTO: 0.05 THOUSAND/UL (ref 0–0.2)
IMM GRANULOCYTES NFR BLD AUTO: 1 % (ref 0–2)
LYMPHOCYTES # BLD AUTO: 0.88 THOUSANDS/ΜL (ref 0.6–4.47)
LYMPHOCYTES NFR BLD AUTO: 14 % (ref 14–44)
MAGNESIUM SERPL-MCNC: 2.1 MG/DL (ref 1.6–2.6)
MCH RBC QN AUTO: 29.7 PG (ref 26.8–34.3)
MCHC RBC AUTO-ENTMCNC: 33.3 G/DL (ref 31.4–37.4)
MCV RBC AUTO: 89 FL (ref 82–98)
MONOCYTES # BLD AUTO: 0.88 THOUSAND/ΜL (ref 0.17–1.22)
MONOCYTES NFR BLD AUTO: 14 % (ref 4–12)
NEUTROPHILS # BLD AUTO: 4.34 THOUSANDS/ΜL (ref 1.85–7.62)
NEUTS SEG NFR BLD AUTO: 69 % (ref 43–75)
NRBC BLD AUTO-RTO: 0 /100 WBCS
P AXIS: 64 DEGREES
PLATELET # BLD AUTO: 198 THOUSANDS/UL (ref 149–390)
PMV BLD AUTO: 9.5 FL (ref 8.9–12.7)
POTASSIUM SERPL-SCNC: 4.4 MMOL/L (ref 3.5–5.3)
PR INTERVAL: 150 MS
PROCALCITONIN SERPL-MCNC: 0.08 NG/ML
PROT SERPL-MCNC: 7.4 G/DL (ref 6.4–8.2)
QRS AXIS: 87 DEGREES
QRSD INTERVAL: 86 MS
QT INTERVAL: 344 MS
QTC INTERVAL: 452 MS
RBC # BLD AUTO: 5.15 MILLION/UL (ref 3.88–5.62)
SODIUM SERPL-SCNC: 139 MMOL/L (ref 136–145)
T WAVE AXIS: 65 DEGREES
VENTRICULAR RATE: 104 BPM
WBC # BLD AUTO: 6.31 THOUSAND/UL (ref 4.31–10.16)

## 2022-03-31 PROCEDURE — 94640 AIRWAY INHALATION TREATMENT: CPT

## 2022-03-31 PROCEDURE — 80053 COMPREHEN METABOLIC PANEL: CPT | Performed by: FAMILY MEDICINE

## 2022-03-31 PROCEDURE — 97167 OT EVAL HIGH COMPLEX 60 MIN: CPT

## 2022-03-31 PROCEDURE — 93010 ELECTROCARDIOGRAM REPORT: CPT | Performed by: STUDENT IN AN ORGANIZED HEALTH CARE EDUCATION/TRAINING PROGRAM

## 2022-03-31 PROCEDURE — 85025 COMPLETE CBC W/AUTO DIFF WBC: CPT | Performed by: FAMILY MEDICINE

## 2022-03-31 PROCEDURE — 87106 FUNGI IDENTIFICATION YEAST: CPT | Performed by: FAMILY MEDICINE

## 2022-03-31 PROCEDURE — 83735 ASSAY OF MAGNESIUM: CPT | Performed by: FAMILY MEDICINE

## 2022-03-31 PROCEDURE — 94760 N-INVAS EAR/PLS OXIMETRY 1: CPT

## 2022-03-31 PROCEDURE — 99233 SBSQ HOSP IP/OBS HIGH 50: CPT | Performed by: FAMILY MEDICINE

## 2022-03-31 PROCEDURE — 87070 CULTURE OTHR SPECIMN AEROBIC: CPT | Performed by: FAMILY MEDICINE

## 2022-03-31 PROCEDURE — 82948 REAGENT STRIP/BLOOD GLUCOSE: CPT

## 2022-03-31 PROCEDURE — 99223 1ST HOSP IP/OBS HIGH 75: CPT | Performed by: INTERNAL MEDICINE

## 2022-03-31 PROCEDURE — 84145 PROCALCITONIN (PCT): CPT | Performed by: FAMILY MEDICINE

## 2022-03-31 PROCEDURE — 97163 PT EVAL HIGH COMPLEX 45 MIN: CPT

## 2022-03-31 PROCEDURE — 87205 SMEAR GRAM STAIN: CPT | Performed by: FAMILY MEDICINE

## 2022-03-31 RX ORDER — ALBUTEROL SULFATE 90 UG/1
2 AEROSOL, METERED RESPIRATORY (INHALATION) EVERY 4 HOURS PRN
Status: DISCONTINUED | OUTPATIENT
Start: 2022-03-31 | End: 2022-04-08 | Stop reason: HOSPADM

## 2022-03-31 RX ORDER — LEVALBUTEROL INHALATION SOLUTION 1.25 MG/3ML
1.25 SOLUTION RESPIRATORY (INHALATION)
Status: DISCONTINUED | OUTPATIENT
Start: 2022-03-31 | End: 2022-04-08 | Stop reason: HOSPADM

## 2022-03-31 RX ORDER — METHYLPREDNISOLONE SODIUM SUCCINATE 125 MG/2ML
60 INJECTION, POWDER, LYOPHILIZED, FOR SOLUTION INTRAMUSCULAR; INTRAVENOUS EVERY 8 HOURS SCHEDULED
Status: DISCONTINUED | OUTPATIENT
Start: 2022-03-31 | End: 2022-04-04

## 2022-03-31 RX ADMIN — ATORVASTATIN CALCIUM 40 MG: 40 TABLET, FILM COATED ORAL at 09:16

## 2022-03-31 RX ADMIN — INSULIN LISPRO 2 UNITS: 100 INJECTION, SOLUTION INTRAVENOUS; SUBCUTANEOUS at 16:48

## 2022-03-31 RX ADMIN — ENOXAPARIN SODIUM 40 MG: 40 INJECTION SUBCUTANEOUS at 09:11

## 2022-03-31 RX ADMIN — LEVALBUTEROL HYDROCHLORIDE 1.25 MG: 1.25 SOLUTION RESPIRATORY (INHALATION) at 13:05

## 2022-03-31 RX ADMIN — GUAIFENESIN 600 MG: 600 TABLET ORAL at 09:16

## 2022-03-31 RX ADMIN — INSULIN LISPRO 2 UNITS: 100 INJECTION, SOLUTION INTRAVENOUS; SUBCUTANEOUS at 11:56

## 2022-03-31 RX ADMIN — LEVALBUTEROL HYDROCHLORIDE 1.25 MG: 1.25 SOLUTION RESPIRATORY (INHALATION) at 19:42

## 2022-03-31 RX ADMIN — METHYLPREDNISOLONE SODIUM SUCCINATE 60 MG: 125 INJECTION, POWDER, FOR SOLUTION INTRAMUSCULAR; INTRAVENOUS at 21:24

## 2022-03-31 RX ADMIN — OSELTAMIVIR PHOSPHATE 75 MG: 75 CAPSULE ORAL at 21:24

## 2022-03-31 RX ADMIN — IPRATROPIUM BROMIDE 0.5 MG: 0.5 SOLUTION RESPIRATORY (INHALATION) at 19:42

## 2022-03-31 RX ADMIN — LEVALBUTEROL HYDROCHLORIDE 1.25 MG: 1.25 SOLUTION, CONCENTRATE RESPIRATORY (INHALATION) at 07:12

## 2022-03-31 RX ADMIN — GUAIFENESIN 600 MG: 600 TABLET ORAL at 21:24

## 2022-03-31 RX ADMIN — INSULIN LISPRO 5 UNITS: 100 INJECTION, SOLUTION INTRAVENOUS; SUBCUTANEOUS at 16:49

## 2022-03-31 RX ADMIN — INSULIN LISPRO 5 UNITS: 100 INJECTION, SOLUTION INTRAVENOUS; SUBCUTANEOUS at 09:11

## 2022-03-31 RX ADMIN — BUDESONIDE 0.5 MG: 0.5 INHALANT ORAL at 19:42

## 2022-03-31 RX ADMIN — IPRATROPIUM BROMIDE 0.5 MG: 0.5 SOLUTION RESPIRATORY (INHALATION) at 13:05

## 2022-03-31 RX ADMIN — BUDESONIDE 0.5 MG: 0.5 INHALANT ORAL at 07:12

## 2022-03-31 RX ADMIN — FINASTERIDE 5 MG: 5 TABLET, FILM COATED ORAL at 09:16

## 2022-03-31 RX ADMIN — OXYCODONE HYDROCHLORIDE AND ACETAMINOPHEN 1 TABLET: 5; 325 TABLET ORAL at 15:44

## 2022-03-31 RX ADMIN — GABAPENTIN 100 MG: 100 CAPSULE ORAL at 16:48

## 2022-03-31 RX ADMIN — IPRATROPIUM BROMIDE 0.5 MG: 0.5 SOLUTION RESPIRATORY (INHALATION) at 02:55

## 2022-03-31 RX ADMIN — METHYLPREDNISOLONE SODIUM SUCCINATE 40 MG: 40 INJECTION, POWDER, FOR SOLUTION INTRAMUSCULAR; INTRAVENOUS at 09:10

## 2022-03-31 RX ADMIN — OSELTAMIVIR PHOSPHATE 75 MG: 75 CAPSULE ORAL at 09:16

## 2022-03-31 RX ADMIN — IPRATROPIUM BROMIDE 0.5 MG: 0.5 SOLUTION RESPIRATORY (INHALATION) at 07:12

## 2022-03-31 RX ADMIN — INSULIN LISPRO 4 UNITS: 100 INJECTION, SOLUTION INTRAVENOUS; SUBCUTANEOUS at 21:24

## 2022-03-31 RX ADMIN — SODIUM CHLORIDE, SODIUM GLUCONATE, SODIUM ACETATE, POTASSIUM CHLORIDE, MAGNESIUM CHLORIDE, SODIUM PHOSPHATE, DIBASIC, AND POTASSIUM PHOSPHATE 75 ML/HR: .53; .5; .37; .037; .03; .012; .00082 INJECTION, SOLUTION INTRAVENOUS at 09:36

## 2022-03-31 RX ADMIN — AZITHROMYCIN 500 MG: 250 TABLET, FILM COATED ORAL at 16:48

## 2022-03-31 RX ADMIN — TAMSULOSIN HYDROCHLORIDE 0.4 MG: 0.4 CAPSULE ORAL at 09:16

## 2022-03-31 RX ADMIN — LEVALBUTEROL HYDROCHLORIDE 1.25 MG: 1.25 SOLUTION, CONCENTRATE RESPIRATORY (INHALATION) at 02:55

## 2022-03-31 RX ADMIN — INSULIN LISPRO 5 UNITS: 100 INJECTION, SOLUTION INTRAVENOUS; SUBCUTANEOUS at 11:58

## 2022-03-31 RX ADMIN — GABAPENTIN 100 MG: 100 CAPSULE ORAL at 09:16

## 2022-03-31 NOTE — ASSESSMENT & PLAN NOTE
Most likely secondary to influenza a, patient also has history of lung cancer in remission  Continue respiratory protocol  Maintain saturation 89% or above  Pulmonary consult appreciated

## 2022-03-31 NOTE — PROGRESS NOTES
[address]  Progress Note - Keven Reddish 1955, 78 y o  male MRN: [id number]  Unit/Bed#: -Ac Encounter: [id number]  Primary Care Provider: Florencia Miranda DO   Date and time admitted to hospital: 3/30/2022  1:58 PM    Acute respiratory failure with hypoxia [facility])  Assessment & Plan  Most likely secondary to COPD exacerbation secondary to influenza a  Patient also has history of lung cancer currently in remission for 1 year  Influenza A positive, COVID negative  Patient was saturating 88% on room air, initially patient was using 4 L of oxygen, now patient is requiring 6 L  CTA PE negative,  Pulmonary consult appreciated    COPD with acute exacerbation ([facility] )  Assessment & Plan  Most likely secondary to influenza a, patient also has history of lung cancer in remission  Continue respiratory protocol  Maintain saturation 89% or above  Pulmonary consult appreciated    * Sepsis, viral ([facility] )  Assessment & Plan  Most likely secondary to influenza a  Patient was tachycardic, tachypneic, influenza A positive, lactic acid elevated  For continue treatment as per sepsis protocol  Follow-up blood culture, sputum culture, urine Legionella, pneumonia,   Procalcitonin negative  Pulmonary consult appreciated    Influenza A  Assessment & Plan  Maintain isolation  Continue supportive care  Will place patient on Tamiflu    History of lung cancer  Assessment & Plan  Follows with Dr Seymour Handley note reviewed, documentation as below:  HEMATOLOGY/ONCOLOGY DIAGNOSIS:  1  Unresectable adenocarcinoma the right upper lung, 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, PDL1 <1% -->      Cancer Staging  Malignant neoplasm of upper lobe of right lung ([facility] )  Staging form: Lung, AJCC 8th Edition  - Clinical stage from 1/23/2020: Stage IB (cT2a, cN0, cM0) - Unsigned     Past Therapy:   1  Definitive external beam radiotherapy to the right upper lobe of the lung to a dose of 54 Gy delivered in 3 fractions between 02/12/2020 through 2020  2  Carboplatin/Alimta s/p 4 cycles from 3/3/2020 through 2020     Current Therapy:  Active Surveillance      CT chest 2021 with evidence progression        Tobacco abuse  Assessment & Plan  Continue Nicoderm patch    Type 2 diabetes mellitus, without long-term current use of insulin [facility])  Assessment & Plan  Lab Results   Component Value Date    HGBA1C 8 1 (H) 2021       Recent Labs     22  1830 22  2036 22  0732 22  1101   POCGLU 224* 184* 127 155*       Blood Sugar Average: Last 72 hrs:  (P) 172 5 patient placed on Solu-Medrol due to COPD, which may affect patient blood sugar  Continue sliding scale, adjust insulin dose based on response  Follow hypoglycemia precaution        VTE Pharmacologic Prophylaxis: VTE Score: 7 High Risk (Score >/= 5) - Pharmacological DVT Prophylaxis Ordered: enoxaparin (Lovenox)  Sequential Compression Devices Ordered  Patient Centered Rounds: I performed bedside rounds with nursing staff today  Discussions with Specialists or Other Care Team Provider:  Pulmonary    Education and Discussions with Family / Patient: With patient  Time Spent for Care: 15 minutes  More than 50% of total time spent on counseling and coordination of care as described above  Current Length of Stay: 1 day(s)  Current Patient Status: Inpatient   Certification Statement: The patient will continue to require additional inpatient hospital stay due to To monitor above condition  Discharge Plan: To be determined    Code Status: Level 1 - Full Code    Subjective:   Seen and evaluated during the round  Patient lying on the bed, able to complete his sentence, but using abdomen to help his breathing      Objective:     Vitals:   Temp (24hrs), Av 1 °F (36 7 °C), Min:97 8 °F (36 6 °C), Max:98 2 °F (36 8 °C)    Temp:  [97 8 °F (36 6 °C)-98 2 °F (36 8 °C)] 98 2 °F (36 8 °C)  HR:  [] 101  Resp:  [18-22] 18  BP: (120-144)/(75-90) 134/87  SpO2:  [88 %-93 %] 89 %  Body mass index is 33 04 kg/m²  Input and Output Summary (last 24 hours): Intake/Output Summary (Last 24 hours) at 3/31/2022 1553  Last data filed at 3/31/2022 1457  Gross per 24 hour   Intake 2143 75 ml   Output 2600 ml   Net -456 25 ml       Physical Exam:   Physical Exam  Vitals and nursing note reviewed  Constitutional:       General: He is in acute distress (mild)  Appearance: He is obese  HENT:      Nose: No congestion  Mouth/Throat:      Mouth: Mucous membranes are moist       Pharynx: No oropharyngeal exudate  Eyes:      General: No scleral icterus  Pupils: Pupils are equal, round, and reactive to light  Cardiovascular:      Rate and Rhythm: Tachycardia present  Heart sounds: No friction rub  No gallop  Pulmonary:      Effort: Respiratory distress (mild) present  Breath sounds: Wheezing present  Chest:      Chest wall: No tenderness  Abdominal:      General: Abdomen is flat  Bowel sounds are normal  There is no distension  Palpations: There is no mass  Tenderness: There is no abdominal tenderness  Hernia: No hernia is present  Musculoskeletal:         General: Normal range of motion  Cervical back: Normal range of motion  Right lower leg: No edema  Skin:     General: Skin is warm  Capillary Refill: Capillary refill takes less than 2 seconds  Findings: No lesion  Neurological:      General: No focal deficit present  Mental Status: He is alert and oriented to person, place, and time  Cranial Nerves: No cranial nerve deficit  Sensory: No sensory deficit  Motor: No weakness        Coordination: Coordination normal          Additional Data:     Labs:  Results from last 7 days   Lab Units 03/31/22  0530   WBC Thousand/uL 6 31   HEMOGLOBIN g/dL 15 3   HEMATOCRIT % 46 0   PLATELETS Thousands/uL 198   NEUTROS PCT % 69   LYMPHS PCT % 14   MONOS PCT % 14*   EOS PCT % 2     Results from last 7 days Lab Units 03/31/22  0524   SODIUM mmol/L 139   POTASSIUM mmol/L 4 4   CHLORIDE mmol/L 101   CO2 mmol/L 32   BUN mg/dL 14   CREATININE mg/dL 0 85   ANION GAP mmol/L 6   CALCIUM mg/dL 8 4   ALBUMIN g/dL 3 5   TOTAL BILIRUBIN mg/dL 0 44   ALK PHOS U/L 51   ALT U/L 51   AST U/L 46*   GLUCOSE RANDOM mg/dL 162*         Results from last 7 days   Lab Units 03/31/22  1101 03/31/22  0732 03/30/22  2036 03/30/22  1830   POC GLUCOSE mg/dl 155* 127 184* 224*         Results from last 7 days   Lab Units 03/31/22  0524 03/30/22  1743 03/30/22  1425 03/30/22  1424   LACTIC ACID mmol/L  --  1 9 2 7*  --    PROCALCITONIN ng/ml 0 08  --   --  0 11       Lines/Drains:  Invasive Devices  Report    Peripheral Intravenous Line            Peripheral IV 03/30/22 Right Antecubital 1 day    Peripheral IV 03/31/22 Dorsal (posterior); Left Wrist <1 day                      Imaging: No pertinent imaging reviewed  Recent Cultures (last 7 days):   Results from last 7 days   Lab Units 03/30/22  1424   BLOOD CULTURE  Received in Microbiology Lab  Culture in Progress  Received in Microbiology Lab  Culture in Progress         Last 24 Hours Medication List:   Current Facility-Administered Medications   Medication Dose Route Frequency Provider Last Rate    acetaminophen  650 mg Oral Q6H PRN Shayla Young MD      albuterol  2 puff Inhalation Q4H PRN Shayla Young MD      atorvastatin  40 mg Oral Daily Shayla Young MD      azithromycin  500 mg Oral Q24H Shayla Young MD      benzonatate  100 mg Oral TID PRN Shayla Young MD      budesonide  0 5 mg Nebulization Q12H Shayla Young MD      enoxaparin  40 mg Subcutaneous Daily Shayla Young MD      finasteride  5 mg Oral Daily Shayla Young MD      gabapentin  100 mg Oral BID Shayla Young MD      guaiFENesin  600 mg Oral Q12H [facility] Linus Sadler MD      insulin lispro  2-12 Units Subcutaneous TID  Linus Sadler MD      insulin lispro  2-12 Units Subcutaneous HS Linus Sadler MD      insulin lispro  5 Units Subcutaneous TID With Meals Karlo Sadler MD      ipratropium  0 5 mg Nebulization Q6H Karlo Sadler MD      levalbuterol  1 25 mg Nebulization Q6H Linus Sadler MD      methylPREDNISolone sodium succinate  60 mg Intravenous [facility] Lexi Gonzalez MD      multi-electrolyte  75 mL/hr Intravenous Continuous Fercho Pritchett MD 75 mL/hr (03/31/22 6081)   Meaghan Brown nicotine  21 mg Transdermal Daily Fercho Pritchett MD      oseltamivir  75 mg Oral Q12H [address] Fercho Pritchett MD      oxyCODONE-acetaminophen  1 tablet Oral Q4H PRN Fercho Pritchett MD      tamsulosin  0 4 mg Oral Daily Fercho Pritchett MD          Today, Patient Was Seen By: Fercho Pritchett MD    **Please Note: This note may have been constructed using a voice recognition system  **

## 2022-03-31 NOTE — CONSULTS
Pulmonary Consultation   Savanah Mitchell 78 y o  male MRN: [id number]  Unit/Bed#: -01 Encounter: [id number]    Reason for consultation: Acute on chronic hypoxic respiratory failure  Requesting physician: Dr Najma Holt     Impressions:  1  Acute on chronic hypoxic respiratory failure due to influenza A infection  2    COPD with acute exacerbation  3    RUL lung adenocarcinoma, presumed in remission, but with new nodule of unclear etiology  Recommendations:  1  Increase solumedrol to 60mg TID  2  Agree with 5 days of Oseltamivir  3  Home regimen of Trelegy with albuterol MDI/ neb has been replaced with xopenex/atrovent, budesonide, which is appropriate for now  4  Can stop ceftriaxone from my perspective  OK to give 3 days of azithromycin  5  Likely to need supportive care for multiple days given severity of COPD, concurrent cancer, active tobacco abuse  History of Present Illness   HPI:  Savanah Mitchell is a 78 y o  male who presents for dyspnea  He had a close contact with influenza and acquired the illness from his girlfriend  He reports cough, wheezing, and shortness of breath without fevers or chills  He has a back history of T2a N0 M0 lung adenocarcinoma treated with SBRT and adjuvant chemotherapy, which completed back in 5/2020 (carbo/alimta)  Imaging on this admission showed an increase in a left upper lobe nodule to 8mm, last 4mm in June of last year, potentially suggestive of a metastasis or second primary  He reports his breathing is improved since admission  He has a minimal lingering cough  No major sputum production  No ongoing fevers or chills  Review of systems:  Review of Systems   Respiratory: Positive for cough, chest tightness, shortness of breath and wheezing  All other systems reviewed and are negative  All other 12-point review of systems are negative      Historical Information   Past Medical History:   Diagnosis Date    Bl[facility] )     BPH (benign prostatic hyperplasia)     COPD (chronic obstructive pulmonary disease) (HCC)     Diabetes mellitus (HCC)     Hyperlipidemia     [facility] )     Rib fractures      Past Surgical History:   Procedure Laterality Date    BRONCHOSCOPY      CATARACT EXTRACTION, BILATERAL      CYSTOSCOPY      TONSILLECTOMY      TRANSURETHRAL RESECTION OF PROSTATE       Family History   Problem Relation Age of Onset    Hypertension Mother     No Known Problems Father        Tobacco history: Current 1 5 ppd smoker      Family history: [place]     Meds/Allergies   Current Facility-Administered Medications   Medication Dose Route Frequency    acetaminophen (TYLENOL) tablet 650 mg  650 mg Oral Q6H PRN    albuterol (PROVENTIL HFA,VENTOLIN HFA) inhaler 2 puff  2 puff Inhalation Q4H PRN    atorvastatin (LIPITOR) tablet 40 mg  40 mg Oral Daily    azithromycin (ZITHROMAX) tablet 500 mg  500 mg Oral Q24H    benzonatate (TESSALON PERLES) capsule 100 mg  100 mg Oral TID PRN    budesonide (PULMICORT) inhalation solution 0 5 mg  0 5 mg Nebulization Q12H    cefTRIAXone (ROCEPHIN) IVPB (premix in dextrose) 2,000 mg 50 mL  2,000 mg Intravenous Q24H    enoxaparin (LOVENOX) subcutaneous injection 40 mg  40 mg Subcutaneous Daily    finasteride (PROSCAR) tablet 5 mg  5 mg Oral Daily    gabapentin (NEURONTIN) capsule 100 mg  100 mg Oral BID    guaiFENesin (MUCINEX) 12 hr tablet 600 mg  600 mg Oral Q12H KELI    insulin lispro (HumaLOG) 100 units/mL subcutaneous injection 2-12 Units  2-12 Units Subcutaneous TID AC    insulin lispro (HumaLOG) 100 units/mL subcutaneous injection 2-12 Units  2-12 Units Subcutaneous HS    insulin lispro (HumaLOG) 100 units/mL subcutaneous injection 5 Units  5 Units Subcutaneous TID With Meals    ipratropium (ATROVENT) 0 02 % inhalation solution 0 5 mg  0 5 mg Nebulization Q6H    levalbuterol (XOPENEX) inhalation solution 1 25 mg  1 25 mg Nebulization Q6H    methylPREDNISolone sodium succinate (Solu-MEDROL) injection 40 mg  40 mg Intravenous Q12H [address]    multi-electrolyte (PLASMALYTE-A/ISOLYTE-S PH 7 4) IV solution  75 mL/hr Intravenous Continuous    nicotine (NICODERM CQ) 21 mg/24 hr TD 24 hr patch 21 mg  21 mg Transdermal Daily    oseltamivir (TAMIFLU) capsule 75 mg  75 mg Oral Q12H [address]    oxyCODONE-acetaminophen (PERCOCET) 5-325 mg per tablet 1 tablet  1 tablet Oral Q4H PRN    tamsulosin (FLOMAX) capsule 0 4 mg  0 4 mg Oral Daily     Allergies   Allergen Reactions    Tetracyclines & Related Other (See Comments)       Vitals: Blood pressure 134/87, pulse 101, temperature 98 2 °F (36 8 °C), resp  rate 18, height 6' 1" (1 854 m), weight 114 kg (250 lb 6 4 oz), SpO2 (!) 89 % , on 6 L O2, Body mass index is 33 04 kg/m²  Intake/Output Summary (Last 24 hours) at 3/31/2022 1412  Last data filed at 3/31/2022 1339  Gross per 24 hour   Intake 2143 75 ml   Output 2225 ml   Net -81 25 ml     Physical exam:     Physical Exam  Vitals and nursing note reviewed  Constitutional:       General: He is not in acute distress  Appearance: Normal appearance  He is well-developed  He is not ill-appearing  HENT:      Head: Normocephalic and atraumatic  Eyes:      General: No scleral icterus  Conjunctiva/sclera: Conjunctivae normal    Neck:      Thyroid: No thyromegaly  Vascular: No JVD  Cardiovascular:      Rate and Rhythm: Normal rate and regular rhythm  Heart sounds: Normal heart sounds  No murmur heard  No friction rub  No gallop  Pulmonary:      Effort: Tachypnea and accessory muscle usage present  No respiratory distress  Breath sounds: Examination of the right-upper field reveals wheezing  Examination of the left-upper field reveals wheezing  Examination of the right-middle field reveals wheezing  Examination of the left-middle field reveals wheezing  Examination of the right-lower field reveals wheezing  Examination of the left-lower field reveals wheezing  Wheezing present   No rhonchi or rales    Musculoskeletal:      Cervical back: Neck supple  Right lower leg: No edema  Left lower leg: No edema  Skin:     General: Skin is warm and dry  Findings: No rash  Neurological:      General: No focal deficit present  Mental Status: He is alert and oriented to person, place, and time  Mental status is at baseline  Psychiatric:         Mood and Affect: Mood normal          Behavior: Behavior normal          Labs: I have personally reviewed pertinent lab results  Results from last 7 days   Lab Units 03/31/22  0530 03/30/22  1424   WBC Thousand/uL 6 31 7 97   HEMOGLOBIN g/dL 15 3 15 4   HEMATOCRIT % 46 0 46 6   PLATELETS Thousands/uL 198 196         Results from last 7 days   Lab Units 03/31/22  0524 03/30/22  1424   POTASSIUM mmol/L 4 4 3 4*   CHLORIDE mmol/L 101 97*   CO2 mmol/L 32 31   BUN mg/dL 14 14   CREATININE mg/dL 0 85 1 16   CALCIUM mg/dL 8 4 8 3   ALK PHOS U/L 51 54   ALT U/L 51 44   AST U/L 46* 44         Results from last 7 days   Lab Units 03/31/22  0524 03/30/22  1424   MAGNESIUM mg/dL 2 1 1 7     Imaging and other studies: I have personally reviewed pertinent films in PACS     Code Status: Level 1 - Full Code    Thank you for allowing us to participate in the care of your patient      EVER Pritchard

## 2022-03-31 NOTE — ASSESSMENT & PLAN NOTE
Lab Results   Component Value Date    HGBA1C 8 1 (H) 12/28/2021       Recent Labs     03/30/22  1830 03/30/22 2036 03/31/22  0732 03/31/22  1101   POCGLU 224* 184* 127 155*       Blood Sugar Average: Last 72 hrs:  (P) 172 5 patient placed on Solu-Medrol due to COPD, which may affect patient blood sugar  Continue sliding scale, adjust insulin dose based on response  Follow hypoglycemia precaution

## 2022-03-31 NOTE — RESPIRATORY THERAPY NOTE
RT Protocol Note  Nidhi Villarrealolic 78 y o  male MRN: [id number]  Unit/Bed#: -01 Encounter: [id number]    Assessment    Principal Problem:    Sepsis, viral ([place] [address] )  Active Problems:    Type 2 diabetes mellitus, without long-term current use of insulin (HCC)    Tobacco abuse    COPD with acute exacerbation ([place] [address] )    History of lung cancer    Acute respiratory failure with hypoxia (HCC)    Influenza A      Home Pulmonary Medications:         Past Medical History:   Diagnosis Date    Bladder cancer ([place] [address] )     BPH (benign prostatic hyperplasia)     COPD (chronic obstructive pulmonary disease) ([place] [address] )     Diabetes mellitus ([place] [address] )     Hyperlipidemia     Lung cancer ([place] [address] )     Rib fractures      Social History     Socioeconomic History    Marital status: Single     Spouse name: None    Number of children: None    Years of education: None    Highest education level: None   Occupational History    None   Tobacco Use    Smoking status: Current Every Day Smoker     Packs/day: 1 00    Smokeless tobacco: Never Used   Vaping Use    Vaping Use: Never used   Substance and Sexual Activity    Alcohol use: Not Currently    Drug use: Not Currently    Sexual activity: None   Other Topics Concern    None   Social History Narrative    None     Social Determinants of Health     Financial Resource Strain: Not on file   Food Insecurity: Not on file   Transportation Needs: No Transportation Needs    Lack of Transportation (Medical): No    Lack of Transportation (Non-Medical): No   Physical Activity: Not on file   Stress: Not on file   Social Connections: Not on file   Intimate Partner Violence: Not on file   Housing Stability: Not on file       Subjective         Objective    Physical Exam:        Vitals:  Blood pressure 134/90, pulse (!) 106, temperature 98 2 °F (36 8 °C), resp  rate 18, height 6' 1" (1 854 m), weight 114 kg (250 lb 6 4 oz), SpO2 92 %            Imaging and other studies: I have personally reviewed pertinent reports        O2 Device: 4L nc     Plan    Respiratory Plan: Mild Distress pathway

## 2022-03-31 NOTE — ASSESSMENT & PLAN NOTE
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

## 2022-03-31 NOTE — PLAN OF CARE
Problem: PHYSICAL THERAPY ADULT  Goal: Performs mobility at highest level of function for planned discharge setting  See evaluation for individualized goals  Description: Treatment/Interventions: Functional transfer training,LE strengthening/ROM,Elevations,Therapeutic exercise,Endurance training,Patient/family training,Equipment eval/education,Bed mobility,Gait training,Compensatory technique education,Spoke to nursing,OT  Equipment Recommended: Sofie Black       See flowsheet documentation for full assessment, interventions and recommendations  Note: Prognosis: Good  Problem List: Decreased strength,Decreased endurance,Impaired balance,Decreased mobility,Decreased cognition,Impaired judgement,Decreased safety awareness,Obesity,Impaired sensation  Assessment: Pt is a 78 y o  male seen for PT evaluation s/p admission to [address] [place] [address] on 3/30/2022 with Sepsis, viral ([facility] [address] )  Order placed for PT services  Upon evaluation: Pt is presenting with impaired functional mobility due to decreased strength, decreased endurance, impaired balance, gait deviations, altered sensation, impaired cognition, decreased safety awareness, impaired judgment and fall risk requiring supervision assistance for bed mobility, min assistance for transfers and mod assistance for ambulation with no AD, recommend RW use at this time  Pt's clinical presentation is currently unstable/unpredictable given the functional mobility deficits above coupled with fall risks as indicated by AM-PAC 6-Clicks: 62/25 as well as hx of falls, impulsivity, impaired balance, polypharmacy, impaired judgement, decreased safety awareness, limited sensation/neuropathy and decreased cognition and combined with medical complications of low SpO2 values, new onset O2 use, impulsivity during admission and need for input for mobility technique/safety    Pt's PMHx and comorbidities that may affect physical performance and progress include: COPD, DM, obesity, limited cognition and cancer history and/or treatment  Personal factors affecting pt at time of IE include: step(s) to enter environment, multi-level environment, behavioral pattern, inability to navigate level surfaces without external assistance, inability to navigate community distances, limited insight into impairments, recent fall(s)/fall history and tobacco use  Pt will benefit from continued skilled PT services to address deficits as defined above and to maximize level of functional mobility to facilitate return toward PLOF and improved QOL  From PT/mobility standpoint, recommendation at time of d/c would be Home PT pending progress in order to reduce fall risk and maximize pt's functional independence and consistency with mobility in order to facilitate return to PLOF  Recommend trial with walker next 1-2 sessions and ther ex next 1-2 sessions  Barriers to Discharge: None        PT Discharge Recommendation: Home with home health rehabilitation          See flowsheet documentation for full assessment

## 2022-03-31 NOTE — PLAN OF CARE
Problem: OCCUPATIONAL THERAPY ADULT  Goal: Performs self-care activities at highest level of function for planned discharge setting  See evaluation for individualized goals  Description: Treatment Interventions: ADL retraining,Functional transfer training,UE strengthening/ROM,Endurance training,Cognitive reorientation,Patient/family training,Equipment evaluation/education,Neuromuscular reeducation,Compensatory technique education,Continued evaluation,Energy conservation,Activityengagement          See flowsheet documentation for full assessment, interventions and recommendations  Note: Limitation: Decreased ADL status,Decreased UE strength,Decreased Safe judgement during ADL,Decreased cognition,Decreased endurance,Decreased self-care trans,Decreased high-level ADLs  Prognosis: Good  Assessment: Pt is a 78 y o  male, admitted to [address] [place] [address] 3/30/2022 d/t experiencing increased SOB  Dx: viral sepsis  Pt with PMHx impacting their performance during ADL tasks, including: bladder CA, BPH, COPD, DM, hyperlipidemia, lung CA, hx rib fractures  Prior to admission to the hospital Pt was performing ADLs without physical assistance  IADLs with physical assistance  Functional transfers/ambulation without physical assistance  Cognitive status was PTA was intact  OT order placed to assess Pt's ADLs, cognitive status, and performance during functional tasks in order to maximize safety and independence while making most appropriate d/c recommendations   Pt's clinical presentation is currently unstable/unpredictable given new onset deficits that effect Pt's occupational performance and ability to safely return to [facility] including decrease activity tolerance, decrease standing balance, decrease performance during ADL tasks, decrease cognition, decrease safety awareness , increase impulsiveness, decrease UB MS, decrease generalized strength, decrease activity engagement, decrease performance during functional transfers, steps to enter home, high fall risk and limited insight to deficits combined with medical complications of tachycardia, change in mental status, abnormal potassium values, low SpO2 values, new onset O2 use, elevated BNP, impulsivity during admission and need for input for mobility technique/safety  Upon entering room, Pt on 6L of O2 satting at 92%  Upon sitting at EOB Pt desatting to 88% and maintaining between 86-89% on 6L throughout rest of session  moderate SOB noted  Pt's treatment limited this date d/t decreased pulmonary status  RNYnes aware  Personal factors affecting Pt at time of initial evaluation include: step(s) to enter environment, multi-level environment, limited home support, inability to perform current job functions, inability to perform IADLs, inability to perform ADLs, new need for AD, inability to ambulate household distances, inability to navigate community distances, limited insight into impairments, decreased initiation and engagement, recent fall(s)/fall history and questionable non-compliance  Pt will benefit from continued skilled OT services to address deficits as defined above and to maximize level independence/participation during ADLs and functional tasks to facilitate return toward PLOF and improved quality of life   From an occupational therapy standpoint, recommendation at time of d/c would be [address] with increased family sup     OT Discharge Recommendation: Home with home health rehabilitation

## 2022-03-31 NOTE — ASSESSMENT & PLAN NOTE
Most likely secondary to influenza a  Patient was tachycardic, tachypneic, influenza A positive, lactic acid elevated  For continue treatment as per sepsis protocol  Follow-up blood culture, sputum culture, urine Legionella, pneumonia,   Procalcitonin negative  Pulmonary consult appreciated

## 2022-03-31 NOTE — PHYSICAL THERAPY NOTE
PHYSICAL THERAPY EVALUATION  NAME:  Mara Cockayne  DATE: 03/31/22    AGE:   78 y o  Mrn:   [id number]  ADMIT DX:  Hypoxemia [R09 02]  SOB (shortness of breath) [R06 02]  COPD exacerbation (HCC) [J44 1]    Past Medical History:   Diagnosis Date    Bladder cancer ([place] [address] )     BPH (benign prostatic hyperplasia)     COPD (chronic obstructive pulmonary disease) ([place] [address] )     Diabetes mellitus ([place] [address] )     Hyperlipidemia     Lung cancer ([place] [address] )     Rib fractures      Length Of Stay: 1  Performed at least 2 patient identifiers during session: Name and Birthday  PHYSICAL THERAPY EVALUATION :        03/31/22 0848   Note Type   Note type Evaluation   Pain Assessment   Pain Assessment Tool 0-10   Pain Score No Pain   Restrictions/Precautions   Other Precautions Droplet precautions; Chair Alarm; Bed Alarm;Multiple lines;O2;Fall Risk  (6 lpm)   Home Living   Type of Home House  (3 RIOS RHR)   Home Layout Two level;1/2 bath on main level;Bed/bath upstairs   Bathroom Shower/Tub Tub/shower unit   Bathroom Toilet Standard   Bathroom Equipment Shower chair  (not using PTA)   Home Equipment Walker;Cane  (pt reports these belong to his SO)   Additional Comments Pt reports living in a 2 story home with 3 RIOS and FF to 2nd floor  1/2 bath on 1st floor  main bed/bath on 2nd floor   Pt ambulates with no AD use at [facility]   Prior Function   Level of [place] Independent with ADLs and functional mobility   Lives With Significant other   Receives Help From Family   ADL Assistance Independent   IADLs Independent   Falls in the last 6 months 1 to 4   Comments Pt reports performing ADLs, IADLs, mobility without AD at , has assistance for transportation   Cognition   Overall Cognitive Status Impaired   Orientation Level Oriented X4   Following Commands Follows one step commands with increased time or repetition   RLE Assessment   RLE Assessment WFL  (4/5 strength)   LLE Assessment   LLE Assessment   (4/5 strength)   Light Touch   RLE Light Touch Impaired   RLE Light Touch Comments distal to mid calf, "tingling"   LLE Light Touch Impaired   LLE Light Touch Comments distal to mid calf, "tingling"   Bed Mobility   Supine to Sit 5  Supervision   Additional items Bedrails;Verbal cues; Impulsive   Additional Comments Pt completed supine > sit with VC for safety as pt is impulsive  Pt reports sleeping with wedges  Pt's O2 initially ~90% on 6 lpm, desat to 88% seated EOB, recovering to 90% at end of session   Transfers   Sit to Stand 4  Minimal assistance   Additional items Assist x 1;Verbal cues; Impulsive   Stand to Sit 4  Minimal assistance   Additional items Assist x 1; Impulsive;Verbal cues   Stand pivot 4  Minimal assistance   Additional items Assist x 1; Impulsive;Verbal cues   Additional Comments Pt completed transfers without AD, noted imbalance upon standing requiring min A x 1 to correct  VC for increased ANU as pt initially with narrow ANU affecting his balance  Ambulation/Elevation   Gait pattern Improper Weight shift;Narrow ANU; Decreased foot clearance; Inconsistent claudia; Short stride; Excessively slow   Gait Assistance 3  Moderate assist   Additional items Assist x 1;Verbal cues   Assistive Device None   Distance 12' + 8' Pt completing without AD however due to several LOB requiring mod A x 1 to correct, pt encouraged to use RW while admitted and once he returns home  Pt able to ambulate with min A excluding losses of balance requiring mod A to correct  O2 desat to 85-86% with ambulation, requiring several min to recover to 90%   Pt impulsive attempting to ambulate to window for second gait trial, VC for safety and educated not to self mobilize in room   Balance   Static Sitting Normal   Dynamic Sitting Good   Static Standing Fair -   Dynamic Standing Poor +   Ambulatory Poor +   Endurance Deficit   Endurance Deficit Yes   Endurance Deficit Description fatigue, O2 desat to mid 80s with ambulation, HR noted into the 120s, RN aware   Activity Tolerance Activity Tolerance Patient limited by fatigue   Medical Staff Made Aware Geoffrey LOERA   Nurse Made Aware RN, Luis Carlos Santana   Assessment   Prognosis Good   Problem List Decreased strength;Decreased endurance; Impaired balance;Decreased mobility; Decreased cognition; Impaired judgement;Decreased safety awareness; Obesity; Impaired sensation   Barriers to Discharge None   Goals   Patient Goals Go home   STG Expiration Date 04/14/22   Plan   Treatment/Interventions Functional transfer training;LE strengthening/ROM; Elevations; Therapeutic exercise; Endurance training;Patient/family training;Equipment eval/education; Bed mobility;Gait training; Compensatory technique education;Spoke to nursing;OT   PT Frequency 3-5x/wk   Recommendation   PT Discharge Recommendation Home with home health rehabilitation   Equipment Recommended [facility] Recommended Wheeled walker   Change/add to [employer]? No   AM-PAC Basic Mobility Inpatient   Turning in Bed Without Bedrails 4   Lying on Back to Sitting on Edge of Flat Bed 4   Moving Bed to Chair 3   Standing Up From Chair 3   Walk in Room 2   Climb 3-5 Stairs 2   Basic Mobility Inpatient Raw Score 18   Basic Mobility Standardized Score 41 05   Highest Level Of Mobility   JH-HLM Goal 6: Walk 10 steps or more   JH-HLM Highest Level of Mobility 6: Walk 10 steps or more   JH-HLM Goal Achieved Yes   End of Consult   Patient Position at End of Consult Bedside chair;Bed/Chair alarm activated; All needs within reach     The patient's AM-PAC Basic Mobility Inpatient Short Form Raw Score is 18    A Raw score of greater than 16 suggests the patient may benefit from discharge to home  Please also refer to the recommendation of the Physical Therapist for safe discharge planning  (Please find full objective findings from PT assessment regarding body systems outlined above)       Assessment: Pt is a 78 y o  male seen for PT evaluation s/p admission to [address] [place] [address] on 3/30/2022 with Sepsis, viral ([facility] [address] )  Order placed for PT services  Upon evaluation: Pt is presenting with impaired functional mobility due to decreased strength, decreased endurance, impaired balance, gait deviations, altered sensation, impaired cognition, decreased safety awareness, impaired judgment and fall risk requiring supervision assistance for bed mobility, min assistance for transfers and mod assistance for ambulation with no AD, recommend RW use at this time  Pt's clinical presentation is currently unstable/unpredictable given the functional mobility deficits above coupled with fall risks as indicated by AM-PAC 6-Clicks: 28/44 as well as hx of falls, impulsivity, impaired balance, polypharmacy, impaired judgement, decreased safety awareness, limited sensation/neuropathy and decreased cognition and combined with medical complications of low SpO2 values, new onset O2 use, impulsivity during admission and need for input for mobility technique/safety  Pt's PMHx and comorbidities that may affect physical performance and progress include: COPD, DM, obesity, limited cognition and cancer history and/or treatment  Personal factors affecting pt at time of IE include: step(s) to enter environment, multi-level environment, behavioral pattern, inability to navigate level surfaces without external assistance, inability to navigate community distances, limited insight into impairments, recent fall(s)/fall history and tobacco use  Pt will benefit from continued skilled PT services to address deficits as defined above and to maximize level of functional mobility to facilitate return toward PLOF and improved QOL  From PT/mobility standpoint, recommendation at time of d/c would be Home PT pending progress in order to reduce fall risk and maximize pt's functional independence and consistency with mobility in order to facilitate return to PLOF  Recommend trial with walker next 1-2 sessions and ther ex next 1-2 sessions        Goals: Pt will: Perform bed mobility tasks with modified I to reposition in bed and prepare for transfers  Pt will perform transfers with modified I to decrease burden of care and prepare for ambulation  Pt will ambulate with LRAD for >/= 150' with  modified I  to improve gait quality and promote proper use of assistive device and to access home environment  Pt will complete >/= 13 steps with with unilateral handrail with modified I to return to home with RIOS and return to multilevel home  Increase bilateral LE strength 1/2 grade to facilitate independent mobility and Increase all balance 1/2 grade to decrease risk for falls          Huber Montgomery, PT,DPT

## 2022-03-31 NOTE — OCCUPATIONAL THERAPY NOTE
Occupational Therapy Evaluation     Patient Name: Baudilio WAYNE Date: 3/31/2022  Problem List  Principal Problem:    Sepsis, viral ([place] [id number] )  Active Problems:    Type 2 diabetes mellitus, without long-term current use of insulin (HCC)    Tobacco abuse    COPD with acute exacerbation ([facility] )    History of lung cancer    Acute respiratory failure with hypoxia (HCC)    Influenza A    Past Medical History  Past Medical History:   Diagnosis Date    Bladder cancer ([place] [address] )     BPH (benign prostatic hyperplasia)     COPD (chronic obstructive pulmonary disease) ([place] [address] )     Diabetes mellitus ([place] [address] )     Hyperlipidemia     Lung cancer ([place] [address] )     Rib fractures      Past Surgical History  Past Surgical History:   Procedure Laterality Date    BRONCHOSCOPY      CATARACT EXTRACTION, BILATERAL      CYSTOSCOPY      TONSILLECTOMY      TRANSURETHRAL RESECTION OF PROSTATE             03/31/22 0847   Note Type   Note type Evaluation   Restrictions/Precautions   Other Precautions Droplet precautions; Chair Alarm; Bed Alarm;O2;Fall Risk  (6L O2)   Pain Assessment   Pain Assessment Tool 0-10   Pain Score No Pain   Home Living   Type of Home House  (3 RIOS B HR)   Home Layout Two level;Bed/bath upstairs;1/2 bath on main level  (FF to 2nd floor R HR)   Bathroom Shower/Tub Tub/shower unit   Bathroom Toilet Standard   Bathroom Equipment Shower chair  (did not use PTA)   Home Equipment Walker;Cane  (did not use PTA)   Additional Comments Pt reports living in a 2 story home with 3 RIOS and FF to 2nd floor  1/2 bath on 1st floor  main bed/bath on 2nd floor   Pt ambulates with no AD at [place]   Prior Function   Level of [place] Independent with ADLs and functional mobility   Lives With Significant other   Receives Help From Family   ADL Assistance Independent   IADLs Independent   Falls in the last 6 months 1 to 4   Comments Pt reprots completing ADLs, light IADLs, and functional ambulation @ I  Pt does not drive, and has assistance for IADLs and community mobility PRN   ADL   Where Assessed Edge of bed   Grooming Assistance 6  Modified Independent   Grooming Deficit Setup   UB Dressing Assistance 6  Modified independent   UB Dressing Deficit Setup   LB Dressing Assistance 4  Minimal Assistance   LB Dressing Deficit Steadying;Verbal cueing;Supervision/safety; Increased time to complete; Don/doff R sock; Don/doff L sock; Thread RLE into pants; Thread LLE into pants;Pull up over hips   Additional Comments Pt completing ADL tasks while seated at EOB  UB Dressing and grooming tasks @ Mod I after set-up  LB Dressing @ Min A for balance and safety during CM around waist while standign with no AD for UB support  Pt donning B socks @ S with increased time while seated at EOB  Bed Mobility   Supine to Sit 5  Supervision   Additional items Impulsive   Additional Comments HOB slightly elvated to 15*, no bedrails  Pt impuslive during bed mobility tasks  vc'ing for safety   Transfers   Sit to Stand 4  Minimal assistance   Additional items Assist x 1; Increased time required;Verbal cues   Stand to Sit 4  Minimal assistance   Additional items Assist x 1; Increased time required;Verbal cues   Stand pivot 4  Minimal assistance   Additional items Assist x 1; Increased time required;Verbal cues   Additional Comments Pt completing functional transfers with no AD for UB support  Pt requiring Min A for STS and SPT    Pt wiht poor balance noted, impulsive movements and requiring vc'ing for attention to task and safety   Balance   Static Sitting Good   Dynamic Sitting Fair +   Static Standing Fair -   Dynamic Standing Poor +   Activity Tolerance   Activity Tolerance Patient limited by fatigue   Medical Staff Made Aware Spoke with PT, Esme Palacios   Nurse Made Aware Spoke with RN, Pam New Assessment   RUE Assessment WFL   LUE Assessment   LUE Assessment WFL   Hand Function   Gross Motor Coordination Functional   Fine Motor Coordination Functional   Sensation   Light Touch No apparent deficits   Cognition   Overall Cognitive Status Impaired   Arousal/Participation Alert; Responsive; Cooperative   Attention Attends with cues to redirect   Orientation Level Oriented X4   Memory Within functional limits   Following Commands Follows one step commands with increased time or repetition   Comments Pt impuslive  limited indight to deficits  decreased balance and safety awareness  Pt encouraged at this time to utilize RW for UB support during transfers and ambulation to reduce risk for falls   Assessment   Limitation Decreased ADL status; Decreased UE strength;Decreased Safe judgement during ADL;Decreased cognition;Decreased endurance;Decreased self-care trans;Decreased high-level ADLs   Prognosis Good   Assessment Pt is a 78 y o  male, admitted to [address] [place] [address] 3/30/2022 d/t experiencing increased SOB  Dx: viral sepsis  Pt with PMHx impacting their performance during ADL tasks, including: bladder CA, BPH, COPD, DM, hyperlipidemia, lung CA, hx rib fractures  Prior to admission to the hospital Pt was performing ADLs without physical assistance  IADLs with physical assistance  Functional transfers/ambulation without physical assistance  Cognitive status was PTA was intact  OT order placed to assess Pt's ADLs, cognitive status, and performance during functional tasks in order to maximize safety and independence while making most appropriate d/c recommendations   Pt's clinical presentation is currently unstable/unpredictable given new onset deficits that effect Pt's occupational performance and ability to safely return to [facility] including decrease activity tolerance, decrease standing balance, decrease performance during ADL tasks, decrease cognition, decrease safety awareness , increase impulsiveness, decrease UB MS, decrease generalized strength, decrease activity engagement, decrease performance during functional transfers, steps to enter home, high fall risk and limited insight to deficits combined with medical complications of tachycardia, change in mental status, abnormal potassium values, low SpO2 values, new onset O2 use, elevated BNP, impulsivity during admission and need for input for mobility technique/safety  Upon entering room, Pt on 6L of O2 satting at 92%  Upon sitting at EOB Pt desatting to 88% and maintaining between 86-89% on 6L throughout rest of session  moderate SOB noted  Pt's treatment limited this date d/t decreased pulmonary status  RNAzra aware  Personal factors affecting Pt at time of initial evaluation include: step(s) to enter environment, multi-level environment, limited home support, inability to perform current job functions, inability to perform IADLs, inability to perform ADLs, new need for AD, inability to ambulate household distances, inability to navigate community distances, limited insight into impairments, decreased initiation and engagement, recent fall(s)/fall history and questionable non-compliance  Pt will benefit from continued skilled OT services to address deficits as defined above and to maximize level independence/participation during ADLs and functional tasks to facilitate return toward PLOF and improved quality of life  From an occupational therapy standpoint, recommendation at time of d/c would be [address] [place] [address]'[place] with increased family sup   Plan   Treatment Interventions ADL retraining;Functional transfer training;UE strengthening/ROM; Endurance training;Cognitive reorientation;Patient/family training;Equipment evaluation/education; Neuromuscular reeducation; Compensatory technique education;Continued evaluation; Energy conservation; Activityengagement   Goal Expiration Date 04/14/22   OT Frequency 3-5x/wk   Recommendation   OT Discharge Recommendation Home with home health rehabilitation   AM-PAC Daily Activity Inpatient   Lower Body Dressing 3   Bathing 3   Toileting 3   Upper Body Dressing 3   Grooming 3   Eating 4   Daily Activity Raw Score 19   Daily Activity Standardized Score (Calc for Raw Score >=11) 40 22   AM-PAC Applied Cognition Inpatient   Following a Speech/Presentation 2   Understanding Ordinary Conversation 4   Taking Medications 2   Remembering Where Things Are Placed or Put Away 3   Remembering List of 4-5 Errands 3   Taking Care of Complicated Tasks 2   Applied Cognition Raw Score 16   Applied Cognition Standardized Score 35 03     The patient's raw score on the AM-PAC Daily Activity inpatient short form is 19, standardized score is 40 22, greater than 39 4  Patients at this level are likely to benefit from DC to home  Please refer to the recommendation of the Occupational Therapist for safe DC planning  Pt goals to be met by 4/14/2022    1  Pt will demonstrate ability to complete LB dressing @ Mod I in order to increase safety and independence during meaningful tasks  2  Pt will demonstrate ability to clarisa/doff socks/shoes while sitting EOB @ Mod I in order to increase safety and independence during meaningful tasks  3  Pt will demonstrate ability to complete toileting tasks including CM and pericare @ Mod I in order to increase safety and independence during meaningful tasks  4  Pt will demonstrate ability to complete EOB, chair, toilet/commode transfers @ Mod I in order to increase performance and participation during functional tasks  5  Pt will demonstrate ability to stand for 10+ minutes while maintaining G balance with use of RW for UB support PRN  6  Pt will demonstrate ability to tolerate 30-35 minute OT session with no vc'ing for deep breathing or use of energy conservation techniques in order to increase activity tolerance during functional tasks  7  Pt will demonstrate Good carryover of use of energy conservation/compensatory strategies during ADLs and functional tasks in order to increase safety and reduce risk for falls     8  Pt will demonstrate Good attention and participation in continued evaluation of functional ambulation house hold distances in order to assist with safe d/c planning  9  Pt will attend to continued cognitive assessments 100% of the time in order to provide most appropriate d/c recommendations  10  Pt will follow 100% simple 2-step commands and be A&O x4 consistently with environmental cues to increase participation in functional activities  11  Pt will identify 3 areas of interest/hobbies and 1 intervention on how to incorporate into daily life in order to increase interaction with environment and peers as well as increase participation in meaningful tasks  12  Pt will demonstrate 100% carryover of BUE HEP in order to increase BUE MS and increase performance during functional tasks upon d/c home      Alex Arts OTR/L

## 2022-03-31 NOTE — ASSESSMENT & PLAN NOTE
Most likely secondary to COPD exacerbation secondary to influenza a  Patient also has history of lung cancer currently in remission for 1 year  Influenza A positive, COVID negative  Patient was saturating 88% on room air, initially patient was using 4 L of oxygen, now patient is requiring 6 L  CTA PE negative,  Pulmonary consult appreciated

## 2022-04-01 LAB
BACTERIA UR CULT: ABNORMAL
GLUCOSE SERPL-MCNC: 160 MG/DL (ref 65–140)
GLUCOSE SERPL-MCNC: 161 MG/DL (ref 65–140)
GLUCOSE SERPL-MCNC: 205 MG/DL (ref 65–140)
GLUCOSE SERPL-MCNC: 229 MG/DL (ref 65–140)
L PNEUMO1 AG UR QL IA.RAPID: NEGATIVE
S PNEUM AG UR QL: NEGATIVE

## 2022-04-01 PROCEDURE — 82948 REAGENT STRIP/BLOOD GLUCOSE: CPT

## 2022-04-01 PROCEDURE — 97116 GAIT TRAINING THERAPY: CPT

## 2022-04-01 PROCEDURE — 99233 SBSQ HOSP IP/OBS HIGH 50: CPT | Performed by: FAMILY MEDICINE

## 2022-04-01 PROCEDURE — 94640 AIRWAY INHALATION TREATMENT: CPT

## 2022-04-01 PROCEDURE — 87449 NOS EACH ORGANISM AG IA: CPT | Performed by: FAMILY MEDICINE

## 2022-04-01 PROCEDURE — 97535 SELF CARE MNGMENT TRAINING: CPT

## 2022-04-01 PROCEDURE — 94760 N-INVAS EAR/PLS OXIMETRY 1: CPT

## 2022-04-01 PROCEDURE — 97110 THERAPEUTIC EXERCISES: CPT

## 2022-04-01 RX ORDER — FUROSEMIDE 10 MG/ML
20 INJECTION INTRAMUSCULAR; INTRAVENOUS ONCE
Status: COMPLETED | OUTPATIENT
Start: 2022-04-01 | End: 2022-04-01

## 2022-04-01 RX ORDER — CEFTRIAXONE 1 G/50ML
1000 INJECTION, SOLUTION INTRAVENOUS EVERY 24 HOURS
Status: DISCONTINUED | OUTPATIENT
Start: 2022-04-01 | End: 2022-04-02

## 2022-04-01 RX ORDER — ECHINACEA PURPUREA EXTRACT 125 MG
1 TABLET ORAL
Status: DISCONTINUED | OUTPATIENT
Start: 2022-04-01 | End: 2022-04-08 | Stop reason: HOSPADM

## 2022-04-01 RX ADMIN — ATORVASTATIN CALCIUM 40 MG: 40 TABLET, FILM COATED ORAL at 09:13

## 2022-04-01 RX ADMIN — LEVALBUTEROL HYDROCHLORIDE 1.25 MG: 1.25 SOLUTION RESPIRATORY (INHALATION) at 02:11

## 2022-04-01 RX ADMIN — GUAIFENESIN 600 MG: 600 TABLET ORAL at 21:36

## 2022-04-01 RX ADMIN — FINASTERIDE 5 MG: 5 TABLET, FILM COATED ORAL at 09:13

## 2022-04-01 RX ADMIN — ENOXAPARIN SODIUM 40 MG: 40 INJECTION SUBCUTANEOUS at 09:15

## 2022-04-01 RX ADMIN — METHYLPREDNISOLONE SODIUM SUCCINATE 60 MG: 125 INJECTION, POWDER, FOR SOLUTION INTRAMUSCULAR; INTRAVENOUS at 13:57

## 2022-04-01 RX ADMIN — OSELTAMIVIR PHOSPHATE 75 MG: 75 CAPSULE ORAL at 09:13

## 2022-04-01 RX ADMIN — GABAPENTIN 100 MG: 100 CAPSULE ORAL at 17:13

## 2022-04-01 RX ADMIN — INSULIN LISPRO 2 UNITS: 100 INJECTION, SOLUTION INTRAVENOUS; SUBCUTANEOUS at 09:11

## 2022-04-01 RX ADMIN — BUDESONIDE 0.5 MG: 0.5 INHALANT ORAL at 07:34

## 2022-04-01 RX ADMIN — GUAIFENESIN 600 MG: 600 TABLET ORAL at 09:13

## 2022-04-01 RX ADMIN — INSULIN LISPRO 4 UNITS: 100 INJECTION, SOLUTION INTRAVENOUS; SUBCUTANEOUS at 22:13

## 2022-04-01 RX ADMIN — INSULIN LISPRO 2 UNITS: 100 INJECTION, SOLUTION INTRAVENOUS; SUBCUTANEOUS at 17:13

## 2022-04-01 RX ADMIN — SODIUM CHLORIDE, SODIUM GLUCONATE, SODIUM ACETATE, POTASSIUM CHLORIDE, MAGNESIUM CHLORIDE, SODIUM PHOSPHATE, DIBASIC, AND POTASSIUM PHOSPHATE 75 ML/HR: .53; .5; .37; .037; .03; .012; .00082 INJECTION, SOLUTION INTRAVENOUS at 01:36

## 2022-04-01 RX ADMIN — CEFTRIAXONE 1000 MG: 1 INJECTION, SOLUTION INTRAVENOUS at 09:15

## 2022-04-01 RX ADMIN — IPRATROPIUM BROMIDE 0.5 MG: 0.5 SOLUTION RESPIRATORY (INHALATION) at 07:34

## 2022-04-01 RX ADMIN — IPRATROPIUM BROMIDE 0.5 MG: 0.5 SOLUTION RESPIRATORY (INHALATION) at 13:42

## 2022-04-01 RX ADMIN — IPRATROPIUM BROMIDE 0.5 MG: 0.5 SOLUTION RESPIRATORY (INHALATION) at 02:11

## 2022-04-01 RX ADMIN — INSULIN LISPRO 5 UNITS: 100 INJECTION, SOLUTION INTRAVENOUS; SUBCUTANEOUS at 11:41

## 2022-04-01 RX ADMIN — METHYLPREDNISOLONE SODIUM SUCCINATE 60 MG: 125 INJECTION, POWDER, FOR SOLUTION INTRAMUSCULAR; INTRAVENOUS at 21:36

## 2022-04-01 RX ADMIN — GABAPENTIN 100 MG: 100 CAPSULE ORAL at 09:13

## 2022-04-01 RX ADMIN — TAMSULOSIN HYDROCHLORIDE 0.4 MG: 0.4 CAPSULE ORAL at 09:13

## 2022-04-01 RX ADMIN — BUDESONIDE 0.5 MG: 0.5 INHALANT ORAL at 20:09

## 2022-04-01 RX ADMIN — INSULIN LISPRO 4 UNITS: 100 INJECTION, SOLUTION INTRAVENOUS; SUBCUTANEOUS at 11:40

## 2022-04-01 RX ADMIN — AZITHROMYCIN 500 MG: 250 TABLET, FILM COATED ORAL at 17:13

## 2022-04-01 RX ADMIN — LEVALBUTEROL HYDROCHLORIDE 1.25 MG: 1.25 SOLUTION RESPIRATORY (INHALATION) at 20:09

## 2022-04-01 RX ADMIN — INSULIN LISPRO 5 UNITS: 100 INJECTION, SOLUTION INTRAVENOUS; SUBCUTANEOUS at 09:10

## 2022-04-01 RX ADMIN — LEVALBUTEROL HYDROCHLORIDE 1.25 MG: 1.25 SOLUTION RESPIRATORY (INHALATION) at 13:42

## 2022-04-01 RX ADMIN — METHYLPREDNISOLONE SODIUM SUCCINATE 60 MG: 125 INJECTION, POWDER, FOR SOLUTION INTRAMUSCULAR; INTRAVENOUS at 05:30

## 2022-04-01 RX ADMIN — IPRATROPIUM BROMIDE 0.5 MG: 0.5 SOLUTION RESPIRATORY (INHALATION) at 20:09

## 2022-04-01 RX ADMIN — FUROSEMIDE 20 MG: 10 INJECTION, SOLUTION INTRAMUSCULAR; INTRAVENOUS at 13:57

## 2022-04-01 RX ADMIN — OSELTAMIVIR PHOSPHATE 75 MG: 75 CAPSULE ORAL at 21:36

## 2022-04-01 RX ADMIN — LEVALBUTEROL HYDROCHLORIDE 1.25 MG: 1.25 SOLUTION RESPIRATORY (INHALATION) at 07:34

## 2022-04-01 NOTE — ASSESSMENT & PLAN NOTE
Urine culture showing Gram-negative rods, patient started on ceftriaxone, follow sensitivity results

## 2022-04-01 NOTE — ASSESSMENT & PLAN NOTE
Most likely secondary to influenza a  Patient was tachycardic, tachypneic, influenza A positive, lactic acid elevated  For continue treatment as per sepsis protocol   blood culture, urine Legionella, pneumonia-negative   Sputum culture multifactorial  Procalcitonin negative  Pulmonary consult appreciated

## 2022-04-01 NOTE — PHYSICAL THERAPY NOTE
PHYSICAL THERAPY TREATMENT NOTE  NAME:  Beryle Crigler  DATE: 04/01/22    Length Of Stay: 2  Performed at least 2 patient identifiers during session: Name and Birthday  Treatment time: 1149-9100  Treatment length: 42 min       04/01/22 1045   Note Type   Note Type Treatment   Pain Assessment   Pain Assessment Tool 0-10   Pain Score No Pain   Restrictions/Precautions   Other Precautions Contact/isolation;Droplet precautions; Chair Alarm; Bed Alarm;O2;Fall Risk  (6 lpm)   General   Chart Reviewed Yes   Response to Previous Treatment Patient with no complaints from previous session  Cognition   Overall Cognitive Status Impaired   Orientation Level Oriented X4   Following Commands Follows one step commands with increased time or repetition   Bed Mobility   Additional Comments Pt seated OOB in recliner at start and end of session; bed mobility not assessed at this time   Transfers   Sit to Stand   (steadying assist)   Additional items Increased time required;Verbal cues; Impulsive   Stand to Sit   (steadying assist)   Additional items Increased time required;Verbal cues; Impulsive   Stand pivot   (steadying assist)   Additional items Increased time required;Verbal cues; Impulsive   Additional Comments Pt used RW for all transfers; VC for hand placement and safety   Ambulation/Elevation   Gait pattern Improper Weight shift;Decreased foot clearance; Wide ANU   Gait Assistance 4  Minimal assist   Additional items Assist x 1;Verbal cues   Assistive Device Rolling walker   Distance 25' x 2 with RW  Pt initially with non-skid socks only, min A for minor LOB requiring assistance to correct  Second trial pt requested his shoes due to decreased sensation in B/L feet and completed with steadying A and no LOB   O2 intially 90-92% on 6 lpm at rest  Pt required seated therapeutic rest breaks following gait trials as O2 desat to 85% each trial requiring 2-3 min to recover to 89-90%   Balance   Static Sitting Normal   Dynamic Sitting Good Static Standing Fair   Dynamic Standing Fair -   Ambulatory Poor +   Endurance Deficit   Endurance Deficit Yes   Endurance Deficit Description fatigue, O2 desat see gait section   Activity Tolerance   Activity Tolerance Patient limited by fatigue   Medical Staff Made Aware Leilani LOERA   Nurse Made Aware RNLillian   Exercises   Hip Flexion Sitting;20 reps;AROM; Bilateral   Knee AROM [employer]; Bilateral;20 reps   Marching Standing;10 reps;AROM; Bilateral   Assessment   Prognosis Good   Problem List Decreased strength;Decreased endurance; Impaired balance;Decreased mobility; Decreased cognition; Impaired judgement;Decreased safety awareness   Barriers to Discharge None   Goals   PT Treatment Day 1   Plan   Treatment/Interventions Functional transfer training;LE strengthening/ROM; Elevations; Therapeutic exercise; Endurance training;Patient/family training;Equipment eval/education; Bed mobility;Gait training; Compensatory technique education;Spoke to nursing;OT   Progress Slow progress, medical status limitations   PT Frequency 3-5x/wk   Recommendation   PT Discharge Recommendation Home with home health rehabilitation   Equipment Recommended 9 [facility] Recommended Wheeled walker   Change/add to [employer]? No   AM-PAC Basic Mobility Inpatient   Turning in Bed Without Bedrails 4   Lying on Back to Sitting on Edge of Flat Bed 4   Moving Bed to Chair 3   Standing Up From Chair 3   Walk in Room 3   Climb 3-5 Stairs 2   Basic Mobility Inpatient Raw Score 19   Basic Mobility Standardized Score 42 48   Highest Level Of Mobility   JH-HLM Goal 6: Walk 10 steps or more   JH-HLM Highest Level of Mobility 7: Walk 25 feet or more   JH-HLM Goal Achieved Yes   Education   Education Provided Mobility training;Home exercise program;Assistive device   End of Consult   Patient Position at End of Consult Bedside chair;Bed/Chair alarm activated; All needs within reach     The patient's AM-PAC Basic Mobility Inpatient Short Form Raw Score is 19  A Raw score of greater than 16 suggests the patient may benefit from discharge to home  Please also refer to the recommendation of the Physical Therapist for safe discharge planning  Assessment: Pt seen for PT treatment session this date with interventions consisting of gait training w/ emphasis on improving pt's ability to ambulate level surfaces x 25' x 2 with steadying assist provided by therapist with RW and Therapeutic exercise consisting of: AROM 20 reps B LE in sitting and standing with B/L UE support position  Pt agreeable to PT treatment session upon arrival, pt found seated OOB in recliner, in no apparent distress  In comparison to previous session, pt with improvements in functional mobility as pt is able to ambulate increased distance with decreased assistance  Post session: pt returned back to recliner, chair alarm engaged and all needs in reach Continue to recommend Home PT at time of d/c in order to maximize pt's functional independence and safety w/ mobility  Pt continues to be functioning below baseline level, and remains limited 2* factors listed above and including decreased strength, balance, mobility, pulmonary status, and safety awareness  PT will continue to see pt while here in order to address the deficits listed above and provide interventions consistent w/ POC in effort to achieve STGs       Darren Franklin, PT,DPT

## 2022-04-01 NOTE — CASE MANAGEMENT
Case Management Discharge Planning Note    Patient name Ronna Baca  Location /-24 MRN [id number]  : 1955 Date 2022       Current Admission Date: 3/30/2022  Current Admission Diagnosis:Sepsis, viral [facility])   Patient Active Problem List    Diagnosis Date Noted    Acute respiratory failure with hypoxia ([facility] [address] [facility] ) 2022    Influenza A 2022    UTI (urinary tract infection) 2021    Type 2 diabetes mellitus, without long-term current use of insulin ([facility] ) 09/15/2021    Tobacco abuse 09/15/2021    COPD with acute exacerbation ([facility] ) 09/15/2021    BPH with obstruction/lower urinary tract symptoms 09/15/2021    History of lung cancer 09/15/2021    Sepsis, viral ([facility] ) 09/15/2021    Acute hypoxemic respiratory failure due to COVID-19 ([facility]  ) 2021      LOS (days): 2  Geometric Mean LOS (GMLOS) (days): 4 80  Days to GMLOS:3     OBJECTIVE:  Risk of Unplanned Readmission Score: 9         Current admission status: Inpatient   Preferred Pharmacy:   [address] [facility], [address]  [address]  [address] [facility] [address]  Phone: [phone] Fax: [phone]    Primary Care Provider: Alvina Willams, DO     Not medically ready for dc  Chart reviewed done, full CM assessment is done under flowsheet  CM spoke to patient about dc needs  He does have a nebulizer  Primary Insurance: MEDICARE  Secondary Insurance: [address]    DISCHARGE DETAILS:    Discharge planning discussed with[de-identified] patient  Freedom of Choice: Yes  Comments - Freedom of Choice: Discussed HHC needs, pt is declining stating his "woman" who has worked in health care will help him  2) Discussed Home PT/ declined as he said he can go across the street for OP PT with [facility]  3) Discussed needing Home 02 , per pt he had it before twice and his choice is to use [facility] for his 02 needs    4) pt needs a walker- declining this as he will use the one at the home, his s/o ex 's  CM contacted family/caregiver?: No- see comments (declined)  Were Treatment Team discharge recommendations reviewed with patient/caregiver?: Yes  Did patient/caregiver verbalize understanding of patient care needs?: Yes  Were patient/caregiver advised of the risks associated with not following Treatment Team discharge recommendations?: Yes         [address]         Is the patient interested in [facility] at discharge?: No    DME Referral Provided  Referral made for DME?: No (choice is [facility] for his home 02 needs)    Other Referral/Resources/Interventions Provided:  Referral Comments: Declining [address] [facility] [address]         Treatment Team Recommendation: Home with [address] [facility]  Discharge Destination Plan[de-identified] Home  Transport at Discharge : Family         Anticipating Home 02

## 2022-04-01 NOTE — PROGRESS NOTES
[address]  Progress Note - rDea Chavez 1955, 78 y o  male MRN: [id number]  Unit/Bed#: -01 Encounter: [id number]  Primary Care Provider: Judy Parr DO   Date and time admitted to hospital: 3/30/2022  1:58 PM    Acute respiratory failure with hypoxia [facility])  Assessment & Plan  Most likely secondary to COPD exacerbation secondary to influenza a  Patient also has history of lung cancer currently in remission for 1 year  Influenza A positive, COVID negative  Patient was saturating 88% on room air, initially patient was using 4 L of oxygen, now patient is requiring 6 L  CTA PE negative,  Pulmonary consult appreciated    COPD with acute exacerbation ([facility] )  Assessment & Plan  Most likely secondary to influenza a, patient also has history of lung cancer in remission  Continue respiratory protocol  Maintain saturation 89% or above  Pulmonary consult appreciated  Patient is still on 6 L of oxygen saturating 87-89%    * Sepsis, viral ([facility] )  Assessment & Plan  Most likely secondary to influenza a  Patient was tachycardic, tachypneic, influenza A positive, lactic acid elevated  For continue treatment as per sepsis protocol   blood culture, urine Legionella, pneumonia-negative   Sputum culture multifactorial  Procalcitonin negative  Pulmonary consult appreciated    Influenza A  Assessment & Plan  Maintain isolation  Continue supportive care  Will place patient on Tamiflu    UTI (urinary tract infection)  Assessment & Plan  Urine culture showing Gram-negative rods, patient started on ceftriaxone, follow sensitivity results    History of lung cancer  Assessment & Plan  Follows with Dr Dany Johnson note reviewed, documentation as below:  HEMATOLOGY/ONCOLOGY DIAGNOSIS:  1  Unresectable adenocarcinoma the right upper lung, 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, PDL1 <1% --> DR     Cancer Staging  Malignant neoplasm of upper lobe of right lung ([facility] )  Staging form: Lung, AJCC 8th Edition  - Clinical stage from 1/23/2020: Stage IB (cT2a, cN0, cM0) - Unsigned     Past Therapy:   1  Definitive external beam radiotherapy to the right upper lobe of the lung to a dose of 54 Gy delivered in 3 fractions between 02/12/2020 through 02/21/2020  2  Carboplatin/Alimta s/p 4 cycles from 3/3/2020 through 5/5/2020     Current Therapy:  Active Surveillance      CT chest 07/19/2021 with evidence progression        Tobacco abuse  Assessment & Plan  Continue Nicoderm patch    Type 2 diabetes mellitus, without long-term current use of insulin [facility])  Assessment & Plan  Lab Results   Component Value Date    HGBA1C 8 1 (H) 12/28/2021       Recent Labs     03/31/22  1558 03/31/22  2106 04/01/22  0803 04/01/22  1125   POCGLU 164* 203* 160* 229*       Blood Sugar Average: Last 72 hrs:  (P) 180 75 patient placed on Solu-Medrol due to COPD, which may affect patient blood sugar  Continue sliding scale, adjust insulin dose based on response  Follow hypoglycemia precaution        VTE Pharmacologic Prophylaxis: VTE Score: 7 High Risk (Score >/= 5) - Pharmacological DVT Prophylaxis Ordered: enoxaparin (Lovenox)  Sequential Compression Devices Ordered  Patient Centered Rounds: I performed bedside rounds with nursing staff today  Discussions with Specialists or Other Care Team Provider:  None    Education and Discussions with Family / Patient: Updated  (Girlfriend) via phone  Time Spent for Care: 15 minutes  More than 50% of total time spent on counseling and coordination of care as described above  Current Length of Stay: 2 day(s)  Current Patient Status: Inpatient   Certification Statement: The patient will continue to require additional inpatient hospital stay due to To monitor above condition  Discharge Plan: Anticipate discharge in 48-72 hrs to home  Code Status: Level 1 - Full Code    Subjective:   Seen and evaluated during the round  Patient resting comfortably, sitting upright position    Denies any significant complaint other than cough  Objective:     Vitals:   Temp (24hrs), Av °F (36 7 °C), Min:97 5 °F (36 4 °C), Max:98 4 °F (36 9 °C)    Temp:  [97 5 °F (36 4 °C)-98 4 °F (36 9 °C)] 97 5 °F (36 4 °C)  HR:  [85-92] 85  Resp:  [18-19] 18  BP: (134-143)/(89-91) 143/91  SpO2:  [86 %-93 %] 86 %  Body mass index is 33 33 kg/m²  Input and Output Summary (last 24 hours): Intake/Output Summary (Last 24 hours) at 2022 1343  Last data filed at 2022 0853  Gross per 24 hour   Intake 2040 ml   Output 2100 ml   Net -60 ml       Physical Exam:   Physical Exam  Vitals and nursing note reviewed  Constitutional:       Appearance: He is obese  He is not diaphoretic  HENT:      Mouth/Throat:      Pharynx: No oropharyngeal exudate  Eyes:      General: No scleral icterus  Conjunctiva/sclera: Conjunctivae normal       Pupils: Pupils are equal, round, and reactive to light  Cardiovascular:      Rate and Rhythm: Normal rate  Heart sounds: No friction rub  No gallop  Pulmonary:      Effort: No respiratory distress  Breath sounds: No stridor  Wheezing and rales present  No rhonchi  Abdominal:      General: Abdomen is flat  Bowel sounds are normal  There is no distension  Palpations: There is no mass  Tenderness: There is no abdominal tenderness  Hernia: No hernia is present  Musculoskeletal:         General: Normal range of motion  Cervical back: Normal range of motion  Right lower leg: No edema  Left lower leg: No edema  Lymphadenopathy:      Cervical: No cervical adenopathy  Skin:     General: Skin is warm  Capillary Refill: Capillary refill takes less than 2 seconds  Findings: No lesion  Neurological:      General: No focal deficit present  Mental Status: He is alert and oriented to person, place, and time  Cranial Nerves: No cranial nerve deficit  Sensory: No sensory deficit  Motor: No weakness        Coordination: Coordination normal    Psychiatric:         Mood and Affect: Mood normal          Additional Data:     Labs:  Results from last 7 days   Lab Units 03/31/22  0530   WBC Thousand/uL 6 31   HEMOGLOBIN g/dL 15 3   HEMATOCRIT % 46 0   PLATELETS Thousands/uL 198   NEUTROS PCT % 69   LYMPHS PCT % 14   MONOS PCT % 14*   EOS PCT % 2     Results from last 7 days   Lab Units 03/31/22  0524   SODIUM mmol/L 139   POTASSIUM mmol/L 4 4   CHLORIDE mmol/L 101   CO2 mmol/L 32   BUN mg/dL 14   CREATININE mg/dL 0 85   ANION GAP mmol/L 6   CALCIUM mg/dL 8 4   ALBUMIN g/dL 3 5   TOTAL BILIRUBIN mg/dL 0 44   ALK PHOS U/L 51   ALT U/L 51   AST U/L 46*   GLUCOSE RANDOM mg/dL 162*         Results from last 7 days   Lab Units 04/01/22  1125 04/01/22  0803 03/31/22  2106 03/31/22  1558 03/31/22  1101 03/31/22  0732 03/30/22  2036 03/30/22  1830   POC GLUCOSE mg/dl 229* 160* 203* 164* 155* 127 184* 224*         Results from last 7 days   Lab Units 03/31/22  0524 03/30/22  1743 03/30/22  1425 03/30/22  1424   LACTIC ACID mmol/L  --  1 9 2 7*  --    PROCALCITONIN ng/ml 0 08  --   --  0 11       Lines/Drains:  Invasive Devices  Report    Peripheral Intravenous Line            Peripheral IV 03/31/22 Dorsal (posterior); Left Wrist 1 day    Peripheral IV 03/31/22 Dorsal (posterior); Right Forearm <1 day                      Imaging: No pertinent imaging reviewed  Recent Cultures (last 7 days):   Results from last 7 days   Lab Units 04/01/22  0630 03/31/22  0736 03/30/22  1751 03/30/22  1424   BLOOD CULTURE   --   --   --  No Growth at 24 hrs  No Growth at 24 hrs     SPUTUM CULTURE   --  Culture too young- will reincubate  --   --    GRAM STAIN RESULT   --  1+ Epithelial cells per low power field*  Rare Polys*  4+ Budding Yeast with Pseudomycelia*  4+ Gram positive rods*  Rare Gram negative rods*  --   --    URINE CULTURE   --   --  >100,000 cfu/ml Gram Negative Juan Daniel*  --    LEGIONELLA URINARY ANTIGEN  Negative  --   --   --        Last 24 Hours Medication List:   Current Facility-Administered Medications   Medication Dose Route Frequency Provider Last Rate    acetaminophen  650 mg Oral Q6H PRN Veronica Adhikari MD      albuterol  2 puff Inhalation Q4H PRN Veronica Adhikari MD      atorvastatin  40 mg Oral Daily Veronica Adhikari MD      azithromycin  500 mg Oral Q24H Veronica Adhikari MD      benzonatate  100 mg Oral TID PRN Veronica Adhikari MD      budesonide  0 5 mg Nebulization Q12H Nina Opitz Islam, MD      cefTRIAXone  1,000 mg Intravenous Q24H Veronica Adhikari MD 1,000 mg (04/01/22 0915)    enoxaparin  40 mg Subcutaneous Daily Veronica Adhikari MD      finasteride  5 mg Oral Daily Linus Sadler MD      gabapentin  100 mg Oral BID Veronica Adhikari MD      guaiFENesin  600 mg Oral Q12H [facility] Linus Sadler MD      insulin lispro  2-12 Units Subcutaneous TID AC Linus Sadler MD      insulin lispro  2-12 Units Subcutaneous HS Linus Sadler MD      insulin lispro  5 Units Subcutaneous TID With Meals Nina Opitz Islam, MD      ipratropium  0 5 mg Nebulization Q6H Nina Opitz Islam, MD      levalbuterol  1 25 mg Nebulization Q6H Linus Sadler MD      methylPREDNISolone sodium succinate  60 mg Intravenous [facility] Mikael Andujar MD      multi-electrolyte  75 mL/hr Intravenous Continuous Veronica Adhikari MD 75 mL/hr (04/01/22 0136)    nicotine  21 mg Transdermal Daily Veronica Adhikari MD      oseltamivir  75 mg Oral Q12H [facility] Veronica Adhikari MD      oxyCODONE-acetaminophen  1 tablet Oral Q4H PRN Veronica Adhikari MD      tamsulosin  0 4 mg Oral Daily Veronica Adhikari MD          Today, Patient Was Seen By: Veronica Adhikari MD    **Please Note: This note may have been constructed using a voice recognition system  **

## 2022-04-01 NOTE — NURSING NOTE
Late entry due to pt care    Pt refused to be bladder scanned  Pt educated on reasoning for performing a post void residual  Pt still adamant that he should not be bladder scanned due to history of bladder cancer  Pt states that his primary doctor is aware that he does not fully empty his bladder  Pt also stated that he self catheterizes at home as needed

## 2022-04-01 NOTE — OCCUPATIONAL THERAPY NOTE
Occupational Therapy Progress Note     Patient Name: Bridgett Leiva  HNHKG'R Date: 4/1/2022  Problem List  Principal Problem:    Sepsis, viral ([facility] )  Active Problems:    Type 2 diabetes mellitus, without long-term current use of insulin (HCC)    Tobacco abuse    COPD with acute exacerbation ([facility] )    History of lung cancer    Acute respiratory failure with hypoxia ([facility] )    Influenza A       04/01/22 1040   Note Type   Note Type Treatment   Restrictions/Precautions   Other Precautions Contact/isolation;Droplet precautions; Chair Alarm; Bed Alarm; Fall Risk;O2;Impulsive   Pain Assessment   Pain Assessment Tool 0-10   Pain Score No Pain   ADL   Grooming Assistance 6  Modified Independent   Grooming Deficit Wash/dry hands;Steadying   UB Bathing Assistance 6  Modified Independent   LB Bathing Assistance   (SBA)   LB Bathing Comments Pt able to wash feet while propped up on a surface, as he usually sits in shower and props feet up on side of the tub  SBA for washing buttocks while standing d/t balance deficits and safety concerns   UB Dressing Assistance 6  Modified independent   UB Dressing Deficit Setup   LB Dressing Assistance   (SBA)   LB Dressing Deficit Setup; Requires assistive device for steadying;Supervision/safety; Increased time to complete;Verbal cueing   LB Dressing Comments Pt able to don/doff socks and thread briefs while seated in chair, able to pull up while standing @ SBA d/t balance deficits  Transfers   Sit to Stand   (Steadying assist)   Additional items Assist x 1; Increased time required;Verbal cues   Stand to Sit   (Steadying assist)   Additional items Assist x 1; Increased time required;Verbal cues   Stand pivot   (Steadying assist)   Additional items Assist x 1; Increased time required;Verbal cues   Additional Comments Pt seated in recliner chair at the beginning of session on 6L @ 92%   STS from EOB to RW @ Steadying assist  Pt able to complete functional mobility around room x2 @ [address] with RW  O2 decreasing to 86% on 6L with activity  Pt seated in chair at end of session with all needs met  Cognition   Overall Cognitive Status Impaired   Arousal/Participation Alert; Responsive; Cooperative   Attention Attends with cues to redirect   Orientation Level Oriented X4   Memory Within functional limits   Following Commands Follows one step commands with increased time or repetition   Comments Pt impulsive with decreased insight to deficits   Activity Tolerance   Activity Tolerance Patient limited by fatigue   Medical Staff Made Aware Spoke with PT Katie   Assessment   Assessment Pt completed OT tx session #1 on this date focused on ADL performance and functional mobility  Pt alert and agreeable  Pt demonstrated improvements in endurance, transfer status, functional mobility and LB ADL performance this session  Increased time required to monitor O2 needs  Pt 86% (with activity) to 92% (at rest) on 6L O2 throughout  Pt impulsive and required cues for safety concerns throughout  Pt performing UB ADLs @ Mod I and LB ADLs @ SBA for balance deficits and safety concerns  Continue to recommend [facility] services to increase safety and independence in ADL tasks and functional mobility  Plan   Treatment Interventions ADL retraining;Functional transfer training; Endurance training   Goal Expiration Date 04/14/22   OT Treatment Day 1   OT Frequency 3-5x/wk   Recommendation   OT Discharge Recommendation Home with home health rehabilitation   AM-PAC Daily Activity Inpatient   Lower Body Dressing 3   Bathing 3   Toileting 3   Upper Body Dressing 3   Grooming 3   Eating 4   Daily Activity Raw Score 19   Daily Activity Standardized Score (Calc for Raw Score >=11) 40 22   AM-PAC Applied Cognition Inpatient   Following a Speech/Presentation 2   Understanding Ordinary Conversation 4   Taking Medications 2   Remembering Where Things Are Placed or Put Away 3   Remembering List of 4-5 Errands 3   Taking Care of Complicated Tasks 2 Applied Cognition Raw Score 16   Applied Cognition Standardized Score 35 03      Pt goals to be met by 4/14/2022     1  Pt will demonstrate ability to complete LB dressing @ Mod I in order to increase safety and independence during meaningful tasks  2  Pt will demonstrate ability to clarisa/doff socks/shoes while sitting EOB @ Mod I in order to increase safety and independence during meaningful tasks  3  Pt will demonstrate ability to complete toileting tasks including CM and pericare @ Mod I in order to increase safety and independence during meaningful tasks  4  Pt will demonstrate ability to complete EOB, chair, toilet/commode transfers @ Mod I in order to increase performance and participation during functional tasks  5  Pt will demonstrate ability to stand for 10+ minutes while maintaining G balance with use of RW for UB support PRN  6  Pt will demonstrate ability to tolerate 30-35 minute OT session with no vc'ing for deep breathing or use of energy conservation techniques in order to increase activity tolerance during functional tasks  7  Pt will demonstrate Good carryover of use of energy conservation/compensatory strategies during ADLs and functional tasks in order to increase safety and reduce risk for falls  8  Pt will demonstrate Good attention and participation in continued evaluation of functional ambulation house hold distances in order to assist with safe d/c planning  9  Pt will attend to continued cognitive assessments 100% of the time in order to provide most appropriate d/c recommendations  10  Pt will follow 100% simple 2-step commands and be A&O x4 consistently with environmental cues to increase participation in functional activities  11  Pt will identify 3 areas of interest/hobbies and 1 intervention on how to incorporate into daily life in order to increase interaction with environment and peers as well as increase participation in meaningful tasks     12  Pt will demonstrate 100% carryover of BUE HEP in order to increase BUE MS and increase performance during functional tasks upon d/c home      [place], OTR/L

## 2022-04-01 NOTE — PLAN OF CARE
Problem: PAIN - ADULT  Goal: Verbalizes/displays adequate comfort level or baseline comfort level  Description: Interventions:  - Encourage patient to monitor pain and request assistance  - Assess pain using appropriate pain scale  - Administer analgesics based on type and severity of pain and evaluate response  - Implement non-pharmacological measures as appropriate and evaluate response  - Consider cultural and social influences on pain and pain management  - Notify physician/advanced practitioner if interventions unsuccessful or patient reports new pain  Outcome: Progressing     Problem: INFECTION - ADULT  Goal: Absence or prevention of progression during hospitalization  Description: INTERVENTIONS:  - Assess and monitor for signs and symptoms of infection  - Monitor lab/diagnostic results  - Monitor all insertion sites, i e  indwelling lines, tubes, and drains  - Monitor endotracheal if appropriate and nasal secretions for changes in amount and color  - [place] appropriate cooling/warming therapies per order  - Administer medications as ordered  - Instruct and encourage patient and family to use good hand hygiene technique  - Identify and instruct in appropriate isolation precautions for identified infection/condition  Outcome: Progressing

## 2022-04-01 NOTE — RESPIRATORY THERAPY NOTE
RT Protocol Note  Winston Shi 78 y o  male MRN: [id number]  Unit/Bed#: -01 Encounter: [id number]    Assessment    Principal Problem:    Sepsis, viral ([place] [address] )  Active Problems:    Type 2 diabetes mellitus, without long-term current use of insulin (HCC)    Tobacco abuse    COPD with acute exacerbation (HCC)    History of lung cancer    Acute respiratory failure with hypoxia (HCC)    Influenza A      Home Pulmonary Medications:  Trelegy, albuterol       Past Medical History:   Diagnosis Date    Bladder cancer ([place] [address] )     BPH (benign prostatic hyperplasia)     COPD (chronic obstructive pulmonary disease) ([place] [address] )     Diabetes mellitus ([place] [address] )     Hyperlipidemia     Lung cancer ([place] [address] )     Rib fractures      Social History     Socioeconomic History    Marital status: Single     Spouse name: None    Number of children: None    Years of education: None    Highest education level: None   Occupational History    None   Tobacco Use    Smoking status: Current Every Day Smoker     Packs/day: 1 00    Smokeless tobacco: Never Used   Vaping Use    Vaping Use: Never used   Substance and Sexual Activity    Alcohol use: Not Currently    Drug use: Not Currently    Sexual activity: None   Other Topics Concern    None   Social History Narrative    None     Social Determinants of Health     Financial Resource Strain: Not on file   Food Insecurity: No Food Insecurity    Worried About Running Out of Food in the Last Year: Never true    Giovanna of Food in the Last Year: Never true   Transportation Needs: No Transportation Needs    Lack of Transportation (Medical): No    Lack of Transportation (Non-Medical):  No   Physical Activity: Not on file   Stress: Not on file   Social Connections: Not on file   Intimate Partner Violence: Not on file   Housing Stability: Low Risk     Unable to Pay for Housing in the Last Year: No    Number of Places Lived in the Last Year: 1    Unstable Housing in the Last Year: No Subjective    Subjective Data: Patient is current 1 1/2 pack per day smoker  He denies use of  suplimental oxygen or CPAP/BiPAP use at home  He husues trelogy daily as well as an albuterol rescue inhaler    Objective    Physical Exam:   Assessment Type: During-treatment  General Appearance: Alert,Awake  Respiratory Pattern: Normal  Chest Assessment: Chest expansion symmetrical  Cough: Strong,Non-productive  O2 Device: 6L nasal cannula    Vitals:  Blood pressure 134/89, pulse 88, temperature 98 4 °F (36 9 °C), resp  rate 19, height 6' 1" (1 854 m), weight 115 kg (252 lb 9 6 oz), SpO2 92 %  Imaging and other studies: Nothing to suggest pneumonia  O2 Device: 6L nasal cannula   8mm left upper lobe nodule, increased from 4 x 3 mm in June 2022 (3/29)   Stable masslike opacity in the right upper lobe at the site of treated tumor and stable 1 7 by 1 2 cm nodule in the posterior segment right upper lobe    2 8 x 1 8 septated thin-walled cyst in the left upper lobe (3/41), stable since January 2020      Plan    Respiratory Plan: Mild Distress pathway        Resp Comments: denies shortness if breath

## 2022-04-01 NOTE — PLAN OF CARE
Problem: OCCUPATIONAL THERAPY ADULT  Goal: Performs self-care activities at highest level of function for planned discharge setting  See evaluation for individualized goals  Description: Treatment Interventions: ADL retraining,Functional transfer training,UE strengthening/ROM,Endurance training,Cognitive reorientation,Patient/family training,Equipment evaluation/education,Neuromuscular reeducation,Compensatory technique education,Continued evaluation,Energy conservation,Activityengagement          See flowsheet documentation for full assessment, interventions and recommendations  Outcome: Progressing  Note: Limitation: Decreased ADL status,Decreased UE strength,Decreased Safe judgement during ADL,Decreased cognition,Decreased endurance,Decreased self-care trans,Decreased high-level ADLs  Prognosis: Good  Assessment: Pt completed OT tx session #1 on this date focused on ADL performance and functional mobility  Pt alert and agreeable  Pt demonstrated improvements in endurance, transfer status, functional mobility and LB ADL performance this session  Increased time required to monitor O2 needs  Pt 86% (with activity) to 92% (at rest) on 6L O2 throughout  Pt impulsive and required cues for safety concerns throughout  Pt performing UB ADLs @ Mod I and LB ADLs @ SBA for balance deficits and safety concerns  Continue to recommend [address] [place] [address] to increase safety and independence in ADL tasks and functional mobility       OT Discharge Recommendation: Home with home health rehabilitation

## 2022-04-01 NOTE — PLAN OF CARE
Problem: PHYSICAL THERAPY ADULT  Goal: Performs mobility at highest level of function for planned discharge setting  See evaluation for individualized goals  Description: Treatment/Interventions: Functional transfer training,LE strengthening/ROM,Elevations,Therapeutic exercise,Endurance training,Patient/family training,Equipment eval/education,Bed mobility,Gait training,Compensatory technique education,Spoke to nursing,OT  Equipment Recommended: Natalie Cervantes       See flowsheet documentation for full assessment, interventions and recommendations  Outcome: Progressing  Note: Prognosis: Good  Problem List: Decreased strength,Decreased endurance,Impaired balance,Decreased mobility,Decreased cognition,Impaired judgement,Decreased safety awareness  Assessment: Pt seen for PT treatment session this date with interventions consisting of gait training w/ emphasis on improving pt's ability to ambulate level surfaces x 25' x 2 with steadying assist provided by therapist with RW and Therapeutic exercise consisting of: AROM 20 reps B LE in sitting and standing with B/L UE support position  Pt agreeable to PT treatment session upon arrival, pt found seated OOB in recliner, in no apparent distress  In comparison to previous session, pt with improvements in functional mobility as pt is able to ambulate increased distance with decreased assistance  Post session: pt returned back to recliner, chair alarm engaged and all needs in reach Continue to recommend Home PT at time of d/c in order to maximize pt's functional independence and safety w/ mobility  Pt continues to be functioning below baseline level, and remains limited 2* factors listed above and including decreased strength, balance, mobility, pulmonary status, and safety awareness  PT will continue to see pt while here in order to address the deficits listed above and provide interventions consistent w/ POC in effort to achieve STGs     Barriers to Discharge: None        PT Discharge Recommendation: Home with home health rehabilitation          See flowsheet documentation for full assessment

## 2022-04-01 NOTE — ASSESSMENT & PLAN NOTE
Most likely secondary to influenza a, patient also has history of lung cancer in remission  Continue respiratory protocol  Maintain saturation 89% or above  Pulmonary consult appreciated  Patient is still on 6 L of oxygen saturating 87-89%

## 2022-04-01 NOTE — ASSESSMENT & PLAN NOTE
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

## 2022-04-01 NOTE — ASSESSMENT & PLAN NOTE
Lab Results   Component Value Date    HGBA1C 8 1 (H) 12/28/2021       Recent Labs     03/31/22  1558 03/31/22  2106 04/01/22  0803 04/01/22  1125   POCGLU 164* 203* 160* 229*       Blood Sugar Average: Last 72 hrs:  (P) 180 75 patient placed on Solu-Medrol due to COPD, which may affect patient blood sugar  Continue sliding scale, adjust insulin dose based on response  Follow hypoglycemia precaution

## 2022-04-02 LAB
BACTERIA SPT RESP CULT: ABNORMAL
BACTERIA SPT RESP CULT: ABNORMAL
GLUCOSE SERPL-MCNC: 173 MG/DL (ref 65–140)
GLUCOSE SERPL-MCNC: 177 MG/DL (ref 65–140)
GLUCOSE SERPL-MCNC: 211 MG/DL (ref 65–140)
GLUCOSE SERPL-MCNC: 226 MG/DL (ref 65–140)
GLUCOSE SERPL-MCNC: 255 MG/DL (ref 65–140)
GRAM STN SPEC: ABNORMAL

## 2022-04-02 PROCEDURE — 94640 AIRWAY INHALATION TREATMENT: CPT

## 2022-04-02 PROCEDURE — 99233 SBSQ HOSP IP/OBS HIGH 50: CPT | Performed by: FAMILY MEDICINE

## 2022-04-02 PROCEDURE — 94760 N-INVAS EAR/PLS OXIMETRY 1: CPT

## 2022-04-02 PROCEDURE — 82948 REAGENT STRIP/BLOOD GLUCOSE: CPT

## 2022-04-02 RX ORDER — FUROSEMIDE 10 MG/ML
20 INJECTION INTRAMUSCULAR; INTRAVENOUS ONCE
Status: COMPLETED | OUTPATIENT
Start: 2022-04-02 | End: 2022-04-02

## 2022-04-02 RX ORDER — LEVOFLOXACIN 5 MG/ML
750 INJECTION, SOLUTION INTRAVENOUS EVERY 24 HOURS
Status: DISCONTINUED | OUTPATIENT
Start: 2022-04-02 | End: 2022-04-03

## 2022-04-02 RX ADMIN — INSULIN LISPRO 4 UNITS: 100 INJECTION, SOLUTION INTRAVENOUS; SUBCUTANEOUS at 16:49

## 2022-04-02 RX ADMIN — INSULIN LISPRO 2 UNITS: 100 INJECTION, SOLUTION INTRAVENOUS; SUBCUTANEOUS at 08:06

## 2022-04-02 RX ADMIN — FINASTERIDE 5 MG: 5 TABLET, FILM COATED ORAL at 08:06

## 2022-04-02 RX ADMIN — METHYLPREDNISOLONE SODIUM SUCCINATE 60 MG: 125 INJECTION, POWDER, FOR SOLUTION INTRAMUSCULAR; INTRAVENOUS at 15:28

## 2022-04-02 RX ADMIN — OSELTAMIVIR PHOSPHATE 75 MG: 75 CAPSULE ORAL at 21:28

## 2022-04-02 RX ADMIN — FUROSEMIDE 20 MG: 10 INJECTION, SOLUTION INTRAMUSCULAR; INTRAVENOUS at 12:42

## 2022-04-02 RX ADMIN — IPRATROPIUM BROMIDE 0.5 MG: 0.5 SOLUTION RESPIRATORY (INHALATION) at 13:30

## 2022-04-02 RX ADMIN — INSULIN LISPRO 6 UNITS: 100 INJECTION, SOLUTION INTRAVENOUS; SUBCUTANEOUS at 11:40

## 2022-04-02 RX ADMIN — BUDESONIDE 0.5 MG: 0.5 INHALANT ORAL at 07:35

## 2022-04-02 RX ADMIN — GABAPENTIN 100 MG: 100 CAPSULE ORAL at 16:48

## 2022-04-02 RX ADMIN — IPRATROPIUM BROMIDE 0.5 MG: 0.5 SOLUTION RESPIRATORY (INHALATION) at 07:35

## 2022-04-02 RX ADMIN — ATORVASTATIN CALCIUM 40 MG: 40 TABLET, FILM COATED ORAL at 08:06

## 2022-04-02 RX ADMIN — OSELTAMIVIR PHOSPHATE 75 MG: 75 CAPSULE ORAL at 08:06

## 2022-04-02 RX ADMIN — LEVOFLOXACIN 750 MG: 5 INJECTION, SOLUTION INTRAVENOUS at 09:02

## 2022-04-02 RX ADMIN — GUAIFENESIN 600 MG: 600 TABLET ORAL at 08:06

## 2022-04-02 RX ADMIN — GABAPENTIN 100 MG: 100 CAPSULE ORAL at 08:06

## 2022-04-02 RX ADMIN — BUDESONIDE 0.5 MG: 0.5 INHALANT ORAL at 19:36

## 2022-04-02 RX ADMIN — GUAIFENESIN 600 MG: 600 TABLET ORAL at 21:28

## 2022-04-02 RX ADMIN — LEVALBUTEROL HYDROCHLORIDE 1.25 MG: 1.25 SOLUTION RESPIRATORY (INHALATION) at 19:36

## 2022-04-02 RX ADMIN — LEVALBUTEROL HYDROCHLORIDE 1.25 MG: 1.25 SOLUTION RESPIRATORY (INHALATION) at 02:16

## 2022-04-02 RX ADMIN — LEVALBUTEROL HYDROCHLORIDE 1.25 MG: 1.25 SOLUTION RESPIRATORY (INHALATION) at 07:35

## 2022-04-02 RX ADMIN — LEVALBUTEROL HYDROCHLORIDE 1.25 MG: 1.25 SOLUTION RESPIRATORY (INHALATION) at 13:30

## 2022-04-02 RX ADMIN — TAMSULOSIN HYDROCHLORIDE 0.4 MG: 0.4 CAPSULE ORAL at 08:06

## 2022-04-02 RX ADMIN — METHYLPREDNISOLONE SODIUM SUCCINATE 60 MG: 125 INJECTION, POWDER, FOR SOLUTION INTRAMUSCULAR; INTRAVENOUS at 21:28

## 2022-04-02 RX ADMIN — CEFTRIAXONE 1000 MG: 1 INJECTION, SOLUTION INTRAVENOUS at 08:08

## 2022-04-02 RX ADMIN — IPRATROPIUM BROMIDE 0.5 MG: 0.5 SOLUTION RESPIRATORY (INHALATION) at 02:16

## 2022-04-02 RX ADMIN — METHYLPREDNISOLONE SODIUM SUCCINATE 60 MG: 125 INJECTION, POWDER, FOR SOLUTION INTRAMUSCULAR; INTRAVENOUS at 05:57

## 2022-04-02 RX ADMIN — ENOXAPARIN SODIUM 40 MG: 40 INJECTION SUBCUTANEOUS at 08:07

## 2022-04-02 RX ADMIN — INSULIN LISPRO 4 UNITS: 100 INJECTION, SOLUTION INTRAVENOUS; SUBCUTANEOUS at 21:29

## 2022-04-02 RX ADMIN — IPRATROPIUM BROMIDE 0.5 MG: 0.5 SOLUTION RESPIRATORY (INHALATION) at 19:36

## 2022-04-02 NOTE — RESPIRATORY THERAPY NOTE
RT Protocol Note  Cristobal Bull 78 y o  male MRN: [id number]  Unit/Bed#: -01 Encounter: [id number]    Assessment    Principal Problem:    Sepsis, viral ([place] [address] )  Active Problems:    Type 2 diabetes mellitus, without long-term current use of insulin (HCC)    Tobacco abuse    COPD with acute exacerbation ([place] [address] )    History of lung cancer    UTI (urinary tract infection)    Acute respiratory failure with hypoxia (HCC)    Influenza A      Home Pulmonary Medications:  Trelegy  albuterol       Past Medical History:   Diagnosis Date    Bladder cancer ([place] [address] )     BPH (benign prostatic hyperplasia)     COPD (chronic obstructive pulmonary disease) ([place] [address] )     Diabetes mellitus ([place] [address] )     Hyperlipidemia     Lung cancer ([place] [address] )     Rib fractures      Social History     Socioeconomic History    Marital status: Single     Spouse name: None    Number of children: None    Years of education: None    Highest education level: None   Occupational History    None   Tobacco Use    Smoking status: Current Every Day Smoker     Packs/day: 1 00    Smokeless tobacco: Never Used   Vaping Use    Vaping Use: Never used   Substance and Sexual Activity    Alcohol use: Not Currently    Drug use: Not Currently    Sexual activity: None   Other Topics Concern    None   Social History Narrative    None     Social Determinants of Health     Financial Resource Strain: Not on file   Food Insecurity: No Food Insecurity    Worried About Running Out of Food in the Last Year: Never true    Giovanna of Food in the Last Year: Never true   Transportation Needs: No Transportation Needs    Lack of Transportation (Medical): No    Lack of Transportation (Non-Medical):  No   Physical Activity: Not on file   Stress: Not on file   Social Connections: Not on file   Intimate Partner Violence: Not on file   Housing Stability: Low Risk     Unable to Pay for Housing in the Last Year: No    Number of Places Lived in the Last Year: 1    Unstable Housing in the Last Year: No       Subjective    Subjective Data: Patient is current 1 1/2 pack per day smoker  He denies use of  suplimental oxygen or CPAP/BiPAP use at home  He husues trelogy daily as well as an albuterol rescue inhaler    Objective    Physical Exam:   Assessment Type: During-treatment  General Appearance: Alert,Awake  Respiratory Pattern: Normal  Chest Assessment: Chest expansion symmetrical  Bilateral Breath Sounds: Diminished  Cough: Strong,Productive    Vitals:  Blood pressure 137/85, pulse 84, temperature 98 °F (36 7 °C), resp  rate 18, height 6' 1" (1 854 m), weight 114 kg (251 lb), SpO2 93 %  Imaging and other studies: Nothing to suggest pneumonia  8mm left upper lobe nodule, increased from 4 x 3 mm in June 2022 (3/29)       Stable masslike opacity in the right upper lobe at the site of treated tumor and stable 1 7 by 1 2 cm nodule in the posterior segment right upper lobe      2 8 x 1 8 septated thin-walled cyst in the left upper lobe    O2 Device: 6l nc     Plan    Respiratory Plan: Mild Distress pathway        Resp Comments: stable on 6L

## 2022-04-02 NOTE — PROGRESS NOTES
[address]  Progress Note - Subhash Crespo 1955, 78 y o  male MRN: [id number]  Unit/Bed#: -01 Encounter: [id number]  Primary Care Provider: Magdy Posadas DO   Date and time admitted to hospital: 3/30/2022  1:58 PM    Acute respiratory failure with hypoxia [facility])  Assessment & Plan  Most likely secondary to COPD exacerbation secondary to influenza a  Patient also has history of lung cancer currently in remission for 1 year  Influenza A positive, COVID negative  Patient was saturating 88% on room air, initially patient was using 4 L of oxygen, now patient is requiring 6 L  CTA PE negative,  Pulmonary consult appreciated    COPD with acute exacerbation ([facility] [address] )  Assessment & Plan  Most likely secondary to influenza a, patient also has history of lung cancer in remission  Continue respiratory protocol  Maintain saturation 89% or above  Pulmonary consult appreciated  Patient is still on 6 L of oxygen saturating 87-89%    * Sepsis, viral ([facility] [address] )  Assessment & Plan  Most likely secondary to influenza a  Patient was tachycardic, tachypneic, influenza A positive, lactic acid elevated  For continue treatment as per sepsis protocol   blood culture, urine Legionella, pneumonia-negative   Sputum culture multifactorial  Urine culture is showing Enterobacter sensitive to levofloxacin, antibiotic switched to levofloxacin  Procalcitonin negative  Pulmonary consult appreciated    Influenza A  Assessment & Plan  Maintain isolation  Continue supportive care  On Tamiflu    UTI (urinary tract infection)  Assessment & Plan  Urine culture showing Gram-negative rods,-Enterobacter, sensitive to levofloxacin, antibiotic adjusted    History of lung cancer  Assessment & Plan  Follows with Dr Yuli Ramsey note reviewed, documentation as below:  HEMATOLOGY/ONCOLOGY DIAGNOSIS:  1  Unresectable adenocarcinoma the right upper lung, 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, PDL1 <1% --> DR     Cancer Staging  Malignant neoplasm of upper lobe of right lung [facility])  Staging form: Lung, AJCC 8th Edition  - Clinical stage from 1/23/2020: Stage IB (cT2a, cN0, cM0) - Unsigned     Past Therapy:   1  Definitive external beam radiotherapy to the right upper lobe of the lung to a dose of 54 Gy delivered in 3 fractions between 02/12/2020 through 02/21/2020  2  Carboplatin/Alimta s/p 4 cycles from 3/3/2020 through 5/5/2020     Current Therapy:  Active Surveillance      CT chest 07/19/2021 with evidence progression        Tobacco abuse  Assessment & Plan  Continue Nicoderm patch    Type 2 diabetes mellitus, without long-term current use of insulin [facility])  Assessment & Plan  Lab Results   Component Value Date    HGBA1C 8 1 (H) 12/28/2021       Recent Labs     04/01/22  2146 04/02/22  0427 04/02/22  0733 04/02/22  1125   POCGLU 205* 173* 177* 255*       Blood Sugar Average: Last 72 hrs:  (P) 034 [id number] patient placed on Solu-Medrol due to COPD, which may affect patient blood sugar  Continue sliding scale, adjust insulin dose based on response  Insulin dose adjusted  Follow hypoglycemia precaution        VTE Pharmacologic Prophylaxis: VTE Score: 7 High Risk (Score >/= 5) - Pharmacological DVT Prophylaxis Ordered: enoxaparin (Lovenox)  Sequential Compression Devices Ordered  Patient Centered Rounds: I performed bedside rounds with nursing staff today  Discussions with Specialists or Other Care Team Provider:  None    Education and Discussions with Family / Patient: Updated  (girlfriend) via phone  Time Spent for Care: 15 minutes  More than 50% of total time spent on counseling and coordination of care as described above  Current Length of Stay: 3 day(s)  Current Patient Status: Inpatient   Certification Statement: The patient will continue to require additional inpatient hospital stay due to To monitor above condition  Discharge Plan: Anticipate discharge in 48-72 hrs to home      Code Status: Level 1 - Full Code    Subjective: Seen and evaluated during the round  Resting comfortably  Denies any significant complaint  Objective:     Vitals:   Temp (24hrs), Av °F (36 7 °C), Min:98 °F (36 7 °C), Max:98 1 °F (36 7 °C)    Temp:  [98 °F (36 7 °C)-98 1 °F (36 7 °C)] 98 °F (36 7 °C)  HR:  [83-99] 83  Resp:  [17-22] 22  BP: (118-137)/(83-89) 118/89  SpO2:  [83 %-93 %] 90 %  Body mass index is 33 12 kg/m²  Input and Output Summary (last 24 hours): Intake/Output Summary (Last 24 hours) at 2022 1159  Last data filed at 2022 1001  Gross per 24 hour   Intake 480 ml   Output 2700 ml   Net -2220 ml       Physical Exam:   Physical Exam  Vitals and nursing note reviewed  Constitutional:       Appearance: He is obese  HENT:      Nose: No congestion  Mouth/Throat:      Mouth: Mucous membranes are moist       Pharynx: No oropharyngeal exudate  Eyes:      General: No scleral icterus  Conjunctiva/sclera: Conjunctivae normal       Pupils: Pupils are equal, round, and reactive to light  Cardiovascular:      Rate and Rhythm: Normal rate  Heart sounds: No friction rub  No gallop  Pulmonary:      Effort: Pulmonary effort is normal       Breath sounds: Wheezing and rales present  Abdominal:      General: Abdomen is flat  Bowel sounds are normal  There is no distension  Palpations: There is no mass  Tenderness: There is no abdominal tenderness  Hernia: No hernia is present  Musculoskeletal:         General: Normal range of motion  Cervical back: Normal range of motion  Right lower leg: No edema  Left lower leg: No edema  Lymphadenopathy:      Cervical: No cervical adenopathy  Skin:     General: Skin is warm  Neurological:      General: No focal deficit present  Mental Status: He is alert and oriented to person, place, and time  Cranial Nerves: No cranial nerve deficit  Sensory: No sensory deficit  Motor: No weakness        Coordination: Coordination normal  Additional Data:     Labs:  Results from last 7 days   Lab Units 03/31/22  0530   WBC Thousand/uL 6 31   HEMOGLOBIN g/dL 15 3   HEMATOCRIT % 46 0   PLATELETS Thousands/uL 198   NEUTROS PCT % 69   LYMPHS PCT % 14   MONOS PCT % 14*   EOS PCT % 2     Results from last 7 days   Lab Units 03/31/22  0524   SODIUM mmol/L 139   POTASSIUM mmol/L 4 4   CHLORIDE mmol/L 101   CO2 mmol/L 32   BUN mg/dL 14   CREATININE mg/dL 0 85   ANION GAP mmol/L 6   CALCIUM mg/dL 8 4   ALBUMIN g/dL 3 5   TOTAL BILIRUBIN mg/dL 0 44   ALK PHOS U/L 51   ALT U/L 51   AST U/L 46*   GLUCOSE RANDOM mg/dL 162*         Results from last 7 days   Lab Units 04/02/22  1125 04/02/22  0733 04/02/22  0427 04/01/22  2146 04/01/22  1617 04/01/22  1125 04/01/22  0803 03/31/22  2106 03/31/22  1558 03/31/22  1101 03/31/22  0732 03/30/22  2036   POC GLUCOSE mg/dl 255* 177* 173* 205* 161* 229* 160* 203* 164* 155* 127 184*         Results from last 7 days   Lab Units 03/31/22  0524 03/30/22  1743 03/30/22  1425 03/30/22  1424   LACTIC ACID mmol/L  --  1 9 2 7*  --    PROCALCITONIN ng/ml 0 08  --   --  0 11       Lines/Drains:  Invasive Devices  Report    Peripheral Intravenous Line            Peripheral IV 03/31/22 Dorsal (posterior); Left Wrist 2 days    Peripheral IV 03/31/22 Dorsal (posterior); Right Forearm 1 day                      Imaging: No pertinent imaging reviewed  Recent Cultures (last 7 days):   Results from last 7 days   Lab Units 04/01/22  0630 03/31/22  0736 03/30/22  1751 03/30/22  1424   BLOOD CULTURE   --   --   --  No Growth at 48 hrs  No Growth at 48 hrs     SPUTUM CULTURE   --  2+ Growth of Candida tropicalis*  2+ Growth of   --   --    GRAM STAIN RESULT   --  1+ Epithelial cells per low power field*  Rare Polys*  4+ Budding Yeast with Pseudomycelia*  4+ Gram positive rods*  Rare Gram negative rods*  --   --    URINE CULTURE   --   --  >100,000 cfu/ml Enterobacter cloacae*  >100,000 cfu/ml Enterobacter cloacae complex* 10,000-19,000 cfu/ml   --    LEGIONELLA URINARY ANTIGEN  Negative  --   --   --        Last 24 Hours Medication List:   Current Facility-Administered Medications   Medication Dose Route Frequency Provider Last Rate    acetaminophen  650 mg Oral Q6H PRN Carlos A Huang MD      albuterol  2 puff Inhalation Q4H PRN Carlos A Huang MD      atorvastatin  40 mg Oral Daily Carlos A Huang MD      benzonatate  100 mg Oral TID PRN Carlos A Huang MD      budesonide  0 5 mg Nebulization Q12H Ulysess Joseluis Sadler MD      enoxaparin  40 mg Subcutaneous Daily Carlos A Huang MD      finasteride  5 mg Oral Daily Linus Sadler MD      gabapentin  100 mg Oral BID Carlos A Huang MD      guaiFENesin  600 mg Oral Q12H [address] Linus Sadler MD      insulin lispro  10 Units Subcutaneous TID With Meals Linus Sadler MD      insulin lispro  2-12 Units Subcutaneous TID AC Linus Sadler MD      insulin lispro  2-12 Units Subcutaneous HS Carlos A Huang MD      ipratropium  0 5 mg Nebulization Q6H Linus Sadler MD      levalbuterol  1 25 mg Nebulization Q6H Carlos A Huang MD      levofloxacin  750 mg Intravenous Q24H Carlos A Huang  mg (04/02/22 0902)    methylPREDNISolone sodium succinate  60 mg Intravenous [facility] Gerardo Chang MD      nicotine  21 mg Transdermal Daily Carlos A Huang MD      oseltamivir  75 mg Oral Q12H [address] Carlos A Huang MD      oxyCODONE-acetaminophen  1 tablet Oral Q4H PRN Carlos A Huang MD      sodium chloride  1 spray Each Nare Q1H PRN Carlos A Huang MD      tamsulosin  0 4 mg Oral Daily Carlos A Huang MD          Today, Patient Was Seen By: Carlos A Huang MD    **Please Note: This note may have been constructed using a voice recognition system  **

## 2022-04-02 NOTE — PLAN OF CARE
Problem: PAIN - ADULT  Goal: Verbalizes/displays adequate comfort level or baseline comfort level  Description: Interventions:  - Encourage patient to monitor pain and request assistance  - Assess pain using appropriate pain scale  - Administer analgesics based on type and severity of pain and evaluate response  - Implement non-pharmacological measures as appropriate and evaluate response  - Consider cultural and social influences on pain and pain management  - Notify physician/advanced practitioner if interventions unsuccessful or patient reports new pain  Outcome: Progressing     Problem: INFECTION - ADULT  Goal: Absence or prevention of progression during hospitalization  Description: INTERVENTIONS:  - Assess and monitor for signs and symptoms of infection  - Monitor lab/diagnostic results  - Monitor all insertion sites, i e  indwelling lines, tubes, and drains  - Monitor endotracheal if appropriate and nasal secretions for changes in amount and color  - [place] appropriate cooling/warming therapies per order  - Administer medications as ordered  - Instruct and encourage patient and family to use good hand hygiene technique  - Identify and instruct in appropriate isolation precautions for identified infection/condition  Outcome: Progressing     Problem: DISCHARGE PLANNING  Goal: Discharge to home or other facility with appropriate resources  Description: INTERVENTIONS:  - Identify barriers to discharge w/patient and caregiver  - Arrange for needed discharge resources and transportation as appropriate  - Identify discharge learning needs (meds, wound care, etc )  - Arrange for interpretive services to assist at discharge as needed  - Refer to Case Management Department for coordinating discharge planning if the patient needs post-hospital services based on physician/advanced practitioner order or complex needs related to functional status, cognitive ability, or social support system  Outcome: Progressing

## 2022-04-02 NOTE — ASSESSMENT & PLAN NOTE
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

## 2022-04-02 NOTE — PLAN OF CARE
Problem: PAIN - ADULT  Goal: Verbalizes/displays adequate comfort level or baseline comfort level  Description: Interventions:  - Encourage patient to monitor pain and request assistance  - Assess pain using appropriate pain scale  - Administer analgesics based on type and severity of pain and evaluate response  - Implement non-pharmacological measures as appropriate and evaluate response  - Consider cultural and social influences on pain and pain management  - Notify physician/advanced practitioner if interventions unsuccessful or patient reports new pain  Outcome: Progressing     Problem: INFECTION - ADULT  Goal: Absence or prevention of progression during hospitalization  Description: INTERVENTIONS:  - Assess and monitor for signs and symptoms of infection  - Monitor lab/diagnostic results  - Monitor all insertion sites, i e  indwelling lines, tubes, and drains  - Monitor endotracheal if appropriate and nasal secretions for changes in amount and color  - [place] appropriate cooling/warming therapies per order  - Administer medications as ordered  - Instruct and encourage patient and family to use good hand hygiene technique  - Identify and instruct in appropriate isolation precautions for identified infection/condition  Outcome: Progressing     Problem: SAFETY ADULT  Goal: Patient will remain free of falls  Description: INTERVENTIONS:  - Educate patient/family on patient safety including physical limitations  - Instruct patient to call for assistance with activity   - Consult OT/PT to assist with strengthening/mobility   - Keep Call bell within reach  - Keep bed low and locked with side rails adjusted as appropriate  - Keep care items and personal belongings within reach  - Initiate and maintain comfort rounds  - Make Fall Risk Sign visible to staff  - Offer Toileting every  Hours, in advance of need  - Initiate/Maintain alarm  - Obtain necessary fall risk management equipment:   - Apply yellow socks and bracelet for high fall risk patients  - Consider moving patient to room near nurses station  Outcome: Progressing  Goal: Maintain or return to baseline ADL function  Description: INTERVENTIONS:  -  Assess patient's ability to carry out ADLs; assess patient's baseline for ADL function and identify physical deficits which impact ability to perform ADLs (bathing, care of mouth/teeth, toileting, grooming, dressing, etc )  - Assess/evaluate cause of self-care deficits   - Assess range of motion  - Assess patient's mobility; develop plan if impaired  - Assess patient's need for assistive devices and provide as appropriate  - Encourage maximum independence but intervene and supervise when necessary  - Involve family in performance of ADLs  - Assess for home care needs following discharge   - Consider OT consult to assist with ADL evaluation and planning for discharge  - Provide patient education as appropriate  Outcome: Progressing  Goal: Maintains/Returns to pre admission functional level  Description: INTERVENTIONS:  - Perform BMAT or MOVE assessment daily    - Set and communicate daily mobility goal to care team and patient/family/caregiver  - Collaborate with rehabilitation services on mobility goals if consulted  - Perform Range of Motion  times a day  - Reposition patient every  hours    - Dangle patient  times a day  - Stand patient  times a day  - Ambulate patient  times a day  - Out of bed to chair  times a day   - Out of bed for meals  times a day  - Out of bed for toileting  - Record patient progress and toleration of activity level   Outcome: Progressing     Problem: DISCHARGE PLANNING  Goal: Discharge to home or other facility with appropriate resources  Description: INTERVENTIONS:  - Identify barriers to discharge w/patient and caregiver  - Arrange for needed discharge resources and transportation as appropriate  - Identify discharge learning needs (meds, wound care, etc )  - Arrange for interpretive services to assist at discharge as needed  - Refer to Case Management Department for coordinating discharge planning if the patient needs post-hospital services based on physician/advanced practitioner order or complex needs related to functional status, cognitive ability, or social support system  Outcome: Progressing     Problem: Knowledge Deficit  Goal: Patient/family/caregiver demonstrates understanding of disease process, treatment plan, medications, and discharge instructions  Description: Complete learning assessment and assess knowledge base    Interventions:  - Provide teaching at level of understanding  - Provide teaching via preferred learning methods  Outcome: Progressing     Problem: Potential for Falls  Goal: Patient will remain free of falls  Description: INTERVENTIONS:  - Educate patient/family on patient safety including physical limitations  - Instruct patient to call for assistance with activity   - Consult OT/PT to assist with strengthening/mobility   - Keep Call bell within reach  - Keep bed low and locked with side rails adjusted as appropriate  - Keep care items and personal belongings within reach  - Initiate and maintain comfort rounds  - Make Fall Risk Sign visible to staff  - Offer Toileting every  Hours, in advance of need  - Initiate/Maintain alarm  - Obtain necessary fall risk management equipment:   - Apply yellow socks and bracelet for high fall risk patients  - Consider moving patient to room near nurses station  Outcome: Progressing     Problem: MOBILITY - ADULT  Goal: Maintain or return to baseline ADL function  Description: INTERVENTIONS:  -  Assess patient's ability to carry out ADLs; assess patient's baseline for ADL function and identify physical deficits which impact ability to perform ADLs (bathing, care of mouth/teeth, toileting, grooming, dressing, etc )  - Assess/evaluate cause of self-care deficits   - Assess range of motion  - Assess patient's mobility; develop plan if impaired  - Assess patient's need for assistive devices and provide as appropriate  - Encourage maximum independence but intervene and supervise when necessary  - Involve family in performance of ADLs  - Assess for home care needs following discharge   - Consider OT consult to assist with ADL evaluation and planning for discharge  - Provide patient education as appropriate  Outcome: Progressing  Goal: Maintains/Returns to pre admission functional level  Description: INTERVENTIONS:  - Perform BMAT or MOVE assessment daily    - Set and communicate daily mobility goal to care team and patient/family/caregiver  - Collaborate with rehabilitation services on mobility goals if consulted  - Perform Range of Motion  times a day  - Reposition patient every  hours    - Dangle patient  times a day  - Stand patient  times a day  - Ambulate patient  times a day  - Out of bed to chair  times a day   - Out of bed for meals  times a day  - Out of bed for toileting  - Record patient progress and toleration of activity level   Outcome: Progressing

## 2022-04-02 NOTE — ASSESSMENT & PLAN NOTE
Urine culture showing Gram-negative rods,-Enterobacter, sensitive to levofloxacin, antibiotic adjusted

## 2022-04-02 NOTE — ASSESSMENT & PLAN NOTE
Most likely secondary to influenza a  Patient was tachycardic, tachypneic, influenza A positive, lactic acid elevated  For continue treatment as per sepsis protocol   blood culture, urine Legionella, pneumonia-negative   Sputum culture multifactorial  Urine culture is showing Enterobacter sensitive to levofloxacin, antibiotic switched to levofloxacin  Procalcitonin negative  Pulmonary consult appreciated

## 2022-04-02 NOTE — ASSESSMENT & PLAN NOTE
Lab Results   Component Value Date    HGBA1C 8 1 (H) 12/28/2021       Recent Labs     04/01/22  2146 04/02/22  0427 04/02/22  0733 04/02/22  1125   POCGLU 205* 173* 177* 255*       Blood Sugar Average: Last 72 hrs:  (P) 415 [id number] patient placed on Solu-Medrol due to COPD, which may affect patient blood sugar  Continue sliding scale, adjust insulin dose based on response  Insulin dose adjusted  Follow hypoglycemia precaution

## 2022-04-03 ENCOUNTER — APPOINTMENT (INPATIENT)
Dept: RADIOLOGY | Facility: HOSPITAL | Age: 67
DRG: 871 | End: 2022-04-03
Payer: MEDICARE

## 2022-04-03 LAB
GLUCOSE SERPL-MCNC: 181 MG/DL (ref 65–140)
GLUCOSE SERPL-MCNC: 190 MG/DL (ref 65–140)
GLUCOSE SERPL-MCNC: 268 MG/DL (ref 65–140)
GLUCOSE SERPL-MCNC: 288 MG/DL (ref 65–140)

## 2022-04-03 PROCEDURE — 71045 X-RAY EXAM CHEST 1 VIEW: CPT

## 2022-04-03 PROCEDURE — 82948 REAGENT STRIP/BLOOD GLUCOSE: CPT

## 2022-04-03 PROCEDURE — 94760 N-INVAS EAR/PLS OXIMETRY 1: CPT

## 2022-04-03 PROCEDURE — 99233 SBSQ HOSP IP/OBS HIGH 50: CPT | Performed by: FAMILY MEDICINE

## 2022-04-03 PROCEDURE — 94640 AIRWAY INHALATION TREATMENT: CPT

## 2022-04-03 RX ORDER — LEVOFLOXACIN 750 MG/1
750 TABLET ORAL EVERY 24 HOURS
Status: DISCONTINUED | OUTPATIENT
Start: 2022-04-04 | End: 2022-04-08 | Stop reason: HOSPADM

## 2022-04-03 RX ADMIN — INSULIN LISPRO 2 UNITS: 100 INJECTION, SOLUTION INTRAVENOUS; SUBCUTANEOUS at 16:35

## 2022-04-03 RX ADMIN — INSULIN LISPRO 6 UNITS: 100 INJECTION, SOLUTION INTRAVENOUS; SUBCUTANEOUS at 22:11

## 2022-04-03 RX ADMIN — OSELTAMIVIR PHOSPHATE 75 MG: 75 CAPSULE ORAL at 08:08

## 2022-04-03 RX ADMIN — FINASTERIDE 5 MG: 5 TABLET, FILM COATED ORAL at 08:09

## 2022-04-03 RX ADMIN — ATORVASTATIN CALCIUM 40 MG: 40 TABLET, FILM COATED ORAL at 08:09

## 2022-04-03 RX ADMIN — IPRATROPIUM BROMIDE 0.5 MG: 0.5 SOLUTION RESPIRATORY (INHALATION) at 02:52

## 2022-04-03 RX ADMIN — BUDESONIDE 0.5 MG: 0.5 INHALANT ORAL at 19:38

## 2022-04-03 RX ADMIN — GABAPENTIN 100 MG: 100 CAPSULE ORAL at 17:25

## 2022-04-03 RX ADMIN — IPRATROPIUM BROMIDE 0.5 MG: 0.5 SOLUTION RESPIRATORY (INHALATION) at 14:08

## 2022-04-03 RX ADMIN — GUAIFENESIN 600 MG: 600 TABLET ORAL at 08:08

## 2022-04-03 RX ADMIN — TAMSULOSIN HYDROCHLORIDE 0.4 MG: 0.4 CAPSULE ORAL at 08:09

## 2022-04-03 RX ADMIN — METHYLPREDNISOLONE SODIUM SUCCINATE 60 MG: 125 INJECTION, POWDER, FOR SOLUTION INTRAMUSCULAR; INTRAVENOUS at 06:40

## 2022-04-03 RX ADMIN — LEVALBUTEROL HYDROCHLORIDE 1.25 MG: 1.25 SOLUTION RESPIRATORY (INHALATION) at 07:45

## 2022-04-03 RX ADMIN — LEVOFLOXACIN 750 MG: 5 INJECTION, SOLUTION INTRAVENOUS at 08:06

## 2022-04-03 RX ADMIN — IPRATROPIUM BROMIDE 0.5 MG: 0.5 SOLUTION RESPIRATORY (INHALATION) at 07:45

## 2022-04-03 RX ADMIN — GABAPENTIN 100 MG: 100 CAPSULE ORAL at 08:08

## 2022-04-03 RX ADMIN — OSELTAMIVIR PHOSPHATE 75 MG: 75 CAPSULE ORAL at 22:11

## 2022-04-03 RX ADMIN — BUDESONIDE 0.5 MG: 0.5 INHALANT ORAL at 07:45

## 2022-04-03 RX ADMIN — METHYLPREDNISOLONE SODIUM SUCCINATE 60 MG: 125 INJECTION, POWDER, FOR SOLUTION INTRAMUSCULAR; INTRAVENOUS at 22:11

## 2022-04-03 RX ADMIN — INSULIN LISPRO 6 UNITS: 100 INJECTION, SOLUTION INTRAVENOUS; SUBCUTANEOUS at 11:34

## 2022-04-03 RX ADMIN — LEVALBUTEROL HYDROCHLORIDE 1.25 MG: 1.25 SOLUTION RESPIRATORY (INHALATION) at 19:38

## 2022-04-03 RX ADMIN — IPRATROPIUM BROMIDE 0.5 MG: 0.5 SOLUTION RESPIRATORY (INHALATION) at 19:38

## 2022-04-03 RX ADMIN — LEVALBUTEROL HYDROCHLORIDE 1.25 MG: 1.25 SOLUTION RESPIRATORY (INHALATION) at 14:08

## 2022-04-03 RX ADMIN — INSULIN LISPRO 2 UNITS: 100 INJECTION, SOLUTION INTRAVENOUS; SUBCUTANEOUS at 08:03

## 2022-04-03 RX ADMIN — METHYLPREDNISOLONE SODIUM SUCCINATE 60 MG: 125 INJECTION, POWDER, FOR SOLUTION INTRAMUSCULAR; INTRAVENOUS at 13:08

## 2022-04-03 RX ADMIN — ENOXAPARIN SODIUM 40 MG: 40 INJECTION SUBCUTANEOUS at 08:05

## 2022-04-03 RX ADMIN — LEVALBUTEROL HYDROCHLORIDE 1.25 MG: 1.25 SOLUTION RESPIRATORY (INHALATION) at 02:53

## 2022-04-03 RX ADMIN — GUAIFENESIN 600 MG: 600 TABLET ORAL at 22:10

## 2022-04-03 NOTE — ASSESSMENT & PLAN NOTE
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

## 2022-04-03 NOTE — ASSESSMENT & PLAN NOTE
Lab Results   Component Value Date    HGBA1C 8 1 (H) 12/28/2021       Recent Labs     04/02/22  1125 04/02/22  1623 04/02/22 2046 04/03/22  0723   POCGLU 255* 211* 226* 181*       Blood Sugar Average: Last 72 hrs:  (P) 736 [id number] patient placed on Solu-Medrol due to COPD, which may affect patient blood sugar  Continue sliding scale, adjust insulin dose based on response  Insulin dose adjusted  Follow hypoglycemia precaution

## 2022-04-03 NOTE — PLAN OF CARE
Problem: PAIN - ADULT  Goal: Verbalizes/displays adequate comfort level or baseline comfort level  Description: Interventions:  - Encourage patient to monitor pain and request assistance  - Assess pain using appropriate pain scale  - Administer analgesics based on type and severity of pain and evaluate response  - Implement non-pharmacological measures as appropriate and evaluate response  - Consider cultural and social influences on pain and pain management  - Notify physician/advanced practitioner if interventions unsuccessful or patient reports new pain  Outcome: Progressing     Problem: INFECTION - ADULT  Goal: Absence or prevention of progression during hospitalization  Description: INTERVENTIONS:  - Assess and monitor for signs and symptoms of infection  - Monitor lab/diagnostic results  - Monitor all insertion sites, i e  indwelling lines, tubes, and drains  - Monitor endotracheal if appropriate and nasal secretions for changes in amount and color  - [place] appropriate cooling/warming therapies per order  - Administer medications as ordered  - Instruct and encourage patient and family to use good hand hygiene technique  - Identify and instruct in appropriate isolation precautions for identified infection/condition  Outcome: Progressing     Problem: SAFETY ADULT  Goal: Patient will remain free of falls  Description: INTERVENTIONS:  - Educate patient/family on patient safety including physical limitations  - Instruct patient to call for assistance with activity   - Consult OT/PT to assist with strengthening/mobility   - Keep Call bell within reach  - Keep bed low and locked with side rails adjusted as appropriate  - Keep care items and personal belongings within reach  - Initiate and maintain comfort rounds  - Make Fall Risk Sign visible to staff  - Offer Toileting every  Hours, in advance of need  - Initiate/Maintain alarm  - Obtain necessary fall risk management equipment:   - Apply yellow socks and bracelet for high fall risk patients  - Consider moving patient to room near nurses station  Outcome: Progressing  Goal: Maintain or return to baseline ADL function  Description: INTERVENTIONS:  -  Assess patient's ability to carry out ADLs; assess patient's baseline for ADL function and identify physical deficits which impact ability to perform ADLs (bathing, care of mouth/teeth, toileting, grooming, dressing, etc )  - Assess/evaluate cause of self-care deficits   - Assess range of motion  - Assess patient's mobility; develop plan if impaired  - Assess patient's need for assistive devices and provide as appropriate  - Encourage maximum independence but intervene and supervise when necessary  - Involve family in performance of ADLs  - Assess for home care needs following discharge   - Consider OT consult to assist with ADL evaluation and planning for discharge  - Provide patient education as appropriate  Outcome: Progressing  Goal: Maintains/Returns to pre admission functional level  Description: INTERVENTIONS:  - Perform BMAT or MOVE assessment daily    - Set and communicate daily mobility goal to care team and patient/family/caregiver  - Collaborate with rehabilitation services on mobility goals if consulted  - Perform Range of Motion  times a day  - Reposition patient every  hours    - Dangle patient  times a day  - Stand patient  times a day  - Ambulate patient  times a day  - Out of bed to chair  times a day   - Out of bed for meals  times a day  - Out of bed for toileting  - Record patient progress and toleration of activity level   Outcome: Progressing     Problem: DISCHARGE PLANNING  Goal: Discharge to home or other facility with appropriate resources  Description: INTERVENTIONS:  - Identify barriers to discharge w/patient and caregiver  - Arrange for needed discharge resources and transportation as appropriate  - Identify discharge learning needs (meds, wound care, etc )  - Arrange for interpretive services to assist at discharge as needed  - Refer to Case Management Department for coordinating discharge planning if the patient needs post-hospital services based on physician/advanced practitioner order or complex needs related to functional status, cognitive ability, or social support system  Outcome: Progressing     Problem: Knowledge Deficit  Goal: Patient/family/caregiver demonstrates understanding of disease process, treatment plan, medications, and discharge instructions  Description: Complete learning assessment and assess knowledge base    Interventions:  - Provide teaching at level of understanding  - Provide teaching via preferred learning methods  Outcome: Progressing     Problem: Potential for Falls  Goal: Patient will remain free of falls  Description: INTERVENTIONS:  - Educate patient/family on patient safety including physical limitations  - Instruct patient to call for assistance with activity   - Consult OT/PT to assist with strengthening/mobility   - Keep Call bell within reach  - Keep bed low and locked with side rails adjusted as appropriate  - Keep care items and personal belongings within reach  - Initiate and maintain comfort rounds  - Make Fall Risk Sign visible to staff  - Offer Toileting every  Hours, in advance of need  - Initiate/Maintain alarm  - Obtain necessary fall risk management equipment:   - Apply yellow socks and bracelet for high fall risk patients  - Consider moving patient to room near nurses station  Outcome: Progressing     Problem: MOBILITY - ADULT  Goal: Maintain or return to baseline ADL function  Description: INTERVENTIONS:  -  Assess patient's ability to carry out ADLs; assess patient's baseline for ADL function and identify physical deficits which impact ability to perform ADLs (bathing, care of mouth/teeth, toileting, grooming, dressing, etc )  - Assess/evaluate cause of self-care deficits   - Assess range of motion  - Assess patient's mobility; develop plan if impaired  - Assess patient's need for assistive devices and provide as appropriate  - Encourage maximum independence but intervene and supervise when necessary  - Involve family in performance of ADLs  - Assess for home care needs following discharge   - Consider OT consult to assist with ADL evaluation and planning for discharge  - Provide patient education as appropriate  Outcome: Progressing  Goal: Maintains/Returns to pre admission functional level  Description: INTERVENTIONS:  - Perform BMAT or MOVE assessment daily    - Set and communicate daily mobility goal to care team and patient/family/caregiver  - Collaborate with rehabilitation services on mobility goals if consulted  - Perform Range of Motion  times a day  - Reposition patient every  hours    - Dangle patient  times a day  - Stand patient  times a day  - Ambulate patient  times a day  - Out of bed to chair  times a day   - Out of bed for meals  times a day  - Out of bed for toileting  - Record patient progress and toleration of activity level   Outcome: Progressing

## 2022-04-03 NOTE — RESPIRATORY THERAPY NOTE
RT Protocol Note  Alexandro Frances 78 y o  male MRN: [id number]  Unit/Bed#: -01 Encounter: [id number]    Assessment    Principal Problem:    Sepsis, viral ([place] [address] )  Active Problems:    Type 2 diabetes mellitus, without long-term current use of insulin (HCC)    Tobacco abuse    COPD with acute exacerbation ([place] [address] )    History of lung cancer    UTI (urinary tract infection)    Acute respiratory failure with hypoxia (HCC)    Influenza A      Home Pulmonary Medications:         Past Medical History:   Diagnosis Date    Bladder cancer ([place] [address] )     BPH (benign prostatic hyperplasia)     COPD (chronic obstructive pulmonary disease) ([place] [address] )     Diabetes mellitus ([place] [address] )     Hyperlipidemia     Lung cancer ([place] [address] )     Rib fractures      Social History     Socioeconomic History    Marital status: Single     Spouse name: None    Number of children: None    Years of education: None    Highest education level: None   Occupational History    None   Tobacco Use    Smoking status: Current Every Day Smoker     Packs/day: 1 00    Smokeless tobacco: Never Used   Vaping Use    Vaping Use: Never used   Substance and Sexual Activity    Alcohol use: Not Currently    Drug use: Not Currently    Sexual activity: None   Other Topics Concern    None   Social History Narrative    None     Social Determinants of Health     Financial Resource Strain: Not on file   Food Insecurity: No Food Insecurity    Worried About Running Out of Food in the Last Year: Never true    Giovanna of Food in the Last Year: Never true   Transportation Needs: No Transportation Needs    Lack of Transportation (Medical): No    Lack of Transportation (Non-Medical):  No   Physical Activity: Not on file   Stress: Not on file   Social Connections: Not on file   Intimate Partner Violence: Not on file   Housing Stability: Low Risk     Unable to Pay for Housing in the Last Year: No    Number of Places Lived in the Last Year: 1    Unstable Housing in the Last Year: No Subjective    Subjective Data: Patient is current 1 1/2 pack per day smoker  He denies use of  suplimental oxygen or CPAP/BiPAP use at home  He husues trelogy daily as well as an albuterol rescue inhaler    Objective    Physical Exam:   Assessment Type: During-treatment  General Appearance: Alert,Awake  Respiratory Pattern: Dyspnea with exertion  Chest Assessment: Chest expansion symmetrical  Bilateral Breath Sounds: Diminished    Vitals:  Blood pressure 122/76, pulse 83, temperature 98 5 °F (36 9 °C), resp  rate 17, height 6' 1" (1 854 m), weight 114 kg (251 lb), SpO2 91 %  Imaging and other studies: I have personally reviewed pertinent reports        O2 Device: 6l nc     Plan    Respiratory Plan: Mild Distress pathway        Resp Comments: prod cough small thick yellow

## 2022-04-03 NOTE — ASSESSMENT & PLAN NOTE
Most likely secondary to influenza a, patient also has history of lung cancer in remission  Continue respiratory protocol  Maintain saturation 89% or above  Pulmonary consult appreciated  Patient is still on 6 L of oxygen saturating 87-89%  Follow repeat chest x-ray

## 2022-04-03 NOTE — ASSESSMENT & PLAN NOTE
Most likely secondary to COPD exacerbation secondary to influenza a  Patient also has history of lung cancer currently in remission for 1 year  Influenza A positive, COVID negative  Patient was saturating 88% on room air, initially patient was using 4 L of oxygen, now patient is requiring 6 L  CTA PE negative,  Pulmonary consult appreciated  Follow repeat chest x-ray

## 2022-04-03 NOTE — RESPIRATORY THERAPY NOTE
RT Protocol Note  Elzbitea Merchant 78 y o  male MRN: [id number]  Unit/Bed#: -01 Encounter: [id number]    Assessment    Principal Problem:    Sepsis, viral ([place] [address] )  Active Problems:    Type 2 diabetes mellitus, without long-term current use of insulin (HCC)    Tobacco abuse    COPD with acute exacerbation ([place] [address] )    History of lung cancer    UTI (urinary tract infection)    Acute respiratory failure with hypoxia (HCC)    Influenza A      Home Pulmonary Medications:         Past Medical History:   Diagnosis Date    Bladder cancer ([place] [address] )     BPH (benign prostatic hyperplasia)     COPD (chronic obstructive pulmonary disease) ([place] [address] )     Diabetes mellitus ([place] [address] )     Hyperlipidemia     Lung cancer ([place] [address] )     Rib fractures      Social History     Socioeconomic History    Marital status: Single     Spouse name: None    Number of children: None    Years of education: None    Highest education level: None   Occupational History    None   Tobacco Use    Smoking status: Current Every Day Smoker     Packs/day: 1 00    Smokeless tobacco: Never Used   Vaping Use    Vaping Use: Never used   Substance and Sexual Activity    Alcohol use: Not Currently    Drug use: Not Currently    Sexual activity: None   Other Topics Concern    None   Social History Narrative    None     Social Determinants of Health     Financial Resource Strain: Not on file   Food Insecurity: No Food Insecurity    Worried About Running Out of Food in the Last Year: Never true    Giovanna of Food in the Last Year: Never true   Transportation Needs: No Transportation Needs    Lack of Transportation (Medical): No    Lack of Transportation (Non-Medical):  No   Physical Activity: Not on file   Stress: Not on file   Social Connections: Not on file   Intimate Partner Violence: Not on file   Housing Stability: Low Risk     Unable to Pay for Housing in the Last Year: No    Number of Places Lived in the Last Year: 1    Unstable Housing in the Last Year: No Subjective    Subjective Data: Patient is current 1 1/2 pack per day smoker  He denies use of  suplimental oxygen or CPAP/BiPAP use at home  He husues trelogy daily as well as an albuterol rescue inhaler    Objective    Physical Exam:   Assessment Type: During-treatment  General Appearance: Alert,Awake  Respiratory Pattern: Dyspnea with exertion  Chest Assessment: Chest expansion symmetrical  Bilateral Breath Sounds: Diminished  O2 Device: 5L NC    Vitals:  Blood pressure 121/74, pulse 74, temperature 97 7 °F (36 5 °C), resp  rate 18, height 6' 1" (1 854 m), weight 113 kg (249 lb 2 oz), SpO2 90 %  Imaging and other studies: I have personally reviewed pertinent reports  O2 Device: 5L NC     Plan    Respiratory Plan: Mild Distress pathway        Resp Comments: pt sitting out in chair  no distress   states his "breathing is getting better and they turned my oxygen down to 5 "  will continue current therapy

## 2022-04-03 NOTE — PLAN OF CARE
Problem: INFECTION - ADULT  Goal: Absence or prevention of progression during hospitalization  Description: INTERVENTIONS:  - Assess and monitor for signs and symptoms of infection  - Monitor lab/diagnostic results  - Monitor all insertion sites, i e  indwelling lines, tubes, and drains  - Monitor endotracheal if appropriate and nasal secretions for changes in amount and color  - [place] appropriate cooling/warming therapies per order  - Administer medications as ordered  - Instruct and encourage patient and family to use good hand hygiene technique  - Identify and instruct in appropriate isolation precautions for identified infection/condition  Outcome: Progressing     Problem: DISCHARGE PLANNING  Goal: Discharge to home or other facility with appropriate resources  Description: INTERVENTIONS:  - Identify barriers to discharge w/patient and caregiver  - Arrange for needed discharge resources and transportation as appropriate  - Identify discharge learning needs (meds, wound care, etc )  - Arrange for interpretive services to assist at discharge as needed  - Refer to Case Management Department for coordinating discharge planning if the patient needs post-hospital services based on physician/advanced practitioner order or complex needs related to functional status, cognitive ability, or social support system  Outcome: Progressing     Problem: Knowledge Deficit  Goal: Patient/family/caregiver demonstrates understanding of disease process, treatment plan, medications, and discharge instructions  Description: Complete learning assessment and assess knowledge base    Interventions:  - Provide teaching at level of understanding  - Provide teaching via preferred learning methods  Outcome: Progressing

## 2022-04-03 NOTE — PROGRESS NOTES
[address]  Progress Note - Margaret Marie 1955, 78 y o  male MRN: [id number]  Unit/Bed#: -01 Encounter: [id number]  Primary Care Provider: Jessica Dill,    Date and time admitted to hospital: 3/30/2022  1:58 PM    Acute respiratory failure with hypoxia [facility])  Assessment & Plan  Most likely secondary to COPD exacerbation secondary to influenza a  Patient also has history of lung cancer currently in remission for 1 year  Influenza A positive, COVID negative  Patient was saturating 88% on room air, initially patient was using 4 L of oxygen, now patient is requiring 6 L  CTA PE negative,  Pulmonary consult appreciated  Follow repeat chest x-ray    COPD with acute exacerbation ([facility] [address] )  Assessment & Plan  Most likely secondary to influenza a, patient also has history of lung cancer in remission  Continue respiratory protocol  Maintain saturation 89% or above  Pulmonary consult appreciated  Patient is still on 6 L of oxygen saturating 87-89%  Follow repeat chest x-ray    * Sepsis, viral ([facility] [address] )  Assessment & Plan  Most likely secondary to influenza a  Patient was tachycardic, tachypneic, influenza A positive, lactic acid elevated  For continue treatment as per sepsis protocol   blood culture, urine Legionella, pneumonia-negative   Sputum culture multifactorial  Urine culture is showing Enterobacter sensitive to levofloxacin, antibiotic switched to levofloxacin  Procalcitonin negative  Pulmonary consult appreciated    Influenza A  Assessment & Plan  Maintain isolation  Continue supportive care  On Tamiflu    UTI (urinary tract infection)  Assessment & Plan  Urine culture showing Gram-negative rods,-Enterobacter, sensitive to levofloxacin, antibiotic adjusted    History of lung cancer  Assessment & Plan  Follows with Dr Calvin Gomez note reviewed, documentation as below:  HEMATOLOGY/ONCOLOGY DIAGNOSIS:  1  Unresectable adenocarcinoma the right upper lung, 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, PDL1 <1% --> DR     Cancer Staging  Malignant neoplasm of upper lobe of right lung [facility])  Staging form: Lung, AJCC 8th Edition  - Clinical stage from 1/23/2020: Stage IB (cT2a, cN0, cM0) - Unsigned     Past Therapy:   1  Definitive external beam radiotherapy to the right upper lobe of the lung to a dose of 54 Gy delivered in 3 fractions between 02/12/2020 through 02/21/2020  2  Carboplatin/Alimta s/p 4 cycles from 3/3/2020 through 5/5/2020     Current Therapy:  Active Surveillance      CT chest 07/19/2021 with evidence progression        Tobacco abuse  Assessment & Plan  Continue Nicoderm patch    Type 2 diabetes mellitus, without long-term current use of insulin [facility])  Assessment & Plan  Lab Results   Component Value Date    HGBA1C 8 1 (H) 12/28/2021       Recent Labs     04/02/22  1125 04/02/22  1623 04/02/22  2046 04/03/22  0723   POCGLU 255* 211* 226* 181*       Blood Sugar Average: Last 72 hrs:  (P) 187 [id number] patient placed on Solu-Medrol due to COPD, which may affect patient blood sugar  Continue sliding scale, adjust insulin dose based on response  Insulin dose adjusted  Follow hypoglycemia precaution          VTE Pharmacologic Prophylaxis: VTE Score: 7 High Risk (Score >/= 5) - Pharmacological DVT Prophylaxis Ordered: enoxaparin (Lovenox)  Sequential Compression Devices Ordered  Patient Centered Rounds: I performed bedside rounds with nursing staff today  Discussions with Specialists or Other Care Team Provider:  None    Education and Discussions with Family / Patient: Updated  (wife) via phone  Time Spent for Care: 15 minutes  More than 50% of total time spent on counseling and coordination of care as described above      Current Length of Stay: 4 day(s)  Current Patient Status: Inpatient   Certification Statement: The patient will continue to require additional inpatient hospital stay due to To monitor above condition  Discharge Plan: Anticipate discharge in 48-72 hrs to home     Code Status: Level 1 - Full Code    Subjective:   Seen and evaluated during the round  Patient sitting upright position, talking without taking any break  Is still having some short of breath  Objective:     Vitals:   Temp (24hrs), Av °F (36 7 °C), Min:97 7 °F (36 5 °C), Max:98 5 °F (36 9 °C)    Temp:  [97 7 °F (36 5 °C)-98 5 °F (36 9 °C)] 97 7 °F (36 5 °C)  HR:  [74-98] 74  Resp:  [17-18] 18  BP: (121-125)/(73-76) 121/74  SpO2:  [89 %-93 %] 92 %  Body mass index is 32 87 kg/m²  Input and Output Summary (last 24 hours): Intake/Output Summary (Last 24 hours) at 4/3/2022 1023  Last data filed at 4/3/2022 0827  Gross per 24 hour   Intake 900 ml   Output 1400 ml   Net -500 ml       Physical Exam:   Physical Exam  Vitals reviewed  Constitutional:       Appearance: He is obese  HENT:      Nose: No congestion  Mouth/Throat:      Mouth: Mucous membranes are moist       Pharynx: No oropharyngeal exudate  Eyes:      General: No scleral icterus  Conjunctiva/sclera: Conjunctivae normal       Pupils: Pupils are equal, round, and reactive to light  Cardiovascular:      Rate and Rhythm: Normal rate  Heart sounds: No murmur heard  No friction rub  No gallop  Pulmonary:      Effort: Pulmonary effort is normal  No respiratory distress  Breath sounds: Wheezing present  No rhonchi or rales  Abdominal:      General: Abdomen is flat  Bowel sounds are normal  There is no distension  Palpations: There is no mass  Tenderness: There is no abdominal tenderness  Hernia: No hernia is present  Musculoskeletal:         General: Normal range of motion  Cervical back: Normal range of motion  Right lower leg: No edema  Lymphadenopathy:      Cervical: No cervical adenopathy  Skin:     General: Skin is warm  Capillary Refill: Capillary refill takes less than 2 seconds  Neurological:      General: No focal deficit present        Mental Status: He is alert and oriented to person, place, and time  Cranial Nerves: No cranial nerve deficit  Motor: No weakness  Psychiatric:         Mood and Affect: Mood normal          Additional Data:     Labs:  Results from last 7 days   Lab Units 03/31/22  0530   WBC Thousand/uL 6 31   HEMOGLOBIN g/dL 15 3   HEMATOCRIT % 46 0   PLATELETS Thousands/uL 198   NEUTROS PCT % 69   LYMPHS PCT % 14   MONOS PCT % 14*   EOS PCT % 2     Results from last 7 days   Lab Units 03/31/22  0524   SODIUM mmol/L 139   POTASSIUM mmol/L 4 4   CHLORIDE mmol/L 101   CO2 mmol/L 32   BUN mg/dL 14   CREATININE mg/dL 0 85   ANION GAP mmol/L 6   CALCIUM mg/dL 8 4   ALBUMIN g/dL 3 5   TOTAL BILIRUBIN mg/dL 0 44   ALK PHOS U/L 51   ALT U/L 51   AST U/L 46*   GLUCOSE RANDOM mg/dL 162*         Results from last 7 days   Lab Units 04/03/22  0723 04/02/22  2046 04/02/22  1623 04/02/22  1125 04/02/22  0733 04/02/22  0427 04/01/22  2146 04/01/22  1617 04/01/22  1125 04/01/22  0803 03/31/22  2106 03/31/22  1558   POC GLUCOSE mg/dl 181* 226* 211* 255* 177* 173* 205* 161* 229* 160* 203* 164*         Results from last 7 days   Lab Units 03/31/22  0524 03/30/22  1743 03/30/22  1425 03/30/22  1424   LACTIC ACID mmol/L  --  1 9 2 7*  --    PROCALCITONIN ng/ml 0 08  --   --  0 11       Lines/Drains:  Invasive Devices  Report    Peripheral Intravenous Line            Peripheral IV 03/31/22 Dorsal (posterior); Left Wrist 3 days    Peripheral IV 03/31/22 Dorsal (posterior); Right Forearm 2 days                      Imaging: No pertinent imaging reviewed  Recent Cultures (last 7 days):   Results from last 7 days   Lab Units 04/01/22  0630 03/31/22  0736 03/30/22  1751 03/30/22  1424   BLOOD CULTURE   --   --   --  No Growth at 72 hrs  No Growth at 72 hrs     SPUTUM CULTURE   --  2+ Growth of Candida tropicalis*  2+ Growth of   --   --    GRAM STAIN RESULT   --  1+ Epithelial cells per low power field*  Rare Polys*  4+ Budding Yeast with Pseudomycelia*  4+ Gram positive rods*  Rare Gram negative rods*  --   --    URINE CULTURE   --   --  >100,000 cfu/ml Enterobacter cloacae*  >100,000 cfu/ml Enterobacter cloacae complex*  10,000-19,000 cfu/ml   --    LEGIONELLA URINARY ANTIGEN  Negative  --   --   --        Last 24 Hours Medication List:   Current Facility-Administered Medications   Medication Dose Route Frequency Provider Last Rate    acetaminophen  650 mg Oral Q6H PRN Timoteo Mccloud MD      albuterol  2 puff Inhalation Q4H PRN Timoteo Mccloud MD      atorvastatin  40 mg Oral Daily Linus Sadler MD      benzonatate  100 mg Oral TID PRN Timoteo Mccloud MD      budesonide  0 5 mg Nebulization Q12H Edelmira Sadler MD      enoxaparin  40 mg Subcutaneous Daily Timoteo Mccloud MD      finasteride  5 mg Oral Daily Linus Sadler MD      gabapentin  100 mg Oral BID Timoteo Mccloud MD      guaiFENesin  600 mg Oral Q12H [facility] Linus Sadler MD      insulin lispro  10 Units Subcutaneous TID With Meals Linus Sadler MD      insulin lispro  2-12 Units Subcutaneous TID AC Linus Sadler MD      insulin lispro  2-12 Units Subcutaneous HS Linus Sadler MD      ipratropium  0 5 mg Nebulization Q6H Linus Sadler MD      levalbuterol  1 25 mg Nebulization Q6H Linus Sadler MD      [START ON 4/4/2022] levofloxacin  750 mg Oral Q24H Edelmira Sadler MD      methylPREDNISolone sodium succinate  60 mg Intravenous [facility] Joseph Esquivel MD      nicotine  21 mg Transdermal Daily Timoteo Mccloud MD      oseltamivir  75 mg Oral Q12H [facility] Timoteo Mccloud MD      oxyCODONE-acetaminophen  1 tablet Oral Q4H PRN Timoteo Mccloud MD      sodium chloride  1 spray Each Nare Q1H PRN Timoteo Mccloud MD      tamsulosin  0 4 mg Oral Daily Timoteo Mccloud MD          Today, Patient Was Seen By: Timoteo Mccloud MD    **Please Note: This note may have been constructed using a voice recognition system  **

## 2022-04-03 NOTE — PROGRESS NOTES
The levofloxacin has / have been converted to Oral per [facility] IV-to-PO Auto-Conversion Protocol for Adults as approved by the Pharmacy and Therapeutics Committee  The patient met all eligible criteria:  3 Age = 25years old   2) Received at least one dose of the IV form   3) Receiving at least one other scheduled oral/enteral medication   4) Tolerating an oral/enteral diet   and did not have any exclusions:   1) Critical care patient   2) Active GI bleed (IF assessing H2RAs or PPIs)   3) Continuous tube feeding (IF assessing cipro, doxycycline, levofloxacin, minocycline, rifampin, or voriconazole)   4) Receiving PO vancomycin (IF assessing metronidazole)   5) Persistent nausea and/or vomiting   6) Ileus or gastrointestinal obstruction   7) Esau/nasogastric tube set for continuous suction   8) Specific order not to automatically convert to PO (in the order's comments or if discussed in the most recent Infectious Disease or primary team's progress notes)

## 2022-04-04 LAB
ALBUMIN SERPL BCP-MCNC: 3.4 G/DL (ref 3.5–5)
ALP SERPL-CCNC: 50 U/L (ref 46–116)
ALT SERPL W P-5'-P-CCNC: 92 U/L (ref 12–78)
ANION GAP SERPL CALCULATED.3IONS-SCNC: 6 MMOL/L (ref 4–13)
AST SERPL W P-5'-P-CCNC: 26 U/L (ref 5–45)
BACTERIA BLD CULT: NORMAL
BACTERIA BLD CULT: NORMAL
BASOPHILS # BLD AUTO: 0.02 THOUSANDS/ΜL (ref 0–0.1)
BASOPHILS NFR BLD AUTO: 0 % (ref 0–1)
BILIRUB SERPL-MCNC: 0.57 MG/DL (ref 0.2–1)
BUN SERPL-MCNC: 24 MG/DL (ref 5–25)
CALCIUM ALBUM COR SERPL-MCNC: 9.2 MG/DL (ref 8.3–10.1)
CALCIUM SERPL-MCNC: 8.7 MG/DL (ref 8.3–10.1)
CHLORIDE SERPL-SCNC: 100 MMOL/L (ref 100–108)
CO2 SERPL-SCNC: 33 MMOL/L (ref 21–32)
CREAT SERPL-MCNC: 0.84 MG/DL (ref 0.6–1.3)
EOSINOPHIL # BLD AUTO: 0.01 THOUSAND/ΜL (ref 0–0.61)
EOSINOPHIL NFR BLD AUTO: 0 % (ref 0–6)
ERYTHROCYTE [DISTWIDTH] IN BLOOD BY AUTOMATED COUNT: 12.9 % (ref 11.6–15.1)
GFR SERPL CREATININE-BSD FRML MDRD: 90 ML/MIN/1.73SQ M
GLUCOSE SERPL-MCNC: 184 MG/DL (ref 65–140)
GLUCOSE SERPL-MCNC: 198 MG/DL (ref 65–140)
GLUCOSE SERPL-MCNC: 205 MG/DL (ref 65–140)
GLUCOSE SERPL-MCNC: 209 MG/DL (ref 65–140)
GLUCOSE SERPL-MCNC: 333 MG/DL (ref 65–140)
HCT VFR BLD AUTO: 46.7 % (ref 36.5–49.3)
HGB BLD-MCNC: 15 G/DL (ref 12–17)
IMM GRANULOCYTES # BLD AUTO: 0.25 THOUSAND/UL (ref 0–0.2)
IMM GRANULOCYTES NFR BLD AUTO: 2 % (ref 0–2)
LYMPHOCYTES # BLD AUTO: 1.26 THOUSANDS/ΜL (ref 0.6–4.47)
LYMPHOCYTES NFR BLD AUTO: 10 % (ref 14–44)
MCH RBC QN AUTO: 29.2 PG (ref 26.8–34.3)
MCHC RBC AUTO-ENTMCNC: 32.1 G/DL (ref 31.4–37.4)
MCV RBC AUTO: 91 FL (ref 82–98)
MONOCYTES # BLD AUTO: 0.65 THOUSAND/ΜL (ref 0.17–1.22)
MONOCYTES NFR BLD AUTO: 5 % (ref 4–12)
NEUTROPHILS # BLD AUTO: 10.45 THOUSANDS/ΜL (ref 1.85–7.62)
NEUTS SEG NFR BLD AUTO: 83 % (ref 43–75)
NRBC BLD AUTO-RTO: 0 /100 WBCS
PLATELET # BLD AUTO: 239 THOUSANDS/UL (ref 149–390)
PMV BLD AUTO: 9.9 FL (ref 8.9–12.7)
POTASSIUM SERPL-SCNC: 3.9 MMOL/L (ref 3.5–5.3)
PROT SERPL-MCNC: 6.8 G/DL (ref 6.4–8.2)
RBC # BLD AUTO: 5.14 MILLION/UL (ref 3.88–5.62)
SODIUM SERPL-SCNC: 139 MMOL/L (ref 136–145)
WBC # BLD AUTO: 12.64 THOUSAND/UL (ref 4.31–10.16)

## 2022-04-04 PROCEDURE — 94760 N-INVAS EAR/PLS OXIMETRY 1: CPT

## 2022-04-04 PROCEDURE — 82948 REAGENT STRIP/BLOOD GLUCOSE: CPT

## 2022-04-04 PROCEDURE — 94640 AIRWAY INHALATION TREATMENT: CPT

## 2022-04-04 PROCEDURE — 99233 SBSQ HOSP IP/OBS HIGH 50: CPT | Performed by: FAMILY MEDICINE

## 2022-04-04 PROCEDURE — 80053 COMPREHEN METABOLIC PANEL: CPT | Performed by: FAMILY MEDICINE

## 2022-04-04 PROCEDURE — 85025 COMPLETE CBC W/AUTO DIFF WBC: CPT | Performed by: FAMILY MEDICINE

## 2022-04-04 RX ORDER — METHYLPREDNISOLONE SODIUM SUCCINATE 125 MG/2ML
60 INJECTION, POWDER, LYOPHILIZED, FOR SOLUTION INTRAMUSCULAR; INTRAVENOUS EVERY 12 HOURS SCHEDULED
Status: DISCONTINUED | OUTPATIENT
Start: 2022-04-04 | End: 2022-04-07

## 2022-04-04 RX ADMIN — BUDESONIDE 0.5 MG: 0.5 INHALANT ORAL at 07:21

## 2022-04-04 RX ADMIN — TAMSULOSIN HYDROCHLORIDE 0.4 MG: 0.4 CAPSULE ORAL at 09:00

## 2022-04-04 RX ADMIN — IPRATROPIUM BROMIDE 0.5 MG: 0.5 SOLUTION RESPIRATORY (INHALATION) at 07:21

## 2022-04-04 RX ADMIN — IPRATROPIUM BROMIDE 0.5 MG: 0.5 SOLUTION RESPIRATORY (INHALATION) at 02:17

## 2022-04-04 RX ADMIN — IPRATROPIUM BROMIDE 0.5 MG: 0.5 SOLUTION RESPIRATORY (INHALATION) at 13:16

## 2022-04-04 RX ADMIN — GABAPENTIN 100 MG: 100 CAPSULE ORAL at 17:24

## 2022-04-04 RX ADMIN — FINASTERIDE 5 MG: 5 TABLET, FILM COATED ORAL at 09:00

## 2022-04-04 RX ADMIN — GUAIFENESIN 600 MG: 600 TABLET ORAL at 09:00

## 2022-04-04 RX ADMIN — LEVALBUTEROL HYDROCHLORIDE 1.25 MG: 1.25 SOLUTION RESPIRATORY (INHALATION) at 19:14

## 2022-04-04 RX ADMIN — INSULIN LISPRO 2 UNITS: 100 INJECTION, SOLUTION INTRAVENOUS; SUBCUTANEOUS at 21:07

## 2022-04-04 RX ADMIN — LEVALBUTEROL HYDROCHLORIDE 1.25 MG: 1.25 SOLUTION RESPIRATORY (INHALATION) at 07:21

## 2022-04-04 RX ADMIN — OSELTAMIVIR PHOSPHATE 75 MG: 75 CAPSULE ORAL at 21:07

## 2022-04-04 RX ADMIN — LEVOFLOXACIN 750 MG: 750 TABLET, FILM COATED ORAL at 09:00

## 2022-04-04 RX ADMIN — ENOXAPARIN SODIUM 40 MG: 40 INJECTION SUBCUTANEOUS at 09:00

## 2022-04-04 RX ADMIN — LEVALBUTEROL HYDROCHLORIDE 1.25 MG: 1.25 SOLUTION RESPIRATORY (INHALATION) at 02:17

## 2022-04-04 RX ADMIN — GUAIFENESIN 600 MG: 600 TABLET ORAL at 21:07

## 2022-04-04 RX ADMIN — BUDESONIDE 0.5 MG: 0.5 INHALANT ORAL at 19:14

## 2022-04-04 RX ADMIN — METHYLPREDNISOLONE SODIUM SUCCINATE 60 MG: 125 INJECTION, POWDER, FOR SOLUTION INTRAMUSCULAR; INTRAVENOUS at 21:07

## 2022-04-04 RX ADMIN — INSULIN LISPRO 4 UNITS: 100 INJECTION, SOLUTION INTRAVENOUS; SUBCUTANEOUS at 08:59

## 2022-04-04 RX ADMIN — IPRATROPIUM BROMIDE 0.5 MG: 0.5 SOLUTION RESPIRATORY (INHALATION) at 19:14

## 2022-04-04 RX ADMIN — INSULIN LISPRO 8 UNITS: 100 INJECTION, SOLUTION INTRAVENOUS; SUBCUTANEOUS at 12:01

## 2022-04-04 RX ADMIN — GABAPENTIN 100 MG: 100 CAPSULE ORAL at 09:00

## 2022-04-04 RX ADMIN — OSELTAMIVIR PHOSPHATE 75 MG: 75 CAPSULE ORAL at 09:00

## 2022-04-04 RX ADMIN — INSULIN LISPRO 2 UNITS: 100 INJECTION, SOLUTION INTRAVENOUS; SUBCUTANEOUS at 17:09

## 2022-04-04 RX ADMIN — METHYLPREDNISOLONE SODIUM SUCCINATE 60 MG: 125 INJECTION, POWDER, FOR SOLUTION INTRAMUSCULAR; INTRAVENOUS at 05:10

## 2022-04-04 RX ADMIN — LEVALBUTEROL HYDROCHLORIDE 1.25 MG: 1.25 SOLUTION RESPIRATORY (INHALATION) at 13:16

## 2022-04-04 RX ADMIN — ATORVASTATIN CALCIUM 40 MG: 40 TABLET, FILM COATED ORAL at 09:00

## 2022-04-04 NOTE — ASSESSMENT & PLAN NOTE
Most likely secondary to COPD exacerbation secondary to influenza a  Patient also has history of lung cancer currently in remission for 1 year  Influenza A positive, COVID negative  Patient was saturating 88% on room air, initially patient was using 4 L of oxygen, now patient is requiring 5 L  CTA PE negative,  Pulmonary consult appreciated  Repeat x-ray no significant  Start decreasing the frequency of IV steroid to 60 mg b i d   From 60 mg t i d

## 2022-04-04 NOTE — ASSESSMENT & PLAN NOTE
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

## 2022-04-04 NOTE — ASSESSMENT & PLAN NOTE
Most likely secondary to influenza a, patient also has history of lung cancer in remission  Continue respiratory protocol  Maintain saturation 89% or above  Pulmonary consult appreciated  Patient is still on 5 L of oxygen   repeat chest x-ray-nonsignificant  Will start tapering IV Solu-Medrol, patient was getting 60 mg t i d  -reduced it to 60 mg b i d

## 2022-04-04 NOTE — PLAN OF CARE
Problem: PAIN - ADULT  Goal: Verbalizes/displays adequate comfort level or baseline comfort level  Description: Interventions:  - Encourage patient to monitor pain and request assistance  - Assess pain using appropriate pain scale  - Administer analgesics based on type and severity of pain and evaluate response  - Implement non-pharmacological measures as appropriate and evaluate response  - Consider cultural and social influences on pain and pain management  - Notify physician/advanced practitioner if interventions unsuccessful or patient reports new pain  Outcome: Progressing     Problem: INFECTION - ADULT  Goal: Absence or prevention of progression during hospitalization  Description: INTERVENTIONS:  - Assess and monitor for signs and symptoms of infection  - Monitor lab/diagnostic results  - Monitor all insertion sites, i e  indwelling lines, tubes, and drains  - Monitor endotracheal if appropriate and nasal secretions for changes in amount and color  - [place] appropriate cooling/warming therapies per order  - Administer medications as ordered  - Instruct and encourage patient and family to use good hand hygiene technique  - Identify and instruct in appropriate isolation precautions for identified infection/condition  Outcome: Progressing     Problem: SAFETY ADULT  Goal: Patient will remain free of falls  Description: INTERVENTIONS:  - Educate patient/family on patient safety including physical limitations  - Instruct patient to call for assistance with activity   - Consult OT/PT to assist with strengthening/mobility   - Keep Call bell within reach  - Keep bed low and locked with side rails adjusted as appropriate  - Keep care items and personal belongings within reach  - Initiate and maintain comfort rounds  - Make Fall Risk Sign visible to staff  - Offer Toileting every  Hours, in advance of need  - Initiate/Maintain alarm  - Obtain necessary fall risk management equipment:   - Apply yellow socks and bracelet for high fall risk patients  - Consider moving patient to room near nurses station  Outcome: Progressing  Goal: Maintain or return to baseline ADL function  Description: INTERVENTIONS:  -  Assess patient's ability to carry out ADLs; assess patient's baseline for ADL function and identify physical deficits which impact ability to perform ADLs (bathing, care of mouth/teeth, toileting, grooming, dressing, etc )  - Assess/evaluate cause of self-care deficits   - Assess range of motion  - Assess patient's mobility; develop plan if impaired  - Assess patient's need for assistive devices and provide as appropriate  - Encourage maximum independence but intervene and supervise when necessary  - Involve family in performance of ADLs  - Assess for home care needs following discharge   - Consider OT consult to assist with ADL evaluation and planning for discharge  - Provide patient education as appropriate  Outcome: Progressing  Goal: Maintains/Returns to pre admission functional level  Description: INTERVENTIONS:  - Perform BMAT or MOVE assessment daily    - Set and communicate daily mobility goal to care team and patient/family/caregiver  - Collaborate with rehabilitation services on mobility goals if consulted  - Perform Range of Motion  times a day  - Reposition patient every  hours    - Dangle patient  times a day  - Stand patient  times a day  - Ambulate patient  times a day  - Out of bed to chair  times a day   - Out of bed for meals  times a day  - Out of bed for toileting  - Record patient progress and toleration of activity level   Outcome: Progressing     Problem: DISCHARGE PLANNING  Goal: Discharge to home or other facility with appropriate resources  Description: INTERVENTIONS:  - Identify barriers to discharge w/patient and caregiver  - Arrange for needed discharge resources and transportation as appropriate  - Identify discharge learning needs (meds, wound care, etc )  - Arrange for interpretive services to assist at discharge as needed  - Refer to Case Management Department for coordinating discharge planning if the patient needs post-hospital services based on physician/advanced practitioner order or complex needs related to functional status, cognitive ability, or social support system  Outcome: Progressing     Problem: Knowledge Deficit  Goal: Patient/family/caregiver demonstrates understanding of disease process, treatment plan, medications, and discharge instructions  Description: Complete learning assessment and assess knowledge base    Interventions:  - Provide teaching at level of understanding  - Provide teaching via preferred learning methods  Outcome: Progressing     Problem: Potential for Falls  Goal: Patient will remain free of falls  Description: INTERVENTIONS:  - Educate patient/family on patient safety including physical limitations  - Instruct patient to call for assistance with activity   - Consult OT/PT to assist with strengthening/mobility   - Keep Call bell within reach  - Keep bed low and locked with side rails adjusted as appropriate  - Keep care items and personal belongings within reach  - Initiate and maintain comfort rounds  - Make Fall Risk Sign visible to staff  - Offer Toileting every  Hours, in advance of need  - Initiate/Maintain alarm  - Obtain necessary fall risk management equipment:   - Apply yellow socks and bracelet for high fall risk patients  - Consider moving patient to room near nurses station  Outcome: Progressing     Problem: MOBILITY - ADULT  Goal: Maintain or return to baseline ADL function  Description: INTERVENTIONS:  -  Assess patient's ability to carry out ADLs; assess patient's baseline for ADL function and identify physical deficits which impact ability to perform ADLs (bathing, care of mouth/teeth, toileting, grooming, dressing, etc )  - Assess/evaluate cause of self-care deficits   - Assess range of motion  - Assess patient's mobility; develop plan if impaired  - Assess patient's need for assistive devices and provide as appropriate  - Encourage maximum independence but intervene and supervise when necessary  - Involve family in performance of ADLs  - Assess for home care needs following discharge   - Consider OT consult to assist with ADL evaluation and planning for discharge  - Provide patient education as appropriate  Outcome: Progressing  Goal: Maintains/Returns to pre admission functional level  Description: INTERVENTIONS:  - Perform BMAT or MOVE assessment daily    - Set and communicate daily mobility goal to care team and patient/family/caregiver  - Collaborate with rehabilitation services on mobility goals if consulted  - Perform Range of Motion  times a day  - Reposition patient every  hours    - Dangle patient  times a day  - Stand patient  times a day  - Ambulate patient  times a day  - Out of bed to chair  times a day   - Out of bed for meals  times a day  - Out of bed for toileting  - Record patient progress and toleration of activity level   Outcome: Progressing

## 2022-04-04 NOTE — PROGRESS NOTES
[address]  Progress Note - Maylin Kelly 1955, 78 y o  male MRN: [id number]  Unit/Bed#: -01 Encounter: [id number]  Primary Care Provider: Mara Kayser, DO   Date and time admitted to hospital: 3/30/2022  1:58 PM    Acute respiratory failure with hypoxia [facility])  Assessment & Plan  Most likely secondary to COPD exacerbation secondary to influenza a  Patient also has history of lung cancer currently in remission for 1 year  Influenza A positive, COVID negative  Patient was saturating 88% on room air, initially patient was using 4 L of oxygen, now patient is requiring 5 L  CTA PE negative,  Pulmonary consult appreciated  Repeat x-ray no significant  Start decreasing the frequency of IV steroid to 60 mg b i d   From 60 mg t i d     COPD with acute exacerbation ([facility] [address] )  Assessment & Plan  Most likely secondary to influenza a, patient also has history of lung cancer in remission  Continue respiratory protocol  Maintain saturation 89% or above  Pulmonary consult appreciated  Patient is still on 5 L of oxygen   repeat chest x-ray-nonsignificant  Will start tapering IV Solu-Medrol, patient was getting 60 mg t i d  -reduced it to 60 mg b i d     * Sepsis, viral ([facility] [address] )  Assessment & Plan  Most likely secondary to influenza a  Patient was tachycardic, tachypneic, influenza A positive, lactic acid elevated  For continue treatment as per sepsis protocol   blood culture, urine Legionella, pneumonia-negative   Sputum culture multifactorial  Urine culture is showing Enterobacter sensitive to levofloxacin, antibiotic switched to levofloxacin  Procalcitonin negative  Pulmonary consult appreciated      Influenza A  Assessment & Plan  Maintain isolation  Continue supportive care  On Tamiflu    UTI (urinary tract infection)  Assessment & Plan  Urine culture showing Gram-negative rods,-Enterobacter, sensitive to levofloxacin, antibiotic adjusted    History of lung cancer  Assessment & Plan  Follows with Dr Alea Hart note reviewed, documentation as below:  HEMATOLOGY/ONCOLOGY DIAGNOSIS:  1  Unresectable adenocarcinoma the right upper lung, 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, PDL1 <1% -->      Cancer Staging  Malignant neoplasm of upper lobe of right lung [facility])  Staging form: Lung, AJCC 8th Edition  - Clinical stage from 1/23/2020: Stage IB (cT2a, cN0, cM0) - Unsigned     Past Therapy:   1  Definitive external beam radiotherapy to the right upper lobe of the lung to a dose of 54 Gy delivered in 3 fractions between 02/12/2020 through 02/21/2020  2  Carboplatin/Alimta s/p 4 cycles from 3/3/2020 through 5/5/2020     Current Therapy:  Active Surveillance      CT chest 07/19/2021 with evidence progression        Tobacco abuse  Assessment & Plan  Continue Nicoderm patch    Type 2 diabetes mellitus, without long-term current use of insulin [facility])  Assessment & Plan  Lab Results   Component Value Date    HGBA1C 8 1 (H) 12/28/2021       Recent Labs     04/03/22  1616 04/03/22  2051 04/04/22  0736 04/04/22  1104   POCGLU 190* 288* 205* 333*       Blood Sugar Average: Last 72 hrs:  (P) 932 [id number] patient placed on Solu-Medrol due to COPD, which may affect patient blood sugar  Continue sliding scale, adjust insulin dose based on response  Insulin dose adjusted  Follow hypoglycemia precaution          VTE Pharmacologic Prophylaxis: VTE Score: 7 High Risk (Score >/= 5) - Pharmacological DVT Prophylaxis Ordered: enoxaparin (Lovenox)  Sequential Compression Devices Ordered  Patient Centered Rounds: I performed bedside rounds with nursing staff today  Discussions with Specialists or Other Care Team Provider:  None    Education and Discussions with Family / Patient: Updated  (wife) via phone  Time Spent for Care: 15 minutes  More than 50% of total time spent on counseling and coordination of care as described above      Current Length of Stay: 5 day(s)  Current Patient Status: Inpatient   Certification Statement: The patient will continue to require additional inpatient hospital stay due to To monitor above condition  Discharge Plan: Anticipate discharge in 48-72 hrs to To be determined    Code Status: Level 1 - Full Code    Subjective:   Seen and evaluated during the round  Patient sitting upright position  Denies any significant complaint other than mild short of breath  Objective:     Vitals:   Temp (24hrs), Av 7 °F (36 5 °C), Min:97 3 °F (36 3 °C), Max:98 2 °F (36 8 °C)    Temp:  [97 3 °F (36 3 °C)-98 2 °F (36 8 °C)] 97 3 °F (36 3 °C)  HR:  [67-91] 67  Resp:  [18-20] 18  BP: (114-141)/(76-86) 135/76  SpO2:  [89 %-93 %] 90 %  Body mass index is 32 88 kg/m²  Input and Output Summary (last 24 hours): Intake/Output Summary (Last 24 hours) at 2022 1258  Last data filed at 2022 0841  Gross per 24 hour   Intake 720 ml   Output 1200 ml   Net -480 ml       Physical Exam:   Physical Exam  Vitals and nursing note reviewed  Constitutional:       Appearance: He is obese  HENT:      Head: Normocephalic  Mouth/Throat:      Pharynx: No oropharyngeal exudate  Eyes:      General: No scleral icterus  Conjunctiva/sclera: Conjunctivae normal       Pupils: Pupils are equal, round, and reactive to light  Cardiovascular:      Rate and Rhythm: Normal rate  Heart sounds: No murmur heard  No friction rub  No gallop  Pulmonary:      Effort: Pulmonary effort is normal  No respiratory distress  Breath sounds: Wheezing present  No rhonchi or rales  Abdominal:      General: Abdomen is flat  Bowel sounds are normal  There is no distension  Palpations: There is no mass  Tenderness: There is no abdominal tenderness  Hernia: No hernia is present  Musculoskeletal:         General: Normal range of motion  Cervical back: Normal range of motion  Right lower leg: No edema  Left lower leg: No edema  Skin:     General: Skin is warm        Capillary Refill: Capillary refill takes less than 2 seconds  Findings: No lesion  Neurological:      General: No focal deficit present  Mental Status: He is alert and oriented to person, place, and time  Cranial Nerves: No cranial nerve deficit  Sensory: No sensory deficit  Motor: No weakness  Coordination: Coordination normal          Additional Data:     Labs:  Results from last 7 days   Lab Units 04/04/22  0455   WBC Thousand/uL 12 64*   HEMOGLOBIN g/dL 15 0   HEMATOCRIT % 46 7   PLATELETS Thousands/uL 239   NEUTROS PCT % 83*   LYMPHS PCT % 10*   MONOS PCT % 5   EOS PCT % 0     Results from last 7 days   Lab Units 04/04/22  0455   SODIUM mmol/L 139   POTASSIUM mmol/L 3 9   CHLORIDE mmol/L 100   CO2 mmol/L 33*   BUN mg/dL 24   CREATININE mg/dL 0 84   ANION GAP mmol/L 6   CALCIUM mg/dL 8 7   ALBUMIN g/dL 3 4*   TOTAL BILIRUBIN mg/dL 0 57   ALK PHOS U/L 50   ALT U/L 92*   AST U/L 26   GLUCOSE RANDOM mg/dL 209*         Results from last 7 days   Lab Units 04/04/22  1104 04/04/22  0736 04/03/22  2051 04/03/22  1616 04/03/22  1114 04/03/22  0723 04/02/22  2046 04/02/22  1623 04/02/22  1125 04/02/22  0733 04/02/22  0427 04/01/22  2146   POC GLUCOSE mg/dl 333* 205* 288* 190* 268* 181* 226* 211* 255* 177* 173* 205*         Results from last 7 days   Lab Units 03/31/22  0524 03/30/22  1743 03/30/22  1425 03/30/22  1424   LACTIC ACID mmol/L  --  1 9 2 7*  --    PROCALCITONIN ng/ml 0 08  --   --  0 11       Lines/Drains:  Invasive Devices  Report    Peripheral Intravenous Line            Peripheral IV 04/04/22 Left Wrist <1 day                      Imaging: Reviewed radiology reports from this admission including: chest xray    Recent Cultures (last 7 days):   Results from last 7 days   Lab Units 04/01/22  0630 03/31/22  0736 03/30/22  1751 03/30/22  1424   BLOOD CULTURE   --   --   --  No Growth After 4 Days  No Growth After 4 Days     SPUTUM CULTURE   --  2+ Growth of Candida tropicalis*  2+ Growth of   -- --    GRAM STAIN RESULT   --  1+ Epithelial cells per low power field*  Rare Polys*  4+ Budding Yeast with Pseudomycelia*  4+ Gram positive rods*  Rare Gram negative rods*  --   --    URINE CULTURE   --   --  >100,000 cfu/ml Enterobacter cloacae*  >100,000 cfu/ml Enterobacter cloacae complex*  10,000-19,000 cfu/ml   --    LEGIONELLA URINARY ANTIGEN  Negative  --   --   --        Last 24 Hours Medication List:   Current Facility-Administered Medications   Medication Dose Route Frequency Provider Last Rate    acetaminophen  650 mg Oral Q6H PRN Radha Reed MD      albuterol  2 puff Inhalation Q4H PRN Radha Reed MD      atorvastatin  40 mg Oral Daily Linus Sadler MD      benzonatate  100 mg Oral TID PRN Radha Reed MD      budesonide  0 5 mg Nebulization Q12H [place] Bradley Sadler MD      enoxaparin  40 mg Subcutaneous Daily Radha Reed MD      finasteride  5 mg Oral Daily Linus Sadler MD      gabapentin  100 mg Oral BID Radha Reed MD      guaiFENesin  600 mg Oral Q12H [facility] Linuseduardo Sadler MD      insulin lispro  10 Units Subcutaneous TID With Meals Linus Sadler MD      insulin lispro  2-12 Units Subcutaneous TID AC Linus Sadler MD      insulin lispro  2-12 Units Subcutaneous HS Radha Reed MD      ipratropium  0 5 mg Nebulization Q6H Linuseduardo Sadler MD      levalbuterol  1 25 mg Nebulization Q6H Linus Sadler MD      levofloxacin  750 mg Oral Q24H Linus Sadler MD      methylPREDNISolone sodium succinate  60 mg Intravenous Q12H [facility] Linuseduardo Aguilar MD      nicotine  21 mg Transdermal Daily Radha Reed MD      oseltamivir  75 mg Oral Q12H [facility] Radha Reed MD      oxyCODONE-acetaminophen  1 tablet Oral Q4H PRN Radha Reed MD      sodium chloride  1 spray Each Nare Q1H PRN Radha Reed MD      tamsulosin  0 4 mg Oral Daily Radha Reed MD          Today, Patient Was Seen By: Radha Reed MD    **Please Note: This note may have been constructed using a voice recognition system  **

## 2022-04-04 NOTE — RESPIRATORY THERAPY NOTE
RT Protocol Note  Cristobal Bull 78 y o  male MRN: [id number]  Unit/Bed#: -01 Encounter: [id number]    Assessment    Principal Problem:    Sepsis, viral ([place] [address] )  Active Problems:    Type 2 diabetes mellitus, without long-term current use of insulin (HCC)    Tobacco abuse    COPD with acute exacerbation ([place] [address] )    History of lung cancer    UTI (urinary tract infection)    Acute respiratory failure with hypoxia (HCC)    Influenza A      Home Pulmonary Medications:         Past Medical History:   Diagnosis Date    Bladder cancer ([place] [address] )     BPH (benign prostatic hyperplasia)     COPD (chronic obstructive pulmonary disease) ([place] [address] )     Diabetes mellitus ([place] [address] )     Hyperlipidemia     Lung cancer ([place] [address] )     Rib fractures      Social History     Socioeconomic History    Marital status: Single     Spouse name: None    Number of children: None    Years of education: None    Highest education level: None   Occupational History    None   Tobacco Use    Smoking status: Current Every Day Smoker     Packs/day: 1 00    Smokeless tobacco: Never Used   Vaping Use    Vaping Use: Never used   Substance and Sexual Activity    Alcohol use: Not Currently    Drug use: Not Currently    Sexual activity: None   Other Topics Concern    None   Social History Narrative    None     Social Determinants of Health     Financial Resource Strain: Not on file   Food Insecurity: No Food Insecurity    Worried About Running Out of Food in the Last Year: Never true    Giovanna of Food in the Last Year: Never true   Transportation Needs: No Transportation Needs    Lack of Transportation (Medical): No    Lack of Transportation (Non-Medical):  No   Physical Activity: Not on file   Stress: Not on file   Social Connections: Not on file   Intimate Partner Violence: Not on file   Housing Stability: Low Risk     Unable to Pay for Housing in the Last Year: No    Number of Places Lived in the Last Year: 1    Unstable Housing in the Last Year: No Subjective    Subjective Data: Patient is current 1 1/2 pack per day smoker  He denies use of  suplimental oxygen or CPAP/BiPAP use at home  He husues trelogy daily as well as an albuterol rescue inhaler    Objective    Physical Exam:   Assessment Type: During-treatment  General Appearance: Awake,Alert  Respiratory Pattern: Normal  Chest Assessment: Chest expansion symmetrical  Bilateral Breath Sounds: Diminished,Expiratory wheezes    Vitals:  Blood pressure 135/76, pulse 67, temperature (!) 97 3 °F (36 3 °C), resp  rate 18, height 6' 1" (1 854 m), weight 113 kg (249 lb 3 2 oz), SpO2 92 %  Imaging and other studies: I have personally reviewed pertinent reports  O2 Device: 5L NC     Plan    Respiratory Plan: Mild Distress pathway        Resp Comments: Pt comfortable OOB in chair  Offers no c/c   No s/s of SOB at present

## 2022-04-04 NOTE — ASSESSMENT & PLAN NOTE
Lab Results   Component Value Date    HGBA1C 8 1 (H) 12/28/2021       Recent Labs     04/03/22  1616 04/03/22  2051 04/04/22  0736 04/04/22  1104   POCGLU 190* 288* 205* 333*       Blood Sugar Average: Last 72 hrs:  (P) 798 [id number] patient placed on Solu-Medrol due to COPD, which may affect patient blood sugar  Continue sliding scale, adjust insulin dose based on response  Insulin dose adjusted  Follow hypoglycemia precaution

## 2022-04-04 NOTE — RESPIRATORY THERAPY NOTE
RT Protocol Note  Nidhi Serrano 78 y o  male MRN: [id number]  Unit/Bed#: -01 Encounter: [id number]    Assessment    Principal Problem:    Sepsis, viral ([place] [address] )  Active Problems:    Type 2 diabetes mellitus, without long-term current use of insulin (HCC)    Tobacco abuse    COPD with acute exacerbation ([place] [address] )    History of lung cancer    UTI (urinary tract infection)    Acute respiratory failure with hypoxia (HCC)    Influenza A      Home Pulmonary Medications:         Past Medical History:   Diagnosis Date    Bladder cancer ([place] [address] )     BPH (benign prostatic hyperplasia)     COPD (chronic obstructive pulmonary disease) ([place] [address] )     Diabetes mellitus ([place] [address] )     Hyperlipidemia     Lung cancer ([place] [address] )     Rib fractures      Social History     Socioeconomic History    Marital status: Single     Spouse name: None    Number of children: None    Years of education: None    Highest education level: None   Occupational History    None   Tobacco Use    Smoking status: Current Every Day Smoker     Packs/day: 1 00    Smokeless tobacco: Never Used   Vaping Use    Vaping Use: Never used   Substance and Sexual Activity    Alcohol use: Not Currently    Drug use: Not Currently    Sexual activity: None   Other Topics Concern    None   Social History Narrative    None     Social Determinants of Health     Financial Resource Strain: Not on file   Food Insecurity: No Food Insecurity    Worried About Running Out of Food in the Last Year: Never true    Giovanna of Food in the Last Year: Never true   Transportation Needs: No Transportation Needs    Lack of Transportation (Medical): No    Lack of Transportation (Non-Medical):  No   Physical Activity: Not on file   Stress: Not on file   Social Connections: Not on file   Intimate Partner Violence: Not on file   Housing Stability: Low Risk     Unable to Pay for Housing in the Last Year: No    Number of Places Lived in the Last Year: 1    Unstable Housing in the Last Year: No Subjective    Subjective Data: Patient is current 1 1/2 pack per day smoker  He denies use of  suplimental oxygen or CPAP/BiPAP use at home  He husues trelogy daily as well as an albuterol rescue inhaler    Objective    Physical Exam:   Assessment Type: During-treatment  General Appearance: Awake,Alert  Respiratory Pattern: Normal  Chest Assessment: Chest expansion symmetrical  Bilateral Breath Sounds: Diminished    Vitals:  Blood pressure 141/76, pulse 75, temperature 98 2 °F (36 8 °C), resp  rate 20, height 6' 1" (1 854 m), weight 113 kg (249 lb 2 oz), SpO2 92 %  Imaging and other studies: I have personally reviewed pertinent reports  O2 Device: 5L NC     Plan    Respiratory Plan: Mild Distress pathway        Resp Comments: Pt awake at this time for scheduled tx, sitting in stretch chair with 5lpm NC  Sp02 continues in the 93% range

## 2022-04-05 ENCOUNTER — APPOINTMENT (INPATIENT)
Dept: NON INVASIVE DIAGNOSTICS | Facility: HOSPITAL | Age: 67
DRG: 871 | End: 2022-04-05
Payer: MEDICARE

## 2022-04-05 PROBLEM — M79.89 LEG SWELLING: Status: ACTIVE | Noted: 2022-04-05

## 2022-04-05 LAB
AORTIC ROOT: 4 CM
APICAL FOUR CHAMBER EJECTION FRACTION: 75 %
E WAVE DECELERATION TIME: 205 MS
FRACTIONAL SHORTENING: 34 % (ref 28–44)
GLUCOSE SERPL-MCNC: 210 MG/DL (ref 65–140)
GLUCOSE SERPL-MCNC: 246 MG/DL (ref 65–140)
GLUCOSE SERPL-MCNC: 264 MG/DL (ref 65–140)
GLUCOSE SERPL-MCNC: 286 MG/DL (ref 65–140)
INTERVENTRICULAR SEPTUM IN DIASTOLE (PARASTERNAL SHORT AXIS VIEW): 1.1 CM
INTERVENTRICULAR SEPTUM: 1.1 CM (ref 0.58–1.08)
LAAS-AP4: 7.5 CM2
LEFT ATRIUM SIZE: 3.2 CM
LEFT INTERNAL DIMENSION IN SYSTOLE: 3.1 CM (ref 5.81–8.81)
LEFT VENTRICULAR INTERNAL DIMENSION IN DIASTOLE: 4.7 CM (ref 9.82–14.64)
LEFT VENTRICULAR POSTERIOR WALL IN END DIASTOLE: 1 CM (ref 0.56–1.07)
LEFT VENTRICULAR STROKE VOLUME: 65 ML
LVSV (TEICH): 65 ML
MV E'TISSUE VEL-LAT: 7 CM/S
MV E'TISSUE VEL-SEP: 7 CM/S
MV PEAK A VEL: 0.99 M/S
MV PEAK E VEL: 76 CM/S
MV STENOSIS PRESSURE HALF TIME: 59 MS
MV VALVE AREA P 1/2 METHOD: 3.73 CM2
RIGHT ATRIAL 2D VOLUME: 14 ML
RIGHT ATRIUM AREA SYSTOLE A4C: 7.7 CM2
RIGHT VENTRICLE ID DIMENSION: 2.8 CM
SL CV PED ECHO LEFT VENTRICLE DIASTOLIC VOLUME (MOD BIPLANE) 2D: 102 ML
SL CV PED ECHO LEFT VENTRICLE SYSTOLIC VOLUME (MOD BIPLANE) 2D: 37 ML
Z-SCORE OF INTERVENTRICULAR SEPTUM IN END DIASTOLE: 2.11
Z-SCORE OF LEFT VENTRICULAR DIMENSION IN END DIASTOLE: -9.37
Z-SCORE OF LEFT VENTRICULAR DIMENSION IN END SYSTOLE: -6.61
Z-SCORE OF LEFT VENTRICULAR POSTERIOR WALL IN END DIASTOLE: 1.44

## 2022-04-05 PROCEDURE — 94640 AIRWAY INHALATION TREATMENT: CPT

## 2022-04-05 PROCEDURE — 99232 SBSQ HOSP IP/OBS MODERATE 35: CPT | Performed by: FAMILY MEDICINE

## 2022-04-05 PROCEDURE — 82948 REAGENT STRIP/BLOOD GLUCOSE: CPT

## 2022-04-05 PROCEDURE — 94760 N-INVAS EAR/PLS OXIMETRY 1: CPT

## 2022-04-05 PROCEDURE — 97110 THERAPEUTIC EXERCISES: CPT

## 2022-04-05 PROCEDURE — 93970 EXTREMITY STUDY: CPT | Performed by: SURGERY

## 2022-04-05 PROCEDURE — 93970 EXTREMITY STUDY: CPT

## 2022-04-05 PROCEDURE — 97116 GAIT TRAINING THERAPY: CPT

## 2022-04-05 PROCEDURE — 93306 TTE W/DOPPLER COMPLETE: CPT

## 2022-04-05 RX ORDER — FUROSEMIDE 10 MG/ML
20 INJECTION INTRAMUSCULAR; INTRAVENOUS ONCE
Status: COMPLETED | OUTPATIENT
Start: 2022-04-05 | End: 2022-04-05

## 2022-04-05 RX ORDER — BUDESONIDE AND FORMOTEROL FUMARATE DIHYDRATE 80; 4.5 UG/1; UG/1
2 AEROSOL RESPIRATORY (INHALATION) 2 TIMES DAILY
Status: DISCONTINUED | OUTPATIENT
Start: 2022-04-05 | End: 2022-04-08 | Stop reason: HOSPADM

## 2022-04-05 RX ADMIN — METHYLPREDNISOLONE SODIUM SUCCINATE 60 MG: 125 INJECTION, POWDER, FOR SOLUTION INTRAMUSCULAR; INTRAVENOUS at 08:36

## 2022-04-05 RX ADMIN — IPRATROPIUM BROMIDE 0.5 MG: 0.5 SOLUTION RESPIRATORY (INHALATION) at 19:09

## 2022-04-05 RX ADMIN — INSULIN LISPRO 6 UNITS: 100 INJECTION, SOLUTION INTRAVENOUS; SUBCUTANEOUS at 21:46

## 2022-04-05 RX ADMIN — BUDESONIDE AND FORMOTEROL FUMARATE DIHYDRATE 2 PUFF: 80; 4.5 AEROSOL RESPIRATORY (INHALATION) at 17:15

## 2022-04-05 RX ADMIN — GUAIFENESIN 600 MG: 600 TABLET ORAL at 08:36

## 2022-04-05 RX ADMIN — ATORVASTATIN CALCIUM 40 MG: 40 TABLET, FILM COATED ORAL at 08:36

## 2022-04-05 RX ADMIN — INSULIN HUMAN 15 UNITS: 100 INJECTION, SUSPENSION SUBCUTANEOUS at 17:18

## 2022-04-05 RX ADMIN — OSELTAMIVIR PHOSPHATE 75 MG: 75 CAPSULE ORAL at 08:36

## 2022-04-05 RX ADMIN — BUDESONIDE AND FORMOTEROL FUMARATE DIHYDRATE 2 PUFF: 80; 4.5 AEROSOL RESPIRATORY (INHALATION) at 14:49

## 2022-04-05 RX ADMIN — LEVALBUTEROL HYDROCHLORIDE 1.25 MG: 1.25 SOLUTION RESPIRATORY (INHALATION) at 07:16

## 2022-04-05 RX ADMIN — LEVALBUTEROL HYDROCHLORIDE 1.25 MG: 1.25 SOLUTION RESPIRATORY (INHALATION) at 13:57

## 2022-04-05 RX ADMIN — FINASTERIDE 5 MG: 5 TABLET, FILM COATED ORAL at 08:36

## 2022-04-05 RX ADMIN — GABAPENTIN 100 MG: 100 CAPSULE ORAL at 17:15

## 2022-04-05 RX ADMIN — INSULIN LISPRO 4 UNITS: 100 INJECTION, SOLUTION INTRAVENOUS; SUBCUTANEOUS at 08:34

## 2022-04-05 RX ADMIN — ENOXAPARIN SODIUM 40 MG: 40 INJECTION SUBCUTANEOUS at 08:35

## 2022-04-05 RX ADMIN — TAMSULOSIN HYDROCHLORIDE 0.4 MG: 0.4 CAPSULE ORAL at 08:36

## 2022-04-05 RX ADMIN — LEVALBUTEROL HYDROCHLORIDE 1.25 MG: 1.25 SOLUTION RESPIRATORY (INHALATION) at 02:35

## 2022-04-05 RX ADMIN — LEVOFLOXACIN 750 MG: 750 TABLET, FILM COATED ORAL at 08:36

## 2022-04-05 RX ADMIN — METHYLPREDNISOLONE SODIUM SUCCINATE 60 MG: 125 INJECTION, POWDER, FOR SOLUTION INTRAMUSCULAR; INTRAVENOUS at 21:46

## 2022-04-05 RX ADMIN — BUDESONIDE 0.5 MG: 0.5 INHALANT ORAL at 07:16

## 2022-04-05 RX ADMIN — INSULIN LISPRO 4 UNITS: 100 INJECTION, SOLUTION INTRAVENOUS; SUBCUTANEOUS at 17:18

## 2022-04-05 RX ADMIN — FUROSEMIDE 20 MG: 10 INJECTION, SOLUTION INTRAMUSCULAR; INTRAVENOUS at 11:38

## 2022-04-05 RX ADMIN — IPRATROPIUM BROMIDE 0.5 MG: 0.5 SOLUTION RESPIRATORY (INHALATION) at 07:16

## 2022-04-05 RX ADMIN — GABAPENTIN 100 MG: 100 CAPSULE ORAL at 08:36

## 2022-04-05 RX ADMIN — LEVALBUTEROL HYDROCHLORIDE 1.25 MG: 1.25 SOLUTION RESPIRATORY (INHALATION) at 19:09

## 2022-04-05 RX ADMIN — IPRATROPIUM BROMIDE 0.5 MG: 0.5 SOLUTION RESPIRATORY (INHALATION) at 13:57

## 2022-04-05 RX ADMIN — INSULIN LISPRO 6 UNITS: 100 INJECTION, SOLUTION INTRAVENOUS; SUBCUTANEOUS at 11:37

## 2022-04-05 RX ADMIN — IPRATROPIUM BROMIDE 0.5 MG: 0.5 SOLUTION RESPIRATORY (INHALATION) at 02:35

## 2022-04-05 RX ADMIN — GUAIFENESIN 600 MG: 600 TABLET ORAL at 21:46

## 2022-04-05 NOTE — RESPIRATORY THERAPY NOTE
RT Protocol Note  Mara Cockayne 78 y o  male MRN: [id number]  Unit/Bed#: -01 Encounter: [id number]    Assessment    Principal Problem:    Sepsis, viral ([place] [address] )  Active Problems:    Type 2 diabetes mellitus, without long-term current use of insulin (HCC)    Tobacco abuse    COPD with acute exacerbation ([place] [address] )    History of lung cancer    UTI (urinary tract infection)    Acute respiratory failure with hypoxia (HCC)    Influenza A      Home Pulmonary Medications:         Past Medical History:   Diagnosis Date    Bladder cancer ([place] [address] )     BPH (benign prostatic hyperplasia)     COPD (chronic obstructive pulmonary disease) ([place] [address] )     Diabetes mellitus ([place] [address] )     Hyperlipidemia     Lung cancer ([place] [address] )     Rib fractures      Social History     Socioeconomic History    Marital status: Single     Spouse name: None    Number of children: None    Years of education: None    Highest education level: None   Occupational History    None   Tobacco Use    Smoking status: Current Every Day Smoker     Packs/day: 1 00    Smokeless tobacco: Never Used   Vaping Use    Vaping Use: Never used   Substance and Sexual Activity    Alcohol use: Not Currently    Drug use: Not Currently    Sexual activity: None   Other Topics Concern    None   Social History Narrative    None     Social Determinants of Health     Financial Resource Strain: Not on file   Food Insecurity: No Food Insecurity    Worried About Running Out of Food in the Last Year: Never true    Giovanna of Food in the Last Year: Never true   Transportation Needs: No Transportation Needs    Lack of Transportation (Medical): No    Lack of Transportation (Non-Medical):  No   Physical Activity: Not on file   Stress: Not on file   Social Connections: Not on file   Intimate Partner Violence: Not on file   Housing Stability: Low Risk     Unable to Pay for Housing in the Last Year: No    Number of Places Lived in the Last Year: 1    Unstable Housing in the Last Year: No Subjective    Subjective Data: Patient is current 1 1/2 pack per day smoker  He denies use of  suplimental oxygen or CPAP/BiPAP use at home  He husues trelogy daily as well as an albuterol rescue inhaler    Objective    Physical Exam:        Vitals:  Blood pressure 128/81, pulse 82, temperature (!) 97 2 °F (36 2 °C), resp  rate 20, height 6' 1" (1 854 m), weight 113 kg (249 lb 7 oz), SpO2 91 %  Imaging and other studies: I have personally reviewed pertinent reports  O2 Device: 5L NC     Plan    Respiratory Plan: Mild Distress pathway        Resp Comments: Pt comfortable OOB in chair  Offers no c/c   No s/s of SOB at present

## 2022-04-05 NOTE — PROGRESS NOTES
[address]  Progress Note - Tomeka Connelly 1955, 78 y o  male MRN: [id number]  Unit/Bed#: -01 Encounter: [id number]  Primary Care Provider: Josselyn Hernandez,    Date and time admitted to hospital: 3/30/2022  1:58 PM    Leg swelling  Assessment & Plan  · ProBNP over 600  Will check a 2D echo never had he has +2 pitting edema will give a dose of Lasix and check a venous duplex  Influenza A  Assessment & Plan  Maintain isolation  Continue supportive care  Has completed actually 6 days of Tamiflu discontinue    Acute respiratory failure with hypoxia (HCC)  Assessment & Plan  Most likely secondary to COPD exacerbation secondary to influenza a  Patient also has history of lung cancer currently in remission for 1 year but has new nodule  Influenza A positive, COVID negative  Patient was saturating 88% on room air, initially patient was using 4 L of oxygen, now patient is requiring 5 L  CTA PE negative,  Pulmonary consult appreciated  Repeat x-ray no significant  Still requiring 5 L  ProBNP and initially was slightly elevated above 600 he does have +2 pitting edema  No previous 2D echo will give also dose of Lasix 20 mg IV x1 and do a 2D echo      Continue steroids    UTI (urinary tract infection)  Assessment & Plan  Urine culture showing Gram-negative rods,-Enterobacter, sensitive to levofloxacin, antibiotic adjusted 2/7    History of lung cancer  Assessment & Plan  Follows with Dr Jimbo James note reviewed, documentation as below:  HEMATOLOGY/ONCOLOGY DIAGNOSIS:  1  Unresectable adenocarcinoma the right upper lung, 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, PDL1 <1% --> DR     Cancer Staging  Malignant neoplasm of upper lobe of right lung [facility])  Staging form: Lung, AJCC 8th Edition  - Clinical stage from 1/23/2020: Stage IB (cT2a, cN0, cM0) - Unsigned     Past Therapy:   1  Definitive external beam radiotherapy to the right upper lobe of the lung to a dose of 54 Gy delivered in 3 fractions between 02/12/2020 through 02/21/2020  2  Carboplatin/Alimta s/p 4 cycles from 3/3/2020 through 5/5/2020     Current Therapy:  Active Surveillance      CT chest 07/19/2021 with evidence progression        COPD with acute exacerbation [facility])  Assessment & Plan  Most likely secondary to influenza a, patient also has history of lung cancer in remission  Continue respiratory protocol  Maintain saturation 89% or above  Pulmonary consult appreciated  Patient is still on 5 L of oxygen   repeat chest x-ray-nonsignificant  No wheezing decreased breath sounds continue Solu-Medrol Pulmicort, Xopenex and Atrovent    Tobacco abuse  Assessment & Plan  Continue Nicoderm patch    Type 2 diabetes mellitus, without long-term current use of insulin [facility])  Assessment & Plan  Lab Results   Component Value Date    HGBA1C 8 1 (H) 12/28/2021       Recent Labs     04/04/22  1104 04/04/22  1639 04/04/22  2054 04/05/22  0745   POCGLU 333* 198* 184* 246*       Blood Sugar Average: Last 72 hrs:  (P) 223 [id number] patient placed on Solu-Medrol due to COPD, which may affect patient blood sugar  Continue sliding scale, adjust insulin dose based on response  Insulin dose adjusted  Follow hypoglycemia precaution  Patient sugars uncontrolled start him on NPH 15 units b i d  Continue Humalog 10 units t i d  With meals sliding scale adjust as needed    * Sepsis, viral ([address] )  Assessment & Plan  Secondary to influenza A resolved          VTE Pharmacologic Prophylaxis: VTE Score: 7 High Risk (Score >/= 5) - Pharmacological DVT Prophylaxis Ordered: enoxaparin (Lovenox)  Sequential Compression Devices Ordered  Patient Centered Rounds: I performed bedside rounds with nursing staff today  Discussions with Specialists or Other Care Team Provider: will discuss with pulm    Education and Discussions with Family / Shireen Badillo and will update family    Time Spent for Care: 30 minutes   More than 50% of total time spent on counseling and coordination of care as described above  Current Length of Stay: 6 day(s)  Current Patient Status: Inpatient   Certification Statement: The patient will continue to require additional inpatient hospital stay due to hypoxia  Discharge Plan: Anticipate discharge in 48-72 hrs to home  Code Status: Level 1 - Full Code    Subjective:   Seen and examined sob improving unable to cough anything up    Objective:     Vitals:   Temp (24hrs), Av 6 °F (36 4 °C), Min:97 2 °F (36 2 °C), Max:98 2 °F (36 8 °C)    Temp:  [97 2 °F (36 2 °C)-98 2 °F (36 8 °C)] 97 2 °F (36 2 °C)  HR:  [73-82] 82  Resp:  [20] 20  BP: (128-133)/(77-81) 128/81  SpO2:  [89 %-92 %] 91 %  Body mass index is 32 91 kg/m²  Input and Output Summary (last 24 hours): Intake/Output Summary (Last 24 hours) at 2022 1109  Last data filed at 2022 1001  Gross per 24 hour   Intake 660 ml   Output 1625 ml   Net -965 ml       Physical Exam:   Physical Exam  Vitals and nursing note reviewed  Constitutional:       Appearance: He is well-developed  HENT:      Head: Normocephalic and atraumatic  Eyes:      Conjunctiva/sclera: Conjunctivae normal    Cardiovascular:      Rate and Rhythm: Normal rate and regular rhythm  Heart sounds: No murmur heard  Pulmonary:      Effort: Pulmonary effort is normal  No respiratory distress  Breath sounds: No wheezing or rales  Comments: decreased  Abdominal:      Palpations: Abdomen is soft  Tenderness: There is no abdominal tenderness  Musculoskeletal:         General: Swelling (+2 pitting edema ) present  Cervical back: Neck supple  Skin:     General: Skin is warm and dry  Neurological:      Mental Status: He is alert and oriented to person, place, and time            Additional Data:     Labs:  Results from last 7 days   Lab Units 22  0455   WBC Thousand/uL 12 64*   HEMOGLOBIN g/dL 15 0   HEMATOCRIT % 46 7   PLATELETS Thousands/uL 239   NEUTROS PCT % 83*   LYMPHS PCT % 10*   MONOS PCT % 5   EOS PCT % 0     Results from last 7 days   Lab Units 04/04/22  0455   SODIUM mmol/L 139   POTASSIUM mmol/L 3 9   CHLORIDE mmol/L 100   CO2 mmol/L 33*   BUN mg/dL 24   CREATININE mg/dL 0 84   ANION GAP mmol/L 6   CALCIUM mg/dL 8 7   ALBUMIN g/dL 3 4*   TOTAL BILIRUBIN mg/dL 0 57   ALK PHOS U/L 50   ALT U/L 92*   AST U/L 26   GLUCOSE RANDOM mg/dL 209*         Results from last 7 days   Lab Units 04/05/22  0745 04/04/22  2054 04/04/22  1639 04/04/22  1104 04/04/22  0736 04/03/22  2051 04/03/22  1616 04/03/22  1114 04/03/22  0723 04/02/22 2046 04/02/22  1623 04/02/22  1125   POC GLUCOSE mg/dl 246* 184* 198* 333* 205* 288* 190* 268* 181* 226* 211* 255*         Results from last 7 days   Lab Units 03/31/22  0524 03/30/22  1743 03/30/22  1425 03/30/22  1424   LACTIC ACID mmol/L  --  1 9 2 7*  --    PROCALCITONIN ng/ml 0 08  --   --  0 11       Lines/Drains:  Invasive Devices  Report    Peripheral Intravenous Line            Peripheral IV 04/04/22 Left Wrist 1 day                      Imaging: Reviewed radiology reports from this admission including: chest xray    Recent Cultures (last 7 days):   Results from last 7 days   Lab Units 04/01/22  0630 03/31/22  0736 03/30/22  1751 03/30/22  1424   BLOOD CULTURE   --   --   --  No Growth After 5 Days  No Growth After 5 Days     SPUTUM CULTURE   --  2+ Growth of Candida tropicalis*  2+ Growth of   --   --    GRAM STAIN RESULT   --  1+ Epithelial cells per low power field*  Rare Polys*  4+ Budding Yeast with Pseudomycelia*  4+ Gram positive rods*  Rare Gram negative rods*  --   --    URINE CULTURE   --   --  >100,000 cfu/ml Enterobacter cloacae*  >100,000 cfu/ml Enterobacter cloacae complex*  10,000-19,000 cfu/ml   --    LEGIONELLA URINARY ANTIGEN  Negative  --   --   --        Last 24 Hours Medication List:   Current Facility-Administered Medications   Medication Dose Route Frequency Provider Last Rate    acetaminophen  650 mg Oral Q6H PRN Camelia Melton MD  albuterol  2 puff Inhalation Q4H PRN Jos Durant MD      atorvastatin  40 mg Oral Daily Linus Sadler MD      benzonatate  100 mg Oral TID PRN Jos Durant MD      budesonide  0 5 mg Nebulization Q12H Pan Sadler MD      enoxaparin  40 mg Subcutaneous Daily Jos Durant MD      finasteride  5 mg Oral Daily Jos Durant MD      furosemide  20 mg Intravenous Once Hai Wiggins MD      gabapentin  100 mg Oral BID Jos Durant MD      guaiFENesin  600 mg Oral Q12H [facility] Linus Sadler MD      insulin lispro  10 Units Subcutaneous TID With Meals Jos Durant MD      insulin lispro  2-12 Units Subcutaneous TID AC Linus Sadler MD      insulin lispro  2-12 Units Subcutaneous HS Jos Durant MD      insulin NPH  15 Units Subcutaneous BID AC Hai Wiggins MD      ipratropium  0 5 mg Nebulization Q6H Jos Durant MD      levalbuterol  1 25 mg Nebulization Q6H Jos Durant MD      levofloxacin  750 mg Oral Q24H Linus Sadler MD      methylPREDNISolone sodium succinate  60 mg Intravenous Q12H [facility] Jos Durant MD      nicotine  21 mg Transdermal Daily Jos Durant MD      oxyCODONE-acetaminophen  1 tablet Oral Q4H PRN Jos Durant MD      sodium chloride  1 spray Each Nare Q1H PRN Jos Durant MD      tamsulosin  0 4 mg Oral Daily Jos Durant MD          Today, Patient Was Seen By: Hai Wiggins MD    **Please Note: This note may have been constructed using a voice recognition system  **

## 2022-04-05 NOTE — PLAN OF CARE
Problem: PHYSICAL THERAPY ADULT  Goal: Performs mobility at highest level of function for planned discharge setting  See evaluation for individualized goals  Description: Treatment/Interventions: Functional transfer training,LE strengthening/ROM,Elevations,Therapeutic exercise,Endurance training,Patient/family training,Equipment eval/education,Bed mobility,Gait training,Compensatory technique education,Spoke to nursing,OT  Equipment Recommended: Elda Ang       See flowsheet documentation for full assessment, interventions and recommendations  Outcome: Progressing  Note: Prognosis: Good  Problem List: Decreased strength,Decreased endurance,Impaired balance,Decreased mobility,Impaired judgement,Decreased safety awareness,Obesity  Assessment: Pt seen for PT treatment session this date with interventions consisting of gait training w/ emphasis on improving pt's ability to ambulate level surfaces x 50'x2 + 75' x 2 with SBA provided by therapist with RW and Therapeutic exercise consisting of: AROM 10 reps B LE in standing with B/L UE support position  Pt agreeable to PT treatment session upon arrival, pt found seated OOB in recliner, in no apparent distress  In comparison to previous session, pt with improvements in activity tolerance and pulmonary function with pt's O2 desatting less drastically and faster recovery to >/= 90%, able to ambulate increased distance with decreased assistance and complete assigned ther ex  Post session: pt returned back to recliner, chair alarm engaged, all needs in reach and RN notified of session findings/recommendations Continue to recommend Home PT at time of d/c in order to maximize pt's functional independence and safety w/ mobility  Pt continues to be functioning below baseline level, and remains limited 2* factors listed above and including decreased strength, balance, mobility, safety awareness, and activity tolerance   PT will continue to see pt while here in order to address the deficits listed above and provide interventions consistent w/ POC in effort to achieve STGs  Barriers to Discharge: None        PT Discharge Recommendation: Home with home health rehabilitation          See flowsheet documentation for full assessment

## 2022-04-05 NOTE — ASSESSMENT & PLAN NOTE
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

## 2022-04-05 NOTE — ASSESSMENT & PLAN NOTE
Most likely secondary to influenza a, patient also has history of lung cancer in remission  Continue respiratory protocol  Maintain saturation 89% or above  Pulmonary consult appreciated  Patient is still on 5 L of oxygen   repeat chest x-ray-nonsignificant  No wheezing decreased breath sounds continue Solu-Medrol Pulmicort, Xopenex and Atrovent

## 2022-04-05 NOTE — ASSESSMENT & PLAN NOTE
Urine culture showing Gram-negative rods,-Enterobacter, sensitive to levofloxacin, antibiotic adjusted 2/7

## 2022-04-05 NOTE — ASSESSMENT & PLAN NOTE
Most likely secondary to COPD exacerbation secondary to influenza a  Patient also has history of lung cancer currently in remission for 1 year but has new nodule  Influenza A positive, COVID negative  Patient was saturating 88% on room air, initially patient was using 4 L of oxygen, now patient is requiring 5 L  CTA PE negative,  Pulmonary consult appreciated  Repeat x-ray no significant  Still requiring 5 L  ProBNP and initially was slightly elevated above 600 he does have +2 pitting edema  No previous 2D echo will give also dose of Lasix 20 mg IV x1 and do a 2D echo      Continue steroids

## 2022-04-05 NOTE — ASSESSMENT & PLAN NOTE
Lab Results   Component Value Date    HGBA1C 8 1 (H) 12/28/2021       Recent Labs     04/04/22  1104 04/04/22  1639 04/04/22 2054 04/05/22  0745   POCGLU 333* 198* 184* 246*       Blood Sugar Average: Last 72 hrs:  (P) 223 [id number] patient placed on Solu-Medrol due to COPD, which may affect patient blood sugar  Continue sliding scale, adjust insulin dose based on response  Insulin dose adjusted  Follow hypoglycemia precaution  Patient sugars uncontrolled start him on NPH 15 units b i d  Continue Humalog 10 units t i d   With meals sliding scale adjust as needed

## 2022-04-05 NOTE — PLAN OF CARE
Problem: PAIN - ADULT  Goal: Verbalizes/displays adequate comfort level or baseline comfort level  Description: Interventions:  - Encourage patient to monitor pain and request assistance  - Assess pain using appropriate pain scale0-10  - Administer analgesics based on type and severity of pain and evaluate response  - Implement non-pharmacological measures as appropriate and evaluate response  - Consider cultural and social influences on pain and pain management  - Notify physician/advanced practitioner if interventions unsuccessful or patient reports new pain  Outcome: Progressing

## 2022-04-05 NOTE — ASSESSMENT & PLAN NOTE
· ProBNP over 600  Will check a 2D echo never had he has +2 pitting edema will give a dose of Lasix and check a venous duplex

## 2022-04-05 NOTE — PHYSICAL THERAPY NOTE
PHYSICAL THERAPY TREATMENT NOTE  NAME:  Long Ryder  DATE: 04/05/22    Length Of Stay: 6  Performed at least 2 patient identifiers during session: Name and Birthday  Treatment time: 678-1065  Treatment length: 38 min       04/05/22 1021   Note Type   Note Type Treatment   Pain Assessment   Pain Assessment Tool 0-10   Pain Score No Pain   Restrictions/Precautions   Other Precautions Contact/isolation;Droplet precautions; Chair Alarm; Bed Alarm;O2;Fall Risk   General   Chart Reviewed Yes   Response to Previous Treatment Patient with no complaints from previous session  Cognition   Overall Cognitive Status Impaired   Orientation Level Oriented X4   Following Commands Follows one step commands with increased time or repetition   Bed Mobility   Additional Comments Pt seated OOB in recliner at start and end of session; bed mobility not assessed this session   Transfers   Sit to Stand   (SBA)   Additional items Increased time required;Verbal cues   Stand to Sit   (SBA)   Additional items Increased time required;Verbal cues   Stand pivot   (SBA)   Additional items Increased time required;Verbal cues   Additional Comments Pt used RW for all transfers  VC for hand placement and safety  Pt requesting to ambulate in his shoes due to pain and instability from neuropathy   Ambulation/Elevation   Gait pattern Improper Weight shift;Decreased foot clearance; Wide ANU   Gait Assistance   (SBA)   Additional items Verbal cues   Assistive Device Rolling walker   Distance 50' x 2 + 75' x 2 with RW, no LOB, last trial completed without RW with steadying assist for safety   Pt required frequent rest breaks due to decreased O2 sats 85-86% on 5 lpm with 2-3 min therapeutic rest breaks, however third and fourth trials only desatting to 88%, 90% at baseline   Balance   Static Sitting Normal   Dynamic Sitting Good   Static Standing Fair +   Dynamic Standing Fair   Ambulatory Fair -   Endurance Deficit   Endurance Deficit Yes   Endurance Deficit Description fatigue, O2 desat with exertion   Activity Tolerance   Activity Tolerance Patient limited by fatigue   Nurse Made Aware Lillian GAMA   Exercises   Hip Flexion Standing;10 reps;AROM; Bilateral   Hip Abduction Standing;10 reps;AROM; Bilateral   Ankle Pumps Standing;10 reps;AROM; Bilateral   Balance training  hamstring curls x 10 reps, standing B/L   Assessment   Prognosis Good   Problem List Decreased strength;Decreased endurance; Impaired balance;Decreased mobility; Impaired judgement;Decreased safety awareness; Obesity   Barriers to Discharge None   Goals   PT Treatment Day 2   Plan   Treatment/Interventions Functional transfer training;LE strengthening/ROM; Elevations; Therapeutic exercise; Endurance training;Patient/family training;Equipment eval/education; Bed mobility; Compensatory technique education;Spoke to nursing   Progress Slow progress, decreased activity tolerance   PT Frequency 3-5x/wk   Recommendation   PT Discharge Recommendation Home with home health rehabilitation   Equipment Recommended [place] walker   Change/add to [place]? No   AM-PAC Basic Mobility Inpatient   Turning in Bed Without Bedrails 4   Lying on Back to Sitting on Edge of Flat Bed 4   Moving Bed to Chair 3   Standing Up From Chair 3   Walk in Room 3   Climb 3-5 Stairs 3   Basic Mobility Inpatient Raw Score 20   Basic Mobility Standardized Score 43 99   Highest Level Of Mobility   JH-HLM Goal 6: Walk 10 steps or more   JH-HLM Highest Level of Mobility 7: Walk 25 feet or more   JH-HLM Goal Achieved Yes   Education   Education Provided Mobility training;Assistive device   Patient Demonstrates acceptance/verbal understanding   End of Consult   Patient Position at End of Consult Bedside chair;Bed/Chair alarm activated; All needs within reach     The patient's AM-PAC Basic Mobility Inpatient Short Form Raw Score is 20   A Raw score of greater than 16 suggests the patient may benefit from discharge to home  Please also refer to the recommendation of the Physical Therapist for safe discharge planning  Assessment: Pt seen for PT treatment session this date with interventions consisting of gait training w/ emphasis on improving pt's ability to ambulate level surfaces x 50'x2 + 75' x 2 with SBA provided by therapist with RW and Therapeutic exercise consisting of: AROM 10 reps B LE in standing with B/L UE support position  Pt agreeable to PT treatment session upon arrival, pt found seated OOB in recliner, in no apparent distress  In comparison to previous session, pt with improvements in activity tolerance and pulmonary function with pt's O2 desatting less drastically and faster recovery to >/= 90%, able to ambulate increased distance with decreased assistance and complete assigned ther ex  Post session: pt returned back to recliner, chair alarm engaged, all needs in reach and RN notified of session findings/recommendations Continue to recommend Home PT at time of d/c in order to maximize pt's functional independence and safety w/ mobility  Pt continues to be functioning below baseline level, and remains limited 2* factors listed above and including decreased strength, balance, mobility, safety awareness, and activity tolerance  PT will continue to see pt while here in order to address the deficits listed above and provide interventions consistent w/ POC in effort to achieve STGs       Shiv Jara, PT,DPT

## 2022-04-06 LAB
ANION GAP SERPL CALCULATED.3IONS-SCNC: 5 MMOL/L (ref 4–13)
BUN SERPL-MCNC: 28 MG/DL (ref 5–25)
CALCIUM SERPL-MCNC: 8.5 MG/DL (ref 8.3–10.1)
CHLORIDE SERPL-SCNC: 99 MMOL/L (ref 100–108)
CO2 SERPL-SCNC: 32 MMOL/L (ref 21–32)
CREAT SERPL-MCNC: 1.02 MG/DL (ref 0.6–1.3)
GFR SERPL CREATININE-BSD FRML MDRD: 75 ML/MIN/1.73SQ M
GLUCOSE SERPL-MCNC: 155 MG/DL (ref 65–140)
GLUCOSE SERPL-MCNC: 230 MG/DL (ref 65–140)
GLUCOSE SERPL-MCNC: 268 MG/DL (ref 65–140)
GLUCOSE SERPL-MCNC: 268 MG/DL (ref 65–140)
GLUCOSE SERPL-MCNC: 279 MG/DL (ref 65–140)
POTASSIUM SERPL-SCNC: 4.1 MMOL/L (ref 3.5–5.3)
SODIUM SERPL-SCNC: 136 MMOL/L (ref 136–145)

## 2022-04-06 PROCEDURE — 97530 THERAPEUTIC ACTIVITIES: CPT

## 2022-04-06 PROCEDURE — 82948 REAGENT STRIP/BLOOD GLUCOSE: CPT

## 2022-04-06 PROCEDURE — 97535 SELF CARE MNGMENT TRAINING: CPT

## 2022-04-06 PROCEDURE — 99232 SBSQ HOSP IP/OBS MODERATE 35: CPT | Performed by: FAMILY MEDICINE

## 2022-04-06 PROCEDURE — 80048 BASIC METABOLIC PNL TOTAL CA: CPT | Performed by: FAMILY MEDICINE

## 2022-04-06 PROCEDURE — 94760 N-INVAS EAR/PLS OXIMETRY 1: CPT

## 2022-04-06 PROCEDURE — 94640 AIRWAY INHALATION TREATMENT: CPT

## 2022-04-06 RX ORDER — GABAPENTIN 100 MG/1
100 CAPSULE ORAL 3 TIMES DAILY
Status: DISCONTINUED | OUTPATIENT
Start: 2022-04-06 | End: 2022-04-08 | Stop reason: HOSPADM

## 2022-04-06 RX ADMIN — INSULIN LISPRO 6 UNITS: 100 INJECTION, SOLUTION INTRAVENOUS; SUBCUTANEOUS at 11:11

## 2022-04-06 RX ADMIN — METHYLPREDNISOLONE SODIUM SUCCINATE 60 MG: 125 INJECTION, POWDER, FOR SOLUTION INTRAMUSCULAR; INTRAVENOUS at 08:39

## 2022-04-06 RX ADMIN — INSULIN HUMAN 15 UNITS: 100 INJECTION, SUSPENSION SUBCUTANEOUS at 08:42

## 2022-04-06 RX ADMIN — GUAIFENESIN 600 MG: 600 TABLET ORAL at 22:29

## 2022-04-06 RX ADMIN — BUDESONIDE AND FORMOTEROL FUMARATE DIHYDRATE 2 PUFF: 80; 4.5 AEROSOL RESPIRATORY (INHALATION) at 17:55

## 2022-04-06 RX ADMIN — INSULIN LISPRO 6 UNITS: 100 INJECTION, SOLUTION INTRAVENOUS; SUBCUTANEOUS at 08:42

## 2022-04-06 RX ADMIN — LEVALBUTEROL HYDROCHLORIDE 1.25 MG: 1.25 SOLUTION RESPIRATORY (INHALATION) at 07:40

## 2022-04-06 RX ADMIN — INSULIN LISPRO 2 UNITS: 100 INJECTION, SOLUTION INTRAVENOUS; SUBCUTANEOUS at 22:36

## 2022-04-06 RX ADMIN — LEVALBUTEROL HYDROCHLORIDE 1.25 MG: 1.25 SOLUTION RESPIRATORY (INHALATION) at 13:33

## 2022-04-06 RX ADMIN — IPRATROPIUM BROMIDE 0.5 MG: 0.5 SOLUTION RESPIRATORY (INHALATION) at 02:12

## 2022-04-06 RX ADMIN — GUAIFENESIN 600 MG: 600 TABLET ORAL at 08:39

## 2022-04-06 RX ADMIN — BUDESONIDE AND FORMOTEROL FUMARATE DIHYDRATE 2 PUFF: 80; 4.5 AEROSOL RESPIRATORY (INHALATION) at 08:40

## 2022-04-06 RX ADMIN — GABAPENTIN 100 MG: 100 CAPSULE ORAL at 15:26

## 2022-04-06 RX ADMIN — INSULIN LISPRO 4 UNITS: 100 INJECTION, SOLUTION INTRAVENOUS; SUBCUTANEOUS at 16:21

## 2022-04-06 RX ADMIN — LEVALBUTEROL HYDROCHLORIDE 1.25 MG: 1.25 SOLUTION RESPIRATORY (INHALATION) at 20:11

## 2022-04-06 RX ADMIN — ATORVASTATIN CALCIUM 40 MG: 40 TABLET, FILM COATED ORAL at 08:39

## 2022-04-06 RX ADMIN — FINASTERIDE 5 MG: 5 TABLET, FILM COATED ORAL at 08:39

## 2022-04-06 RX ADMIN — LEVOFLOXACIN 750 MG: 750 TABLET, FILM COATED ORAL at 08:39

## 2022-04-06 RX ADMIN — IPRATROPIUM BROMIDE 0.5 MG: 0.5 SOLUTION RESPIRATORY (INHALATION) at 20:10

## 2022-04-06 RX ADMIN — IPRATROPIUM BROMIDE 0.5 MG: 0.5 SOLUTION RESPIRATORY (INHALATION) at 13:33

## 2022-04-06 RX ADMIN — METHYLPREDNISOLONE SODIUM SUCCINATE 60 MG: 125 INJECTION, POWDER, FOR SOLUTION INTRAMUSCULAR; INTRAVENOUS at 22:29

## 2022-04-06 RX ADMIN — GABAPENTIN 100 MG: 100 CAPSULE ORAL at 22:29

## 2022-04-06 RX ADMIN — INSULIN HUMAN 15 UNITS: 100 INJECTION, SUSPENSION SUBCUTANEOUS at 16:20

## 2022-04-06 RX ADMIN — IPRATROPIUM BROMIDE 0.5 MG: 0.5 SOLUTION RESPIRATORY (INHALATION) at 07:40

## 2022-04-06 RX ADMIN — GABAPENTIN 100 MG: 100 CAPSULE ORAL at 08:39

## 2022-04-06 RX ADMIN — ENOXAPARIN SODIUM 40 MG: 40 INJECTION SUBCUTANEOUS at 08:39

## 2022-04-06 RX ADMIN — LEVALBUTEROL HYDROCHLORIDE 1.25 MG: 1.25 SOLUTION RESPIRATORY (INHALATION) at 02:12

## 2022-04-06 RX ADMIN — TAMSULOSIN HYDROCHLORIDE 0.4 MG: 0.4 CAPSULE ORAL at 08:39

## 2022-04-06 NOTE — RESPIRATORY THERAPY NOTE
RT Protocol Note  Nehal Parra 78 y o  male MRN: [id number]  Unit/Bed#: -01 Encounter: [id number]    Assessment    Principal Problem:    Sepsis, viral ([place] [address] )  Active Problems:    Type 2 diabetes mellitus, without long-term current use of insulin (HCC)    Tobacco abuse    COPD with acute exacerbation ([place] [address] )    History of lung cancer    UTI (urinary tract infection)    Acute respiratory failure with hypoxia ([facility])    Influenza A    Leg swelling      Home Pulmonary Medications:         Past Medical History:   Diagnosis Date    Bladder cancer ([place] [address] )     BPH (benign prostatic hyperplasia)     COPD (chronic obstructive pulmonary disease) ([place] [address] )     Diabetes mellitus ([place] [address] )     Hyperlipidemia     Lung cancer ([place] [address] )     Rib fractures      Social History     Socioeconomic History    Marital status: Single     Spouse name: None    Number of children: None    Years of education: None    Highest education level: None   Occupational History    None   Tobacco Use    Smoking status: Current Every Day Smoker     Packs/day: 1 00    Smokeless tobacco: Never Used   Vaping Use    Vaping Use: Never used   Substance and Sexual Activity    Alcohol use: Not Currently    Drug use: Not Currently    Sexual activity: None   Other Topics Concern    None   Social History Narrative    None     Social Determinants of Health     Financial Resource Strain: Not on file   Food Insecurity: No Food Insecurity    Worried About Running Out of Food in the Last Year: Never true    Giovanna of Food in the Last Year: Never true   Transportation Needs: No Transportation Needs    Lack of Transportation (Medical): No    Lack of Transportation (Non-Medical):  No   Physical Activity: Not on file   Stress: Not on file   Social Connections: Not on file   Intimate Partner Violence: Not on file   Housing Stability: Low Risk     Unable to Pay for Housing in the Last Year: No    Number of Places Lived in the Last Year: 1    Unstable Housing in the Last Year: No       Subjective    Subjective Data: Patient is current 1 1/2 pack per day smoker  He denies use of  suplimental oxygen or CPAP/BiPAP use at home  He husues trelogy daily as well as an albuterol rescue inhaler    Objective    Physical Exam:   Assessment Type: During-treatment  General Appearance: Alert,Awake  Respiratory Pattern: Dyspnea with exertion  Chest Assessment: Chest expansion symmetrical  Bilateral Breath Sounds: Diminished  Cough: None  O2 Device: 4 lNC    Vitals:  Blood pressure 129/85, pulse 79, temperature 98 1 °F (36 7 °C), resp  rate 18, height 6' 1" (1 854 m), weight 112 kg (246 lb 14 6 oz), SpO2 (!) 89 %  Imaging and other studies: I have personally reviewed pertinent reports  O2 Device: 4 lNC     Plan    Respiratory Plan: Mild Distress pathway        Resp Comments: currently now on 4L nc  pt reports no change in SOB   will continue current treatments

## 2022-04-06 NOTE — ASSESSMENT & PLAN NOTE
Most likely secondary to influenza a, patient also has history of lung cancer in remission  Continue respiratory protocol  Maintain saturation 89% or above  Pulmonary consult appreciated  Patient is still on 5 L of oxygen   repeat chest x-ray-nonsignificant  No wheezing decreased breath sounds continue Solu-Medrol Pulmicort, Xopenex and Atrovent will continue coming down on steroids tomorrow

## 2022-04-06 NOTE — ASSESSMENT & PLAN NOTE
Urine culture showing Gram-negative rods,-Enterobacter, sensitive to levofloxacin, antibiotic adjusted 3/7

## 2022-04-06 NOTE — OCCUPATIONAL THERAPY NOTE
Occupational Therapy Progress Note     Patient Name: Partha Mayfield  QDKCZ'O Date: 4/6/2022  Problem List  Principal Problem:    Sepsis, viral ([facility] )  Active Problems:    Type 2 diabetes mellitus, without long-term current use of insulin (HCC)    Tobacco abuse    COPD with acute exacerbation ([facility] )    History of lung cancer    UTI (urinary tract infection)    Acute respiratory failure with hypoxia (HCC)    Influenza A    Leg swelling       04/06/22 0825   Note Type   Note Type Treatment   Restrictions/Precautions   Other Precautions Contact/isolation; Chair Alarm; Bed Alarm;O2;Fall Risk  (4-5L)   Pain Assessment   Pain Assessment Tool 0-10   Pain Score No Pain   ADL   Where Assessed Chair   Grooming Assistance 6  Modified Independent   Grooming Deficit Wash/dry face   UB Bathing Assistance 6  Modified Independent   LB Bathing Assistance 5  Supervision/Setup   LB Bathing Deficit Supervision/safety;Setup   UB Dressing Assistance 6  Modified independent   LB Dressing Assistance 5  Supervision/Setup   LB Dressing Deficit Supervision/safety;Setup   Transfers   Sit to Stand 5  Supervision   Additional items Increased time required;Verbal cues; Impulsive   Stand to Sit 5  Supervision   Additional items Increased time required;Verbal cues; Impulsive   Stand pivot   (SBA)   Additional items Increased time required;Verbal cues; Impulsive   Additional Comments Pt seated in recliner chair at beginning of session @ 90% on 4L  Pt completing ADLs while seated in chair, causing O2 to decreased to mid 80's  RN increased O2 to 5L  STS from chair to RW @ S  Pt able to complete approximately 5 minutes of functional mobility around room with RW @ S->SBA  Pt requiring SBA for pivots as he would  walker and place it instead of gliding it  Pt able to complete functional mobility on 4L while maintaining O2 sats between 88-92%, eventually requiring 5L d/t decrease in O2 after 5 minutes   Pt seated in chair at end of session on 5L with all needs met  Cognition   Overall Cognitive Status Impaired   Arousal/Participation Alert; Responsive; Cooperative   Attention Attends with cues to redirect   Orientation Level Oriented X4   Memory Within functional limits   Following Commands Follows one step commands without difficulty   Comments Pt continues with impulsivity, poor safety awareness and decreased insight to deficits  Pt frustrated that he could not be I in the room and has c/o increased stiffness from decreased mobility, pt educated significantly on O2 needs, safety concerns and importance of completing therapy for improved functional outcomes   Activity Tolerance   Activity Tolerance Patient limited by fatigue   Medical Staff Made Aware Spoke with NATAN Nguyen   Assessment   Assessment Pt completed OT tx session #2 on this date focused on ADL performance and functional mobility  Pt alert and agreeable to participate  Pt demonstrating improvements in endurance and standing tolerance/balance this session but is limited by decreased pulmonary tolerance and cognition  Pt completing UB ADLs @ Mod I and LB ADLs @ S/set-up for balance deficits and safety concerns  Pt requiring increase to 5L O2 while performing ADLs, as well as cues for pursed lip breathing  Pt able to complete 5 minutes functional mobility in room with RW @ S-SBA on 4L, requiring increased to 5L after 5 minutes  Pt demonstrating Good potential to achieve goals but is currently demonstrating deficits in endurance, standing balance/tolerance, activity tolerance, safety awareness, limited insight to deficits  Continue to recommend [address] [place] [address] services upon d/c to increase safety and independence in ADL tasks and functional mobility  Plan   Treatment Interventions ADL retraining;Functional transfer training; Endurance training   Goal Expiration Date 04/14/22   OT Treatment Day 2   OT Frequency 3-5x/wk   Recommendation   OT Discharge Recommendation Home with home health rehabilitation   AM-PAC Daily Activity Inpatient   Lower Body Dressing 3   Bathing 3   Toileting 3   Upper Body Dressing 3   Grooming 3   Eating 4   Daily Activity Raw Score 19   Daily Activity Standardized Score (Calc for Raw Score >=11) 40 22   AM-PAC Applied Cognition Inpatient   Following a Speech/Presentation 2   Understanding Ordinary Conversation 4   Taking Medications 2   Remembering Where Things Are Placed or Put Away 3   Remembering List of 4-5 Errands 3   Taking Care of Complicated Tasks 2   Applied Cognition Raw Score 16   Applied Cognition Standardized Score 35 03       Pt goals to be met by 4/14/2022     1  Pt will demonstrate ability to complete LB dressing @ Mod I in order to increase safety and independence during meaningful tasks  2  Pt will demonstrate ability to clarisa/doff socks/shoes while sitting EOB @ Mod I in order to increase safety and independence during meaningful tasks  3  Pt will demonstrate ability to complete toileting tasks including CM and pericare @ Mod I in order to increase safety and independence during meaningful tasks  4  Pt will demonstrate ability to complete EOB, chair, toilet/commode transfers @ Mod I in order to increase performance and participation during functional tasks  5  Pt will demonstrate ability to stand for 10+ minutes while maintaining G balance with use of RW for UB support PRN  6  Pt will demonstrate ability to tolerate 30-35 minute OT session with no vc'ing for deep breathing or use of energy conservation techniques in order to increase activity tolerance during functional tasks  7  Pt will demonstrate Good carryover of use of energy conservation/compensatory strategies during ADLs and functional tasks in order to increase safety and reduce risk for falls  8  Pt will demonstrate Good attention and participation in continued evaluation of functional ambulation house hold distances in order to assist with safe d/c planning    9  Pt will attend to continued cognitive assessments 100% of the time in order to provide most appropriate d/c recommendations  10  Pt will follow 100% simple 2-step commands and be A&O x4 consistently with environmental cues to increase participation in functional activities  11  Pt will identify 3 areas of interest/hobbies and 1 intervention on how to incorporate into daily life in order to increase interaction with environment and peers as well as increase participation in meaningful tasks  12  Pt will demonstrate 100% carryover of BUE HEP in order to increase BUE MS and increase performance during functional tasks upon d/c home      Macy Ochoa, OTR/L

## 2022-04-06 NOTE — PROGRESS NOTES
[address]  Progress Note - Drea Chavez 1955, 78 y o  male MRN: [id number]  Unit/Bed#: -01 Encounter: [id number]  Primary Care Provider: Judy Parr DO   Date and time admitted to hospital: 3/30/2022  1:58 PM    Leg swelling  Assessment & Plan  · ProBNP over 600  Venous duplex is negative improved her now has trace status post 1 dose of Lasix to 20 mg IV a BM slightly up no further Lasix 2D echo is normal   Will add compression stockings to be worn as soon as he gets out of bed and leg elevation as well  Influenza A  Assessment & Plan  Maintain isolation  Continue supportive care  Has completed actually 6 days of Tamiflu discontinue    Acute respiratory failure with hypoxia (HCC)  Assessment & Plan  Most likely secondary to COPD exacerbation secondary to influenza a  Patient also has history of lung cancer currently in remission for 1 year but has new nodule  Influenza A positive, COVID negative  Patient was saturating 88% on room air,  CTA PE negative,  Pulmonary consult appreciated  Repeat x-ray no significant  Still requiring 5 L  ProBNP and initially was slightly elevated above 600 he does have +2 pitting edema    Status post 1 dose of Lasix on 04/06 leg edema improved will place compression stockings his BUN slightly up note for other Lasix at this time 2D echo is normal   Continue steroids  Today he was down to 4 L 89% suspect he is going to need high doses of oxygen like 4 L at rest and may need Oxymizer ambulation will come down on steroids further in 24 hours    UTI (urinary tract infection)  Assessment & Plan  Urine culture showing Gram-negative rods,-Enterobacter, sensitive to levofloxacin, antibiotic adjusted 3/7    History of lung cancer  Assessment & Plan  Follows with Dr Dany Johnson note reviewed, documentation as below:  HEMATOLOGY/ONCOLOGY DIAGNOSIS:  1  Unresectable adenocarcinoma the right upper lung, 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, PDL1 <1% -->      Cancer Staging  Malignant neoplasm of upper lobe of right lung [facility])  Staging form: Lung, AJCC 8th Edition  - Clinical stage from 1/23/2020: Stage IB (cT2a, cN0, cM0) - Unsigned     Past Therapy:   1  Definitive external beam radiotherapy to the right upper lobe of the lung to a dose of 54 Gy delivered in 3 fractions between 02/12/2020 through 02/21/2020  2  Carboplatin/Alimta s/p 4 cycles from 3/3/2020 through 5/5/2020     Current Therapy:  Active Surveillance      CT chest 07/19/2021 with evidence progression        COPD with acute exacerbation ([facility] )  Assessment & Plan  Most likely secondary to influenza a, patient also has history of lung cancer in remission  Continue respiratory protocol  Maintain saturation 89% or above  Pulmonary consult appreciated  Patient is still on 5 L of oxygen   repeat chest x-ray-nonsignificant  No wheezing decreased breath sounds continue Solu-Medrol Pulmicort, Xopenex and Atrovent will continue coming down on steroids tomorrow    Tobacco abuse  Assessment & Plan  Continue Nicoderm patch    Type 2 diabetes mellitus, without long-term current use of insulin [facility])  Assessment & Plan  Lab Results   Component Value Date    HGBA1C 8 1 (H) 12/28/2021       Recent Labs     04/05/22  1604 04/05/22  2115 04/06/22  0758 04/06/22  1054   POCGLU 210* 264* 268* 279*       Blood Sugar Average: Last 72 hrs:  (P) 242 [id number] patient placed on Solu-Medrol due to COPD, which may affect patient blood sugar  Continue sliding scale, adjust insulin dose based on response  Insulin dose adjusted  Follow hypoglycemia precaution  Patient sugars uncontrolled start him on NPH 15 units b i d just started increase Humalog to 12 units t i d   With meals sliding scale adjust as needed  Has peripheral neuropathy has been on gabapentin 100 mg p o  B i d  Still has the feeling increased to 3 times a day    * Sepsis, viral ([facility] )  Assessment & Plan  Secondary to influenza A resolved          VTE Pharmacologic Prophylaxis: VTE Score: 7 High Risk (Score >/= 5) - Pharmacological DVT Prophylaxis Ordered: enoxaparin (Lovenox)  Sequential Compression Devices Ordered  Patient Centered Rounds: I performed bedside rounds with nursing staff today  Discussions with Specialists or Other Care Team Provider: discussed with pulm yesterday     Education and Discussions with Family / Patient: Updated  (wife) via phone  Time Spent for Care: 30 minutes  More than 50% of total time spent on counseling and coordination of care as described above  Current Length of Stay: 7 day(s)  Current Patient Status: Inpatient   Certification Statement: The patient will continue to require additional inpatient hospital stay due to hypoxia  Discharge Plan: Anticipate discharge in 48-72 hrs to home  Code Status: Level 1 - Full Code    Subjective:   Seen and examined has pins and needles in feet for years   Sob improving    Objective:     Vitals:   Temp (24hrs), Av 1 °F (36 7 °C), Min:98 °F (36 7 °C), Max:98 1 °F (36 7 °C)    Temp:  [98 °F (36 7 °C)-98 1 °F (36 7 °C)] 98 1 °F (36 7 °C)  HR:  [70-83] 79  Resp:  [16-17] 16  BP: (125-130)/(81-85) 129/85  SpO2:  [86 %-96 %] 90 %  Body mass index is 32 58 kg/m²  Input and Output Summary (last 24 hours): Intake/Output Summary (Last 24 hours) at 2022 1221  Last data filed at 2022 0801  Gross per 24 hour   Intake 900 ml   Output 1475 ml   Net -575 ml       Physical Exam:   Physical Exam  Vitals and nursing note reviewed  Constitutional:       Appearance: He is well-developed  HENT:      Head: Normocephalic and atraumatic  Eyes:      Conjunctiva/sclera: Conjunctivae normal    Cardiovascular:      Rate and Rhythm: Normal rate and regular rhythm  Heart sounds: No murmur heard  Pulmonary:      Effort: Pulmonary effort is normal  No respiratory distress  Breath sounds: No wheezing or rales        Comments: decvrased  Abdominal: Palpations: Abdomen is soft  Tenderness: There is no abdominal tenderness  Musculoskeletal:         General: Swelling (trace ) present  Cervical back: Neck supple  Skin:     General: Skin is warm and dry  Neurological:      Mental Status: He is alert and oriented to person, place, and time  Additional Data:     Labs:  Results from last 7 days   Lab Units 04/04/22  0455   WBC Thousand/uL 12 64*   HEMOGLOBIN g/dL 15 0   HEMATOCRIT % 46 7   PLATELETS Thousands/uL 239   NEUTROS PCT % 83*   LYMPHS PCT % 10*   MONOS PCT % 5   EOS PCT % 0     Results from last 7 days   Lab Units 04/06/22  0521 04/04/22  0455 04/04/22  0455   SODIUM mmol/L 136   < > 139   POTASSIUM mmol/L 4 1   < > 3 9   CHLORIDE mmol/L 99*   < > 100   CO2 mmol/L 32   < > 33*   BUN mg/dL 28*   < > 24   CREATININE mg/dL 1 02   < > 0 84   ANION GAP mmol/L 5   < > 6   CALCIUM mg/dL 8 5   < > 8 7   ALBUMIN g/dL  --   --  3 4*   TOTAL BILIRUBIN mg/dL  --   --  0 57   ALK PHOS U/L  --   --  50   ALT U/L  --   --  92*   AST U/L  --   --  26   GLUCOSE RANDOM mg/dL 268*   < > 209*    < > = values in this interval not displayed           Results from last 7 days   Lab Units 04/06/22  1054 04/06/22  0758 04/05/22  2115 04/05/22  1604 04/05/22  1114 04/05/22  0745 04/04/22  2054 04/04/22  1639 04/04/22  1104 04/04/22  0736 04/03/22  2051 04/03/22  1616   POC GLUCOSE mg/dl 279* 268* 264* 210* 286* 246* 184* 198* 333* 205* 288* 190*         Results from last 7 days   Lab Units 03/31/22  0524 03/30/22  1743 03/30/22  1425 03/30/22  1424   LACTIC ACID mmol/L  --  1 9 2 7*  --    PROCALCITONIN ng/ml 0 08  --   --  0 11       Lines/Drains:  Invasive Devices  Report    Peripheral Intravenous Line            Peripheral IV 04/04/22 Left Wrist 2 days                      Imaging: Reviewed radiology reports from this admission including: chest xray    Recent Cultures (last 7 days):   Results from last 7 days   Lab Units 04/01/22  0630 03/31/22  0702 03/30/22  1751 03/30/22  1424   BLOOD CULTURE   --   --   --  No Growth After 5 Days  No Growth After 5 Days     SPUTUM CULTURE   --  2+ Growth of Candida tropicalis*  2+ Growth of   --   --    GRAM STAIN RESULT   --  1+ Epithelial cells per low power field*  Rare Polys*  4+ Budding Yeast with Pseudomycelia*  4+ Gram positive rods*  Rare Gram negative rods*  --   --    URINE CULTURE   --   --  >100,000 cfu/ml Enterobacter cloacae*  >100,000 cfu/ml Enterobacter cloacae complex*  10,000-19,000 cfu/ml   --    LEGIONELLA URINARY ANTIGEN  Negative  --   --   --        Last 24 Hours Medication List:   Current Facility-Administered Medications   Medication Dose Route Frequency Provider Last Rate    acetaminophen  650 mg Oral Q6H PRN Elridge Side, MD      albuterol  2 puff Inhalation Q4H PRN Elridge Side, MD      atorvastatin  40 mg Oral Daily Elridge Side, MD      benzonatate  100 mg Oral TID PRN Elridge Side, MD      budesonide-formoterol  2 puff Inhalation BID Lanny Flores MD      enoxaparin  40 mg Subcutaneous Daily Elridge Side, MD      finasteride  5 mg Oral Daily Elridge Side, MD      gabapentin  100 mg Oral TID Lanny Flores MD      guaiFENesin  600 mg Oral Q12H [address] Elridge Side, MD      insulin lispro  12 Units Subcutaneous TID With Meals Lanny Flores MD      insulin lispro  2-12 Units Subcutaneous TID AC Linus Sadelr MD      insulin lispro  2-12 Units Subcutaneous HS Elridge Side, MD      insulin NPH  15 Units Subcutaneous BID AC Lanny Flores MD      ipratropium  0 5 mg Nebulization Q6H Elridge Side, MD      levalbuterol  1 25 mg Nebulization Q6H Elridge Side, MD      levofloxacin  750 mg Oral Q24H Linus Sadler MD      methylPREDNISolone sodium succinate  60 mg Intravenous Q12H [address] Elridge Side, MD      nicotine  21 mg Transdermal Daily Elridge Side, MD      oxyCODONE-acetaminophen  1 tablet Oral Q4H PRN Linus THRASHER MD Viktoriya      sodium chloride  1 spray Each Nare Q1H PRN Tarun Branham MD      tamsulosin  0 4 mg Oral Daily Tarun Branham MD          Today, Patient Was Seen By: Minerva Vásquez MD    **Please Note: This note may have been constructed using a voice recognition system  **

## 2022-04-06 NOTE — PLAN OF CARE
Problem: OCCUPATIONAL THERAPY ADULT  Goal: Performs self-care activities at highest level of function for planned discharge setting  See evaluation for individualized goals  Description: Treatment Interventions: ADL retraining,Functional transfer training,UE strengthening/ROM,Endurance training,Cognitive reorientation,Patient/family training,Equipment evaluation/education,Neuromuscular reeducation,Compensatory technique education,Continued evaluation,Energy conservation,Activityengagement          See flowsheet documentation for full assessment, interventions and recommendations  Outcome: Progressing  Note: Limitation: Decreased ADL status,Decreased UE strength,Decreased Safe judgement during ADL,Decreased cognition,Decreased endurance,Decreased self-care trans,Decreased high-level ADLs  Prognosis: Good  Assessment: Pt completed OT tx session #2 on this date focused on ADL performance and functional mobility  Pt alert and agreeable to participate  Pt demonstrating improvements in endurance and standing tolerance/balance this session but is limited by decreased pulmonary tolerance and cognition  Pt completing UB ADLs @ Mod I and LB ADLs @ S/set-up for balance deficits and safety concerns  Pt requiring increase to 5L O2 while performing ADLs, as well as cues for pursed lip breathing  Pt able to complete 5 minutes functional mobility in room with RW @ S-SBA on 4L, requiring increased to 5L after 5 minutes  Pt demonstrating Good potential to achieve goals but is currently demonstrating deficits in endurance, standing balance/tolerance, activity tolerance, safety awareness, limited insight to deficits  Continue to recommend [address] [place] [address] upon d/c to increase safety and independence in ADL tasks and functional mobility       OT Discharge Recommendation: Home with home health rehabilitation

## 2022-04-06 NOTE — ASSESSMENT & PLAN NOTE
Lab Results   Component Value Date    HGBA1C 8 1 (H) 12/28/2021       Recent Labs     04/05/22  1604 04/05/22  2115 04/06/22  0758 04/06/22  1054   POCGLU 210* 264* 268* 279*       Blood Sugar Average: Last 72 hrs:  (P) 242 [id number] patient placed on Solu-Medrol due to COPD, which may affect patient blood sugar  Continue sliding scale, adjust insulin dose based on response  Insulin dose adjusted  Follow hypoglycemia precaution  Patient sugars uncontrolled start him on NPH 15 units b i d just started increase Humalog to 12 units t i d   With meals sliding scale adjust as needed  Has peripheral neuropathy has been on gabapentin 100 mg p o  B i d  Still has the feeling increased to 3 times a day

## 2022-04-06 NOTE — ASSESSMENT & PLAN NOTE
· ProBNP over 600  Venous duplex is negative improved her now has trace status post 1 dose of Lasix to 20 mg IV a BM slightly up no further Lasix 2D echo is normal   Will add compression stockings to be worn as soon as he gets out of bed and leg elevation as well

## 2022-04-06 NOTE — RESPIRATORY THERAPY NOTE
RT Protocol Note  Keven Barrientos 78 y o  male MRN: [id number]  Unit/Bed#: -01 Encounter: [id number]    Assessment    Principal Problem:    Sepsis, viral ([place] [address] )  Active Problems:    Type 2 diabetes mellitus, without long-term current use of insulin ([facility])    Tobacco abuse    COPD with acute exacerbation ([place] [address] )    History of lung cancer    UTI (urinary tract infection)    Acute respiratory failure with hypoxia ([facility])    Influenza A    Leg swelling      Home Pulmonary Medications:         Past Medical History:   Diagnosis Date    Bladder cancer ([place] [address] )     BPH (benign prostatic hyperplasia)     COPD (chronic obstructive pulmonary disease) ([place] [address] )     Diabetes mellitus ([place] [address] )     Hyperlipidemia     Lung cancer ([place] [address] )     Rib fractures      Social History     Socioeconomic History    Marital status: Single     Spouse name: None    Number of children: None    Years of education: None    Highest education level: None   Occupational History    None   Tobacco Use    Smoking status: Current Every Day Smoker     Packs/day: 1 00    Smokeless tobacco: Never Used   Vaping Use    Vaping Use: Never used   Substance and Sexual Activity    Alcohol use: Not Currently    Drug use: Not Currently    Sexual activity: None   Other Topics Concern    None   Social History Narrative    None     Social Determinants of Health     Financial Resource Strain: Not on file   Food Insecurity: No Food Insecurity    Worried About Running Out of Food in the Last Year: Never true    Giovanna of Food in the Last Year: Never true   Transportation Needs: No Transportation Needs    Lack of Transportation (Medical): No    Lack of Transportation (Non-Medical):  No   Physical Activity: Not on file   Stress: Not on file   Social Connections: Not on file   Intimate Partner Violence: Not on file   Housing Stability: Low Risk     Unable to Pay for Housing in the Last Year: No    Number of Places Lived in the Last Year: 1    Unstable Housing in the Last Year: No       Subjective    Subjective Data: Patient is current 1 1/2 pack per day smoker  He denies use of  suplimental oxygen or CPAP/BiPAP use at home  He husues trelogy daily as well as an albuterol rescue inhaler    Objective    Physical Exam:   Assessment Type: During-treatment  General Appearance: Awake,Alert  Respiratory Pattern: Dyspnea with exertion  Chest Assessment: Chest expansion symmetrical  Bilateral Breath Sounds: Diminished    Vitals:  Blood pressure 130/84, pulse 75, temperature 98 °F (36 7 °C), resp  rate 17, height 6' 1" (1 854 m), weight 113 kg (249 lb), SpO2 91 %  Imaging and other studies: I have personally reviewed pertinent reports  O2 Device: 5L NC     Plan    Respiratory Plan: Mild Distress pathway        Resp Comments: Pt awake, resting in stretch chair, continues on 5lpm NC, Q6 neb tx given as ordered

## 2022-04-06 NOTE — ASSESSMENT & PLAN NOTE
Most likely secondary to COPD exacerbation secondary to influenza a  Patient also has history of lung cancer currently in remission for 1 year but has new nodule  Influenza A positive, COVID negative  Patient was saturating 88% on room air,  CTA PE negative,  Pulmonary consult appreciated  Repeat x-ray no significant  Still requiring 5 L  ProBNP and initially was slightly elevated above 600 he does have +2 pitting edema    Status post 1 dose of Lasix on 04/06 leg edema improved will place compression stockings his BUN slightly up note for other Lasix at this time 2D echo is normal   Continue steroids  Today he was down to 4 L 89% suspect he is going to need high doses of oxygen like 4 L at rest and may need Oxymizer ambulation will come down on steroids further in 24 hours

## 2022-04-06 NOTE — ASSESSMENT & PLAN NOTE
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

## 2022-04-07 LAB
GLUCOSE SERPL-MCNC: 165 MG/DL (ref 65–140)
GLUCOSE SERPL-MCNC: 180 MG/DL (ref 65–140)
GLUCOSE SERPL-MCNC: 239 MG/DL (ref 65–140)
GLUCOSE SERPL-MCNC: 327 MG/DL (ref 65–140)

## 2022-04-07 PROCEDURE — 97530 THERAPEUTIC ACTIVITIES: CPT

## 2022-04-07 PROCEDURE — 99232 SBSQ HOSP IP/OBS MODERATE 35: CPT | Performed by: FAMILY MEDICINE

## 2022-04-07 PROCEDURE — 99232 SBSQ HOSP IP/OBS MODERATE 35: CPT | Performed by: INTERNAL MEDICINE

## 2022-04-07 PROCEDURE — 94640 AIRWAY INHALATION TREATMENT: CPT

## 2022-04-07 PROCEDURE — 94760 N-INVAS EAR/PLS OXIMETRY 1: CPT

## 2022-04-07 PROCEDURE — 97116 GAIT TRAINING THERAPY: CPT

## 2022-04-07 PROCEDURE — 97112 NEUROMUSCULAR REEDUCATION: CPT

## 2022-04-07 PROCEDURE — 82948 REAGENT STRIP/BLOOD GLUCOSE: CPT

## 2022-04-07 RX ORDER — METHYLPREDNISOLONE SODIUM SUCCINATE 40 MG/ML
40 INJECTION, POWDER, LYOPHILIZED, FOR SOLUTION INTRAMUSCULAR; INTRAVENOUS EVERY 12 HOURS SCHEDULED
Status: DISCONTINUED | OUTPATIENT
Start: 2022-04-07 | End: 2022-04-08

## 2022-04-07 RX ORDER — FUROSEMIDE 10 MG/ML
20 INJECTION INTRAMUSCULAR; INTRAVENOUS ONCE
Status: COMPLETED | OUTPATIENT
Start: 2022-04-07 | End: 2022-04-07

## 2022-04-07 RX ADMIN — LEVALBUTEROL HYDROCHLORIDE 1.25 MG: 1.25 SOLUTION RESPIRATORY (INHALATION) at 13:09

## 2022-04-07 RX ADMIN — GUAIFENESIN 600 MG: 600 TABLET ORAL at 08:15

## 2022-04-07 RX ADMIN — TAMSULOSIN HYDROCHLORIDE 0.4 MG: 0.4 CAPSULE ORAL at 08:15

## 2022-04-07 RX ADMIN — INSULIN LISPRO 4 UNITS: 100 INJECTION, SOLUTION INTRAVENOUS; SUBCUTANEOUS at 16:41

## 2022-04-07 RX ADMIN — BUDESONIDE AND FORMOTEROL FUMARATE DIHYDRATE 2 PUFF: 80; 4.5 AEROSOL RESPIRATORY (INHALATION) at 08:16

## 2022-04-07 RX ADMIN — BUDESONIDE AND FORMOTEROL FUMARATE DIHYDRATE 2 PUFF: 80; 4.5 AEROSOL RESPIRATORY (INHALATION) at 16:41

## 2022-04-07 RX ADMIN — FINASTERIDE 5 MG: 5 TABLET, FILM COATED ORAL at 08:15

## 2022-04-07 RX ADMIN — INSULIN LISPRO 2 UNITS: 100 INJECTION, SOLUTION INTRAVENOUS; SUBCUTANEOUS at 21:31

## 2022-04-07 RX ADMIN — IPRATROPIUM BROMIDE 0.5 MG: 0.5 SOLUTION RESPIRATORY (INHALATION) at 13:09

## 2022-04-07 RX ADMIN — GABAPENTIN 100 MG: 100 CAPSULE ORAL at 08:16

## 2022-04-07 RX ADMIN — METHYLPREDNISOLONE SODIUM SUCCINATE 60 MG: 125 INJECTION, POWDER, FOR SOLUTION INTRAMUSCULAR; INTRAVENOUS at 08:15

## 2022-04-07 RX ADMIN — INSULIN LISPRO 8 UNITS: 100 INJECTION, SOLUTION INTRAVENOUS; SUBCUTANEOUS at 12:07

## 2022-04-07 RX ADMIN — INSULIN HUMAN 15 UNITS: 100 INJECTION, SUSPENSION SUBCUTANEOUS at 08:17

## 2022-04-07 RX ADMIN — LEVALBUTEROL HYDROCHLORIDE 1.25 MG: 1.25 SOLUTION RESPIRATORY (INHALATION) at 19:35

## 2022-04-07 RX ADMIN — INSULIN HUMAN 15 UNITS: 100 INJECTION, SUSPENSION SUBCUTANEOUS at 16:45

## 2022-04-07 RX ADMIN — INSULIN LISPRO 2 UNITS: 100 INJECTION, SOLUTION INTRAVENOUS; SUBCUTANEOUS at 08:16

## 2022-04-07 RX ADMIN — ENOXAPARIN SODIUM 40 MG: 40 INJECTION SUBCUTANEOUS at 08:15

## 2022-04-07 RX ADMIN — IPRATROPIUM BROMIDE 0.5 MG: 0.5 SOLUTION RESPIRATORY (INHALATION) at 07:13

## 2022-04-07 RX ADMIN — LEVOFLOXACIN 750 MG: 750 TABLET, FILM COATED ORAL at 08:15

## 2022-04-07 RX ADMIN — GABAPENTIN 100 MG: 100 CAPSULE ORAL at 21:26

## 2022-04-07 RX ADMIN — FUROSEMIDE 20 MG: 10 INJECTION, SOLUTION INTRAMUSCULAR; INTRAVENOUS at 17:20

## 2022-04-07 RX ADMIN — LEVALBUTEROL HYDROCHLORIDE 1.25 MG: 1.25 SOLUTION RESPIRATORY (INHALATION) at 02:56

## 2022-04-07 RX ADMIN — ATORVASTATIN CALCIUM 40 MG: 40 TABLET, FILM COATED ORAL at 08:15

## 2022-04-07 RX ADMIN — METHYLPREDNISOLONE SODIUM SUCCINATE 40 MG: 40 INJECTION, POWDER, FOR SOLUTION INTRAMUSCULAR; INTRAVENOUS at 21:26

## 2022-04-07 RX ADMIN — IPRATROPIUM BROMIDE 0.5 MG: 0.5 SOLUTION RESPIRATORY (INHALATION) at 19:35

## 2022-04-07 RX ADMIN — INSULIN LISPRO 15 UNITS: 100 INJECTION, SOLUTION INTRAVENOUS; SUBCUTANEOUS at 16:43

## 2022-04-07 RX ADMIN — IPRATROPIUM BROMIDE 0.5 MG: 0.5 SOLUTION RESPIRATORY (INHALATION) at 02:56

## 2022-04-07 RX ADMIN — LEVALBUTEROL HYDROCHLORIDE 1.25 MG: 1.25 SOLUTION RESPIRATORY (INHALATION) at 07:13

## 2022-04-07 RX ADMIN — GABAPENTIN 100 MG: 100 CAPSULE ORAL at 16:41

## 2022-04-07 RX ADMIN — GUAIFENESIN 600 MG: 600 TABLET ORAL at 21:26

## 2022-04-07 NOTE — ASSESSMENT & PLAN NOTE
Lab Results   Component Value Date    HGBA1C 8 1 (H) 12/28/2021       Recent Labs     04/06/22  1603 04/06/22  2053 04/07/22  0738 04/07/22  1128   POCGLU 230* 155* 180* 327*       Blood Sugar Average: Last 72 hrs:  (P) 240 [id number] patient placed on Solu-Medrol due to COPD, which may affect patient blood sugar  Continue sliding scale, adjust insulin dose based on response  Insulin dose adjusted  Follow hypoglycemia precaution  Patient sugars uncontrolled start him on NPH 15 units b i d just started increase Humalog to 15 units t i d   With meals sliding scale adjust as needed  Has peripheral neuropathy has been on gabapentin 100 mg p o  B i d  Still has the feeling increased to 3 times a day

## 2022-04-07 NOTE — PLAN OF CARE
Problem: PHYSICAL THERAPY ADULT  Goal: Performs mobility at highest level of function for planned discharge setting  See evaluation for individualized goals  Description: Treatment/Interventions: Functional transfer training,LE strengthening/ROM,Elevations,Therapeutic exercise,Endurance training,Patient/family training,Equipment eval/education,Bed mobility,Gait training,Compensatory technique education,Spoke to nursing,OT  Equipment Recommended: Denise Pruitt       See flowsheet documentation for full assessment, interventions and recommendations  Outcome: Progressing  Note: Prognosis: Good  Problem List: Decreased strength,Decreased endurance,Impaired balance,Decreased mobility  Assessment: Pt seen for PT treatment session this date with interventions consisting of transfer training, gait training, neuromuscular re-education of movement activities to improve dynamic sitting balance and dynamic standing balance, and education provided as needed for safety and direction to help improve functional mobility and activity tolerance   Pt agreeable to PT treatment session upon arrival, pt found sitting OOB in recliner  At end of session, pt left sitting OOB in recliner with chair alarm activated and all needs in reach  In comparison to previous session, pt with improvements in ambulation distance and amount of assistance required for mobility tasks  Continue to recommend Home PT at time of d/c in order to maximize pt's functional independence and safety w/ mobility  Pt continues to be functioning below baseline level  PT will continue to see pt while here in order to address the deficits listed above and provide interventions consistent w/ POC in effort to achieve STGs  Barriers to Discharge: None        PT Discharge Recommendation: Home with home health rehabilitation          See flowsheet documentation for full assessment

## 2022-04-07 NOTE — PROGRESS NOTES
[address]  Progress Note - Jessica Recinos 1955, 78 y o  male MRN: [id number]  Unit/Bed#: -01 Encounter: [id number]  Primary Care Provider: Jamie Fox DO   Date and time admitted to hospital: 3/30/2022  1:58 PM    Leg swelling  Assessment & Plan  · ProBNP over 600  Venous duplex is negative improved her now has trace status post 1 dose of Lasix to 20 mg IV a BM slightly up no further Lasix 2D echo is normal   Will add compression stockings to be worn as soon as he gets out of bed and leg elevation as well  Influenza A  Assessment & Plan  Maintain isolation  Continue supportive care  Has completed actually 6 days of Tamiflu discontinue    Acute respiratory failure with hypoxia (HCC)  Assessment & Plan  Most likely secondary to COPD exacerbation secondary to influenza a  Patient also has history of lung cancer currently in remission for 1 year but has new nodule  Influenza A positive, COVID negative  Patient was saturating 88% on room air,  CTA PE negative,  Pulmonary consult appreciated  Repeat x-ray no significant  Still requiring 5 L  ProBNP and initially was slightly elevated above 600 he does have +2 pitting edema  Status post 1 dose of Lasix on 04/06 leg edema improved will place compression stockings his BUN slightly up note for other Lasix at this time 2D echo is normal   Continue steroids  90% 4 L  Shortness of breath continues to improve  Will do home O2 studies tomorrow might need Oxymizer  Decrease steroids to 40 mg IV q 12 hours      UTI (urinary tract infection)  Assessment & Plan  Urine culture showing Gram-negative rods,-Enterobacter, sensitive to levofloxacin, antibiotic adjusted 3/7    History of lung cancer  Assessment & Plan  Follows with Dr Karen Martinez note reviewed, documentation as below:  HEMATOLOGY/ONCOLOGY DIAGNOSIS:  1  Unresectable adenocarcinoma the right upper lung, 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, PDL1 <1% -->      Cancer Staging  Malignant neoplasm of upper lobe of right lung [facility])  Staging form: Lung, AJCC 8th Edition  - Clinical stage from 1/23/2020: Stage IB (cT2a, cN0, cM0) - Unsigned     Past Therapy:   1  Definitive external beam radiotherapy to the right upper lobe of the lung to a dose of 54 Gy delivered in 3 fractions between 02/12/2020 through 02/21/2020  2  Carboplatin/Alimta s/p 4 cycles from 3/3/2020 through 5/5/2020     Current Therapy:  Active Surveillance      CT chest 07/19/2021 with evidence progression        COPD with acute exacerbation [facility])  Assessment & Plan  Most likely secondary to influenza a, patient also has history of lung cancer in remission  Continue respiratory protocol  Maintain saturation 89% or above  Pulmonary consult appreciated  Patient is still on 5 L of oxygen   repeat chest x-ray-nonsignificant  No wheezing decreased breath sounds continue Solu-Medrol Pulmicort, Xopenex and Atrovent will continue coming down on steroids tomorrow    Tobacco abuse  Assessment & Plan  Continue Nicoderm patch    Type 2 diabetes mellitus, without long-term current use of insulin [facility])  Assessment & Plan  Lab Results   Component Value Date    HGBA1C 8 1 (H) 12/28/2021       Recent Labs     04/06/22  1603 04/06/22  2053 04/07/22  0738 04/07/22  1128   POCGLU 230* 155* 180* 327*       Blood Sugar Average: Last 72 hrs:  (P) 240 [id number] patient placed on Solu-Medrol due to COPD, which may affect patient blood sugar  Continue sliding scale, adjust insulin dose based on response  Insulin dose adjusted  Follow hypoglycemia precaution  Patient sugars uncontrolled start him on NPH 15 units b i d just started increase Humalog to 15 units t i d   With meals sliding scale adjust as needed  Has peripheral neuropathy has been on gabapentin 100 mg p o  B i d  Still has the feeling increased to 3 times a day    * Sepsis, viral ([address] )  Assessment & Plan  Secondary to influenza A resolved          VTE Pharmacologic Prophylaxis: VTE Score: 7 High Risk (Score >/= 5) - Pharmacological DVT Prophylaxis Ordered: enoxaparin (Lovenox)  Sequential Compression Devices Ordered  Patient Centered Rounds: I performed bedside rounds with nursing staff today  Discussions with Specialists or Other Care Team Provider: naz    Education and Discussions with Family / Patient: Updated  (wife) via phone  Time Spent for Care: 30 minutes  More than 50% of total time spent on counseling and coordination of care as described above  Current Length of Stay: 8 day(s)  Current Patient Status: Inpatient   Certification Statement: The patient will continue to require additional inpatient hospital stay due to hypoxia  Discharge Plan: Anticipate discharge tomorrow to home  Code Status: Level 1 - Full Code    Subjective:   Seen and examined sob improved feeling pretty good    Objective:     Vitals:   Temp (24hrs), Av 7 °F (36 5 °C), Min:97 2 °F (36 2 °C), Max:98 4 °F (36 9 °C)    Temp:  [97 2 °F (36 2 °C)-98 4 °F (36 9 °C)] 97 2 °F (36 2 °C)  HR:  [73-81] 74  Resp:  [17-19] 19  BP: (114-120)/(78-83) 116/79  SpO2:  [89 %-92 %] 92 %  Body mass index is 32 58 kg/m²  Input and Output Summary (last 24 hours): Intake/Output Summary (Last 24 hours) at 2022 1301  Last data filed at 2022 0737  Gross per 24 hour   Intake 960 ml   Output 2350 ml   Net -1390 ml       Physical Exam:   Physical Exam  Vitals and nursing note reviewed  Constitutional:       Appearance: He is well-developed  HENT:      Head: Normocephalic and atraumatic  Eyes:      Conjunctiva/sclera: Conjunctivae normal    Cardiovascular:      Rate and Rhythm: Normal rate and regular rhythm  Heart sounds: No murmur heard  Pulmonary:      Effort: Pulmonary effort is normal  No respiratory distress  Breath sounds: No wheezing or rales  Comments: decreased  Abdominal:      Palpations: Abdomen is soft  Tenderness: There is no abdominal tenderness  Musculoskeletal:         General: Swelling (+1) present  Cervical back: Neck supple  Skin:     General: Skin is warm and dry  Neurological:      Mental Status: He is alert and oriented to person, place, and time  Additional Data:     Labs:  Results from last 7 days   Lab Units 04/04/22  0455   WBC Thousand/uL 12 64*   HEMOGLOBIN g/dL 15 0   HEMATOCRIT % 46 7   PLATELETS Thousands/uL 239   NEUTROS PCT % 83*   LYMPHS PCT % 10*   MONOS PCT % 5   EOS PCT % 0     Results from last 7 days   Lab Units 04/06/22  0521 04/04/22  0455 04/04/22  0455   SODIUM mmol/L 136   < > 139   POTASSIUM mmol/L 4 1   < > 3 9   CHLORIDE mmol/L 99*   < > 100   CO2 mmol/L 32   < > 33*   BUN mg/dL 28*   < > 24   CREATININE mg/dL 1 02   < > 0 84   ANION GAP mmol/L 5   < > 6   CALCIUM mg/dL 8 5   < > 8 7   ALBUMIN g/dL  --   --  3 4*   TOTAL BILIRUBIN mg/dL  --   --  0 57   ALK PHOS U/L  --   --  50   ALT U/L  --   --  92*   AST U/L  --   --  26   GLUCOSE RANDOM mg/dL 268*   < > 209*    < > = values in this interval not displayed           Results from last 7 days   Lab Units 04/07/22  1128 04/07/22  0738 04/06/22 2053 04/06/22  1603 04/06/22  1054 04/06/22  0758 04/05/22  2115 04/05/22  1604 04/05/22  1114 04/05/22  0745 04/04/22  2054 04/04/22  1639   POC GLUCOSE mg/dl 327* 180* 155* 230* 279* 268* 264* 210* 286* 246* 184* 198*               Lines/Drains:  Invasive Devices  Report    Peripheral Intravenous Line            Peripheral IV 04/07/22 Right Forearm <1 day                      Imaging: Reviewed radiology reports from this admission including: chest xray    Recent Cultures (last 7 days):   Results from last 7 days   Lab Units 04/01/22  0630   LEGIONELLA URINARY ANTIGEN  Negative       Last 24 Hours Medication List:   Current Facility-Administered Medications   Medication Dose Route Frequency Provider Last Rate    acetaminophen  650 mg Oral Q6H PRN Carlos A Huang MD      albuterol  2 puff Inhalation Q4H PRN Haroldo Phillips MD      atorvastatin  40 mg Oral Daily Haroldo Phillips MD      benzonatate  100 mg Oral TID PRN Haroldo Phillips MD      budesonide-formoterol  2 puff Inhalation BID Angela Weaver MD      enoxaparin  40 mg Subcutaneous Daily Haroldo Phillips MD      finasteride  5 mg Oral Daily Haroldo Phillips MD      gabapentin  100 mg Oral TID Angela Weaver MD      guaiFENesin  600 mg Oral Q12H [facility] Haroldo Phillips MD      insulin lispro  15 Units Subcutaneous TID With Meals Angela Weaver MD      insulin lispro  2-12 Units Subcutaneous TID AC Linus Sadler MD      insulin lispro  2-12 Units Subcutaneous HS Linus Sadler MD      insulin NPH  15 Units Subcutaneous BID AC Angela Weaver MD      ipratropium  0 5 mg Nebulization Q6H Haroldo Phillips MD      levalbuterol  1 25 mg Nebulization Q6H Haroldo Phillips MD      levofloxacin  750 mg Oral Q24H Haroldo Phillips MD      methylPREDNISolone sodium succinate  40 mg Intravenous Q12H [facility] Angela Weaver MD      nicotine  21 mg Transdermal Daily Haroldo Phillips MD      oxyCODONE-acetaminophen  1 tablet Oral Q4H PRN Haroldo Phillips MD      sodium chloride  1 spray Each Nare Q1H PRN Haroldo Phillips MD      tamsulosin  0 4 mg Oral Daily Haroldo Phillips MD          Today, Patient Was Seen By: Angela Weaver MD    **Please Note: This note may have been constructed using a voice recognition system  **

## 2022-04-07 NOTE — PHYSICAL THERAPY NOTE
PHYSICAL THERAPY TREATMENT NOTE  NAME:  More Telles  DATE: 04/07/22    Length Of Stay: 8  Performed at least 2 patient identifiers during session: Name and Birthday    TREATMENT FLOWSHEET:    04/07/22 1414   PT Last Visit   PT Visit Date 04/07/22   Note Type   Note Type Treatment   Pain Assessment   Pain Assessment Tool 0-10   Pain Score No Pain   Restrictions/Precautions   Other Precautions Fall Risk;O2;Chair Alarm; Bed Alarm  (4LO2 via NC)   General   Chart Reviewed Yes   Response to Previous Treatment Patient with no complaints from previous session  Family/Caregiver Present No   Cognition   Overall Cognitive Status WFL   Arousal/Participation Alert; Cooperative   Attention Within functional limits   Orientation Level Oriented X4   Memory Within functional limits   Following Commands Follows all commands and directions without difficulty   Subjective   Subjective " I would really rather go to out patient PT than have Ev 77 come to the house, Daniela 72 has an OP facility 2 blocks from my house "   Bed Mobility   Rolling R 6  Modified independent   Additional items HOB elevated; Bedrails; Increased time required;Verbal cues   Rolling L 6  Modified independent   Additional items HOB elevated; Bedrails; Increased time required;Verbal cues   Supine to Sit 6  Modified independent   Additional items HOB elevated; Bedrails; Increased time required;Verbal cues   Additional Comments Pt  was able to maintain unsupported sitting balance to perform multidirectional weightshifting and multidirectional reaching to help improve dynamic sitting balance   Transfers   Sit to Stand 5  Supervision   Additional items Assist x 1; Armrests; Increased time required;Verbal cues   Stand to Sit 5  Supervision   Additional items Assist x 1; Armrests; Increased time required;Verbal cues   Stand pivot 5  Supervision   Additional items Assist x 1; Armrests; Increased time required;Verbal cues   Toilet transfer 5  Supervision   Additional items Assist x 1;Increased time required;Verbal cues;Standard toilet   Additional Comments Pt  was able to manage clothing and hygiene needs without assistance   Ambulation/Elevation   Gait pattern Improper Weight shift;Decreased foot clearance; Wide ANU   Gait Assistance 5  Supervision   Additional items Assist x 1;Verbal cues   Assistive Device Rolling walker;None   Distance 100 ft and 10 ft x2   Stair Management Assistance Not tested  (pt  has 3 RIOS home)   Balance   Static Sitting Normal   Dynamic Sitting Good   Static Standing Fair +   Dynamic Standing Fair   Ambulatory Fair -   Endurance Deficit   Endurance Deficit Yes   Endurance Deficit Description MARINA and fatigue, SpO2 was 94 % with 4LO2 following ambulation but did drop to 89% while talking and sitting  Activity Tolerance   Activity Tolerance Patient limited by fatigue   Exercises   Neuro re-ed Pt  performed dynamic standing balance activities including multidirectional weightshifting, multidirectional reaching, and [id number] degrees both directions without AD and close S to improve dynamic standing balance  Pt  also performed dynamic sitting activities including multidirectional reaching, multidirectional weightshifting, and bending over to  object from the floor to help improve dynamic sitting balance for a total of 24 mins    Assessment   Prognosis Good   Problem List Decreased strength;Decreased endurance; Impaired balance;Decreased mobility   Goals   Patient Goals to go home and start OP therapy   PT Treatment Day 3   Plan   Treatment/Interventions Functional transfer training;LE strengthening/ROM; Elevations; Therapeutic exercise; Endurance training;Patient/family training;Equipment eval/education; Bed mobility;Gait training   Progress Progressing toward goals   PT Frequency 3-5x/wk   Recommendation   PT Discharge Recommendation Home with home health rehabilitation   AM-PAC Basic Mobility Inpatient   Turning in Bed Without Bedrails 4   Lying on Back to Sitting on Edge of Flat Bed 4   Moving Bed to Chair 3   Standing Up From Chair 4   Walk in Room 3   Climb 3-5 Stairs 3   Basic Mobility Inpatient Raw Score 21   Basic Mobility Standardized Score 45 55   Highest Level Of Mobility   [facility] Goal 6: Walk 10 steps or more       The patient's AM-PAC Basic Mobility Inpatient Short Form Raw Score is 21, Standardized Score is 45 55  A standardized score greater than 42 9 suggests the patient may benefit from discharge to home  Please also refer to the recommendation of the Physical Therapist for safe discharge planning  Pt seen for PT treatment session this date with interventions consisting of transfer training, gait training, neuromuscular re-education of movement activities to improve dynamic sitting balance and dynamic standing balance, and education provided as needed for safety and direction to help improve functional mobility and activity tolerance   Pt agreeable to PT treatment session upon arrival, pt found sitting OOB in recliner  At end of session, pt left sitting OOB in recliner with chair alarm activated and all needs in reach  In comparison to previous session, pt with improvements in ambulation distance and amount of assistance required for mobility tasks  Continue to recommend Home PT at time of d/c in order to maximize pt's functional independence and safety w/ mobility  Pt continues to be functioning below baseline level  PT will continue to see pt while here in order to address the deficits listed above and provide interventions consistent w/ POC in effort to achieve STGs      Kit Espinosa PTA

## 2022-04-07 NOTE — CASE MANAGEMENT
Case Management Discharge Planning Note    Patient name Nehal Parra  Location /-82 MRN [id number]  : 1955 Date 2022       Current Admission Date: 3/30/2022  Current Admission Diagnosis:Sepsis, viral [facility])   Patient Active Problem List    Diagnosis Date Noted    Leg swelling 2022    Acute respiratory failure with hypoxia ([facility] [address] ) 2022    Influenza A 2022    UTI (urinary tract infection) 2021    Type 2 diabetes mellitus, without long-term current use of insulin ([facility] [address] ) 09/15/2021    Tobacco abuse 09/15/2021    COPD with acute exacerbation ([facility] ) 09/15/2021    BPH with obstruction/lower urinary tract symptoms 09/15/2021    History of lung cancer 09/15/2021    [facility] 09/15/2021    Acute hypoxemic respiratory failure due to COVID-19 [facility] ) 2021      LOS (days): 8  Geometric Mean LOS (GMLOS) (days): 4 80  Days to GMLOS:-3 1     OBJECTIVE:  Risk of Unplanned Readmission Score: 11         Current admission status: Inpatient   Preferred Pharmacy:   [address] [facility], [address] [facility] [id number]  [address] [place] [address]  Phone: [phone] Fax: [phone]    Primary Care Provider: Bret Lesches, DO    Primary Insurance: MEDICARE  Secondary Insurance: [id number] [address]ender 6    DISCHARGE DETAILS:    Discharge planning discussed with[de-identified] patient  Freedom of Choice: Yes  Comments - Freedom of Choice: patient declined HHC, nursing and RW offered to him 22 and previously by CM  CM contacted family/caregiver?: No- see comments (patient declined)  Were Treatment Team discharge recommendations reviewed with patient/caregiver?: Yes  Did patient/caregiver verbalize understanding of patient care needs?: Yes  Were patient/caregiver advised of the risks associated with not following Treatment Team discharge recommendations?: Yes         [facility]         Is the patient interested in [facility] at discharge?: No       Treatment Team Recommendation: Home  Discharge Destination Plan[de-identified] Home  Transport at Discharge : Family        CM met with patient at bedside to assess CM needs  Discussed [facility] referral with patient (OT and PT), patient declined and said if he feels therapy is needed he will walk to [facility]  Patient inquired what medication he will be discharged on and CM explained nursing will review with him  CM suggested visiting nurse for follow up care with patient in home regarding medications and patient denied, indicating he has a nurse he meets with at the [facility] who handles all his medications  Patient was agreeable to O2 eval and returning home with oxygen from [employer] as he previously utilized this company in the home  CM will follow for O2 needs

## 2022-04-07 NOTE — PROGRESS NOTES
Progress Note - Pulmonary   Keven Reddish 78 y o  male MRN: [id number]  Unit/Bed#: -01 Encounter: [id number]    Impressions:  1  Acute on chronic hypoxic respiratory failure due to influenza A infection  2     COPD with acute exacerbation - now resolved  3  RUL lung adenocarcinoma, presumed in remission, but with new nodule of unclear etiology  4   Volume overload  5  Tobacco use disorder     Recommendations:  1  Taper prednisone to 40 mg and taper over 10 days (has been on IV steroids 40 q12 or q8 for 8 days)  2  Completed tamiflu  3  Home regimen of Trelegy with albuterol MDI/ neb at home- currently on Symbicort and Xopenex/Ipratropium  4  Received antibiotics for possible pulmonary infection  Currently on levofloxacin for possible urinary infection  5  Would consider further diuresis  6  Should follow up with pulmonary - sees Dr Vanessa Carpenter with [facility]  7  May need oxygen on discharge  Was on oxygen in the past    We will sign off  Call back with questions        Subjective:   No shortness of breath  Occasional cough    On 5 lpm of O2  No O2 at baseline  Feels improvd    Objective:     Vitals: Blood pressure 131/79, pulse 88, temperature 98 2 °F (36 8 °C), resp  rate 18, height 6' 1" (1 854 m), weight 112 kg (246 lb 14 6 oz), SpO2 (!) 89 %  ,Body mass index is 32 58 kg/m²        Intake/Output Summary (Last 24 hours) at 4/7/2022 1652  Last data filed at 4/7/2022 1524  Gross per 24 hour   Intake 600 ml   Output 2050 ml   Net -1450 ml       Invasive Devices  Report    Peripheral Intravenous Line            Peripheral IV 04/07/22 Right Forearm <1 day              Vitals:    04/07/22 1521   BP: 131/79   Pulse: 88   Resp: 18   Temp: 98 2 °F (36 8 °C)   SpO2: (!) 89%     General:  Patient is awake, alert, non-toxic and in no acute respiratory distress  Eyes: PERRL, no scleral icterus  Neck: + JVD  CV:  Regular, +S1 and S2, No murmurs, gallops or rubs appreciated  Lungs: Clear to auscultation bilateral without wheeze, rales or rhonci  Abdomen: Soft, +BS, Non-tender, non-distended  Obese  Extremities: No clubbing, cyanosis  + edema bilateral  Neuro: No focal motor/sensory deficits  Skin: Warm, No rashes or ulcerations    Labs: I have personally reviewed pertinent lab results  Lab Results   Component Value Date    WBC 12 64 (H) 04/04/2022    HGB 15 0 04/04/2022    HCT 46 7 04/04/2022    MCV 91 04/04/2022     04/04/2022     Lab Results   Component Value Date    SODIUM 136 04/06/2022    K 4 1 04/06/2022    CL 99 (L) 04/06/2022    CO2 32 04/06/2022    BUN 28 (H) 04/06/2022    CREATININE 1 02 04/06/2022    GLUC 268 (H) 04/06/2022    CALCIUM 8 5 04/06/2022       Imaging and other studies: I have personally reviewed pertinent reports  and I have personally reviewed pertinent films in PACS    CXR 4/3- no acute disease  RUL scar at prior treated tumor    CTA Chest 3/30  No definite pulmonary emboli, but interpretation is compromised by respiratory motion and segmental and subsegmental emboli can be obscured      8 mm left upper lobe nodule, increased from 4 mm in June 2020 and slight enlargement of an AP window node since June 2020  Metastatic disease cannot be excluded      2 8 x 1 8 cm septated thin-walled cyst in the left upper lobe, stable since January 2020  Attention will be directed to this on follow-up to exclude a cystic adenocarcinoma      No change in masslike opacity in the right upper lobe and adjacent nodule, the site of treated tumor  TTE 4/5  Left Ventricle: Top-normal left ventricular wall thickness with normal LV cavity size and systolic function  Calculated ejection fraction greater than 70%  No obvious segmental wall motion abnormality identified  Normal end-diastolic pressures estimated  Unremarkable diastolic relaxation pattern    Right Ventricle: Normal right ventricular size and function    Right Atrium: The atrium is mildly dilated      Tricuspid Valve: Grossly normal-appearing tricuspid leaflets with trace insufficiency  No adequate spectral envelope for estimating pulmonary pressure    Aorta: The aortic root is mildly dilated

## 2022-04-07 NOTE — ASSESSMENT & PLAN NOTE
Follows with Dr Grant Guallpa note reviewed, documentation as below:  HEMATOLOGY/ONCOLOGY DIAGNOSIS:  1  Unresectable adenocarcinoma the right upper lung, 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, PDL1 <1% -->      Cancer Staging  Malignant neoplasm of upper lobe of right lung [facility])  Staging form: Lung, AJCC 8th Edition  - Clinical stage from 1/23/2020: Stage IB (cT2a, cN0, cM0) - Unsigned     Past Therapy:   1  Definitive external beam radiotherapy to the right upper lobe of the lung to a dose of 54 Gy delivered in 3 fractions between 02/12/2020 through 02/21/2020  2  Carboplatin/Alimta s/p 4 cycles from 3/3/2020 through 5/5/2020     Current Therapy:  Active Surveillance      CT chest 07/19/2021 with evidence progression

## 2022-04-07 NOTE — PLAN OF CARE
Problem: PAIN - ADULT  Goal: Verbalizes/displays adequate comfort level or baseline comfort level  Description: Interventions:  - Encourage patient to monitor pain and request assistance  - Assess pain using appropriate pain scale0-10  - Administer analgesics based on type and severity of pain and evaluate response  - Implement non-pharmacological measures as appropriate and evaluate response  - Consider cultural and social influences on pain and pain management  - Notify physician/advanced practitioner if interventions unsuccessful or patient reports new pain  Outcome: Progressing     Problem: INFECTION - ADULT  Goal: Absence or prevention of progression during hospitalization  Description: INTERVENTIONS:  - Assess and monitor for signs and symptoms of infection  - Monitor lab/diagnostic results  - Monitor all insertion sites, i e  indwelling lines, tubes, and drains  - Monitor endotracheal if appropriate and nasal secretions for changes in amount and color  - [place] appropriate cooling/warming therapies per order  - Administer medications as ordered  - Instruct and encourage patient and family to use good hand hygiene technique  - Identify and instruct in appropriate isolation precautions for identified infection/condition  Outcome: Progressing     Problem: SAFETY ADULT  Goal: Patient will remain free of falls  Description: INTERVENTIONS:  - Educate patient/family on patient safety including physical limitations  - Instruct patient to call for assistance with activity   - Consult OT/PT to assist with strengthening/mobility   - Keep Call bell within reach  - Keep bed low and locked with side rails adjusted as appropriate  - Keep care items and personal belongings within reach  - Initiate and maintain comfort rounds  - Make Fall Risk Sign visible to staff  - Offer Toileting every   Hours, in advance of need  - Initiate/Maintain   alarm  - Obtain necessary fall risk management equipment:     - Apply yellow socks and bracelet for high fall risk patients  - Consider moving patient to room near nurses station  Outcome: Progressing  Goal: Maintain or return to baseline ADL function  Description: INTERVENTIONS:  -  Assess patient's ability to carry out ADLs; assess patient's baseline for ADL function and identify physical deficits which impact ability to perform ADLs (bathing, care of mouth/teeth, toileting, grooming, dressing, etc )  - Assess/evaluate cause of self-care deficits   - Assess range of motion  - Assess patient's mobility; develop plan if impaired  - Assess patient's need for assistive devices and provide as appropriate  - Encourage maximum independence but intervene and supervise when necessary  - Involve family in performance of ADLs  - Assess for home care needs following discharge   - Consider OT consult to assist with ADL evaluation and planning for discharge  - Provide patient education as appropriate  Outcome: Progressing  Goal: Maintains/Returns to pre admission functional level  Description: INTERVENTIONS:  - Perform BMAT or MOVE assessment daily    - Set and communicate daily mobility goal to care team and patient/family/caregiver  - Collaborate with rehabilitation services on mobility goals if consulted  - Perform Range of Motion   times a day  - Reposition patient every   hours  - Dangle patient   times a day  - Stand patient   times a day  - Ambulate patient   times a day  - Out of bed to chair   times a day   - Out of bed for meals    times a day  - Out of bed for toileting  - Record patient progress and toleration of activity level   Outcome: Progressing     Problem: DISCHARGE PLANNING  Goal: Discharge to home or other facility with appropriate resources  Description: INTERVENTIONS:  - Identify barriers to discharge w/patient and caregiver  - Arrange for needed discharge resources and transportation as appropriate  - Identify discharge learning needs (meds, wound care, etc )  - Arrange for interpretive services to assist at discharge as needed  - Refer to Case Management Department for coordinating discharge planning if the patient needs post-hospital services based on physician/advanced practitioner order or complex needs related to functional status, cognitive ability, or social support system  Outcome: Progressing     Problem: MOBILITY - ADULT  Goal: Maintain or return to baseline ADL function  Description: INTERVENTIONS:  -  Assess patient's ability to carry out ADLs; assess patient's baseline for ADL function and identify physical deficits which impact ability to perform ADLs (bathing, care of mouth/teeth, toileting, grooming, dressing, etc )  - Assess/evaluate cause of self-care deficits   - Assess range of motion  - Assess patient's mobility; develop plan if impaired  - Assess patient's need for assistive devices and provide as appropriate  - Encourage maximum independence but intervene and supervise when necessary  - Involve family in performance of ADLs  - Assess for home care needs following discharge   - Consider OT consult to assist with ADL evaluation and planning for discharge  - Provide patient education as appropriate  Outcome: Progressing  Goal: Maintains/Returns to pre admission functional level  Description: INTERVENTIONS:  - Perform BMAT or MOVE assessment daily    - Set and communicate daily mobility goal to care team and patient/family/caregiver  - Collaborate with rehabilitation services on mobility goals if consulted  - Perform Range of Motion   times a day  - Reposition patient every   hours  - Dangle patient   times a day  - Stand patient   times a day  - Ambulate patient   times a day  - Out of bed to chair   times a day   - Out of bed for meals     times a day  - Out of bed for toileting  - Record patient progress and toleration of activity level   Outcome: Progressing     Problem: RESPIRATORY - ADULT  Goal: Achieves optimal ventilation and oxygenation  Description: INTERVENTIONS:  - Assess for changes in respiratory status  - Assess for changes in mentation and behavior  - Position to facilitate oxygenation and minimize respiratory effort  - Oxygen administered by appropriate delivery if ordered  - Initiate smoking cessation education as indicated  - Encourage broncho-pulmonary hygiene including cough, deep breathe, Incentive Spirometry  - Assess the need for suctioning and aspirate as needed  - Assess and instruct to report SOB or any respiratory difficulty  - Respiratory Therapy support as indicated  Outcome: Progressing

## 2022-04-07 NOTE — ASSESSMENT & PLAN NOTE
Most likely secondary to COPD exacerbation secondary to influenza a  Patient also has history of lung cancer currently in remission for 1 year but has new nodule  Influenza A positive, COVID negative  Patient was saturating 88% on room air,  CTA PE negative,  Pulmonary consult appreciated  Repeat x-ray no significant  Still requiring 5 L  ProBNP and initially was slightly elevated above 600 he does have +2 pitting edema  Status post 1 dose of Lasix on 04/06 leg edema improved will place compression stockings his BUN slightly up note for other Lasix at this time 2D echo is normal   Continue steroids  90% 4 L  Shortness of breath continues to improve  Will do home O2 studies tomorrow might need Oxymizer  Decrease steroids to 40 mg IV q 12 hours

## 2022-04-07 NOTE — PLAN OF CARE
Problem: PAIN - ADULT  Goal: Verbalizes/displays adequate comfort level or baseline comfort level  Description: Interventions:  - Encourage patient to monitor pain and request assistance  - Assess pain using appropriate pain scale0-10  - Administer analgesics based on type and severity of pain and evaluate response  - Implement non-pharmacological measures as appropriate and evaluate response  - Consider cultural and social influences on pain and pain management  - Notify physician/advanced practitioner if interventions unsuccessful or patient reports new pain  Outcome: Progressing     Problem: INFECTION - ADULT  Goal: Absence or prevention of progression during hospitalization  Description: INTERVENTIONS:  - Assess and monitor for signs and symptoms of infection  - Monitor lab/diagnostic results  - Monitor all insertion sites, i e  indwelling lines, tubes, and drains  - Monitor endotracheal if appropriate and nasal secretions for changes in amount and color  - [place] appropriate cooling/warming therapies per order  - Administer medications as ordered  - Instruct and encourage patient and family to use good hand hygiene technique  - Identify and instruct in appropriate isolation precautions for identified infection/condition  Outcome: Progressing     Problem: SAFETY ADULT  Goal: Patient will remain free of falls  Description: INTERVENTIONS:  - Educate patient/family on patient safety including physical limitations  - Instruct patient to call for assistance with activity   - Consult OT/PT to assist with strengthening/mobility   - Keep Call bell within reach  - Keep bed low and locked with side rails adjusted as appropriate  - Keep care items and personal belongings within reach  - Initiate and maintain comfort rounds  - Make Fall Risk Sign visible to staff  - Apply yellow socks and bracelet for high fall risk patients  - Consider moving patient to room near nurses station  Outcome: Progressing  Goal: Maintain or return to baseline ADL function  Description: INTERVENTIONS:  -  Assess patient's ability to carry out ADLs; assess patient's baseline for ADL function and identify physical deficits which impact ability to perform ADLs (bathing, care of mouth/teeth, toileting, grooming, dressing, etc )  - Assess/evaluate cause of self-care deficits   - Assess range of motion  - Assess patient's mobility; develop plan if impaired  - Assess patient's need for assistive devices and provide as appropriate  - Encourage maximum independence but intervene and supervise when necessary  - Involve family in performance of ADLs  - Assess for home care needs following discharge   - Consider OT consult to assist with ADL evaluation and planning for discharge  - Provide patient education as appropriate  Outcome: Progressing  Goal: Maintains/Returns to pre admission functional level  Description: INTERVENTIONS:  - Perform BMAT or MOVE assessment daily    - Set and communicate daily mobility goal to care team and patient/family/caregiver     - Collaborate with rehabilitation services on mobility goals if consulted  - Out of bed for toileting  - Record patient progress and toleration of activity level   Outcome: Progressing     Problem: DISCHARGE PLANNING  Goal: Discharge to home or other facility with appropriate resources  Description: INTERVENTIONS:  - Identify barriers to discharge w/patient and caregiver  - Arrange for needed discharge resources and transportation as appropriate  - Identify discharge learning needs (meds, wound care, etc )  - Arrange for interpretive services to assist at discharge as needed  - Refer to Case Management Department for coordinating discharge planning if the patient needs post-hospital services based on physician/advanced practitioner order or complex needs related to functional status, cognitive ability, or social support system  Outcome: Progressing     Problem: Knowledge Deficit  Goal: Patient/family/caregiver demonstrates understanding of disease process, treatment plan, medications, and discharge instructions  Description: Complete learning assessment and assess knowledge base    Interventions:  - Provide teaching at level of understanding  - Provide teaching via preferred learning methods  Outcome: Progressing     Problem: Potential for Falls  Goal: Patient will remain free of falls  Description: INTERVENTIONS:  - Educate patient/family on patient safety including physical limitations  - Instruct patient to call for assistance with activity   - Consult OT/PT to assist with strengthening/mobility   - Keep Call bell within reach  - Keep bed low and locked with side rails adjusted as appropriate  - Keep care items and personal belongings within reach  - Initiate and maintain comfort rounds  - Make Fall Risk Sign visible to staff  - Apply yellow socks and bracelet for high fall risk patients  - Consider moving patient to room near nurses station  Outcome: Progressing     Problem: MOBILITY - ADULT  Goal: Maintain or return to baseline ADL function  Description: INTERVENTIONS:  -  Assess patient's ability to carry out ADLs; assess patient's baseline for ADL function and identify physical deficits which impact ability to perform ADLs (bathing, care of mouth/teeth, toileting, grooming, dressing, etc )  - Assess/evaluate cause of self-care deficits   - Assess range of motion  - Assess patient's mobility; develop plan if impaired  - Assess patient's need for assistive devices and provide as appropriate  - Encourage maximum independence but intervene and supervise when necessary  - Involve family in performance of ADLs  - Assess for home care needs following discharge   - Consider OT consult to assist with ADL evaluation and planning for discharge  - Provide patient education as appropriate  Outcome: Progressing  Goal: Maintains/Returns to pre admission functional level  Description: INTERVENTIONS:  - Perform BMAT or MOVE assessment daily    - Set and communicate daily mobility goal to care team and patient/family/caregiver     - Collaborate with rehabilitation services on mobility goals if consulted  - Out of bed for toileting  - Record patient progress and toleration of activity level   Outcome: Progressing     Problem: RESPIRATORY - ADULT  Goal: Achieves optimal ventilation and oxygenation  Description: INTERVENTIONS:  - Assess for changes in respiratory status  - Assess for changes in mentation and behavior  - Position to facilitate oxygenation and minimize respiratory effort  - Oxygen administered by appropriate delivery if ordered  - Initiate smoking cessation education as indicated  - Encourage broncho-pulmonary hygiene including cough, deep breathe, Incentive Spirometry  - Assess the need for suctioning and aspirate as needed  - Assess and instruct to report SOB or any respiratory difficulty  - Respiratory Therapy support as indicated  Outcome: Progressing

## 2022-04-08 VITALS
RESPIRATION RATE: 18 BRPM | SYSTOLIC BLOOD PRESSURE: 133 MMHG | BODY MASS INDEX: 32.72 KG/M2 | HEIGHT: 73 IN | DIASTOLIC BLOOD PRESSURE: 81 MMHG | TEMPERATURE: 97.7 F | WEIGHT: 246.91 LBS | HEART RATE: 77 BPM | OXYGEN SATURATION: 91 %

## 2022-04-08 LAB
DME PARACHUTE DELIVERY DATE ACTUAL: NORMAL
DME PARACHUTE DELIVERY DATE EXPECTED: NORMAL
DME PARACHUTE DELIVERY DATE REQUESTED: NORMAL
DME PARACHUTE ITEM DESCRIPTION: NORMAL
DME PARACHUTE ORDER STATUS: NORMAL
DME PARACHUTE SUPPLIER NAME: NORMAL
DME PARACHUTE SUPPLIER PHONE: NORMAL
GLUCOSE SERPL-MCNC: 163 MG/DL (ref 65–140)
GLUCOSE SERPL-MCNC: 178 MG/DL (ref 65–140)

## 2022-04-08 PROCEDURE — 99239 HOSP IP/OBS DSCHRG MGMT >30: CPT | Performed by: FAMILY MEDICINE

## 2022-04-08 PROCEDURE — 94761 N-INVAS EAR/PLS OXIMETRY MLT: CPT

## 2022-04-08 PROCEDURE — 94640 AIRWAY INHALATION TREATMENT: CPT

## 2022-04-08 PROCEDURE — 82948 REAGENT STRIP/BLOOD GLUCOSE: CPT

## 2022-04-08 PROCEDURE — 94760 N-INVAS EAR/PLS OXIMETRY 1: CPT

## 2022-04-08 RX ORDER — PREDNISONE 20 MG/1
40 TABLET ORAL DAILY
Status: DISCONTINUED | OUTPATIENT
Start: 2022-04-09 | End: 2022-04-08 | Stop reason: HOSPADM

## 2022-04-08 RX ORDER — ALBUTEROL SULFATE 2.5 MG/3ML
2.5 SOLUTION RESPIRATORY (INHALATION) 2 TIMES DAILY
Qty: 180 ML | Refills: 0 | Status: SHIPPED | OUTPATIENT
Start: 2022-04-08 | End: 2022-05-08

## 2022-04-08 RX ORDER — PEN NEEDLE, DIABETIC 32GX 5/32"
NEEDLE, DISPOSABLE MISCELLANEOUS
Qty: 100 EACH | Refills: 0 | Status: SHIPPED | OUTPATIENT
Start: 2022-04-08

## 2022-04-08 RX ORDER — LEVOFLOXACIN 750 MG/1
750 TABLET ORAL EVERY 24 HOURS
Qty: 2 TABLET | Refills: 0 | Status: SHIPPED | OUTPATIENT
Start: 2022-04-09 | End: 2022-04-11

## 2022-04-08 RX ORDER — GABAPENTIN 100 MG/1
100 CAPSULE ORAL 3 TIMES DAILY
Qty: 90 CAPSULE | Refills: 0 | Status: SHIPPED | OUTPATIENT
Start: 2022-04-08

## 2022-04-08 RX ORDER — INSULIN HUMAN 100 [IU]/ML
20 INJECTION, SUSPENSION SUBCUTANEOUS
Qty: 15 ML | Refills: 0 | Status: SHIPPED | OUTPATIENT
Start: 2022-04-08 | End: 2022-04-08 | Stop reason: HOSPADM

## 2022-04-08 RX ORDER — PREDNISONE 10 MG/1
TABLET ORAL
Qty: 21 TABLET | Refills: 0 | Status: SHIPPED | OUTPATIENT
Start: 2022-04-09 | End: 2022-04-19

## 2022-04-08 RX ORDER — HUMAN INSULIN 100 [IU]/ML
20 INJECTION, SUSPENSION SUBCUTANEOUS
Qty: 15 ML | Refills: 0 | Status: SHIPPED | OUTPATIENT
Start: 2022-04-08 | End: 2022-04-09 | Stop reason: HOSPADM

## 2022-04-08 RX ADMIN — LEVALBUTEROL HYDROCHLORIDE 1.25 MG: 1.25 SOLUTION RESPIRATORY (INHALATION) at 13:55

## 2022-04-08 RX ADMIN — INSULIN LISPRO 15 UNITS: 100 INJECTION, SOLUTION INTRAVENOUS; SUBCUTANEOUS at 12:05

## 2022-04-08 RX ADMIN — ATORVASTATIN CALCIUM 40 MG: 40 TABLET, FILM COATED ORAL at 08:11

## 2022-04-08 RX ADMIN — FINASTERIDE 5 MG: 5 TABLET, FILM COATED ORAL at 08:11

## 2022-04-08 RX ADMIN — INSULIN HUMAN 15 UNITS: 100 INJECTION, SUSPENSION SUBCUTANEOUS at 08:13

## 2022-04-08 RX ADMIN — LEVALBUTEROL HYDROCHLORIDE 1.25 MG: 1.25 SOLUTION RESPIRATORY (INHALATION) at 01:44

## 2022-04-08 RX ADMIN — METHYLPREDNISOLONE SODIUM SUCCINATE 40 MG: 40 INJECTION, POWDER, FOR SOLUTION INTRAMUSCULAR; INTRAVENOUS at 08:11

## 2022-04-08 RX ADMIN — IPRATROPIUM BROMIDE 0.5 MG: 0.5 SOLUTION RESPIRATORY (INHALATION) at 13:55

## 2022-04-08 RX ADMIN — IPRATROPIUM BROMIDE 0.5 MG: 0.5 SOLUTION RESPIRATORY (INHALATION) at 01:44

## 2022-04-08 RX ADMIN — LEVOFLOXACIN 750 MG: 750 TABLET, FILM COATED ORAL at 08:10

## 2022-04-08 RX ADMIN — INSULIN LISPRO 15 UNITS: 100 INJECTION, SOLUTION INTRAVENOUS; SUBCUTANEOUS at 08:15

## 2022-04-08 RX ADMIN — LEVALBUTEROL HYDROCHLORIDE 1.25 MG: 1.25 SOLUTION RESPIRATORY (INHALATION) at 08:43

## 2022-04-08 RX ADMIN — INSULIN LISPRO 2 UNITS: 100 INJECTION, SOLUTION INTRAVENOUS; SUBCUTANEOUS at 12:05

## 2022-04-08 RX ADMIN — GABAPENTIN 100 MG: 100 CAPSULE ORAL at 08:11

## 2022-04-08 RX ADMIN — GUAIFENESIN 600 MG: 600 TABLET ORAL at 08:11

## 2022-04-08 RX ADMIN — BUDESONIDE AND FORMOTEROL FUMARATE DIHYDRATE 2 PUFF: 80; 4.5 AEROSOL RESPIRATORY (INHALATION) at 08:17

## 2022-04-08 RX ADMIN — INSULIN LISPRO 2 UNITS: 100 INJECTION, SOLUTION INTRAVENOUS; SUBCUTANEOUS at 08:15

## 2022-04-08 RX ADMIN — TAMSULOSIN HYDROCHLORIDE 0.4 MG: 0.4 CAPSULE ORAL at 08:11

## 2022-04-08 RX ADMIN — IPRATROPIUM BROMIDE 0.5 MG: 0.5 SOLUTION RESPIRATORY (INHALATION) at 08:42

## 2022-04-08 NOTE — PROGRESS NOTES
Pt d/c home  AVS reviewed with pt at bedside  Medications reviewed  Verified with Case management that insulin is covered by insurance as requested by patient  Home O2 reviewed  IV removed  Belongings gathered and sent home with pt

## 2022-04-08 NOTE — ASSESSMENT & PLAN NOTE
Most likely secondary to COPD exacerbation secondary to influenza a  Patient also has history of lung cancer currently in remission for 1 year but has new nodule  Influenza A positive, COVID negative  Patient was saturating 88% on room air,  CTA PE negative,  Pulmonary consult appreciated  Repeat x-ray no significant  Patient is down to 4 L home O2 studies done only requires 4 L at rest and ambulation  Will be discharged home on a prednisone taper

## 2022-04-08 NOTE — ASSESSMENT & PLAN NOTE
Follows with Dr Jimbo James note reviewed, documentation as below:  HEMATOLOGY/ONCOLOGY DIAGNOSIS:  1  Unresectable adenocarcinoma the right upper lung, 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, PDL1 <1% -->      Cancer Staging  Malignant neoplasm of upper lobe of right lung [facility])  Staging form: Lung, AJCC 8th Edition  - Clinical stage from 1/23/2020: Stage IB (cT2a, cN0, cM0) - Unsigned     Past Therapy:   1  Definitive external beam radiotherapy to the right upper lobe of the lung to a dose of 54 Gy delivered in 3 fractions between 02/12/2020 through 02/21/2020  2  Carboplatin/Alimta s/p 4 cycles from 3/3/2020 through 5/5/2020     Current Therapy:  Active Surveillance      CT chest 07/19/2021 with evidence progression

## 2022-04-08 NOTE — PLAN OF CARE
Problem: PAIN - ADULT  Goal: Verbalizes/displays adequate comfort level or baseline comfort level  Description: Interventions:  - Encourage patient to monitor pain and request assistance  - Assess pain using appropriate pain scale0-10  - Administer analgesics based on type and severity of pain and evaluate response  - Implement non-pharmacological measures as appropriate and evaluate response  - Consider cultural and social influences on pain and pain management  - Notify physician/advanced practitioner if interventions unsuccessful or patient reports new pain  Outcome: Progressing     Problem: INFECTION - ADULT  Goal: Absence or prevention of progression during hospitalization  Description: INTERVENTIONS:  - Assess and monitor for signs and symptoms of infection  - Monitor lab/diagnostic results  - Monitor all insertion sites, i e  indwelling lines, tubes, and drains  - Monitor endotracheal if appropriate and nasal secretions for changes in amount and color  - [place] appropriate cooling/warming therapies per order  - Administer medications as ordered  - Instruct and encourage patient and family to use good hand hygiene technique  - Identify and instruct in appropriate isolation precautions for identified infection/condition  Outcome: Progressing     Problem: SAFETY ADULT  Goal: Patient will remain free of falls  Description: INTERVENTIONS:  - Educate patient/family on patient safety including physical limitations  - Instruct patient to call for assistance with activity   - Consult OT/PT to assist with strengthening/mobility   - Keep Call bell within reach  - Keep bed low and locked with side rails adjusted as appropriate  - Keep care items and personal belongings within reach  - Initiate and maintain comfort rounds  - Make Fall Risk Sign visible to staff  - Offer Toileting every 2 Hours, in advance of need  - Initiate/Maintain bed alarm  - Obtain necessary fall risk management equipment:   - Apply yellow socks and bracelet for high fall risk patients  - Consider moving patient to room near nurses station  Outcome: Progressing  Goal: Maintain or return to baseline ADL function  Description: INTERVENTIONS:  -  Assess patient's ability to carry out ADLs; assess patient's baseline for ADL function and identify physical deficits which impact ability to perform ADLs (bathing, care of mouth/teeth, toileting, grooming, dressing, etc )  - Assess/evaluate cause of self-care deficits   - Assess range of motion  - Assess patient's mobility; develop plan if impaired  - Assess patient's need for assistive devices and provide as appropriate  - Encourage maximum independence but intervene and supervise when necessary  - Involve family in performance of ADLs  - Assess for home care needs following discharge   - Consider OT consult to assist with ADL evaluation and planning for discharge  - Provide patient education as appropriate  Outcome: Progressing  Goal: Maintains/Returns to pre admission functional level  Description: INTERVENTIONS:  - Perform BMAT or MOVE assessment daily    - Set and communicate daily mobility goal to care team and patient/family/caregiver     - Collaborate with rehabilitation services on mobility goals if consulted  - Out of bed for toileting  - Record patient progress and toleration of activity level   Outcome: Progressing     Problem: DISCHARGE PLANNING  Goal: Discharge to home or other facility with appropriate resources  Description: INTERVENTIONS:  - Identify barriers to discharge w/patient and caregiver  - Arrange for needed discharge resources and transportation as appropriate  - Identify discharge learning needs (meds, wound care, etc )  - Arrange for interpretive services to assist at discharge as needed  - Refer to Case Management Department for coordinating discharge planning if the patient needs post-hospital services based on physician/advanced practitioner order or complex needs related to functional status, cognitive ability, or social support system  Outcome: Progressing     Problem: Knowledge Deficit  Goal: Patient/family/caregiver demonstrates understanding of disease process, treatment plan, medications, and discharge instructions  Description: Complete learning assessment and assess knowledge base    Interventions:  - Provide teaching at level of understanding  - Provide teaching via preferred learning methods  Outcome: Progressing     Problem: Potential for Falls  Goal: Patient will remain free of falls  Description: INTERVENTIONS:  - Educate patient/family on patient safety including physical limitations  - Instruct patient to call for assistance with activity   - Consult OT/PT to assist with strengthening/mobility   - Keep Call bell within reach  - Keep bed low and locked with side rails adjusted as appropriate  - Keep care items and personal belongings within reach  - Initiate and maintain comfort rounds  - Make Fall Risk Sign visible to staff  - Offer Toileting every 2 Hours, in advance of need  - Initiate/Maintain bed alarm  - Obtain necessary fall risk management equipment:   - Apply yellow socks and bracelet for high fall risk patients  - Consider moving patient to room near nurses station  Outcome: Progressing     Problem: MOBILITY - ADULT  Goal: Maintain or return to baseline ADL function  Description: INTERVENTIONS:  -  Assess patient's ability to carry out ADLs; assess patient's baseline for ADL function and identify physical deficits which impact ability to perform ADLs (bathing, care of mouth/teeth, toileting, grooming, dressing, etc )  - Assess/evaluate cause of self-care deficits   - Assess range of motion  - Assess patient's mobility; develop plan if impaired  - Assess patient's need for assistive devices and provide as appropriate  - Encourage maximum independence but intervene and supervise when necessary  - Involve family in performance of ADLs  - Assess for home care needs following discharge   - Consider OT consult to assist with ADL evaluation and planning for discharge  - Provide patient education as appropriate  Outcome: Progressing  Goal: Maintains/Returns to pre admission functional level  Description: INTERVENTIONS:  - Perform BMAT or MOVE assessment daily    - Set and communicate daily mobility goal to care team and patient/family/caregiver  - Collaborate with rehabilitation services on mobility goals if consulted  - Perform Range of Motion 3 times a day  - Reposition patient every 2 hours    - Dangle patient 2 times a day  - Stand patient 2 times a day  - Ambulate patient 2 times a day  - Out of bed to chair 3 times a day   - Out of bed for meals 3 times a day  - Out of bed for toileting  - Record patient progress and toleration of activity level   Outcome: Progressing     Problem: RESPIRATORY - ADULT  Goal: Achieves optimal ventilation and oxygenation  Description: INTERVENTIONS:  - Assess for changes in respiratory status  - Assess for changes in mentation and behavior  - Position to facilitate oxygenation and minimize respiratory effort  - Oxygen administered by appropriate delivery if ordered  - Initiate smoking cessation education as indicated  - Encourage broncho-pulmonary hygiene including cough, deep breathe, Incentive Spirometry  - Assess the need for suctioning and aspirate as needed  - Assess and instruct to report SOB or any respiratory difficulty  - Respiratory Therapy support as indicated  Outcome: Progressing

## 2022-04-08 NOTE — ASSESSMENT & PLAN NOTE
Urine culture showing Gram-negative rods,-Enterobacter, sensitive to levofloxacin, antibiotic adjusted will send in 2 more days of antibiotics

## 2022-04-08 NOTE — PLAN OF CARE
Problem: PAIN - ADULT  Goal: Verbalizes/displays adequate comfort level or baseline comfort level  Description: Interventions:  - Encourage patient to monitor pain and request assistance  - Assess pain using appropriate pain scale0-10  - Administer analgesics based on type and severity of pain and evaluate response  - Implement non-pharmacological measures as appropriate and evaluate response  - Consider cultural and social influences on pain and pain management  - Notify physician/advanced practitioner if interventions unsuccessful or patient reports new pain  Outcome: Progressing     Problem: INFECTION - ADULT  Goal: Absence or prevention of progression during hospitalization  Description: INTERVENTIONS:  - Assess and monitor for signs and symptoms of infection  - Monitor lab/diagnostic results  - Monitor all insertion sites, i e  indwelling lines, tubes, and drains  - Monitor endotracheal if appropriate and nasal secretions for changes in amount and color  - [place] appropriate cooling/warming therapies per order  - Administer medications as ordered  - Instruct and encourage patient and family to use good hand hygiene technique  - Identify and instruct in appropriate isolation precautions for identified infection/condition  Outcome: Progressing     Problem: SAFETY ADULT  Goal: Patient will remain free of falls  Description: INTERVENTIONS:  - Educate patient/family on patient safety including physical limitations  - Instruct patient to call for assistance with activity   - Consult OT/PT to assist with strengthening/mobility   - Keep Call bell within reach  - Keep bed low and locked with side rails adjusted as appropriate  - Keep care items and personal belongings within reach  - Initiate and maintain comfort rounds  - Make Fall Risk Sign visible to staff  - Apply yellow socks and bracelet for high fall risk patients  - Consider moving patient to room near nurses station  Outcome: Progressing  Goal: Maintain or return to baseline ADL function  Description: INTERVENTIONS:  -  Assess patient's ability to carry out ADLs; assess patient's baseline for ADL function and identify physical deficits which impact ability to perform ADLs (bathing, care of mouth/teeth, toileting, grooming, dressing, etc )  - Assess/evaluate cause of self-care deficits   - Assess range of motion  - Assess patient's mobility; develop plan if impaired  - Assess patient's need for assistive devices and provide as appropriate  - Encourage maximum independence but intervene and supervise when necessary  - Involve family in performance of ADLs  - Assess for home care needs following discharge   - Consider OT consult to assist with ADL evaluation and planning for discharge  - Provide patient education as appropriate  Outcome: Progressing  Goal: Maintains/Returns to pre admission functional level  Description: INTERVENTIONS:  - Perform BMAT or MOVE assessment daily    - Set and communicate daily mobility goal to care team and patient/family/caregiver     - Collaborate with rehabilitation services on mobility goals if consulted  - Out of bed for toileting  - Record patient progress and toleration of activity level   Outcome: Progressing     Problem: DISCHARGE PLANNING  Goal: Discharge to home or other facility with appropriate resources  Description: INTERVENTIONS:  - Identify barriers to discharge w/patient and caregiver  - Arrange for needed discharge resources and transportation as appropriate  - Identify discharge learning needs (meds, wound care, etc )  - Arrange for interpretive services to assist at discharge as needed  - Refer to Case Management Department for coordinating discharge planning if the patient needs post-hospital services based on physician/advanced practitioner order or complex needs related to functional status, cognitive ability, or social support system  Outcome: Progressing     Problem: Knowledge Deficit  Goal: Patient/family/caregiver demonstrates understanding of disease process, treatment plan, medications, and discharge instructions  Description: Complete learning assessment and assess knowledge base    Interventions:  - Provide teaching at level of understanding  - Provide teaching via preferred learning methods  Outcome: Progressing     Problem: Potential for Falls  Goal: Patient will remain free of falls  Description: INTERVENTIONS:  - Educate patient/family on patient safety including physical limitations  - Instruct patient to call for assistance with activity   - Consult OT/PT to assist with strengthening/mobility   - Keep Call bell within reach  - Keep bed low and locked with side rails adjusted as appropriate  - Keep care items and personal belongings within reach  - Initiate and maintain comfort rounds  - Make Fall Risk Sign visible to staff  - Apply yellow socks and bracelet for high fall risk patients  - Consider moving patient to room near nurses station  Outcome: Progressing     Problem: MOBILITY - ADULT  Goal: Maintain or return to baseline ADL function  Description: INTERVENTIONS:  -  Assess patient's ability to carry out ADLs; assess patient's baseline for ADL function and identify physical deficits which impact ability to perform ADLs (bathing, care of mouth/teeth, toileting, grooming, dressing, etc )  - Assess/evaluate cause of self-care deficits   - Assess range of motion  - Assess patient's mobility; develop plan if impaired  - Assess patient's need for assistive devices and provide as appropriate  - Encourage maximum independence but intervene and supervise when necessary  - Involve family in performance of ADLs  - Assess for home care needs following discharge   - Consider OT consult to assist with ADL evaluation and planning for discharge  - Provide patient education as appropriate  Outcome: Progressing  Goal: Maintains/Returns to pre admission functional level  Description: INTERVENTIONS:  - Perform BMAT or MOVE assessment daily    - Set and communicate daily mobility goal to care team and patient/family/caregiver     - Collaborate with rehabilitation services on mobility goals if consulted  - Out of bed for toileting  - Record patient progress and toleration of activity level   Outcome: Progressing     Problem: RESPIRATORY - ADULT  Goal: Achieves optimal ventilation and oxygenation  Description: INTERVENTIONS:  - Assess for changes in respiratory status  - Assess for changes in mentation and behavior  - Position to facilitate oxygenation and minimize respiratory effort  - Oxygen administered by appropriate delivery if ordered  - Initiate smoking cessation education as indicated  - Encourage broncho-pulmonary hygiene including cough, deep breathe, Incentive Spirometry  - Assess the need for suctioning and aspirate as needed  - Assess and instruct to report SOB or any respiratory difficulty  - Respiratory Therapy support as indicated  Outcome: Progressing

## 2022-04-08 NOTE — RESPIRATORY THERAPY NOTE
RT Protocol Note  Ray Welch 78 y o  male MRN: [id number]  Unit/Bed#: -01 Encounter: [id number]    Assessment    Principal Problem:    Sepsis, viral ([place] [address] )  Active Problems:    Type 2 diabetes mellitus, without long-term current use of insulin ([facility])    Tobacco abuse    COPD with acute exacerbation ([place] [address] )    History of lung cancer    UTI (urinary tract infection)    Acute respiratory failure with hypoxia ([facility])    Influenza A    Leg swelling      Home Pulmonary Medications:  Trelegy, albuterol as needed for shortness of breath       Past Medical History:   Diagnosis Date    Bladder cancer ([place] [address] )     BPH (benign prostatic hyperplasia)     COPD (chronic obstructive pulmonary disease) ([place] [address] )     Diabetes mellitus ([place] [address] )     Hyperlipidemia     Lung cancer ([place] [address] )     Rib fractures      Social History     Socioeconomic History    Marital status: Single     Spouse name: None    Number of children: None    Years of education: None    Highest education level: None   Occupational History    None   Tobacco Use    Smoking status: Current Every Day Smoker     Packs/day: 1 00    Smokeless tobacco: Never Used   Vaping Use    Vaping Use: Never used   Substance and Sexual Activity    Alcohol use: Not Currently    Drug use: Not Currently    Sexual activity: None   Other Topics Concern    None   Social History Narrative    None     Social Determinants of Health     Financial Resource Strain: Not on file   Food Insecurity: No Food Insecurity    Worried About Running Out of Food in the Last Year: Never true    [place] of Food in the Last Year: Never true   Transportation Needs: No Transportation Needs    Lack of Transportation (Medical): No    Lack of Transportation (Non-Medical):  No   Physical Activity: Not on file   Stress: Not on file   Social Connections: Not on file   Intimate Partner Violence: Not on file   Housing Stability: Low Risk     Unable to Pay for Housing in the Last Year: No    Number of Places Lived in the Last Year: 1    Unstable Housing in the Last Year: No       Subjective    Subjective Data: Patient is current 1 1/2 pack per day smoker  He denies use of  suplimental oxygen or CPAP/BiPAP use at home  He husues trelogy daily as well as an albuterol rescue inhaler    Objective    Physical Exam:   Assessment Type: During-treatment  General Appearance: Alert,Awake  Respiratory Pattern: Symmetrical  Chest Assessment: Chest expansion symmetrical  Bilateral Breath Sounds: Diminished  Cough: None  O2 Device: 4L N/C    Vitals:  Blood pressure 132/80, pulse 72, temperature 97 5 °F (36 4 °C), resp  rate 17, height 6' 1" (1 854 m), weight 112 kg (246 lb 14 6 oz), SpO2 92 %  Imaging and other studies: No acute disease  Right upper lobe scar at the site of treated tumor  No effusion or pneumothorax      O2 Device: 4L N/C     Plan    Respiratory Plan: Mild Distress pathway        Resp Comments: stable on 4L

## 2022-04-08 NOTE — DISCHARGE SUMMARY
[address]  Discharge- Renea Ladd 1955, 78 y o  male MRN: [id number]  Unit/Bed#: -01 Encounter: [id number]  Primary Care Provider: Chaya Vyas DO   Date and time admitted to hospital: 3/30/2022  1:58 PM    Leg swelling  Assessment & Plan  · ProBNP over 600  2D echo is normal diastolic and systolic function  Leg edema has improved with 2 doses of Lasix he did put out almost 9 L  Edema is +1 continue elevation continue compression stockings and resume his Lasix 40 mg daily as an outpatient  Venous duplex was done is negative    Influenza A  Assessment & Plan  Maintain isolation  Continue supportive care  Has completed actually 6 days of Tamiflu discontinue    Acute respiratory failure with hypoxia (HCC)  Assessment & Plan  Most likely secondary to COPD exacerbation secondary to influenza a  Patient also has history of lung cancer currently in remission for 1 year but has new nodule  Influenza A positive, COVID negative  Patient was saturating 88% on room air,  CTA PE negative,  Pulmonary consult appreciated  Repeat x-ray no significant  Patient is down to 4 L home O2 studies done only requires 4 L at rest and ambulation  Will be discharged home on a prednisone taper      UTI (urinary tract infection)  Assessment & Plan  Urine culture showing Gram-negative rods,-Enterobacter, sensitive to levofloxacin, antibiotic adjusted will send in 2 more days of antibiotics    History of lung cancer  Assessment & Plan  Follows with Dr Irene Vaughn note reviewed, documentation as below:  HEMATOLOGY/ONCOLOGY DIAGNOSIS:  1  Unresectable adenocarcinoma the right upper lung, 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, PDL1 <1% --> DR     Cancer Staging  Malignant neoplasm of upper lobe of right lung [facility])  Staging form: Lung, AJCC 8th Edition  - Clinical stage from 1/23/2020: Stage IB (cT2a, cN0, cM0) - Unsigned     Past Therapy:   1  Definitive external beam radiotherapy to the right upper lobe of the lung to a dose of 54 Gy delivered in 3 fractions between 02/12/2020 through 02/21/2020  2  Carboplatin/Alimta s/p 4 cycles from 3/3/2020 through 5/5/2020     Current Therapy:  Active Surveillance      CT chest 07/19/2021 with evidence progression        COPD with acute exacerbation [facility])  Assessment & Plan  Most likely secondary to influenza a, patient also has history of lung cancer in remission  Continue respiratory protocol  Discussed with Austin Dupree yesterday  Transition to prednisone 40 mg daily and will taper every 2 days by 10 and then to 5 mg and daily x2 and then stop  He is to follow-up with pulmonology  He has a nebulizer machine will have him do albuterol b i d  And continue his trilogy     Tobacco abuse  Assessment & Plan  Continue Nicoderm patch    Type 2 diabetes mellitus, without long-term current use of insulin [facility])  Assessment & Plan  Lab Results   Component Value Date    HGBA1C 8 1 (H) 12/28/2021       Recent Labs     04/07/22  1128 04/07/22  1609 04/07/22  2125 04/08/22  0731   POCGLU 327* 239* 165* 178*       Blood Sugar Average: Last 72 hrs:  (P) 757 [id number]   Patient is on metformin 1000 mg p o  B i d  at home may continue that he was on list pro and NPH 15 units b i d  Of NPH and lispro 15 units t i d  With meals to get his sugars around 165-178 secondary to steroids  Patient is going to be on a steroid taper and will send home on insulin while on that to avoid any hypoglycemia  Will switch him to Humulin 70/30 20 units b i d  expect as his steroids continue to be tapered down his sugars continues to improve and he will not need as much insulin her maybe not all  Discussed with his girlfriend to check his sugars 3 times a day if the sugars test to be less than 140 to hold the dose of insulin call primary care physician to adjust dosages      * Sepsis, viral ([facility] [address] )  Assessment & Plan  Secondary to influenza A resolved          Medical Problems             Resolved Problems  Date Reviewed: 4/8/2022          Resolved    Puerperal sepsis with acute hypoxic respiratory failure ([facility] [address] ) 3/30/2022     Resolved by  Carlos A uHang MD              Discharging Physician / Practitioner: Harriett Cantrell MD  PCP: Syd Lozada DO  Admission Date:   Admission Orders (From admission, onward)     Ordered        03/30/22 1519  INPATIENT ADMISSION  Once                      Discharge Date: 04/08/22    Consultations During Hospital Stay:  · Pulmonary    Procedures Performed:   · None    Significant Findings / Test Results:   · CTA PE study-No definite pulmonary emboli, but interpretation is compromised by respiratory motion and segmental and subsegmental emboli can be obscured      8 mm left upper lobe nodule, increased from 4 mm in June 2020 and slight enlargement of an AP window node since June 2020  Metastatic disease cannot be excluded      2 8 x 1 8 cm septated thin-walled cyst in the left upper lobe, stable since January 2020  Attention will be directed to this on follow-up to exclude a cystic adenocarcinoma  · Chest x-ray 4/3-normal    Incidental Findings:   · See above    Test Results Pending at Discharge (will require follow up): · None     Outpatient Tests Requested:  · None    Complications:  None    Reason for Admission:  Acute hypoxic respiratory failure    Hospital Course:   Jaimie Iraheta is a 78 y o  male patient who originally presented to the hospital on 3/30/2022 due to acute hypoxic respiratory failure secondary to COPD exacerbation  ProBNP was elevated but not consistent with CHF no evidence of CHF in the CTA of the chest x-ray he had some edema 2D echo done normal EF normal diastolic and systolic function was given intermittent doses of Lasix with improvement of edema diuresing 9 L   Patient started on high-dose steroids of 60 mg IV t i d  he had influenza positive completed Tamiflu and also found to have UTI positive started on Levaquin and will complete 2 remaining doses an outpatient  Pulmonary was consulted as well over this 7 day  Patient was able to be weaned down to 4 L with steroids being tapered down and the on outpatient converted to prednisone 40 mg daily taper down by 10 every 2 days and then 1 get to 5 mg daily for 2 days and stop  Patient had home O2 studies done he requires 4 L at rest and ambulation  He has a nebulizer machine discussed with him to can do albuterol b i d  For now follow-up with pulmonology continue his other inhalers trilogy  Unfortunately he had hyperglycemia secondary to the steroids he needed high doses of insulin to improve it as discussed with his girlfriend with tapering down of prednisone his sugars consult should continue to improve will send him on Humulin 168951 units b i d  And he can resume his metformin if his sugars start to get below 140 to hold the dose of insulin to call the PCP to make adjustments  His gabapentin was raised to 100 mg t i d  Secondary to having still peripheral neuropathy  Otherwise patient is medically clear to be discharged home follow-up with Pulmonary  Please see above list of diagnoses and related plan for additional information  Condition at Discharge: stable    Discharge Day Visit / Exam:   Subjective:  Patient seen and examined, shortness of breath improved  Vitals: Blood Pressure: 133/81 (04/08/22 0730)  Pulse: 77 (04/08/22 0730)  Temperature: 97 7 °F (36 5 °C) (04/08/22 0730)  Temp Source: Oral (04/06/22 2238)  Respirations: 18 (04/08/22 0730)  Height: 6' 1" (185 4 cm) (04/05/22 1300)  Weight - Scale: 112 kg (246 lb 14 6 oz) (04/08/22 0539)  SpO2: 91 % (04/08/22 0843)  Exam:   Physical Exam  Vitals and nursing note reviewed  Constitutional:       Appearance: He is well-developed  HENT:      Head: Normocephalic and atraumatic  Eyes:      Conjunctiva/sclera: Conjunctivae normal    Cardiovascular:      Rate and Rhythm: Normal rate and regular rhythm        Heart sounds: No murmur heard       Pulmonary:      Effort: Pulmonary effort is normal  No respiratory distress  Breath sounds: Normal breath sounds  No wheezing or rales  Abdominal:      General: There is no distension  Palpations: Abdomen is soft  Tenderness: There is no abdominal tenderness  Musculoskeletal:         General: Swelling (Plus one pitting edema) present  Cervical back: Neck supple  Skin:     General: Skin is warm and dry  Neurological:      Mental Status: He is alert and oriented to person, place, and time  Discussion with Family: Updated  (wife) via phone  Discharge instructions/Information to patient and family:   See after visit summary for information provided to patient and family  Provisions for Follow-Up Care:  See after visit summary for information related to follow-up care and any pertinent home health orders  Disposition:   Home    Planned Readmission: no     Discharge Statement:  I spent >35 minutes discharging the patient  This time was spent on the day of discharge  I had direct contact with the patient on the day of discharge  Greater than 50% of the total time was spent examining patient, answering all patient questions, arranging and discussing plan of care with patient as well as directly providing post-discharge instructions  Additional time then spent on discharge activities  Discharge Medications:  See after visit summary for reconciled discharge medications provided to patient and/or family        **Please Note: This note may have been constructed using a voice recognition system**

## 2022-04-08 NOTE — DISCHARGE INSTRUCTIONS
Check sugars 3 times a day if sugars start to be <140 hold dose of insulin and call pcp for adjustement

## 2022-04-08 NOTE — CASE MANAGEMENT
Case Management Discharge Planning Note    Patient name Cleo Delaney  Location /-65 MRN [id number]  : 1955 Date 2022       Current Admission Date: 3/30/2022  Current Admission Diagnosis:Sepsis, viral [facility])   Patient Active Problem List    Diagnosis Date Noted    Leg swelling 2022    Acute respiratory failure with hypoxia ([facility] [address] ) 2022    Influenza A 2022    UTI (urinary tract infection) 2021    Type 2 diabetes mellitus, without long-term current use of insulin ([facility] [facility] ) 09/15/2021    Tobacco abuse 09/15/2021    COPD with acute exacerbation ([facility] ) 09/15/2021    BPH with obstruction/lower urinary tract symptoms 09/15/2021    History of lung cancer 09/15/2021    [facility] 09/15/2021    Acute hypoxemic respiratory failure due to COVID-19 ([facility] ) 2021      LOS (days): 9  Geometric Mean LOS (GMLOS) (days): 4 80  Days to GMLOS:-4 1     OBJECTIVE:  Risk of Unplanned Readmission Score: 12         Current admission status: Inpatient   Preferred Pharmacy:   [address] [facility], [address] [facility] [address] [facility] [address]  [address] [place] [address]  Phone: [phone] Fax: [phone]    Primary Care Provider: Patti Francois DO    Primary Insurance: MEDICARE  Secondary Insurance: [id number] [employer] 6    DISCHARGE DETAILS:    Discharge planning discussed with[de-identified] patient  Freedom of Choice: Yes  Comments - Freedom of Choice: patient declined HHC, nursing and RW offered to him 22 and previously by [facility]  CM contacted family/caregiver?: No- see comments (patient declined)  Were Treatment Team discharge recommendations reviewed with patient/caregiver?: Yes  Did patient/caregiver verbalize understanding of patient care needs?: Yes  Were patient/caregiver advised of the risks associated with not following Treatment Team discharge recommendations?: Yes    Contacts  Reason/Outcome: Discharge Planning    Requested Home Health Care         Is the patient interested in [address] at discharge?: No    DME Referral Provided  Referral made for DME?: Yes  DME referral completed for the following items[de-identified] Home Oxygen concentrator  DME Supplier Name[de-identified] [employer]    Other Referral/Resources/Interventions Provided:  Referral Comments: [address]         Treatment Team Recommendation: Home  Discharge Destination Plan[de-identified] Home  Transport at Discharge : Family        IMM Given (Date):: 04/08/22  IMM Given to[de-identified] Patient  Family notified[de-identified] appeal rights reviewed with patient     CM met with patient to discuss discharge  CM informed patient O2 ordered through [email or web address], CM waiting for approval to provide mobile tank  Patient still declining [address]  Patient indicated he will have a friend come get him in evening for , does not feel he needs ambulance trasnport  CM spoke to patient, reviewed DC IMM with patient and informed patient he can stay an additional 4 hours for reconsidering appealing the discharge as the medicare rights were review on the day of discharge  Pt verbalized understanding and feels ready to go home and does not intend to stay 4 hours to reconsider

## 2022-04-08 NOTE — ASSESSMENT & PLAN NOTE
· ProBNP over 600  2D echo is normal diastolic and systolic function  Leg edema has improved with 2 doses of Lasix he did put out almost 9 L  Edema is +1 continue elevation continue compression stockings and resume his Lasix 40 mg daily as an outpatient    Venous duplex was done is negative

## 2022-04-08 NOTE — ASSESSMENT & PLAN NOTE
Most likely secondary to influenza a, patient also has history of lung cancer in remission  Continue respiratory protocol  Discussed with [facility] yesterday  Transition to prednisone 40 mg daily and will taper every 2 days by 10 and then to 5 mg and daily x2 and then stop  He is to follow-up with pulmonology  He has a nebulizer machine will have him do albuterol b i d   And continue his trilogy

## 2022-04-08 NOTE — CASE MANAGEMENT
Case Management Discharge Planning Note    Patient name Savanah Mitchell  Location /-70 MRN [id number]  : 1955 Date 2022       Current Admission Date: 3/30/2022  Current Admission Diagnosis:Sepsis, viral [facility])   Patient Active Problem List    Diagnosis Date Noted    Leg swelling 2022    Acute respiratory failure with hypoxia ([facility] ) 2022    Influenza A 2022    UTI (urinary tract infection) 2021    Type 2 diabetes mellitus, without long-term current use of insulin ([facility] ) 09/15/2021    Tobacco abuse 09/15/2021    COPD with acute exacerbation ([facility] ) 09/15/2021    BPH with obstruction/lower urinary tract symptoms 09/15/2021    History of lung cancer 09/15/2021    Sepsis, viral ([facility] ) 09/15/2021    [facility] 2021      LOS (days): 9  Geometric Mean LOS (GMLOS) (days): 4 80  Days to GMLOS:-4 1     OBJECTIVE:  Risk of Unplanned Readmission Score: 12         Current admission status: Inpatient   Preferred Pharmacy:   [address] [facility], [address] [facility] [address] [facility]  [address] [facility]  Phone: [phone] Fax: [phone]    Primary Care Provider: Tish Martinez DO    Primary Insurance: MEDICARE  Secondary Insurance: [facility] DETAILS:  CM provided patient with portable O2 tank for home  O2 home set up to be delivered around 2:00PM to patient's home  Patient indicated he has a glucometer at home  Patient required 4L at rest and on ambulation

## 2022-04-08 NOTE — ASSESSMENT & PLAN NOTE
Lab Results   Component Value Date    HGBA1C 8 1 (H) 12/28/2021       Recent Labs     04/07/22  1128 04/07/22  1609 04/07/22  2125 04/08/22  0731   POCGLU 327* 239* 165* 178*       Blood Sugar Average: Last 72 hrs:  (P) 135 [id number]   Patient is on metformin 1000 mg p o  B i d  at home may continue that he was on list pro and NPH 15 units b i d  Of NPH and lispro 15 units t i d  With meals to get his sugars around 165-178 secondary to steroids  Patient is going to be on a steroid taper and will send home on insulin while on that to avoid any hypoglycemia  Will switch him to Humulin 70/30 20 units b i d  expect as his steroids continue to be tapered down his sugars continues to improve and he will not need as much insulin her maybe not all  Discussed with his girlfriend to check his sugars 3 times a day if the sugars test to be less than 140 to hold the dose of insulin call primary care physician to adjust dosages

## 2022-04-08 NOTE — RESPIRATORY THERAPY NOTE
Home Oxygen Qualifying Test     Patient name: Seamus Love        : 1955   Date of Test:  2022  Diagnosis:    Home Oxygen Test:    **Medicare Guidelines require item(s) 1-5 on all ambulatory patients or 1 and 2 on non-ambulatory patients  1  Baseline SPO2 on Room Air at rest 86 %   a  If <= 88% on Room Air add O2 via NC to obtain SpO2 >=88%  If LPM needed, document LPM  needed to reach =>88%    2  SPO2 during exertion on Room Air   %  a  During exertion monitor SPO2  If SPO2 increases >=89%, do not add supplemental oxygen    3  SPO2 on Oxygen at Rest  91% at 4 LPM    4  SPO2 during exertion on Oxygen 90 % at 4  LPM    5  Test performed during exertion activity  [x]  Supplemental Home Oxygen is indicated  []  Client does not qualify for home oxygen  Respiratory Additional Notes- pt ambulated with oxygen in room approx 5 min       [employer], [place]

## 2022-04-09 RX ORDER — INSULIN GLARGINE 100 [IU]/ML
15 INJECTION, SOLUTION SUBCUTANEOUS EVERY 12 HOURS SCHEDULED
Qty: 15 ML | Refills: 0 | Status: SHIPPED | OUTPATIENT
Start: 2022-04-09

## 2022-04-09 RX ORDER — PEN NEEDLE, DIABETIC 32GX 5/32"
NEEDLE, DISPOSABLE MISCELLANEOUS
Qty: 100 EACH | Refills: 0 | Status: SHIPPED | OUTPATIENT
Start: 2022-04-09

## 2022-04-09 NOTE — QUICK NOTE
Unfortunately patient's insulin that was prescribed yesterday Novolin 70 30 is not available in stock till Monday the [facility] pharmacy try to surge other pharmacies unfortunately the 1 pharmacy that has is too far from the patient will prescribe patient Lantus 15 units b i d  Pharmacy has it in [address] [place] [address] and approved by insurance updated the patient's girlfriend and whenever all the meds already she could go pick them up and discussed with her again to check his sugars if his sugars become less than 140 to hold the dose and just call his family doctor to see if the doses needs to be decreased or insulin completely stopped as discussed as a tapering of prednisone will occur the sugars will be better controlled    Will also fill out the medicare form for albuterol neb

## 2022-04-11 NOTE — PLAN OF CARE
Problem: PAIN - ADULT  Goal: Verbalizes/displays adequate comfort level or baseline comfort level  Description: Interventions:  - Encourage patient to monitor pain and request assistance  - Assess pain using appropriate pain scale  - Administer analgesics based on type and severity of pain and evaluate response  - Implement non-pharmacological measures as appropriate and evaluate response  - Consider cultural and social influences on pain and pain management  - Notify physician/advanced practitioner if interventions unsuccessful or patient reports new pain  Outcome: Progressing     Problem: INFECTION - ADULT  Goal: Absence or prevention of progression during hospitalization  Description: INTERVENTIONS:  - Assess and monitor for signs and symptoms of infection  - Monitor lab/diagnostic results  - Monitor all insertion sites, i e  indwelling lines, tubes, and drains  - Monitor endotracheal if appropriate and nasal secretions for changes in amount and color  - [place] appropriate cooling/warming therapies per order  - Administer medications as ordered  - Instruct and encourage patient and family to use good hand hygiene technique  - Identify and instruct in appropriate isolation precautions for identified infection/condition  Outcome: Progressing  Goal: Absence of fever/infection during neutropenic period  Description: INTERVENTIONS:  - Monitor WBC    Outcome: Progressing     Problem: SAFETY ADULT  Goal: Patient will remain free of falls  Description: INTERVENTIONS:  - Educate patient/family on patient safety including physical limitations  - Instruct patient to call for assistance with activity   - Consult OT/PT to assist with strengthening/mobility   - Keep Call bell within reach  - Keep bed low and locked with side rails adjusted as appropriate  - Keep care items and personal belongings within reach  - Initiate and maintain comfort rounds  - Make Fall Risk Sign visible to staff  - Offer Toileting every 3 Hours, in advance of need  - Initiate/Maintain   alarm  - Obtain necessary fall risk management equipment:   - Apply yellow socks and bracelet for high fall risk patients  - Consider moving patient to room near nurses station  Outcome: Progressing  Goal: Maintain or return to baseline ADL function  Description: INTERVENTIONS:  -  Assess patient's ability to carry out ADLs; assess patient's baseline for ADL function and identify physical deficits which impact ability to perform ADLs (bathing, care of mouth/teeth, toileting, grooming, dressing, etc )  - Assess/evaluate cause of self-care deficits   - Assess range of motion  - Assess patient's mobility; develop plan if impaired  - Assess patient's need for assistive devices and provide as appropriate  - Encourage maximum independence but intervene and supervise when necessary  - Involve family in performance of ADLs  - Assess for home care needs following discharge   - Consider OT consult to assist with ADL evaluation and planning for discharge  - Provide patient education as appropriate  Outcome: Progressing  Goal: Maintains/Returns to pre admission functional level  Description: INTERVENTIONS:  - Perform BMAT or MOVE assessment daily    - Set and communicate daily mobility goal to care team and patient/family/caregiver  - Collaborate with rehabilitation services on mobility goals if consulted  - Perform Range of Motion   times a day  - Reposition patient every   hours  - Dangle patient   times a day  - Stand patient   times a day  - Ambulate patient   Nicole Oliver   times a day  - Out of bed to chair   times a day   - Out of bed for meals    times a day  - Out of bed for toileting  - Record patient progress and toleration of activity level   Outcome: Progressing     Problem: DISCHARGE PLANNING  Goal: Discharge to home or other facility with appropriate resources  Description: INTERVENTIONS:  - Identify barriers to discharge w/patient and caregiver  - Arrange for needed discharge resources and transportation as appropriate  - Identify discharge learning needs (meds, wound care, etc )  - Arrange for interpretive services to assist at discharge as needed  - Refer to Case Management Department for coordinating discharge planning if the patient needs post-hospital services based on physician/advanced practitioner order or complex needs related to functional status, cognitive ability, or social support system  Outcome: Progressing     Problem: Knowledge Deficit  Goal: Patient/family/caregiver demonstrates understanding of disease process, treatment plan, medications, and discharge instructions  Description: Complete learning assessment and assess knowledge base    Interventions:  - Provide teaching at level of understanding  - Provide teaching via preferred learning methods  Outcome: Progressing     Problem: RESPIRATORY - ADULT  Goal: Achieves optimal ventilation and oxygenation  Description: INTERVENTIONS:  - Assess for changes in respiratory status  - Assess for changes in mentation and behavior  - Position to facilitate oxygenation and minimize respiratory effort  - Oxygen administered by appropriate delivery if ordered  - Initiate smoking cessation education as indicated  - Encourage broncho-pulmonary hygiene including cough, deep breathe, Incentive Spirometry  - Assess the need for suctioning and aspirate as needed  - Assess and instruct to report SOB or any respiratory difficulty  - Respiratory Therapy support as indicated  Outcome: Progressing Home Suture Removal Text: Patient was provided a home suture removal kit and will remove their sutures at home.  If they have any questions or difficulties they will call the office.

## 2022-10-11 PROBLEM — J10.1 INFLUENZA A: Status: RESOLVED | Noted: 2022-03-30 | Resolved: 2022-10-11

## 2022-10-11 PROBLEM — B97.89 SEPSIS, VIRAL (HCC): Status: RESOLVED | Noted: 2021-09-15 | Resolved: 2022-10-11

## 2022-10-11 PROBLEM — A41.89 SEPSIS, VIRAL (HCC): Status: RESOLVED | Noted: 2021-09-15 | Resolved: 2022-10-11

## 2022-10-11 PROBLEM — N39.0 UTI (URINARY TRACT INFECTION): Status: RESOLVED | Noted: 2021-09-16 | Resolved: 2022-10-11

## 2022-11-07 ENCOUNTER — HOSPITAL ENCOUNTER (OUTPATIENT)
Dept: CT IMAGING | Facility: HOSPITAL | Age: 67
Discharge: HOME | End: 2022-11-07

## 2022-11-07 DIAGNOSIS — C34.11 MALIGNANT NEOPLASM OF UPPER LOBE, RIGHT BRONCHUS OR LUNG (HCC): ICD-10-CM

## 2023-01-02 ENCOUNTER — HOSPITAL ENCOUNTER (OUTPATIENT)
Dept: CT IMAGING | Facility: HOSPITAL | Age: 68
Discharge: HOME | End: 2023-01-02

## 2023-01-02 DIAGNOSIS — R91.1 SOLITARY PULMONARY NODULE: ICD-10-CM

## 2023-01-02 DIAGNOSIS — Z85.118 PERSONAL HISTORY OF OTHER MALIGNANT NEOPLASM OF BRONCHUS AND LUNG: ICD-10-CM

## 2023-01-02 RX ADMIN — IOHEXOL 100 ML: 350 INJECTION, SOLUTION INTRAVENOUS at 11:19

## 2023-04-26 ENCOUNTER — OFFICE VISIT (OUTPATIENT)
Dept: PODIATRY | Age: 68
End: 2023-04-26

## 2023-04-26 VITALS
BODY MASS INDEX: 34.85 KG/M2 | HEIGHT: 73 IN | HEART RATE: 58 BPM | SYSTOLIC BLOOD PRESSURE: 110 MMHG | DIASTOLIC BLOOD PRESSURE: 68 MMHG | WEIGHT: 263 LBS | TEMPERATURE: 98.3 F | OXYGEN SATURATION: 98 %

## 2023-04-26 DIAGNOSIS — E11.9 TYPE 2 DIABETES MELLITUS WITHOUT COMPLICATION, WITHOUT LONG-TERM CURRENT USE OF INSULIN (HCC): Primary | ICD-10-CM

## 2023-04-26 DIAGNOSIS — Z72.0 TOBACCO ABUSE: ICD-10-CM

## 2023-04-26 DIAGNOSIS — B35.1 ONYCHOMYCOSIS: ICD-10-CM

## 2023-04-26 RX ORDER — EMPAGLIFLOZIN 25 MG/1
TABLET, FILM COATED ORAL
COMMUNITY
Start: 2023-04-25

## 2023-04-26 RX ORDER — POTASSIUM CHLORIDE 750 MG/1
CAPSULE, EXTENDED RELEASE ORAL
COMMUNITY
Start: 2023-02-28

## 2023-04-26 RX ORDER — GLIPIZIDE 2.5 MG/1
5 TABLET, EXTENDED RELEASE ORAL DAILY
COMMUNITY
Start: 2023-04-02

## 2023-04-26 RX ORDER — METFORMIN HYDROCHLORIDE 500 MG/1
TABLET, EXTENDED RELEASE ORAL
COMMUNITY
Start: 2023-03-15

## 2023-04-26 NOTE — PROGRESS NOTES
Assessment/Plan:     Diagnoses and all orders for this visit:    Type 2 diabetes mellitus without complication, without long-term current use of insulin (HCC)  -     VAS lower limb arterial duplex, complete bilateral; Future    Tobacco abuse  -     VAS lower limb arterial duplex, complete bilateral; Future    Onychomycosis    Other orders  -     potassium chloride (MICRO-K) 10 MEQ CR capsule; Take by mouth 1 Capsule in the morning   -     metFORMIN (GLUCOPHAGE-XR) 500 mg 24 hr tablet; take 2 tablets by mouth every morning with breakfast and 2 tablets every evening with dinner (Patient not taking: Reported on 4/26/2023)  -     glipiZIDE (GLUCOTROL XL) 2 5 mg 24 hr tablet; Take 5 mg by mouth daily  -     Jardiance 25 MG TABS; take 1/2 tablet by mouth once daily every morning for 8 days then take 1 tablet daily THEREAFTER  -     TURMERIC-FISH OIL PO; Take by mouth           IMPRESSION:  · NIDDM w/ PN, A1c 8% 10/3/23  · Onychomycosis  · B/L LE edema w/ pain  Ddx arterial vs venous insufficiency + peripheral neuropathy     PLAN:  · I reviewed clinical exam with patient in detail today  I have discussed with the patient the pathophysiology of this diagnosis and reviewed how the examination correlates with this diagnosis  DFE performed today  Patient has significant lower extremity risk due to diminished pulses in the feet and trophic skin changes to the lower extremity including thick toenail, atrophic skin, and decreased hair growth  Patient has B/L LE pain with edema and nonpalpable pulses  He has h/o smoking  LEADs ordered to further assess  Debridement of toenails x10 performed today  Using nail nipper, tawnya, and curette, nails were sharply debrided, reduced in thickness and length  Devitalized nail tissue and fungal debris excised and removed  Patient tolerated well  Discussed proper shoe gear, daily inspections of feet, and general foot health with patient     Patient has Q8  findings and is recommended for at "risk foot care every 9-10 weeks however he will f/u in 2 weeks for LEADs review      Subjective:      Patient ID: Freida De La Cruz is a 75 y o  male  Watemilee Davis presents to clinic today concerning diabetic foot exam, nail care and B/L LE edema with pain  Notes his nails are long and thick and he cannot reach to cut them  Notes B/L LE swelling, redness and pain present for months  Has tried elevation, compression stockings without relief  PCP tried diuretic with relief  NIDDM w/ N/T/B to B/L feet  Smoker  The following portions of the patient's history were reviewed and updated as appropriate: allergies, current medications, past family history, past medical history, past social history, past surgical history and problem list     Review of Systems   Constitutional: Negative for activity change, chills and fever  HENT: Negative  Respiratory: Negative for cough, chest tightness and shortness of breath  Cardiovascular: Positive for leg swelling (B/L LE)  Negative for chest pain  Endocrine: Negative  Genitourinary: Negative  Musculoskeletal: Positive for gait problem (B/L LE pain)  Skin: Positive for color change (B/L LE erythema)  Neurological: Negative for numbness  Psychiatric/Behavioral: Negative  Negative for agitation and behavioral problems  Objective:      /68 (BP Location: Right arm, Patient Position: Sitting, Cuff Size: Standard)   Pulse 58   Temp 98 3 °F (36 8 °C)   Ht 6' 1\" (1 854 m)   Wt 119 kg (263 lb)   SpO2 98%   BMI 34 70 kg/m²          Physical Exam  Cardiovascular:      Pulses: Pulses are weak  Dorsalis pedis pulses are 0 on the right side and 0 on the left side  Posterior tibial pulses are 0 on the right side and 0 on the left side  Feet:      Right foot:      Skin integrity: Dry skin present  No ulcer or callus  Left foot:      Skin integrity: Dry skin present  No ulcer or callus     Skin:     Comments: Elongated, dystrophic, thickened " toenails x10 with subungual debris  Skin is thin, dry and shiny with hemosiderin deposits and venous stasis skin changes  Diabetic Foot Exam    Patient's shoes and socks removed  Right Foot/Ankle   Right Foot Inspection  Skin Exam: skin intact and dry skin  No callus, no ulcer and no callus  Toe Exam: swelling  Sensory   Vibration: absent  Proprioception: absent  Monofilament testing: diminished    Vascular  Capillary refills: < 3 seconds  The right DP pulse is 0  The right PT pulse is 0  Left Foot/Ankle  Left Foot Inspection  Skin Exam: skin intact and dry skin  No ulcer and no callus  Toe Exam: swelling  Sensory   Vibration: absent  Proprioception: absent  Monofilament testing: absent    Vascular  Capillary refills: < 3 seconds  The left DP pulse is 0  The left PT pulse is 0       Assign Risk Category  No deformity present  Loss of protective sensation  Weak pulses  Risk: 2

## 2023-05-18 ENCOUNTER — HOSPITAL ENCOUNTER (OUTPATIENT)
Dept: NON INVASIVE DIAGNOSTICS | Facility: HOSPITAL | Age: 68
Discharge: HOME | End: 2023-05-18
Attending: STUDENT IN AN ORGANIZED HEALTH CARE EDUCATION/TRAINING PROGRAM

## 2023-05-18 DIAGNOSIS — E11.9 TYPE 2 DIABETES MELLITUS WITHOUT COMPLICATION, WITHOUT LONG-TERM CURRENT USE OF INSULIN (HCC): ICD-10-CM

## 2023-05-18 DIAGNOSIS — Z72.0 TOBACCO ABUSE: ICD-10-CM

## 2023-05-22 ENCOUNTER — OFFICE VISIT (OUTPATIENT)
Dept: PODIATRY | Age: 68
End: 2023-05-22

## 2023-05-22 VITALS
WEIGHT: 262 LBS | BODY MASS INDEX: 34.72 KG/M2 | OXYGEN SATURATION: 93 % | HEIGHT: 73 IN | TEMPERATURE: 97.5 F | HEART RATE: 59 BPM

## 2023-05-22 DIAGNOSIS — E11.9 TYPE 2 DIABETES MELLITUS WITHOUT COMPLICATION, WITHOUT LONG-TERM CURRENT USE OF INSULIN (HCC): Primary | ICD-10-CM

## 2023-05-22 RX ORDER — MULTIVITAMIN
TABLET ORAL
COMMUNITY

## 2023-05-22 RX ORDER — LEVOTHYROXINE SODIUM 0.03 MG/1
25 TABLET ORAL DAILY
COMMUNITY
Start: 2023-04-27

## 2023-05-22 NOTE — PROGRESS NOTES
Assessment/Plan:     Diagnoses and all orders for this visit:    Type 2 diabetes mellitus without complication, without long-term current use of insulin (HCC)    Other orders  -     levothyroxine 25 mcg tablet; Take 25 mcg by mouth daily  -     Multiple Vitamin (One-A-Day Essential) TABS; Take by mouth           IMPRESSION:  · NIDDM w/ PN, A1c 8% 1/3/23  · Onychomycosis  · B/L LE edema w/ pain  Ddx venous insufficiency + peripheral neuropathy     PLAN:  LEADs reviewed as below WNL  Patient has B/L LE pain with edema light from venous stasis, COPD combined with peripheral neuropathy from his DM  I discussed compression (has stockings at home), low sodium and simple carb/sugar diet, elevation  LEADs 5/18/23 reviewed: no evidence of AOD  RLE 1 24/164/138  LLE 1 13/NC/141  WNL  Pain mgmt referral was discussed for painful PN however patient will think about it and let me know next visit  Fu Q 7wks for at risk foot care    Subjective:      Patient ID: Raquel Mitchell is a 75 y o  male  Sarahashley BroussardOneil presents to clinic today concerning review of arterial tests  NIDDM w/ N/T/B to B/L feet  Smoker  The following portions of the patient's history were reviewed and updated as appropriate: allergies, current medications, past family history, past medical history, past social history, past surgical history and problem list     Review of Systems   Constitutional: Negative for activity change, chills and fever  HENT: Negative  Respiratory: Negative for cough, chest tightness and shortness of breath  Cardiovascular: Positive for leg swelling (B/L LE)  Negative for chest pain  Endocrine: Negative  Genitourinary: Negative  Musculoskeletal: Positive for gait problem (B/L LE pain)  Skin: Positive for color change (B/L LE erythema)  Neurological: Negative for numbness  Psychiatric/Behavioral: Negative  Negative for agitation and behavioral problems           Objective:      BP (P) 116/82 (BP Location: Right arm, "Patient Position: Sitting, Cuff Size: Standard)   Pulse 59   Temp 97 5 °F (36 4 °C)   Ht 6' 1\" (1 854 m)   Wt 119 kg (262 lb)   SpO2 93%   BMI 34 57 kg/m²          Physical Exam  Cardiovascular:      Pulses: Pulses are weak  Dorsalis pedis pulses are 0 on the right side and 0 on the left side  Posterior tibial pulses are 0 on the right side and 0 on the left side  Feet:      Right foot:      Skin integrity: Dry skin present  No ulcer or callus  Left foot:      Skin integrity: Dry skin present  No ulcer or callus  Skin:     Comments: Elongated, dystrophic, thickened toenails x10 with subungual debris  Skin is thin, dry and shiny with hemosiderin deposits and venous stasis skin changes              "

## 2023-06-05 ENCOUNTER — HOSPITAL ENCOUNTER (OUTPATIENT)
Dept: CT IMAGING | Facility: HOSPITAL | Age: 68
Discharge: HOME | End: 2023-06-05
Payer: MEDICARE

## 2023-06-05 DIAGNOSIS — R91.1 SOLITARY PULMONARY NODULE: ICD-10-CM

## 2023-06-05 DIAGNOSIS — Z85.118 PERSONAL HISTORY OF OTHER MALIGNANT NEOPLASM OF BRONCHUS AND LUNG: ICD-10-CM

## 2023-06-05 PROCEDURE — 71260 CT THORAX DX C+: CPT

## 2023-06-05 RX ADMIN — IOHEXOL 100 ML: 350 INJECTION, SOLUTION INTRAVENOUS at 11:28

## 2023-10-09 ENCOUNTER — HOSPITAL ENCOUNTER (OUTPATIENT)
Dept: CT IMAGING | Facility: HOSPITAL | Age: 68
Discharge: HOME | End: 2023-10-09
Payer: MEDICARE

## 2023-10-09 DIAGNOSIS — Z85.118 PERSONAL HISTORY OF OTHER MALIGNANT NEOPLASM OF BRONCHUS AND LUNG: ICD-10-CM

## 2023-10-09 PROCEDURE — 71260 CT THORAX DX C+: CPT

## 2023-10-09 RX ADMIN — IOHEXOL 85 ML: 350 INJECTION, SOLUTION INTRAVENOUS at 11:38

## 2024-01-26 ENCOUNTER — HOSPITAL ENCOUNTER (OUTPATIENT)
Dept: CT IMAGING | Facility: HOSPITAL | Age: 69
End: 2024-01-26
Payer: MEDICARE

## 2024-01-26 DIAGNOSIS — R91.1 COIN LESION: ICD-10-CM

## 2024-01-26 DIAGNOSIS — Z85.118 PERSONAL HISTORY OF MALIGNANT NEOPLASM OF BRONCHUS AND LUNG: ICD-10-CM

## 2024-01-26 PROCEDURE — G1004 CDSM NDSC: HCPCS

## 2024-01-26 PROCEDURE — 71260 CT THORAX DX C+: CPT

## 2024-01-26 RX ADMIN — IOHEXOL 85 ML: 350 INJECTION, SOLUTION INTRAVENOUS at 11:01

## 2024-05-01 ENCOUNTER — HOSPITAL ENCOUNTER (INPATIENT)
Facility: HOSPITAL | Age: 69
LOS: 5 days | Discharge: HOME HEALTH | DRG: 602 | End: 2024-05-06
Attending: EMERGENCY MEDICINE | Admitting: FAMILY MEDICINE
Payer: COMMERCIAL

## 2024-05-01 DIAGNOSIS — R60.0 BILATERAL LEG EDEMA: ICD-10-CM

## 2024-05-01 DIAGNOSIS — L03.116 CELLULITIS OF LEFT LEG: Primary | ICD-10-CM

## 2024-05-01 DIAGNOSIS — R33.8 ACUTE URINARY RETENTION: ICD-10-CM

## 2024-05-01 DIAGNOSIS — E11.9 TYPE 2 DIABETES MELLITUS, WITHOUT LONG-TERM CURRENT USE OF INSULIN (HCC): ICD-10-CM

## 2024-05-01 DIAGNOSIS — L03.115 CELLULITIS OF RIGHT LEG: ICD-10-CM

## 2024-05-01 DIAGNOSIS — I50.33 ACUTE ON CHRONIC DIASTOLIC (CONGESTIVE) HEART FAILURE (HCC): ICD-10-CM

## 2024-05-01 LAB
BASOPHILS # BLD AUTO: 0.08 THOUSANDS/ÂΜL (ref 0–0.1)
BASOPHILS NFR BLD AUTO: 1 % (ref 0–1)
EOSINOPHIL # BLD AUTO: 0.37 THOUSAND/ÂΜL (ref 0–0.61)
EOSINOPHIL NFR BLD AUTO: 4 % (ref 0–6)
ERYTHROCYTE [DISTWIDTH] IN BLOOD BY AUTOMATED COUNT: 13.1 % (ref 11.6–15.1)
GLUCOSE SERPL-MCNC: 111 MG/DL (ref 65–140)
GLUCOSE SERPL-MCNC: 147 MG/DL (ref 65–140)
HCT VFR BLD AUTO: 49.2 % (ref 36.5–49.3)
HGB BLD-MCNC: 16.4 G/DL (ref 12–17)
IMM GRANULOCYTES # BLD AUTO: 0.04 THOUSAND/UL (ref 0–0.2)
IMM GRANULOCYTES NFR BLD AUTO: 0 % (ref 0–2)
LACTATE SERPL-SCNC: 2.6 MMOL/L (ref 0.5–2)
LACTATE SERPL-SCNC: 3.4 MMOL/L (ref 0.5–2)
LYMPHOCYTES # BLD AUTO: 1.75 THOUSANDS/ÂΜL (ref 0.6–4.47)
LYMPHOCYTES NFR BLD AUTO: 16 % (ref 14–44)
MCH RBC QN AUTO: 30.7 PG (ref 26.8–34.3)
MCHC RBC AUTO-ENTMCNC: 33.3 G/DL (ref 31.4–37.4)
MCV RBC AUTO: 92 FL (ref 82–98)
MONOCYTES # BLD AUTO: 1.07 THOUSAND/ÂΜL (ref 0.17–1.22)
MONOCYTES NFR BLD AUTO: 10 % (ref 4–12)
NEUTROPHILS # BLD AUTO: 7.38 THOUSANDS/ÂΜL (ref 1.85–7.62)
NEUTS SEG NFR BLD AUTO: 69 % (ref 43–75)
NRBC BLD AUTO-RTO: 0 /100 WBCS
PLATELET # BLD AUTO: 238 THOUSANDS/UL (ref 149–390)
PMV BLD AUTO: 8.9 FL (ref 8.9–12.7)
RBC # BLD AUTO: 5.34 MILLION/UL (ref 3.88–5.62)
WBC # BLD AUTO: 10.69 THOUSAND/UL (ref 4.31–10.16)

## 2024-05-01 PROCEDURE — 80053 COMPREHEN METABOLIC PANEL: CPT | Performed by: EMERGENCY MEDICINE

## 2024-05-01 PROCEDURE — 82948 REAGENT STRIP/BLOOD GLUCOSE: CPT

## 2024-05-01 PROCEDURE — 96375 TX/PRO/DX INJ NEW DRUG ADDON: CPT

## 2024-05-01 PROCEDURE — 83036 HEMOGLOBIN GLYCOSYLATED A1C: CPT | Performed by: FAMILY MEDICINE

## 2024-05-01 PROCEDURE — 36415 COLL VENOUS BLD VENIPUNCTURE: CPT | Performed by: EMERGENCY MEDICINE

## 2024-05-01 PROCEDURE — 96365 THER/PROPH/DIAG IV INF INIT: CPT

## 2024-05-01 PROCEDURE — 87040 BLOOD CULTURE FOR BACTERIA: CPT | Performed by: EMERGENCY MEDICINE

## 2024-05-01 PROCEDURE — 99285 EMERGENCY DEPT VISIT HI MDM: CPT | Performed by: EMERGENCY MEDICINE

## 2024-05-01 PROCEDURE — 83605 ASSAY OF LACTIC ACID: CPT | Performed by: EMERGENCY MEDICINE

## 2024-05-01 PROCEDURE — 99223 1ST HOSP IP/OBS HIGH 75: CPT | Performed by: FAMILY MEDICINE

## 2024-05-01 PROCEDURE — 99285 EMERGENCY DEPT VISIT HI MDM: CPT

## 2024-05-01 PROCEDURE — 85025 COMPLETE CBC W/AUTO DIFF WBC: CPT | Performed by: EMERGENCY MEDICINE

## 2024-05-01 PROCEDURE — 87081 CULTURE SCREEN ONLY: CPT | Performed by: FAMILY MEDICINE

## 2024-05-01 RX ORDER — NYSTATIN 100000 [USP'U]/G
POWDER TOPICAL 2 TIMES DAILY
Status: DISCONTINUED | OUTPATIENT
Start: 2024-05-01 | End: 2024-05-06 | Stop reason: HOSPADM

## 2024-05-01 RX ORDER — TAMSULOSIN HYDROCHLORIDE 0.4 MG/1
0.4 CAPSULE ORAL DAILY
Status: DISCONTINUED | OUTPATIENT
Start: 2024-05-02 | End: 2024-05-03

## 2024-05-01 RX ORDER — ALBUTEROL SULFATE 90 UG/1
2 AEROSOL, METERED RESPIRATORY (INHALATION) EVERY 6 HOURS PRN
Status: DISCONTINUED | OUTPATIENT
Start: 2024-05-01 | End: 2024-05-06 | Stop reason: HOSPADM

## 2024-05-01 RX ORDER — GABAPENTIN 100 MG/1
100 CAPSULE ORAL 2 TIMES DAILY
Status: DISCONTINUED | OUTPATIENT
Start: 2024-05-01 | End: 2024-05-04

## 2024-05-01 RX ORDER — OXYCODONE HYDROCHLORIDE AND ACETAMINOPHEN 5; 325 MG/1; MG/1
1 TABLET ORAL EVERY 4 HOURS PRN
Status: DISCONTINUED | OUTPATIENT
Start: 2024-05-01 | End: 2024-05-06 | Stop reason: HOSPADM

## 2024-05-01 RX ORDER — INSULIN LISPRO 100 [IU]/ML
1-6 INJECTION, SOLUTION INTRAVENOUS; SUBCUTANEOUS
Status: DISCONTINUED | OUTPATIENT
Start: 2024-05-01 | End: 2024-05-06 | Stop reason: HOSPADM

## 2024-05-01 RX ORDER — ATORVASTATIN CALCIUM 40 MG/1
40 TABLET, FILM COATED ORAL DAILY
Status: DISCONTINUED | OUTPATIENT
Start: 2024-05-02 | End: 2024-05-06 | Stop reason: HOSPADM

## 2024-05-01 RX ORDER — ACETAMINOPHEN 325 MG/1
650 TABLET ORAL EVERY 4 HOURS PRN
Status: DISCONTINUED | OUTPATIENT
Start: 2024-05-01 | End: 2024-05-06 | Stop reason: HOSPADM

## 2024-05-01 RX ORDER — LEVOTHYROXINE SODIUM 0.03 MG/1
25 TABLET ORAL DAILY
COMMUNITY

## 2024-05-01 RX ORDER — FLUTICASONE FUROATE AND VILANTEROL 100; 25 UG/1; UG/1
1 POWDER RESPIRATORY (INHALATION) DAILY
Status: DISCONTINUED | OUTPATIENT
Start: 2024-05-02 | End: 2024-05-06 | Stop reason: HOSPADM

## 2024-05-01 RX ORDER — ENOXAPARIN SODIUM 100 MG/ML
40 INJECTION SUBCUTANEOUS DAILY
Status: DISCONTINUED | OUTPATIENT
Start: 2024-05-02 | End: 2024-05-06 | Stop reason: HOSPADM

## 2024-05-01 RX ORDER — CALCIUM CARBONATE 500 MG/1
1000 TABLET, CHEWABLE ORAL DAILY PRN
Status: DISCONTINUED | OUTPATIENT
Start: 2024-05-01 | End: 2024-05-06 | Stop reason: HOSPADM

## 2024-05-01 RX ORDER — FENTANYL CITRATE 50 UG/ML
50 INJECTION, SOLUTION INTRAMUSCULAR; INTRAVENOUS ONCE
Status: COMPLETED | OUTPATIENT
Start: 2024-05-01 | End: 2024-05-01

## 2024-05-01 RX ORDER — INSULIN LISPRO 100 [IU]/ML
2-12 INJECTION, SOLUTION INTRAVENOUS; SUBCUTANEOUS
Status: DISCONTINUED | OUTPATIENT
Start: 2024-05-01 | End: 2024-05-06 | Stop reason: HOSPADM

## 2024-05-01 RX ORDER — POTASSIUM CHLORIDE 20 MEQ/1
40 TABLET, EXTENDED RELEASE ORAL ONCE
Status: COMPLETED | OUTPATIENT
Start: 2024-05-01 | End: 2024-05-01

## 2024-05-01 RX ORDER — CEFAZOLIN SODIUM 2 G/50ML
2000 SOLUTION INTRAVENOUS EVERY 8 HOURS
Status: DISCONTINUED | OUTPATIENT
Start: 2024-05-01 | End: 2024-05-03

## 2024-05-01 RX ORDER — NICOTINE 21 MG/24HR
1 PATCH, TRANSDERMAL 24 HOURS TRANSDERMAL DAILY
Status: DISCONTINUED | OUTPATIENT
Start: 2024-05-02 | End: 2024-05-06 | Stop reason: HOSPADM

## 2024-05-01 RX ORDER — FUROSEMIDE 10 MG/ML
40 INJECTION INTRAMUSCULAR; INTRAVENOUS 2 TIMES DAILY
Status: DISCONTINUED | OUTPATIENT
Start: 2024-05-01 | End: 2024-05-04

## 2024-05-01 RX ORDER — FINASTERIDE 5 MG/1
5 TABLET, FILM COATED ORAL DAILY
Status: DISCONTINUED | OUTPATIENT
Start: 2024-05-02 | End: 2024-05-06 | Stop reason: HOSPADM

## 2024-05-01 RX ADMIN — CEFAZOLIN SODIUM 2000 MG: 2 SOLUTION INTRAVENOUS at 21:00

## 2024-05-01 RX ADMIN — OXYCODONE HYDROCHLORIDE AND ACETAMINOPHEN 1 TABLET: 5; 325 TABLET ORAL at 21:29

## 2024-05-01 RX ADMIN — FENTANYL CITRATE 50 MCG: 50 INJECTION INTRAMUSCULAR; INTRAVENOUS at 16:57

## 2024-05-01 RX ADMIN — GABAPENTIN 100 MG: 100 CAPSULE ORAL at 18:34

## 2024-05-01 RX ADMIN — VANCOMYCIN HYDROCHLORIDE 1500 MG: 1 INJECTION, POWDER, LYOPHILIZED, FOR SOLUTION INTRAVENOUS at 16:27

## 2024-05-01 RX ADMIN — ACETAMINOPHEN 325MG 650 MG: 325 TABLET ORAL at 19:31

## 2024-05-01 RX ADMIN — POTASSIUM CHLORIDE 40 MEQ: 1500 TABLET, EXTENDED RELEASE ORAL at 18:34

## 2024-05-01 RX ADMIN — FUROSEMIDE 40 MG: 10 INJECTION, SOLUTION INTRAMUSCULAR; INTRAVENOUS at 20:54

## 2024-05-01 RX ADMIN — SODIUM CHLORIDE 1000 ML: 0.9 INJECTION, SOLUTION INTRAVENOUS at 18:36

## 2024-05-01 NOTE — ASSESSMENT & PLAN NOTE
Wt Readings from Last 3 Encounters:   05/01/24 118 kg (260 lb)   05/22/23 119 kg (262 lb)   04/26/23 119 kg (263 lb)     he has known history of chronic diastolic CHF.  Repeat echo ordered for today.  Was taking Lasix 40 mg oral daily at home.  Trouble with leg swelling and blistering on his legs due to edema.  Will give him gentle hydration for now as he has lactic acidosis followed by Lasix 40 mg IV twice daily and monitor response

## 2024-05-01 NOTE — ASSESSMENT & PLAN NOTE
"Lab Results   Component Value Date    HGBA1C 7.1 (H) 01/15/2024       No results for input(s): \"POCGLU\" in the last 72 hours.    Blood Sugar Average: Last 72 hrs:  Patient placed on insulin sliding scale and held oral agents for now    "

## 2024-05-01 NOTE — ASSESSMENT & PLAN NOTE
Patient noted to have bilateral lower extremity cellulitis due to excessive edema and some blistering noted.  Will get wound cultures and MRSA nasal swab.  Received a dose of vancomycin in the ED already will continue on IV Ancef for now as podiatry for evaluation for some wounds in between his toes

## 2024-05-01 NOTE — H&P
"[facility]  H&P  Name: Peter Busch 68 y.o. male I MRN: [id number]  Unit/Bed#: Z1 H3 I Date of Admission: 5/1/2024   Date of Service: 5/1/2024 I Hospital Day: 0      Assessment/Plan   * Acute on chronic diastolic (congestive) heart failure (HCC)  Assessment & Plan  Wt Readings from Last 3 Encounters:   05/01/24 118 kg (260 lb)   05/22/23 119 kg (262 lb)   04/26/23 119 kg (263 lb)     he has known history of chronic diastolic CHF.  Repeat echo ordered for today.  Was taking Lasix 40 mg oral daily at home.  Trouble with leg swelling and blistering on his legs due to edema.  Will give him gentle hydration for now as he has lactic acidosis followed by Lasix 40 mg IV twice daily and monitor response            Cellulitis of left lower extremity  Assessment & Plan  Patient noted to have bilateral lower extremity cellulitis due to excessive edema and some blistering noted.  Will get wound cultures and MRSA nasal swab.  Received a dose of vancomycin in the ED already will continue on IV Ancef for now as podiatry for evaluation for some wounds in between his toes    Tobacco abuse  Assessment & Plan  Counselled on cessation and placed on nicotine replacement therapy    Type 2 diabetes mellitus, without long-term current use of insulin ([facility])  Assessment & Plan  Lab Results   Component Value Date    HGBA1C 7.1 (H) 01/15/2024       No results for input(s): \"POCGLU\" in the last 72 hours.    Blood Sugar Average: Last 72 hrs:  Patient placed on insulin sliding scale and held oral agents for now           VTE Prophylaxis: Enoxaparin (Lovenox)  / sequential compression device   Code Status: full code  POLST: There is no POLST form on file for this patient (pre-hospital)  Discussion with family: none    Anticipated Length of Stay:  Patient will be admitted on an Inpatient basis with an anticipated length of stay of  more than 2 midnights.   Justification for Hospital Stay: Acute on chronic diastolic " [facility]    Total Time for Visit, including Counseling / Coordination of Care: 90 minutes.  Greater than 50% of this total time spent on direct patient counseling and coordination of care.    Chief Complaint:   Leg swelling    History of Present Illness:    Peter Busch is a 68 y.o. male who presents with leg swelling.  Patient states that he has been dealing with For the past few weeks he has been taking Lasix and he is try to decrease his weight but it is not getting better and he was also seeing a podiatrist who recommended to him that he should probably go to the hospital.  Complaining of redness swelling and some blistering on his legs and dry skin. complains of some chills but no fever no trauma or injury to his legs    Review of Systems:    Review of Systems   Constitutional:  Positive for appetite change and fatigue. Negative for chills and fever.   HENT:  Negative for hearing loss, sore throat and trouble swallowing.    Eyes:  Negative for photophobia, discharge and visual disturbance.   Respiratory:  Negative for chest tightness and shortness of breath.    Cardiovascular:  Positive for leg swelling. Negative for chest pain and palpitations.   Gastrointestinal:  Negative for abdominal pain, blood in stool and vomiting.   Endocrine: Negative for polydipsia and polyuria.   Genitourinary:  Negative for difficulty urinating, dysuria, flank pain and hematuria.   Musculoskeletal:  Negative for back pain and gait problem.   Skin:  Positive for color change and wound. Negative for rash.   Allergic/Immunologic: Negative for environmental allergies and food allergies.   Neurological:  Negative for dizziness, seizures, syncope and headaches.   Hematological:  Does not bruise/bleed easily.   Psychiatric/Behavioral:  Negative for behavioral problems.    All other systems reviewed and are negative.      Past Medical and Surgical History:     Past Medical History:   Diagnosis Date    Bladder cancer (HCC)     BPH (benign  prostatic hyperplasia)     COPD (chronic obstructive pulmonary disease) (HCC)     Diabetes mellitus (HCC)     Hyperlipidemia     Lung cancer (HCC)     Rib fractures        Past Surgical History:   Procedure Laterality Date    BRONCHOSCOPY      CATARACT EXTRACTION, BILATERAL      CYSTOSCOPY      TONSILLECTOMY      TRANSURETHRAL RESECTION OF PROSTATE         Meds/Allergies:    Prior to Admission medications    Medication Sig Start Date End Date Taking? Authorizing Provider   albuterol (PROVENTIL HFA,VENTOLIN HFA) 90 mcg/act inhaler Inhale 2 puffs every 6 (six) hours as needed   Yes Historical Provider, MD   Aspirin Buf,CaCarb-MgCarb-MgO, 81 MG TABS Take 81 mg by mouth   Yes Historical Provider, MD   atorvastatin (LIPITOR) 40 mg tablet Take 40 mg by mouth daily   Yes Historical Provider, MD   finasteride (PROSCAR) 5 mg tablet take 1 tablet by mouth once daily 7/7/21  Yes Historical Provider, MD   furosemide (LASIX) 40 mg tablet take 1 tablet by mouth once daily 6/10/21  Yes Historical Provider, MD   gabapentin (NEURONTIN) 100 mg capsule Take 1 capsule (100 mg total) by mouth 3 (three) times a day  Patient taking differently: Take 100 mg by mouth 2 (two) times a day 4/8/22  Yes Casandra Cabrera MD   glipiZIDE (GLUCOTROL XL) 2.5 mg 24 hr tablet Take 5 mg by mouth daily 4/2/23  Yes Historical Provider, MD   Jardiance 25 MG TABS take 1/2 tablet by mouth once daily every morning for 8 days then take 1 tablet daily THEREAFTER 4/25/23  Yes Historical Provider, MD   meloxicam (MOBIC) 15 mg tablet take 1 tablet by mouth once daily 7/1/21  Yes Historical Provider, MD   metFORMIN (GLUCOPHAGE-XR) 500 mg 24 hr tablet  3/15/23  Yes Historical Provider, MD   Multiple Vitamin (One-A-Day Essential) TABS Take by mouth   Yes Historical Provider, MD   potassium chloride (MICRO-K) 10 MEQ CR capsule Take by mouth 1 Capsule in the morning. 2/28/23  Yes Historical Provider, MD   tamsulosin (FLOMAX) 0.4 mg take 1 capsule by mouth once daily  5/5/21  Yes Historical Provider, MD   TURMERIC-FISH OIL PO Take by mouth   Yes Historical Provider, MD   fluticasone-umeclidinium-vilanterol (Trelegy Ellipta) 200-62.5-25 MCG/INH AEPB inhaler Inhale 1 puff daily Rinse mouth after use. 9/18/21 5/22/23  Yasmine Puente PA-C   metFORMIN (GLUCOPHAGE) 500 mg tablet Take 2 tablets (1,000 mg total) by mouth daily with breakfast 9/18/21   Yasmine Puente PA-C   insulin glargine (Lantus SoloStar) 100 units/mL injection pen Inject 15 Units under the skin every 12 (twelve) hours  Patient not taking: Reported on 4/26/2023 4/9/22 5/1/24  Casandra Cabrera MD   Insulin Pen Needle (BD Pen Needle Diane 2nd Gen) 32G X 4 MM MISC For use with insulin pen. Pharmacy may dispense brand covered by insurance.  Patient not taking: Reported on 4/26/2023 4/8/22 5/1/24  Casandra Cabrera MD   Insulin Pen Needle (BD Pen Needle Diane 2nd Gen) 32G X 4 MM MISC For use with insulin pen. Pharmacy may dispense brand covered by insurance. 4/8/22 5/1/24  Casandra Cabrera MD   Insulin Pen Needle (BD Pen Needle Diane 2nd Gen) 32G X 4 MM MISC For use with insulin pen. Pharmacy may dispense brand covered by insurance.  Patient not taking: Reported on 4/26/2023 4/9/22 5/1/24  Casandra Cabrera MD   levothyroxine 25 mcg tablet Take 25 mcg by mouth daily  Patient not taking: Reported on 5/1/2024 4/27/23 5/1/24  Historical Provider, MD   ondansetron (ZOFRAN) 8 mg tablet Take by mouth every 8 (eight) hours as needed for nausea or vomiting  Patient not taking: Reported on 4/26/2023 5/1/24  Historical Provider, MD     I have reviewed home medications with patient personally.    Allergies:   Allergies   Allergen Reactions    Tetracyclines & Related Other (See Comments)       Social History:     Marital Status: Single     Social History     Substance and Sexual Activity   Alcohol Use Not Currently     Social History     Tobacco Use   Smoking Status Every Day    Current packs/day: 1.00    Types: Cigarettes   Smokeless Tobacco  Never     Social History     Substance and Sexual Activity   Drug Use Not Currently       Family History:    Family History   Problem Relation Age of Onset    Hypertension Mother     No Known Problems Father        Physical Exam:     Vitals:   Blood Pressure: 134/75 (05/01/24 1614)  Pulse: 87 (05/01/24 1620)  Temperature: 97.8 °F (36.6 °C) (05/01/24 1614)  Temp Source: Temporal (05/01/24 1614)  Respirations: 16 (05/01/24 1614)  Weight - Scale: 118 kg (260 lb) (05/01/24 1614)  SpO2: 94 % (05/01/24 1620)    Physical Exam  Vitals and nursing note reviewed.   Constitutional:       Appearance: He is ill-appearing.   HENT:      Head: Normocephalic and atraumatic.      Right Ear: External ear normal.      Left Ear: External ear normal.      Nose: Nose normal.      Mouth/Throat:      Mouth: Mucous membranes are dry.      Pharynx: Oropharynx is clear.   Eyes:      Pupils: Pupils are equal, round, and reactive to light.   Cardiovascular:      Rate and Rhythm: Normal rate and regular rhythm.      Heart sounds: Normal heart sounds.   Pulmonary:      Effort: Pulmonary effort is normal.      Comments: Moderate air entry bilaterally with mild decreased breath sounds bilateral bases  Abdominal:      General: Bowel sounds are normal.      Palpations: Abdomen is soft.      Tenderness: There is no abdominal tenderness.   Musculoskeletal:         General: Normal range of motion.      Cervical back: Normal range of motion and neck supple.      Right lower leg: Edema present.      Left lower leg: Edema present.      Comments: 2+ nonpitting edema bilateral lower extremity with very dry scaly skin noted and popped blisters on bilateral lower shin areas noted and some skin breakdown noted in between the toes with some white flaky skin   Skin:     General: Skin is warm and dry.      Capillary Refill: Capillary refill takes less than 2 seconds.      Findings: Erythema present.   Neurological:      General: No focal deficit present.       Mental Status: He is alert and oriented to person, place, and time.   Psychiatric:         Mood and Affect: Mood normal.           Additional Data:     Lab Results: I have personally reviewed pertinent reports.      Results from last 7 days   Lab Units 05/01/24  1623   WBC Thousand/uL 10.69*   HEMOGLOBIN g/dL 16.4   HEMATOCRIT % 49.2   PLATELETS Thousands/uL 238   SEGS PCT % 69   LYMPHO PCT % 16   MONO PCT % 10   EOS PCT % 4     Results from last 7 days   Lab Units 05/01/24  1623   SODIUM mmol/L 139   POTASSIUM mmol/L 3.1*   CHLORIDE mmol/L 98   CO2 mmol/L 28   BUN mg/dL 15   CREATININE mg/dL 1.05   ANION GAP mmol/L 13   CALCIUM mg/dL 9.5   ALBUMIN g/dL 4.3   TOTAL BILIRUBIN mg/dL 0.50   ALK PHOS U/L 66   ALT U/L 33   AST U/L 21   GLUCOSE RANDOM mg/dL 111                 Results from last 7 days   Lab Units 05/01/24  1623   LACTIC ACID mmol/L 3.4*       Imaging: I have personally reviewed pertinent reports.       VAS VENOUS DUPLEX - LOWER LIMB BILATERAL    (Results Pending)       EKG, Pathology, and Other Studies Reviewed on Admission:   EKG: reviewed    [facility] Reviewed[facility] Yes     ** Please Note: This note has been constructed using a voice recognition system. **

## 2024-05-01 NOTE — PLAN OF CARE
Problem: Potential for Falls  Goal: Patient will remain free of falls  Description: INTERVENTIONS:  - Educate patient/family on patient safety including physical limitations  - Instruct patient to call for assistance with activity   - Consult OT/PT to assist with strengthening/mobility   - Keep Call bell within reach  - Keep bed low and locked with side rails adjusted as appropriate  - Keep care items and personal belongings within reach  - Initiate and maintain comfort rounds  - Make Fall Risk Sign visible to staff  - Offer Toileting every 2 Hours, in advance of need  - Initiate/Maintain bed alarm  - Obtain necessary fall risk management equipment: walker  - Apply yellow socks and bracelet for high fall risk patients  - Consider moving patient to room near nurses station  Outcome: Progressing     Problem: PAIN - ADULT  Goal: Verbalizes/displays adequate comfort level or baseline comfort level  Description: Interventions:  - Encourage patient to monitor pain and request assistance  - Assess pain using appropriate pain scale  - Administer analgesics based on type and severity of pain and evaluate response  - Implement non-pharmacological measures as appropriate and evaluate response  - Consider cultural and social influences on pain and pain management  - Notify physician/advanced practitioner if interventions unsuccessful or patient reports new pain  Outcome: Progressing     Problem: INFECTION - ADULT  Goal: Absence or prevention of progression during hospitalization  Description: INTERVENTIONS:  - Assess and monitor for signs and symptoms of infection  - Monitor lab/diagnostic results  - Monitor all insertion sites, i.e. indwelling lines, tubes, and drains  - Monitor endotracheal if appropriate and nasal secretions for changes in amount and color  - Flora appropriate cooling/warming therapies per order  - Administer medications as ordered  - Instruct and encourage patient and family to use good hand hygiene  technique  - Identify and instruct in appropriate isolation precautions for identified infection/condition  Outcome: Progressing  Goal: Absence of fever/infection during neutropenic period  Description: INTERVENTIONS:  - Monitor WBC    Outcome: Progressing     Problem: SAFETY ADULT  Goal: Patient will remain free of falls  Description: INTERVENTIONS:  - Educate patient/family on patient safety including physical limitations  - Instruct patient to call for assistance with activity   - Consult OT/PT to assist with strengthening/mobility   - Keep Call bell within reach  - Keep bed low and locked with side rails adjusted as appropriate  - Keep care items and personal belongings within reach  - Initiate and maintain comfort rounds  - Make Fall Risk Sign visible to staff  - Offer Toileting every 2 Hours, in advance of need  - Initiate/Maintain bed alarm  - Obtain necessary fall risk management equipment: walker  - Apply yellow socks and bracelet for high fall risk patients  - Consider moving patient to room near nurses station  Outcome: Progressing  Goal: Maintain or return to baseline ADL function  Description: INTERVENTIONS:  -  Assess patient's ability to carry out ADLs; assess patient's baseline for ADL function and identify physical deficits which impact ability to perform ADLs (bathing, care of mouth/teeth, toileting, grooming, dressing, etc.)  - Assess/evaluate cause of self-care deficits   - Assess range of motion  - Assess patient's mobility; develop plan if impaired  - Assess patient's need for assistive devices and provide as appropriate  - Encourage maximum independence but intervene and supervise when necessary  - Involve family in performance of ADLs  - Assess for home care needs following discharge   - Consider OT consult to assist with ADL evaluation and planning for discharge  - Provide patient education as appropriate  Outcome: Progressing  Goal: Maintains/Returns to pre admission functional  level  Description: INTERVENTIONS:  - Perform AM-PAC 6 Click Basic Mobility/ Daily Activity assessment daily.  - Set and communicate daily mobility goal to care team and patient/family/caregiver.   - Collaborate with rehabilitation services on mobility goals if consulted  - Perform Range of Motion 3 times a day.  - Reposition patient every 2 hours.  - Dangle patient 3 times a day  - Stand patient 3 times a day  - Ambulate patient 3 times a day  - Out of bed to chair 3 times a day   - Out of bed for meals 3 times a day  - Out of bed for toileting  - Record patient progress and toleration of activity level   Outcome: Progressing

## 2024-05-01 NOTE — ED PROVIDER NOTES
History  Chief Complaint   Patient presents with    Cellulitis     Patient presents to the ED with complaints of bilateral leg swelling, redness, and drainage that ems reports has been occurring over the last seven months.        History provided by:  Medical records and patient  Rash  Location:  Leg  Leg rash location:  L lower leg and R lower leg  Quality: draining, painful, redness, swelling and weeping    Pain details:     Quality:  Aching and dull    Severity:  Mild    Onset quality:  Gradual    Duration:  7 months    Timing:  Constant    Progression:  Worsening  Severity:  Severe  Onset quality:  Gradual  Duration:  7 months  Timing:  Constant  Progression:  Worsening  Chronicity:  Chronic  Context comment:  Patient with significant peripheral edema, has been battling nonhealing wounds to the B/LLE along with bouts of cellulitis. Seen by podiatrist Dr. Tsai for recurrence of his erythema and swelling, placed on increasing doses of Lasix and antibiotics  Relieved by:  Nothing  Worsened by:  Nothing  Ineffective treatments: Increasing doses of Lasix, 2 antibiotics orally unknown.  Associated symptoms: no abdominal pain, no fatigue, no fever, no headaches, no joint pain, no nausea, no shortness of breath, no sore throat and not vomiting        Prior to Admission Medications   Prescriptions Last Dose Informant Patient Reported? Taking?   Aspirin Buf,CaCarb-MgCarb-MgO, 81 MG TABS 4/30/2024  Yes Yes   Sig: Take 81 mg by mouth   Jardiance 25 MG TABS 4/30/2024  Yes Yes   Sig: take 1/2 tablet by mouth once daily every morning for 8 days then take 1 tablet daily THEREAFTER   Multiple Vitamin (One-A-Day Essential) TABS 4/30/2024  Yes Yes   Sig: Take by mouth   TURMERIC-FISH OIL PO 4/30/2024  Yes Yes   Sig: Take by mouth   albuterol (PROVENTIL HFA,VENTOLIN HFA) 90 mcg/act inhaler 5/1/2024  Yes Yes   Sig: Inhale 2 puffs every 6 (six) hours as needed   atorvastatin (LIPITOR) 40 mg tablet 4/30/2024  Yes Yes   Sig: Take 40  mg by mouth daily   finasteride (PROSCAR) 5 mg tablet 4/30/2024  Yes Yes   Sig: take 1 tablet by mouth once daily   fluticasone-umeclidinium-vilanterol (Trelegy Ellipta) 200-62.5-25 MCG/INH AEPB inhaler   No No   Sig: Inhale 1 puff daily Rinse mouth after use.   furosemide (LASIX) 40 mg tablet 4/30/2024  Yes Yes   Sig: take 1 tablet by mouth once daily   gabapentin (NEURONTIN) 100 mg capsule 4/30/2024  No Yes   Sig: Take 1 capsule (100 mg total) by mouth 3 (three) times a day   Patient taking differently: Take 100 mg by mouth 3 (three) times a day 2 tabs TID   glipiZIDE (GLUCOTROL XL) 2.5 mg 24 hr tablet 4/30/2024  Yes Yes   Sig: Take 5 mg by mouth daily   levothyroxine 25 mcg tablet   Yes Yes   Sig: Take 25 mcg by mouth daily   meloxicam (MOBIC) 15 mg tablet 4/30/2024  Yes Yes   Sig: take 1 tablet by mouth once daily   metFORMIN (GLUCOPHAGE) 500 mg tablet   No No   Sig: Take 2 tablets (1,000 mg total) by mouth daily with breakfast   metFORMIN (GLUCOPHAGE-XR) 500 mg 24 hr tablet 4/30/2024  Yes Yes   potassium chloride (MICRO-K) 10 MEQ CR capsule 4/30/2024  Yes Yes   Sig: Take by mouth 1 Capsule in the morning.   tamsulosin (FLOMAX) 0.4 mg 4/30/2024  Yes Yes   Sig: take 1 capsule by mouth once daily      Facility-Administered Medications: None       Past Medical History:   Diagnosis Date    Bladder cancer (HCC)     BPH (benign prostatic hyperplasia)     COPD (chronic obstructive pulmonary disease) (HCC)     Diabetes mellitus (HCC)     Hyperlipidemia     Lung cancer (HCC)     Rib fractures        Past Surgical History:   Procedure Laterality Date    BRONCHOSCOPY      CATARACT EXTRACTION, BILATERAL      CYSTOSCOPY      TONSILLECTOMY      TRANSURETHRAL RESECTION OF PROSTATE         Family History   Problem Relation Age of Onset    Hypertension Mother     No Known Problems Father      I have reviewed and agree with the history as documented.    E-Cigarette/Vaping    E-Cigarette Use Never User      E-Cigarette/Vaping  Substances     Social History     Tobacco Use    Smoking status: Every Day     Current packs/day: 1.00     Types: Cigarettes    Smokeless tobacco: Never   Vaping Use    Vaping status: Never Used   Substance Use Topics    Alcohol use: Not Currently    Drug use: Not Currently       Review of Systems   Constitutional:  Negative for appetite change, chills, fatigue and fever.   HENT:  Negative for ear pain, rhinorrhea, sore throat and trouble swallowing.    Eyes:  Negative for pain, discharge and visual disturbance.   Respiratory:  Negative for cough, chest tightness and shortness of breath.    Cardiovascular:  Negative for chest pain and palpitations.   Gastrointestinal:  Negative for abdominal pain, nausea and vomiting.   Endocrine: Negative for polydipsia, polyphagia and polyuria.   Genitourinary:  Negative for difficulty urinating, dysuria, hematuria and testicular pain.   Musculoskeletal:  Negative for arthralgias and back pain.   Skin:  Positive for rash and wound. Negative for color change.   Allergic/Immunologic: Negative for immunocompromised state.   Neurological:  Negative for dizziness, seizures, syncope, weakness and headaches.   Hematological:  Negative for adenopathy.   Psychiatric/Behavioral:  Negative for confusion and dysphoric mood.    All other systems reviewed and are negative.      Physical Exam  Physical Exam  Vitals and nursing note reviewed.   Constitutional:       General: He is not in acute distress.     Appearance: Normal appearance. He is not ill-appearing, toxic-appearing or diaphoretic.      Comments: Smells of tobacco   HENT:      Head: Normocephalic and atraumatic.      Nose: Nose normal. No congestion or rhinorrhea.      Mouth/Throat:      Mouth: Mucous membranes are moist.      Pharynx: Oropharynx is clear. No oropharyngeal exudate or posterior oropharyngeal erythema.   Eyes:      General:         Right eye: No discharge.         Left eye: No discharge.   Cardiovascular:      Rate and  Rhythm: Normal rate and regular rhythm.      Pulses: Normal pulses.      Heart sounds: Normal heart sounds. No murmur heard.     No gallop.   Pulmonary:      Effort: Pulmonary effort is normal. No respiratory distress.      Breath sounds: Normal breath sounds. No stridor. No wheezing, rhonchi or rales.   Chest:      Chest wall: No tenderness.   Abdominal:      General: Bowel sounds are normal. There is no distension.      Palpations: Abdomen is soft. There is no mass.      Tenderness: There is no abdominal tenderness. There is no right CVA tenderness, left CVA tenderness, guarding or rebound.      Hernia: No hernia is present.   Musculoskeletal:         General: Normal range of motion.      Cervical back: Normal range of motion and neck supple.      Right lower leg: Edema present.      Left lower leg: Edema present.      Comments: +3 pitting edema bilateral lower extremities, there are ulcerative wounds to the anterior tibial regions, large and serpetitious with surrounding erythema, warm to touch, weeping, foul-smelling   Skin:     General: Skin is warm and dry.      Capillary Refill: Capillary refill takes less than 2 seconds.   Neurological:      General: No focal deficit present.      Mental Status: He is alert and oriented to person, place, and time.      Cranial Nerves: No cranial nerve deficit.      Sensory: No sensory deficit.      Motor: No weakness.      Coordination: Coordination normal.      Gait: Gait normal.      Deep Tendon Reflexes: Reflexes normal.   Psychiatric:         Mood and Affect: Mood normal.         Behavior: Behavior normal.         Thought Content: Thought content normal.         Judgment: Judgment normal.         Vital Signs  ED Triage Vitals   Temperature Pulse Respirations Blood Pressure SpO2   05/01/24 1614 05/01/24 1620 05/01/24 1614 05/01/24 1614 05/01/24 1620   97.8 °F (36.6 °C) 87 16 134/75 94 %      Temp Source Heart Rate Source Patient Position - Orthostatic VS BP Location FiO2  (%)   05/01/24 1614 05/01/24 1614 05/01/24 1614 05/01/24 1614 --   Temporal Monitor Sitting Right arm       Pain Score       05/01/24 1614       3           Vitals:    05/01/24 1620 05/01/24 1745 05/01/24 2053 05/01/24 2229   BP:  131/86 125/73 113/76   Pulse: 87 97 (!) 107 104   Patient Position - Orthostatic VS:  Lying           Visual Acuity      ED Medications  Medications   albuterol (PROVENTIL HFA,VENTOLIN HFA) inhaler 2 puff (has no administration in time range)   atorvastatin (LIPITOR) tablet 40 mg (has no administration in time range)   Fluticasone Furoate-Vilanterol 100-25 mcg/actuation 1 puff (has no administration in time range)     And   umeclidinium 62.5 mcg/actuation inhaler AEPB 1 puff (has no administration in time range)   finasteride (PROSCAR) tablet 5 mg (has no administration in time range)   gabapentin (NEURONTIN) capsule 100 mg (100 mg Oral Given 5/1/24 1834)   tamsulosin (FLOMAX) capsule 0.4 mg (has no administration in time range)   calcium carbonate (TUMS) chewable tablet 1,000 mg (has no administration in time range)   nicotine (NICODERM CQ) 21 mg/24 hr TD 24 hr patch 1 patch (has no administration in time range)   furosemide (LASIX) injection 40 mg (40 mg Intravenous Given 5/1/24 2054)   enoxaparin (LOVENOX) subcutaneous injection 40 mg (has no administration in time range)   acetaminophen (TYLENOL) tablet 650 mg (650 mg Oral Given 5/1/24 1931)   insulin lispro (HumALOG/ADMELOG) 100 units/mL subcutaneous injection 2-12 Units ( Subcutaneous Not Given 5/1/24 1758)   insulin lispro (HumALOG/ADMELOG) 100 units/mL subcutaneous injection 1-6 Units ( Subcutaneous Not Given 5/1/24 2130)   ceFAZolin (ANCEF) IVPB (premix in dextrose) 2,000 mg 50 mL (2,000 mg Intravenous New Bag 5/1/24 2100)   nystatin (MYCOSTATIN) powder ( Topical Not Given 5/1/24 1957)   oxyCODONE-acetaminophen (PERCOCET) 5-325 mg per tablet 1 tablet (1 tablet Oral Given 5/1/24 2129)   vancomycin (VANCOCIN) 1500 mg in sodium  chloride 0.9% 250 mL IVPB (0 mg Intravenous Stopped 5/1/24 1836)   fentaNYL injection 50 mcg (50 mcg Intravenous Given 5/1/24 1657)   sodium chloride 0.9 % bolus 1,000 mL (1,000 mL Intravenous New Bag 5/1/24 1836)   potassium chloride (Klor-Con M20) CR tablet 40 mEq (40 mEq Oral Given 5/1/24 1834)       Diagnostic Studies  Results Reviewed       Procedure Component Value Units Date/Time    Blood culture #1 [[id number]] Collected: 05/01/24 1623    Lab Status: Preliminary result Specimen: Blood from Arm, Left Updated: 05/01/24 2101     Blood Culture Received in Microbiology Lab. Culture in Progress.    Blood culture #2 [[id number]] Collected: 05/01/24 1623    Lab Status: Preliminary result Specimen: Blood from Arm, Right Updated: 05/01/24 2101     Blood Culture Received in Microbiology Lab. Culture in Progress.    Lactic acid 2 Hours [[id number]]  (Abnormal) Collected: 05/01/24 1950    Lab Status: Final result Specimen: Blood from Hand, Left Updated: 05/01/24 2012     LACTIC ACID 2.6 mmol/L     Narrative:      Result may be elevated if tourniquet was used during collection.    MRSA culture [[id number]] Collected: 05/01/24 1841    Lab Status: In process Specimen: Nares from Nose Updated: 05/01/24 1849    Hemoglobin A1c w/EAG Estimation (Orders if not completed within the last 90 days) [[id number]] Collected: 05/01/24 1623    Lab Status: In process Specimen: Blood from Arm, Right Updated: 05/01/24 1724    Wound culture and Gram stain [[id number]]     Lab Status: No result Specimen: Wound     Lactic acid, plasma (w/reflex if result > 2.0) [[id number]]  (Abnormal) Collected: 05/01/24 1623    Lab Status: Final result Specimen: Blood from Arm, Right Updated: 05/01/24 1652     LACTIC ACID 3.4 mmol/L     Narrative:      Result may be elevated if tourniquet was used during collection.    Comprehensive metabolic panel [[id number]]  (Abnormal) Collected: 05/01/24 1623    Lab Status: Final result Specimen: Blood from Arm, Right Updated:  05/01/24 1651     Sodium 139 mmol/L      Potassium 3.1 mmol/L      Chloride 98 mmol/L      CO2 28 mmol/L      ANION GAP 13 mmol/L      BUN 15 mg/dL      Creatinine 1.05 mg/dL      Glucose 111 mg/dL      Calcium 9.5 mg/dL      AST 21 U/L      ALT 33 U/L      Alkaline Phosphatase 66 U/L      Total Protein 7.8 g/dL      Albumin 4.3 g/dL      Total Bilirubin 0.50 mg/dL      eGFR 72 ml/min/1.73sq m     Narrative:      [employer] guidelines for Chronic Kidney Disease (CKD):     Stage 1 with normal or high GFR (GFR > 90 mL/min/1.73 square meters)    Stage 2 Mild CKD (GFR = 60-89 mL/min/1.73 square meters)    Stage 3A Moderate CKD (GFR = 45-59 mL/min/1.73 square meters)    Stage 3B Moderate CKD (GFR = 30-44 mL/min/1.73 square meters)    Stage 4 Severe CKD (GFR = 15-29 mL/min/1.73 square meters)    Stage 5 End Stage CKD (GFR <15 mL/min/1.73 square meters)  Note: GFR calculation is accurate only with a steady state creatinine    CBC and differential [[id number]]  (Abnormal) Collected: 05/01/24 1623    Lab Status: Final result Specimen: Blood from Arm, Right Updated: 05/01/24 1631     WBC 10.69 Thousand/uL      RBC 5.34 Million/uL      Hemoglobin 16.4 g/dL      Hematocrit 49.2 %      MCV 92 fL      MCH 30.7 pg      MCHC 33.3 g/dL      RDW 13.1 %      MPV 8.9 fL      Platelets 238 Thousands/uL      nRBC 0 /100 WBCs      Segmented % 69 %      Immature Grans % 0 %      Lymphocytes % 16 %      Monocytes % 10 %      Eosinophils Relative 4 %      Basophils Relative 1 %      Absolute Neutrophils 7.38 Thousands/µL      Absolute Immature Grans 0.04 Thousand/uL      Absolute Lymphocytes 1.75 Thousands/µL      Absolute Monocytes 1.07 Thousand/µL      Eosinophils Absolute 0.37 Thousand/µL      Basophils Absolute 0.08 Thousands/µL                     VAS VENOUS DUPLEX - LOWER LIMB BILATERAL    (Results Pending)              Procedures  Procedures         ED Course                               SBIRT 22yo+       Flowsheet Row Most Recent Value   Initial Alcohol Screen: US AUDIT-C     1. How often do you have a drink containing alcohol? 0 Filed at: 05/01/2024 1618   2. How many drinks containing alcohol do you have on a typical day you are drinking?  0 Filed at: 05/01/2024 1618   3a. Male UNDER 65: How often do you have five or more drinks on one occasion? 0 Filed at: 05/01/2024 1618   3b. FEMALE Any Age, or MALE 65+: How often do you have 4 or more drinks on one occassion? 0 Filed at: 05/01/2024 1618   Audit-C Score 0 Filed at: 05/01/2024 1618   WILDA: How many times in the past year have you...    Used an illegal drug or used a prescription medication for non-medical reasons? Never Filed at: 05/01/2024 1618                      Medical Decision Making  1611: Patient has significant wounds to the bilateral lower extremities with associated surrounding cellulitis.  The patient has had chronic swelling to the bilateral lower extremities, previous venous duplexes have been negative for DVT.  Patient was sent to the emergency room at the request of his podiatrist after he had no response with oral antibiotics.  Concerns for bilateral lower extremity cellulitis.  Reviewed previous venous duplex.  Plan to start patient empirically on antibiotics.  Tetanus up-to-date.      1700: Labs reviewed.  Discussed with hospitalist for admission.    Amount and/or Complexity of Data Reviewed  External Data Reviewed: labs and radiology.     Details: Venous duplex 2022 negative for DVT  Labs: ordered.    Risk  Prescription drug management.  Decision regarding hospitalization.             Disposition  Final diagnoses:   Cellulitis of left leg   Cellulitis of right leg   Bilateral leg edema     Time reflects when diagnosis was documented in both MDM as applicable and the Disposition within this note       Time User Action Codes Description Comment    5/1/2024  4:53 PM Wade Bettencourt Add [L03.116] Cellulitis of left leg     5/1/2024  4:53 PM  Wade Bettencourt Add [L03.115] Cellulitis of right leg     5/1/2024  4:53 PM Wade Bettencourt Add [R60.0] Bilateral leg edema           ED Disposition       ED Disposition   Admit    Condition   Stable    Date/Time   Wed May 1, 2024 1653    Comment                  Follow-up Information    None         Current Discharge Medication List        CONTINUE these medications which have NOT CHANGED    Details   albuterol (PROVENTIL HFA,VENTOLIN HFA) 90 mcg/act inhaler Inhale 2 puffs every 6 (six) hours as needed      Aspirin Buf,CaCarb-MgCarb-MgO, 81 MG TABS Take 81 mg by mouth      atorvastatin (LIPITOR) 40 mg tablet Take 40 mg by mouth daily      finasteride (PROSCAR) 5 mg tablet take 1 tablet by mouth once daily      furosemide (LASIX) 40 mg tablet take 1 tablet by mouth once daily      gabapentin (NEURONTIN) 100 mg capsule Take 1 capsule (100 mg total) by mouth 3 (three) times a day  Qty: 90 capsule, Refills: 0    Associated Diagnoses: Type 2 diabetes mellitus without complication, without long-term current use of insulin (HCC)      glipiZIDE (GLUCOTROL XL) 2.5 mg 24 hr tablet Take 5 mg by mouth daily      Jardiance 25 MG TABS take 1/2 tablet by mouth once daily every morning for 8 days then take 1 tablet daily THEREAFTER      levothyroxine 25 mcg tablet Take 25 mcg by mouth daily      meloxicam (MOBIC) 15 mg tablet take 1 tablet by mouth once daily      metFORMIN (GLUCOPHAGE-XR) 500 mg 24 hr tablet       Multiple Vitamin (One-A-Day Essential) TABS Take by mouth      potassium chloride (MICRO-K) 10 MEQ CR capsule Take by mouth 1 Capsule in the morning.      tamsulosin (FLOMAX) 0.4 mg take 1 capsule by mouth once daily      TURMERIC-FISH OIL PO Take by mouth      fluticasone-umeclidinium-vilanterol (Trelegy Ellipta) 200-62.5-25 MCG/INH AEPB inhaler Inhale 1 puff daily Rinse mouth after use.  Qty: 60 blister, Refills: 0    Associated Diagnoses: COPD with acute exacerbation (HCC)      metFORMIN (GLUCOPHAGE) 500 mg tablet  Take 2 tablets (1,000 mg total) by mouth daily with breakfast  Qty: 60 tablet, Refills: 0    Associated Diagnoses: Type 2 diabetes mellitus without complication, without long-term current use of insulin (HCC)             No discharge procedures on file.    PDMP Review         Value Time User    PDMP Reviewed  Yes 3/30/2022  4:56 PM Linus Sadler MD            ED Provider  Electronically Signed by             Wade Bettencourt MD  05/01/24 0933

## 2024-05-02 ENCOUNTER — APPOINTMENT (INPATIENT)
Dept: RADIOLOGY | Facility: HOSPITAL | Age: 69
DRG: 602 | End: 2024-05-02
Payer: COMMERCIAL

## 2024-05-02 ENCOUNTER — APPOINTMENT (INPATIENT)
Dept: NON INVASIVE DIAGNOSTICS | Facility: HOSPITAL | Age: 69
DRG: 602 | End: 2024-05-02
Payer: COMMERCIAL

## 2024-05-02 LAB
ANION GAP SERPL CALCULATED.3IONS-SCNC: 8 MMOL/L (ref 4–13)
AORTIC ROOT: 3.9 CM
APICAL FOUR CHAMBER EJECTION FRACTION: 58 %
BUN SERPL-MCNC: 15 MG/DL (ref 5–25)
CALCIUM SERPL-MCNC: 8.6 MG/DL (ref 8.4–10.2)
CHLORIDE SERPL-SCNC: 105 MMOL/L (ref 96–108)
CHOLEST SERPL-MCNC: 103 MG/DL
CO2 SERPL-SCNC: 27 MMOL/L (ref 21–32)
CREAT SERPL-MCNC: 0.88 MG/DL (ref 0.6–1.3)
E WAVE DECELERATION TIME: 276 MS
E/A RATIO: 0.7
ERYTHROCYTE [DISTWIDTH] IN BLOOD BY AUTOMATED COUNT: 13.2 % (ref 11.6–15.1)
EST. AVERAGE GLUCOSE BLD GHB EST-MCNC: 160 MG/DL
FRACTIONAL SHORTENING: 44 (ref 28–44)
GFR SERPL CREATININE-BSD FRML MDRD: 87 ML/MIN/1.73SQ M
GLUCOSE SERPL-MCNC: 132 MG/DL (ref 65–140)
GLUCOSE SERPL-MCNC: 133 MG/DL (ref 65–140)
GLUCOSE SERPL-MCNC: 140 MG/DL (ref 65–140)
GLUCOSE SERPL-MCNC: 142 MG/DL (ref 65–140)
GLUCOSE SERPL-MCNC: 172 MG/DL (ref 65–140)
HBA1C MFR BLD: 7.2 %
HCT VFR BLD AUTO: 46.3 % (ref 36.5–49.3)
HDLC SERPL-MCNC: 42 MG/DL
HGB BLD-MCNC: 15.5 G/DL (ref 12–17)
INTERVENTRICULAR SEPTUM IN DIASTOLE (PARASTERNAL SHORT AXIS VIEW): 1 CM
INTERVENTRICULAR SEPTUM: 1 CM (ref 0.6–1.1)
LAAS-AP2: 14.8 CM2
LAAS-AP4: 13.9 CM2
LDLC SERPL CALC-MCNC: 40 MG/DL (ref 0–100)
LEFT ATRIUM SIZE: 3.1 CM
LEFT ATRIUM VOLUME (MOD BIPLANE): 29 ML
LEFT ATRIUM VOLUME INDEX (MOD BIPLANE): 12.2 ML/M2
LEFT INTERNAL DIMENSION IN SYSTOLE: 2.3 CM (ref 2.1–4)
LEFT VENTRICLE DIASTOLIC VOLUME (MOD BIPLANE): 52 ML
LEFT VENTRICLE SYSTOLIC VOLUME (MOD BIPLANE): 15 ML
LEFT VENTRICULAR INTERNAL DIMENSION IN DIASTOLE: 4.1 CM (ref 3.5–6)
LEFT VENTRICULAR POSTERIOR WALL IN END DIASTOLE: 1.1 CM
LEFT VENTRICULAR STROKE VOLUME: 55 ML
LV EF: 70 %
LVSV (TEICH): 55 ML
MAGNESIUM SERPL-MCNC: 1.7 MG/DL (ref 1.9–2.7)
MCH RBC QN AUTO: 31.2 PG (ref 26.8–34.3)
MCHC RBC AUTO-ENTMCNC: 33.5 G/DL (ref 31.4–37.4)
MCV RBC AUTO: 93 FL (ref 82–98)
MV E'TISSUE VEL-LAT: 10 CM/S
MV E'TISSUE VEL-SEP: 6 CM/S
MV PEAK A VEL: 1.07 M/S
MV PEAK E VEL: 75 CM/S
MV STENOSIS PRESSURE HALF TIME: 80 MS
MV VALVE AREA P 1/2 METHOD: 2.75
NONHDLC SERPL-MCNC: 61 MG/DL
PLATELET # BLD AUTO: 217 THOUSANDS/UL (ref 149–390)
PMV BLD AUTO: 8.9 FL (ref 8.9–12.7)
POTASSIUM SERPL-SCNC: 3.6 MMOL/L (ref 3.5–5.3)
PROCALCITONIN SERPL-MCNC: 0.06 NG/ML
RA PRESSURE ESTIMATED: 8 MMHG
RBC # BLD AUTO: 4.97 MILLION/UL (ref 3.88–5.62)
RIGHT ATRIUM AREA SYSTOLE A4C: 21 CM2
RIGHT VENTRICLE ID DIMENSION: 4.9 CM
SL CV LEFT ATRIUM LENGTH A2C: 6 CM
SL CV LV EF: 70
SL CV PED ECHO LEFT VENTRICLE DIASTOLIC VOLUME (MOD BIPLANE) 2D: 73 ML
SL CV PED ECHO LEFT VENTRICLE SYSTOLIC VOLUME (MOD BIPLANE) 2D: 18 ML
SODIUM SERPL-SCNC: 140 MMOL/L (ref 135–147)
TRICUSPID ANNULAR PLANE SYSTOLIC EXCURSION: 2 CM
TRIGL SERPL-MCNC: 106 MG/DL
WBC # BLD AUTO: 10.44 THOUSAND/UL (ref 4.31–10.16)

## 2024-05-02 PROCEDURE — 99232 SBSQ HOSP IP/OBS MODERATE 35: CPT | Performed by: FAMILY MEDICINE

## 2024-05-02 PROCEDURE — 85027 COMPLETE CBC AUTOMATED: CPT | Performed by: FAMILY MEDICINE

## 2024-05-02 PROCEDURE — 87070 CULTURE OTHR SPECIMN AEROBIC: CPT | Performed by: FAMILY MEDICINE

## 2024-05-02 PROCEDURE — 87186 SC STD MICRODIL/AGAR DIL: CPT | Performed by: FAMILY MEDICINE

## 2024-05-02 PROCEDURE — 84145 PROCALCITONIN (PCT): CPT | Performed by: FAMILY MEDICINE

## 2024-05-02 PROCEDURE — 87077 CULTURE AEROBIC IDENTIFY: CPT | Performed by: FAMILY MEDICINE

## 2024-05-02 PROCEDURE — 80048 BASIC METABOLIC PNL TOTAL CA: CPT | Performed by: FAMILY MEDICINE

## 2024-05-02 PROCEDURE — 80061 LIPID PANEL: CPT | Performed by: FAMILY MEDICINE

## 2024-05-02 PROCEDURE — 93306 TTE W/DOPPLER COMPLETE: CPT

## 2024-05-02 PROCEDURE — 71045 X-RAY EXAM CHEST 1 VIEW: CPT

## 2024-05-02 PROCEDURE — 87147 CULTURE TYPE IMMUNOLOGIC: CPT | Performed by: FAMILY MEDICINE

## 2024-05-02 PROCEDURE — 87205 SMEAR GRAM STAIN: CPT | Performed by: FAMILY MEDICINE

## 2024-05-02 PROCEDURE — 83735 ASSAY OF MAGNESIUM: CPT | Performed by: FAMILY MEDICINE

## 2024-05-02 PROCEDURE — 99222 1ST HOSP IP/OBS MODERATE 55: CPT | Performed by: STUDENT IN AN ORGANIZED HEALTH CARE EDUCATION/TRAINING PROGRAM

## 2024-05-02 PROCEDURE — 82948 REAGENT STRIP/BLOOD GLUCOSE: CPT

## 2024-05-02 PROCEDURE — 73630 X-RAY EXAM OF FOOT: CPT

## 2024-05-02 RX ORDER — MAGNESIUM SULFATE HEPTAHYDRATE 40 MG/ML
2 INJECTION, SOLUTION INTRAVENOUS ONCE
Status: COMPLETED | OUTPATIENT
Start: 2024-05-02 | End: 2024-05-02

## 2024-05-02 RX ORDER — POTASSIUM CHLORIDE 20 MEQ/1
40 TABLET, EXTENDED RELEASE ORAL ONCE
Status: COMPLETED | OUTPATIENT
Start: 2024-05-02 | End: 2024-05-02

## 2024-05-02 RX ORDER — OXYCODONE HYDROCHLORIDE AND ACETAMINOPHEN 5; 325 MG/1; MG/1
2 TABLET ORAL EVERY 4 HOURS PRN
Status: DISCONTINUED | OUTPATIENT
Start: 2024-05-02 | End: 2024-05-03

## 2024-05-02 RX ORDER — AMMONIUM LACTATE 12 G/100G
LOTION TOPICAL 2 TIMES DAILY PRN
Status: DISCONTINUED | OUTPATIENT
Start: 2024-05-02 | End: 2024-05-04

## 2024-05-02 RX ADMIN — OXYCODONE HYDROCHLORIDE AND ACETAMINOPHEN 1 TABLET: 5; 325 TABLET ORAL at 02:35

## 2024-05-02 RX ADMIN — TAMSULOSIN HYDROCHLORIDE 0.4 MG: 0.4 CAPSULE ORAL at 09:27

## 2024-05-02 RX ADMIN — OXYCODONE HYDROCHLORIDE AND ACETAMINOPHEN 1 TABLET: 5; 325 TABLET ORAL at 07:43

## 2024-05-02 RX ADMIN — CEFAZOLIN SODIUM 2000 MG: 2 SOLUTION INTRAVENOUS at 12:05

## 2024-05-02 RX ADMIN — INSULIN LISPRO 1 UNITS: 100 INJECTION, SOLUTION INTRAVENOUS; SUBCUTANEOUS at 21:11

## 2024-05-02 RX ADMIN — FUROSEMIDE 40 MG: 10 INJECTION, SOLUTION INTRAMUSCULAR; INTRAVENOUS at 09:27

## 2024-05-02 RX ADMIN — ATORVASTATIN CALCIUM 40 MG: 40 TABLET, FILM COATED ORAL at 09:27

## 2024-05-02 RX ADMIN — POTASSIUM CHLORIDE 40 MEQ: 1500 TABLET, EXTENDED RELEASE ORAL at 12:45

## 2024-05-02 RX ADMIN — GABAPENTIN 100 MG: 100 CAPSULE ORAL at 17:17

## 2024-05-02 RX ADMIN — CEFAZOLIN SODIUM 2000 MG: 2 SOLUTION INTRAVENOUS at 19:43

## 2024-05-02 RX ADMIN — FINASTERIDE 5 MG: 5 TABLET, FILM COATED ORAL at 09:27

## 2024-05-02 RX ADMIN — OXYCODONE HYDROCHLORIDE AND ACETAMINOPHEN 1 TABLET: 5; 325 TABLET ORAL at 12:05

## 2024-05-02 RX ADMIN — OXYCODONE HYDROCHLORIDE AND ACETAMINOPHEN 1 TABLET: 5; 325 TABLET ORAL at 16:23

## 2024-05-02 RX ADMIN — CEFAZOLIN SODIUM 2000 MG: 2 SOLUTION INTRAVENOUS at 04:50

## 2024-05-02 RX ADMIN — OXYCODONE HYDROCHLORIDE AND ACETAMINOPHEN 1 TABLET: 5; 325 TABLET ORAL at 21:11

## 2024-05-02 RX ADMIN — GABAPENTIN 100 MG: 100 CAPSULE ORAL at 09:27

## 2024-05-02 RX ADMIN — FUROSEMIDE 40 MG: 10 INJECTION, SOLUTION INTRAMUSCULAR; INTRAVENOUS at 17:18

## 2024-05-02 RX ADMIN — UMECLIDINIUM 1 PUFF: 62.5 AEROSOL, POWDER ORAL at 07:48

## 2024-05-02 RX ADMIN — FLUTICASONE FUROATE AND VILANTEROL TRIFENATATE 1 PUFF: 100; 25 POWDER RESPIRATORY (INHALATION) at 07:47

## 2024-05-02 RX ADMIN — MAGNESIUM SULFATE HEPTAHYDRATE 2 G: 40 INJECTION, SOLUTION INTRAVENOUS at 12:45

## 2024-05-02 RX ADMIN — ENOXAPARIN SODIUM 40 MG: 40 INJECTION SUBCUTANEOUS at 09:27

## 2024-05-02 RX ADMIN — OXYCODONE HYDROCHLORIDE AND ACETAMINOPHEN 2 TABLET: 5; 325 TABLET ORAL at 23:00

## 2024-05-02 NOTE — ASSESSMENT & PLAN NOTE
Wt Readings from Last 3 Encounters:   05/02/24 113 kg (250 lb 3.2 oz)   05/22/23 119 kg (262 lb)   04/26/23 119 kg (263 lb)     he has known history of chronic diastolic CHF.  Repeat echo ordered 5/2/24 EF of 70% with grade 1 diastolic dysfunction.  Was taking Lasix 40 mg oral daily at home.  Trouble with leg swelling and blistering on his legs due to edema.  Initially received gentle hydration as has lactic acidosis followed by Lasix 40 mg IV twice daily and monitor response  diuresing very well continue IV Lasix and monitor renal function and electrolytes closely.  Replace potassium and magnesium

## 2024-05-02 NOTE — ASSESSMENT & PLAN NOTE
Lab Results   Component Value Date    HGBA1C 7.2 (H) 05/01/2024       Recent Labs     05/01/24  1745 05/01/24 2049 05/02/24 0724 05/02/24  1101   POCGLU 111 147* 133 142*       Blood Sugar Average: Last 72 hrs:  (P) 133.25Patient placed on insulin sliding scale and held oral agents for now  Blood sugars are well-controlled

## 2024-05-02 NOTE — PROGRESS NOTES
[facility]  Progress Note  Name: Peter Busch I  MRN: [id number]  Unit/Bed#: MS Debra I Date of Admission: 5/1/2024   Date of Service: 5/2/2024 I Hospital Day: 1    Assessment/Plan   * Acute on chronic diastolic (congestive) heart failure (HCC)  Assessment & Plan  Wt Readings from Last 3 Encounters:   05/02/24 113 kg (250 lb 3.2 oz)   05/22/23 119 kg (262 lb)   04/26/23 119 kg (263 lb)     he has known history of chronic diastolic CHF.  Repeat echo ordered 5/2/24 EF of 70% with grade 1 diastolic dysfunction.  Was taking Lasix 40 mg oral daily at home.  Trouble with leg swelling and blistering on his legs due to edema.  Will give him gentle hydration for now as he has lactic acidosis followed by Lasix 40 mg IV twice daily and monitor response  diuresing very well continue IV Lasix and monitor renal function and electrolytes closely.  Replace potassium and magnesium  His postvoid residual was over 500 today had 1 episode of straight cath will need to monitor closely and will also start him on Flomax            Cellulitis of left lower extremity  Assessment & Plan  Patient noted to have bilateral lower extremity cellulitis due to excessive edema and some blistering noted.  Will get wound cultures and MRSA nasal swab.  Received a dose of vancomycin in the ED already will continue on IV Ancef for now as podiatry for evaluation for some wounds in between his toes and dry scaly skin on both feet  Placed on Lac-Hydrin.  Patient definitely has venous stasis leading to a lot of skin changes and concerns for fluid overload on top of that due to CHF leading to blistering of the skin.  Procalcitonin level is negative he has mild leukocytosis.  Will continue IV Ancef for now and switch to oral Keflex to complete total 5-day course as he has previous significant erythema of bilateral lower extremity    Tobacco abuse  Assessment & Plan  Counselled on cessation and placed on nicotine replacement  therapy    Type 2 diabetes mellitus, without long-term current use of insulin ([facility])  Assessment & Plan  Lab Results   Component Value Date    HGBA1C 7.2 (H) 05/01/2024       Recent Labs     05/01/24  1745 05/01/24  2049 05/02/24  0724 05/02/24  1101   POCGLU 111 147* 133 142*         Blood Sugar Average: Last 72 hrs:  (P) 133.25Patient placed on insulin sliding scale and held oral agents for now  Blood sugars are well-controlled                 VTE Pharmacologic Prophylaxis:   Moderate Risk (Score 3-4) - Pharmacological DVT Prophylaxis Ordered: enoxaparin (Lovenox).    Mobility:   Basic Mobility Inpatient Raw Score: 19  JH-HLM Goal: 6: Walk 10 steps or more  JH-HLM Achieved: 3: Sit at edge of bed  JH-HLM Goal achieved. Continue to encourage appropriate mobility.    Patient Centered Rounds: I performed bedside rounds with nursing staff today.   Discussions with Specialists or Other Care Team Provider: none    Education and Discussions with Family / Patient: will update    Total Time Spent on Date of Encounter in care of patient: 35 mins. This time was spent on one or more of the following: performing physical exam; counseling and coordination of care; obtaining or reviewing history; documenting in the medical record; reviewing/ordering tests, medications or procedures; communicating with other healthcare professionals and discussing with patient's family/caregivers.    Current Length of Stay: 1 day(s)  Current Patient Status: Inpatient   Certification Statement: The patient will continue to require additional inpatient hospital stay due to chf  Discharge Plan: Anticipate discharge in 24-48 hrs to discharge location to be determined pending rehab evaluations.    Code Status: Level 1 - Full Code    Subjective:   Patient states he is feeling a little bit better today his leg swelling and redness is a little bit better but it is still there.  He states that he cannot quit smoking as he does not want to does not want a  nicotine patch while he is here    Objective:     Vitals:   Temp (24hrs), Av.7 °F (36.5 °C), Min:97.5 °F (36.4 °C), Max:97.8 °F (36.6 °C)    Temp:  [97.5 °F (36.4 °C)-97.8 °F (36.6 °C)] 97.5 °F (36.4 °C)  HR:  [] 74  Resp:  [16-18] 18  BP: (110-134)/(67-86) 110/67  SpO2:  [88 %-94 %] 92 %  Body mass index is 33.01 kg/m².     Input and Output Summary (last 24 hours):     Intake/Output Summary (Last 24 hours) at 2024 1226  Last data filed at 2024 1205  Gross per 24 hour   Intake 471 ml   Output 4925 ml   Net -4454 ml       Physical Exam:   Physical Exam  Vitals and nursing note reviewed.   Constitutional:       Appearance: Normal appearance.   HENT:      Head: Normocephalic and atraumatic.      Right Ear: External ear normal.      Left Ear: External ear normal.      Nose: Nose normal.      Mouth/Throat:      Pharynx: Oropharynx is clear.   Eyes:      Pupils: Pupils are equal, round, and reactive to light.   Cardiovascular:      Rate and Rhythm: Normal rate and regular rhythm.      Heart sounds: Normal heart sounds.   Pulmonary:      Effort: Pulmonary effort is normal.      Breath sounds: Normal breath sounds.   Abdominal:      General: Bowel sounds are normal.      Palpations: Abdomen is soft.      Tenderness: There is no abdominal tenderness.   Musculoskeletal:         General: Normal range of motion.      Cervical back: Normal range of motion and neck supple.      Right lower leg: Edema present.      Left lower leg: Edema present.   Skin:     General: Skin is warm and dry.      Capillary Refill: Capillary refill takes less than 2 seconds.      Findings: Erythema present.   Neurological:      General: No focal deficit present.      Mental Status: He is alert and oriented to person, place, and time.   Psychiatric:         Mood and Affect: Mood normal.            Additional Data:     Labs:  Results from last 7 days   Lab Units 24  0503 24  1623   WBC Thousand/uL 10.44* 10.69*   HEMOGLOBIN  g/dL 15.5 16.4   HEMATOCRIT % 46.3 49.2   PLATELETS Thousands/uL 217 238   SEGS PCT %  --  69   LYMPHO PCT %  --  16   MONO PCT %  --  10   EOS PCT %  --  4     Results from last 7 days   Lab Units 05/02/24  0503 05/01/24  1623   SODIUM mmol/L 140 139   POTASSIUM mmol/L 3.6 3.1*   CHLORIDE mmol/L 105 98   CO2 mmol/L 27 28   BUN mg/dL 15 15   CREATININE mg/dL 0.88 1.05   ANION GAP mmol/L 8 13   CALCIUM mg/dL 8.6 9.5   ALBUMIN g/dL  --  4.3   TOTAL BILIRUBIN mg/dL  --  0.50   ALK PHOS U/L  --  66   ALT U/L  --  33   AST U/L  --  21   GLUCOSE RANDOM mg/dL 140 111         Results from last 7 days   Lab Units 05/02/24  1101 05/02/24  0724 05/01/24  2049 05/01/24  1745   POC GLUCOSE mg/dl 142* 133 147* 111     Results from last 7 days   Lab Units 05/01/24  1623   HEMOGLOBIN A1C % 7.2*     Results from last 7 days   Lab Units 05/02/24  0503 05/01/24  1950 05/01/24  1623   LACTIC ACID mmol/L  --  2.6* 3.4*   PROCALCITONIN ng/ml 0.06  --   --        Lines/Drains:  Invasive Devices       Peripheral Intravenous Line  Duration             Peripheral IV 05/01/24 Right Antecubital <1 day                          Imaging: Reviewed radiology reports from this admission including: chest CT scan    Recent Cultures (last 7 days):   Results from last 7 days   Lab Units 05/01/24  1623   BLOOD CULTURE  Received in Microbiology Lab. Culture in Progress.  Received in Microbiology Lab. Culture in Progress.       Last 24 Hours Medication List:   Current Facility-Administered Medications   Medication Dose Route Frequency Provider Last Rate    acetaminophen  650 mg Oral Q4H PRN Jaqueline Ferreira MD      albuterol  2 puff Inhalation Q6H PRN Jaqueline Ferreira MD      ammonium lactate   Topical BID PRN Jaqueline Ferreira MD      atorvastatin  40 mg Oral Daily Jaqueline Ferreira MD      calcium carbonate  1,000 mg Oral Daily PRN Jaqueline Ferreira MD      cefazolin  2,000 mg Intravenous Q8H Jaqueline Ferreira MD 2,000 mg (05/02/24 1205)    enoxaparin  40 mg Subcutaneous Daily  Jaqueline Ferreira MD      finasteride  5 mg Oral Daily Jaqueline Ferreira MD      Fluticasone Furoate-Vilanterol  1 puff Inhalation Daily Jaqueline Ferreira MD      And    umeclidinium  1 puff Inhalation Daily Jaqueline Ferreira MD      furosemide  40 mg Intravenous BID Jaqueline Ferreira MD      gabapentin  100 mg Oral BID Jaqueline Ferreira MD      insulin lispro  1-6 Units Subcutaneous HS Jaqueline Ferreira MD      insulin lispro  2-12 Units Subcutaneous TID AC Jaqueline Ferreira MD      magnesium sulfate  2 g Intravenous Once Jaqueline Ferreira MD      nicotine  1 patch Transdermal Daily Jaqueline Ferreira MD      nystatin   Topical BID Jaqueline Ferreira MD      oxyCODONE-acetaminophen  1 tablet Oral Q4H PRN Kvng Pérez MD      potassium chloride  40 mEq Oral Once Jaqueline Ferreira MD      tamsulosin  0.4 mg Oral Daily Jaqueline Ferreira MD          Today, Patient Was Seen By: Jaqueline Ferreira MD    **Please Note: This note may have been constructed using a voice recognition system.**

## 2024-05-02 NOTE — PHYSICAL THERAPY NOTE
PHYSICAL THERAPY NOTE        Patient Name: Peter Busch  Today's Date: 5/2/2024 05/02/24 4400   Note Type   Note type Cancelled Session;Evaluation   Cancel Reasons Medical status       Received order for PT consult. Chart reviewed. Pt admitted with diagnosis of cellulitis, CHF, bilateral leg edema. At this time, PT session cancelled due to pending completion of Podiatry consult and x-rays of B feet. Will follow and see patient as medically appropriate at a later time.       Balbir Kulkarni, PT

## 2024-05-02 NOTE — ASSESSMENT & PLAN NOTE
Wt Readings from Last 3 Encounters:   05/02/24 113 kg (250 lb 3.2 oz)   05/22/23 119 kg (262 lb)   04/26/23 119 kg (263 lb)     he has known history of chronic diastolic CHF.  Repeat echo ordered 5/2/24 EF of 70% with grade 1 diastolic dysfunction.  Was taking Lasix 40 mg oral daily at home.  Trouble with leg swelling and blistering on his legs due to edema.  Will give him gentle hydration for now as he has lactic acidosis followed by Lasix 40 mg IV twice daily and monitor response  diuresing very well continue IV Lasix and monitor renal function and electrolytes closely.  Replace potassium and magnesium

## 2024-05-02 NOTE — PLAN OF CARE

## 2024-05-02 NOTE — UTILIZATION REVIEW
NOTIFICATION OF INPATIENT ADMISSION   AUTHORIZATION REQUEST   SERVICING FACILITY:   [place] [address]  Tax ID: [id number]  NPI: [id number] ATTENDING PROVIDER:  Attending Name and NPI#: Jaqueline Ferreira Md [[id number]]  Address: [address] [place] [address]  Phone: [phone]   ADMISSION INFORMATION:  Place of Service: Inpatient [facility]  Place of Service Code: 21  Inpatient Admission Date/Time: 5/1/24  4:59 PM  Discharge Date/Time: No discharge date for patient encounter.  Admitting Diagnosis Code/Description:  Cellulitis [L03.90]  Bilateral leg edema [R60.0]  Cellulitis of left leg [L03.116]  Cellulitis of right leg [L03.115]     UTILIZATION REVIEW CONTACT:  Kitty Smith, Utilization   Network Utilization Review Department  Phone: [phone]  Fax [phone]  Email: [email or web address]  Contact for approvals/pending authorizations, clinical reviews, and discharge.     PHYSICIAN ADVISORY SERVICES:  Medical Necessity Denial & Eueu-hf-Urax Review  Phone: [phone]  Fax: [phone]  Email: [email or web address]     DISCHARGE SUPPORT TEAM:  For Patients Discharge Needs & Updates  Phone: [phone] opt. 2 Fax: [phone]  Email: [email or web address]

## 2024-05-02 NOTE — CASE MANAGEMENT
Case Management Assessment & Discharge Planning Note    Patient name Peter Busch  Location /-01 MRN [id number]  : 1955 Date 2024       Current Admission Date: 2024  Current Admission Diagnosis:Acute on chronic diastolic (congestive) heart failure (HCC)   Patient Active Problem List    Diagnosis Date Noted    Acute on chronic diastolic (congestive) heart failure (HCC) 2024    Cellulitis of left lower extremity 2024    Leg swelling 2022    Acute respiratory failure with hypoxia (HCC) 2022    Type 2 diabetes mellitus, without long-term current use of insulin (HCC) 09/15/2021    Tobacco abuse 09/15/2021    COPD with acute exacerbation (HCC) 09/15/2021    BPH with obstruction/lower urinary tract symptoms 09/15/2021    History of lung cancer 09/15/2021    Acute hypoxemic respiratory failure due to COVID-19 (HCC) 2021      LOS (days): 1  Geometric Mean LOS (GMLOS) (days):   Days to GMLOS:     OBJECTIVE:    Risk of Unplanned Readmission Score: 6.71         Current admission status: Inpatient  Referral Reason: Other (dc planning)    Preferred Pharmacy:   [id number] [address] [address]  Phone: [phone] Fax: [phone]    Primary Care Provider: Regino Brooks DO    Primary Insurance: [facility] REP  Secondary Insurance: [place] [employer] [facility]    ASSESSMENT:  CM met with patient at the bedside,baseline information  was obtained. CM discussed the role of CM in helping the patient develop a discharge plan and assist the patient in carry out their plan.    Active Health Care Proxies       Hosler, Gwen Health Care Representative - Significant Other   Primary Phone: [phone] (Mobile)  Home Phone: [phone]                 Advance Directives  Does patient have a Health Care POA?: No  Was patient offered paperwork?: Yes (declined)  Does patient currently  have a Health Care decision maker?: Yes, please see Health Care Proxy section  Does patient have Advance Directives?: No  Was patient offered paperwork?: Yes (declined)  Primary Contact: Hosler,Gwen (Significant Other)  [phone]         Readmission Root Cause  30 Day Readmission: No    Patient Information  Admitted from:: Home  Mental Status: Alert  During Assessment patient was accompanied by: Not accompanied during assessment  Assessment information provided by:: Patient  Primary Caregiver: Self  Support Systems: Spouse/significant other  [place] [facility] Residence: [facility]  [facility] do you live [facility] [facility]  Home entry access options. Select all that apply.: Stairs  Number of steps to enter home.: 3  Do the steps have railings?: Yes  Type of Current Residence: 2 story home  Upon entering residence, is there a bedroom on the main floor (no further steps)?: No  A bedroom is located on the following floor levels of residence (select all that apply):: 2nd Floor  Upon entering residence, is there a bathroom on the main floor (no further steps)?: No  Indicate which floors of current residence have a bathroom (select all the apply):: 2nd Floor  Number of steps to 2nd floor from main floor: One Flight  Living Arrangements: Lives w/ Spouse/significant other  Is patient a ?: Yes  Is patient active with [facility] ( Affairs)?: No    Activities of Daily Living Prior to Admission  Functional Status: Independent  Completes ADLs independently?: Yes  Ambulates independently?: Yes  Does patient use assisted devices?: Yes  Assisted Devices (DME) used: Portable Oxygen concentrator, Portable Oxygen tanks  DME Company Name (respiratory supplies): Pt unsure of company name  O2 Rate(s): 2L at night  Does patient currently own DME?: Yes  What DME does the patient currently own?: Portable Oxygen concentrator, Portable Oxygen tanks  Does patient have a history of Outpatient Therapy (PT/OT)?: No  Does the patient have a history  of Short-Term Rehab?: No  Does patient have a history of HHC?: No  Does patient currently have HHC?: No         Patient Information Continued  Income Source: SSI/SSD  Does patient have prescription coverage?: Yes  Does patient receive dialysis treatments?: No  Does patient have a history of substance abuse?: No  Does patient have a history of Mental Health Diagnosis?: No         Means of Transportation  Means of Transport to [facility]:: Other (Comment) (public transport)      Social Determinants of Health (SDOH)      Flowsheet Row Most Recent Value   Housing Stability    In the last 12 months, was there a time when you were not able to pay the mortgage or rent on time? N   In the last 12 months, how many places have you lived? 1   In the last 12 months, was there a time when you did not have a steady place to sleep or slept in a shelter (including now)? N   Transportation Needs    In the past 12 months, has lack of transportation kept you from medical appointments or from getting medications? no   In the past 12 months, has lack of transportation kept you from meetings, work, or from getting things needed for daily living? No   Food Insecurity    Within the past 12 months, you worried that your food would run out before you got the money to buy more. Never true   Within the past 12 months, the food you bought just didn't last and you didn't have money to get more. Never true   Utilities    In the past 12 months has the electric, gas, oil, or water company threatened to shut off services in your home? No            DISCHARGE DETAILS:  Pt willing to have hhc if reccommended. Cm will watch for PT/OT recs and Wound care recs. Pt lives with significant other. Pt and S.O do not have a vehicle. Pt will need transport at [place]. Cm will follow.     Discharge planning discussed with:: patient  Freedom of Choice: Yes     CM contacted family/caregiver?: No- see comments  Were Treatment Team discharge recommendations reviewed with  patient/caregiver?: Yes  Did patient/caregiver verbalize understanding of patient care needs?: Yes  Were patient/caregiver advised of the risks associated with not following Treatment Team discharge recommendations?: Yes

## 2024-05-02 NOTE — CONSULTS
REQUIRED DOCUMENTATION:     1. This service was provided via Telemedicine.  2. Provider located at [facility].  3. TeleMed provider: Carmen Lal DPM.  4. Identify all parties in room with patient during tele consult:  patient  5.Patient was then informed that this was a Telemedicine visit and that the exam was being conducted confidentially over secure lines. My office door was closed. No one else was in the room.  Patient acknowledged consent and understanding of privacy and security of the Telemedicine visit, and gave us permission to have the assistant stay in the room in order to assist with the history and to conduct the exam.  I informed the patient that I have reviewed their record in Epic and presented the opportunity for them to ask any questions regarding the visit today.  The patient agreed to participate.        Consult - Podiatry   Peter Busch 68 y.o. male MRN: [id number]  Unit/Bed#: -01 Encounter: [id number]    Assessment/Plan     Assessment:  B/L LE venous stasis ulcerations 2/2 volume overload  LLE cellulitis   CHF  DM w/ PN, A1c 7.1% 1/15/24    Plan:  - B/L LE with multiple superficial appearing venous stasis ulcerations due to volume overload. Superficial appearing interdigital ulcerations noted as well. Plan to c/w abx and perform LWC. Will need outpt WCC visit. C/w diuresis per SLIM   - B/L foot Xrs ordered  - Diuretic and abx per SLIM  - LEADs 5/18/23: RLE 1.24/164/138. LLE 1.13/nc/141. WNL for healing without evidence of LEAOD.   - We discussed at length lifestyle modifications for venous insufficiency. We recommend to not sleep in a recliner.  To avoid periods of sitting with legs in a dependent position. To elevate legs and lay flat for periods in the day and at night. To take diuretics as directed. Also recommend weight loss, increased ambulation, increased activity, and monitor diet especially sodium intake. To use compression stockings.     - Rest of care per primary team    History  of Present Illness     HPI:  Peter Busch is a 68 y.o. male w/ pmh sig for DM, tobacco abuse, CHF admitted for B/L LE swelling, blistering, drainage present for 7+ months and worsening. Patient did see me in May 2023 however did not follow-up since as instructed. Notes redness to legs as well. Has some edema and pain.      Inpatient consult to Podiatry  Consult performed by: Carmen Lal DPM  Consult ordered by: Jaqueline Ferreira MD        Review of Systems   Constitutional: Negative.    HENT: Negative.    Eyes: Negative.    Respiratory: Negative.    Cardiovascular: Negative.    Gastrointestinal: Negative.    Musculoskeletal: B/L LE pain   Skin:B/L LE ulcerations   Neurological: Negative.   Psych: negative.       Historical Information   Past Medical History:   Diagnosis Date    Bladder cancer (HCC)     BPH (benign prostatic hyperplasia)     COPD (chronic obstructive pulmonary disease) (HCC)     Diabetes mellitus (HCC)     Hyperlipidemia     Lung cancer (HCC)     Rib fractures      Past Surgical History:   Procedure Laterality Date    BRONCHOSCOPY      CATARACT EXTRACTION, BILATERAL      CYSTOSCOPY      TONSILLECTOMY      TRANSURETHRAL RESECTION OF PROSTATE       Social History   Social History     Substance and Sexual Activity   Alcohol Use Not Currently     Social History     Substance and Sexual Activity   Drug Use Not Currently     Social History     Tobacco Use   Smoking Status Every Day    Current packs/day: 1.00    Types: Cigarettes   Smokeless Tobacco Never     Family History:   Family History   Problem Relation Age of Onset    Hypertension Mother     No Known Problems Father        Meds/Allergies   Medications Prior to Admission   Medication    albuterol (PROVENTIL HFA,VENTOLIN HFA) 90 mcg/act inhaler    Aspirin Buf,CaCarb-MgCarb-MgO, 81 MG TABS    atorvastatin (LIPITOR) 40 mg tablet    finasteride (PROSCAR) 5 mg tablet    furosemide (LASIX) 40 mg tablet    gabapentin (NEURONTIN) 100 mg capsule    glipiZIDE  (GLUCOTROL XL) 2.5 mg 24 hr tablet    Jardiance 25 MG TABS    levothyroxine 25 mcg tablet    meloxicam (MOBIC) 15 mg tablet    metFORMIN (GLUCOPHAGE-XR) 500 mg 24 hr tablet    Multiple Vitamin (One-A-Day Essential) TABS    potassium chloride (MICRO-K) 10 MEQ CR capsule    tamsulosin (FLOMAX) 0.4 mg    TURMERIC-FISH OIL PO    fluticasone-umeclidinium-vilanterol (Trelegy Ellipta) 200-62.5-25 MCG/INH AEPB inhaler    metFORMIN (GLUCOPHAGE) 500 mg tablet     Allergies   Allergen Reactions    Tetracyclines & Related Other (See Comments)       Objective   First Vitals:   Blood Pressure: 134/75 (05/01/24 1614)  Pulse: 87 (05/01/24 1620)  Temperature: 97.8 °F (36.6 °C) (05/01/24 1614)  Temp Source: Temporal (05/01/24 1614)  Respirations: 16 (05/01/24 1614)  Weight - Scale: 118 kg (260 lb) (05/01/24 1614)  SpO2: 94 % (05/01/24 1620)    Current Vitals:   Blood Pressure: 110/67 (05/02/24 0721)  Pulse: 74 (05/02/24 1009)  Temperature: 97.5 °F (36.4 °C) (05/01/24 1745)  Temp Source: Temporal (05/02/24 1009)  Respirations: 18 (05/02/24 0721)  Weight - Scale: 113 kg (250 lb 3.2 oz) (05/02/24 0545)  SpO2: 92 % (05/02/24 1009)        /67   Pulse 74   Temp 97.5 °F (36.4 °C) (Temporal)   Resp 18   Wt 113 kg (250 lb 3.2 oz)   SpO2 92%   BMI 33.01 kg/m²      General Appearance:    Alert, cooperative, no distress   Head:    Normocephalic, without obvious abnormality, atraumatic   Eyes:    PERRL, conjunctiva/corneas clear, EOM's intact        Nose:   Moist mucous membranes   Neck:   Supple, symmetrical, trachea midline   Back:     Symmetric   Lungs:     Respirations unlabored   Heart:    Regular rate and rhythm, S1 and S2 normal, no murmur, rub   or gallop   Abdomen:     Soft, non-tender    B/L LE EXAM   Extremities:   B/L LE edema and tenderness   Pulses:   Diminished due to edema.    Skin:   Diffuse B/L LE superficial appearing venous stasis ulcerations. Mild LLE erythema.   Left foot interdigital superficial appearing  ulceration with maceration.    Neurologic:   Gross sensation is intact. Protective sensation is diminished.   L1 interdigital    LLE    LLE    RLE    RLE        Lab Results:   Admission on 05/01/2024   Component Date Value    WBC 05/01/2024 10.69 (H)     RBC 05/01/2024 5.34     Hemoglobin 05/01/2024 16.4     Hematocrit 05/01/2024 49.2     MCV 05/01/2024 92     MCH 05/01/2024 30.7     MCHC 05/01/2024 33.3     RDW 05/01/2024 13.1     MPV 05/01/2024 8.9     Platelets 05/01/2024 238     nRBC 05/01/2024 0     Segmented % 05/01/2024 69     Immature Grans % 05/01/2024 0     Lymphocytes % 05/01/2024 16     Monocytes % 05/01/2024 10     Eosinophils Relative 05/01/2024 4     Basophils Relative 05/01/2024 1     Absolute Neutrophils 05/01/2024 7.38     Absolute Immature Grans 05/01/2024 0.04     Absolute Lymphocytes 05/01/2024 1.75     Absolute Monocytes 05/01/2024 1.07     Eosinophils Absolute 05/01/2024 0.37     Basophils Absolute 05/01/2024 0.08     Sodium 05/01/2024 139     Potassium 05/01/2024 3.1 (L)     Chloride 05/01/2024 98     CO2 05/01/2024 28     ANION GAP 05/01/2024 13     BUN 05/01/2024 15     Creatinine 05/01/2024 1.05     Glucose 05/01/2024 111     Calcium 05/01/2024 9.5     AST 05/01/2024 21     ALT 05/01/2024 33     Alkaline Phosphatase 05/01/2024 66     Total Protein 05/01/2024 7.8     Albumin 05/01/2024 4.3     Total Bilirubin 05/01/2024 0.50     eGFR 05/01/2024 72     LACTIC ACID 05/01/2024 3.4 (H)     Blood Culture 05/01/2024 Received in Microbiology Lab. Culture in Progress.     Blood Culture 05/01/2024 Received in Microbiology Lab. Culture in Progress.     LACTIC ACID 05/01/2024 2.6 (H)     Hemoglobin A1C 05/01/2024 7.2 (H)     EAG 05/01/2024 160     A4C EF 05/02/2024 58     LV Diastolic Volume (BP) 05/02/2024 52     LV Systolic Volume (BP) 05/02/2024 15     EF 05/02/2024 70     LVIDd 05/02/2024 4.10     LVIDS 05/02/2024 2.30     IVSd 05/02/2024 1.00     LVPWd 05/02/2024 1.10     FS 05/02/2024 44     MV  E' Tissue Velocity Se* 05/02/2024 6     MV E' Tissue Velocity La* 05/02/2024 10     LA Volume Index (BP) 05/02/2024 12.2     E/A ratio 05/02/2024 0.70     E wave deceleration time 05/02/2024 276     MV Peak E Andrews 05/02/2024 75     MV Peak A Andrews 05/02/2024 1.07     RVID d 05/02/2024 4.9     Tricuspid annular plane * 05/02/2024 2.00     LA size 05/02/2024 3.1     LA length (A2C) 05/02/2024 6.00     LA volume (BP) 05/02/2024 29     RAA A4C 05/02/2024 21     MV stenosis pressure 1/2* 05/02/2024 80     MV valve area p 1/2 meth* 05/02/2024 2.75     Ao root 05/02/2024 3.90     Left ventricular stroke * 05/02/2024 55.00     IVS 05/02/2024 1     LEFT VENTRICLE SYSTOLIC * 05/02/2024 18     LV DIASTOLIC VOLUME (MOD* 05/02/2024 73     Left Atrium Area-systoli* 05/02/2024 13.9     Left Atrium Area-systoli* 05/02/2024 14.8     LVSV, 2D 05/02/2024 55     LV EF 05/02/2024 70     Est. RA pres 05/02/2024 8.0     POC Glucose 05/01/2024 111     POC Glucose 05/01/2024 147 (H)     WBC 05/02/2024 10.44 (H)     RBC 05/02/2024 4.97     Hemoglobin 05/02/2024 15.5     Hematocrit 05/02/2024 46.3     MCV 05/02/2024 93     MCH 05/02/2024 31.2     MCHC 05/02/2024 33.5     RDW 05/02/2024 13.2     Platelets 05/02/2024 217     MPV 05/02/2024 8.9     Sodium 05/02/2024 140     Potassium 05/02/2024 3.6     Chloride 05/02/2024 105     CO2 05/02/2024 27     ANION GAP 05/02/2024 8     BUN 05/02/2024 15     Creatinine 05/02/2024 0.88     Glucose 05/02/2024 140     Calcium 05/02/2024 8.6     eGFR 05/02/2024 87     Cholesterol 05/02/2024 103     Triglycerides 05/02/2024 106     HDL, Direct 05/02/2024 42     LDL Calculated 05/02/2024 40     Non-HDL-Chol (CHOL-HDL) 05/02/2024 61     Magnesium 05/02/2024 1.7 (L)     Procalcitonin 05/02/2024 0.06     POC Glucose 05/02/2024 133     POC Glucose 05/02/2024 142 (H)              Results from last 7 days   Lab Units 05/01/24  1623   BLOOD CULTURE  Received in Microbiology Lab. Culture in Progress.  Received in  "Microbiology Lab. Culture in Progress.       Invalid input(s): \"LABAEARO\"            Imaging: I have personally reviewed pertinent films in PACS  EKG, Pathology, and Other Studies: I have personally reviewed pertinent reports.      Code Status: Level 1 - Full Code  Advance Directive and Living Will:      Power of :    POLST:          "

## 2024-05-02 NOTE — ASSESSMENT & PLAN NOTE
Patient noted to have bilateral lower extremity cellulitis due to excessive edema and some blistering noted.  Will get wound cultures and MRSA nasal swab.  Received a dose of vancomycin in the ED already will continue on IV Ancef for now as podiatry for evaluation for some wounds in between his toes and dry scaly skin on both feet  Placed on Lac-Hydrin.  Patient definitely has venous stasis leading to a lot of skin changes and concerns for fluid overload on top of that due to CHF leading to blistering of the skin.  Procalcitonin level is negative he has mild leukocytosis.   Was placed on IV Ancef however today noticed to have worsening drainage and sloughing between his left foot first and second toe.  Discussed with podiatry will get MRI of the foot to rule out underlying osteomyelitis and switched him from Ancef to cefepime.  Venous Duplex bilateral lower extremities negative for DVT patient is having a lot of cramping pain in his legs will also get arterial studies done

## 2024-05-02 NOTE — CONSULTS
"Consult received for CHF Ed.     Pt reports good appetite, \"I'm being rationed\" in regards to smaller portion sizes offered in hospital. States wife prepares meals at home \"she doesn't use the salt shaker.\" Pt did not describe food options he consumes at home. Did report an excess of fluid intake, drinks several 16 oz cups of coffee + several 16 oz bottles of water and can of soda everyday = ~188 oz daily. Chart review of weight hx: 4/26/23 263lb, 10/25/23 256lb, 3/25/24 253lb, 5/2/24 250lb. Pt reports weighing himself at home \"I used to weigh myself daily\". Says he has 2 scales and both were accurate to each other though noting 6-7lb difference at doctor's office \"I was fully clothed though.\"     Reviewed with pt current fluid restriction, pt is consuming an estimated 3x his current fluid restriction at home. Discussed chewing on ice cubes to help slow rate of water intake, slip beverages slowly through the day, avoid salty foods which may increase sense of thirst, use of sugar free gums/sugar free candies to help moisten mouth when not able to drink more fluids. Adjusted diet to CCD 3, double veg and double PRO portions to increase satiety of meals, better meet estimated needs and pt satisifaction. Fluid restriction per MD. Provided pt with HF Nutrition Therapy, Salt Free Seasoning Tips, Fluid Restriction Nutrition Therapy handouts along with RD contact information. Pt declined diet ed for low sodium diet. Encouraged daily weights.   "

## 2024-05-02 NOTE — PLAN OF CARE

## 2024-05-02 NOTE — UTILIZATION REVIEW
Initial Clinical Review    Admission: Date/Time/Statement:   Admission Orders (From admission, onward)       Ordered        05/01/24 1659  INPATIENT ADMISSION  Once                          Orders Placed This Encounter   Procedures    INPATIENT ADMISSION     Standing Status:   Standing     Number of Occurrences:   1     Order Specific Question:   Level of Care     Answer:   Med Surg [16]     Order Specific Question:   Estimated length of stay     Answer:   More than 2 Midnights     Order Specific Question:   Certification     Answer:   I certify that inpatient services are medically necessary for this patient for a duration of greater than two midnights. See H&P and MD Progress Notes for additional information about the patient's course of treatment.     ED Arrival Information       Expected   -    Arrival   5/1/2024 16:11    Acuity   Urgent              Means of arrival   Ambulance    Escorted by   [facility]    Service   Hospitalist    Admission type   Emergency              Arrival complaint   Cellulitis             Chief Complaint   Patient presents with    Cellulitis     Patient presents to the ED with complaints of bilateral leg swelling, redness, and drainage that ems reports has been occurring over the last seven months.        Initial Presentation: 68 y.o. male to ED via EMS from home  Present to ED with leg swelling.  Patient states that he has been dealing with For the past few weeks he has been taking Lasix and he is try to decrease his weight but it is not getting better and he was also seeing a podiatrist who recommended to him that he should probably go to the hospital.  Complaining of redness swelling and some blistering on his legs and dry skin. complains of some chills   PMHX: bladder cancer; BPH; COPD; DM; HLD; lung cancer  Admitted to [facility] with DX: Acute on chronic diastolic (congestive) heart failure   on exam: tachy; RA sat 89% - placed on O2 @ 2L via nc sat 94%; B/L LE edema +2 nonpitting,  with erythema. very dry scaly skin noted and popped blisters on bilateral lower shin areas noted and some skin breakdown noted in between the toes with some white flaky skin. Noted to B/L LE cellulitis; WBC 10.69; K 3.1; LA 3.4   PLAN: cont iv abx; cont iv lasix; rec'd ivf bolus 1L x1; cont DuoNebs; monitor labs; fluid restriction; pain control (see below); Accuchecks with ssic; titrate O2; f/u echo; f/u wound cx; f/u MRSA swab        Anticipated Length of Stay/Certification Statement: Patient will be admitted on an Inpatient basis with an anticipated length of stay of  more than 2 midnights.   Justification for Hospital Stay: Acute on chronic diastolic CHF       Date: 5/2/24      Day 2  Patient states he is feeling a little bit better today his leg swelling and redness is a little bit better but it is still there. His postvoid residual was over 500 today had 1 episode of straight cath will need to monitor closely and will also start him on Flomax. I/O net -4454; B/L LE edema present with erythema; Mg 1.7  Plan: cont iv abx; cont iv lasix; recd Mg Sulf iv x1; cont DuoNebs; monitor labs; fluid restriction; pain control (see below); Accuchecks with ssic; titrate O2; f/u echo; f/u wound cx; f/u MRSA swab      Date: 5/3/24      Day 3: Has surpassed a 2nd midnight with active treatments and services.  Require additional inpatient hospital stay due to HF and bilateral lower extremity cellulitis   Patient complains of pain and cramping in both of his legs. Noted to have quite a bit of redness and swelling of both legs some increased purulence and drainage noted between his left first and second toe.  Was placed on IV Ancef however today noticed to have worsening drainage and sloughing between his left foot first and second toe.  Discussed with podiatry will get MRI of the foot to rule out underlying osteomyelitis and switched him from Ancef to cefepime.  Venous Duplex bilateral lower extremities negative for DVT patient is  having a lot of cramping pain in his legs will also get arterial studies ; Mg 1.9; I/O Net -2240  Plan: cont iv abx; cont iv lasix; monitor labs; fluid restriction; pain control (see below); Accuchecks with ssic; titrate O2; f/u echo; f/u wound cx; f/u MRSA swab; f/u MRI foot; f/u arterial study; Cont flomax but  increased to 0.4 mg twice daily         ED Triage Vitals   Temperature Pulse Respirations Blood Pressure SpO2   05/01/24 1614 05/01/24 1620 05/01/24 1614 05/01/24 1614 05/01/24 1620   97.8 °F (36.6 °C) 87 16 134/75 94 %      Temp Source Heart Rate Source Patient Position - Orthostatic VS BP Location FiO2 (%)   05/01/24 1614 05/01/24 1614 05/01/24 1614 05/01/24 1614 --   Temporal Monitor Sitting Right arm       Pain Score       05/01/24 1614       3          Wt Readings from Last 1 Encounters:   05/03/24 112 kg (247 lb)     Wt Readings from Last 3 Encounters:   05/01/24 118 kg (260 lb)   05/22/23 119 kg (262 lb)   04/26/23 119 kg (263 lb)     Wt Readings from Last 3 Encounters:   05/02/24 113 kg (250 lb 3.2 oz)   05/22/23 119 kg (262 lb)   04/26/23 119 kg (263 lb)       Additional Vital Signs:   Date/Time Temp Pulse Resp BP MAP (mmHg) SpO2 Calculated FIO2 (%) - Nasal Cannula Nasal Cannula O2 Flow Rate (L/min) O2 Device Patient Position - Orthostatic VS   05/03/24 11:14:02 -- 87 -- 118/73 88 93 % -- -- -- --   05/03/24 1100 -- 87 -- -- -- -- -- -- -- --   05/03/24 0913 -- 88 -- -- -- -- -- -- -- --   05/03/24 0900 -- 83 -- -- -- 95 % 28 2 L/min Nasal cannula --   05/03/24 07:36:52 97.7 °F (36.5 °C) -- 18 131/70 90 -- -- -- -- --   05/02/24 23:03:42 98.1 °F (36.7 °C) 94 20 108/63 78 90 % 28 2 L/min Nasal cannula Lying   05/02/24 1930 -- -- -- -- -- 90 % 28 2 L/min Nasal cannula --   05/02/24 15:26:27 97.5 °F (36.4 °C) 92 20 106/75 85 90 % -- -- None (Room air) Lying     Date/Time Temp Pulse Resp BP MAP (mmHg) SpO2 Calculated FIO2 (%) - Nasal Cannula Nasal Cannula O2 Flow Rate (L/min) O2 Device Patient Position  - Orthostatic VS   05/02/24 10:09:55 -- 74 -- -- -- 92 % -- -- -- --   05/02/24 0743 -- -- -- -- -- 92 % 28 2 L/min -- --   05/02/24 07:21:12 -- 87 18 110/67 81 94 % -- -- -- --   05/01/24 2318 -- -- -- -- -- 90 % 28 2 L/min Nasal cannula --   05/01/24 22:29:25 -- 104 -- 113/76 88 88 % Abnormal  -- -- -- --   05/01/24 20:53:55 -- 107 Abnormal  -- 125/73 90 89 % Abnormal  -- -- -- --   05/01/24 1931 -- -- -- -- -- -- -- -- None (Room air) --   05/01/24 17:45:19 97.5 °F (36.4 °C) 97 18 131/86 101 94 % -- -- None (Room air) Lying   05/01/24 1620 -- 87 -- -- -- 94 % -- -- None (Room air) --   05/01/24 1614 97.8 °F (36.6 °C) -- 16 134/75 -- -- -- -- None (Room air) Sitting       EKG: None obtained      Pertinent Labs/Diagnostic Test Results:    VAS VENOUS DUPLEX - LOWER LIMB BILATERAL   Final Result by Aubrey Simental DO (05/03 1211)      XR chest portable    (Results Pending)   XR foot 3+ vw right    (Results Pending)   XR foot 3+ vw left    (Results Pending)   VAS ARTERIAL DUPLEX- LOWER LIMB BILATERAL    (Results Pending)   MRI foot/forefoot toes left wo contrast    (Results Pending)        Results from last 7 days   Lab Units 05/02/24  0503 05/01/24  1623   WBC Thousand/uL 10.44* 10.69*   HEMOGLOBIN g/dL 15.5 16.4   HEMATOCRIT % 46.3 49.2   PLATELETS Thousands/uL 217 238   TOTAL NEUT ABS Thousands/µL  --  7.38        Results from last 7 days   Lab Units 05/03/24  0457 05/02/24  0503 05/01/24  1623   SODIUM mmol/L 137 140 139   POTASSIUM mmol/L 3.7 3.6 3.1*   CHLORIDE mmol/L 103 105 98   CO2 mmol/L 27 27 28   ANION GAP mmol/L 7 8 13   BUN mg/dL 16 15 15   CREATININE mg/dL 0.85 0.88 1.05   EGFR ml/min/1.73sq m 88 87 72   CALCIUM mg/dL 8.6 8.6 9.5   MAGNESIUM mg/dL 1.9 1.7*  --      Results from last 7 days   Lab Units 05/01/24  1623   AST U/L 21   ALT U/L 33   ALK PHOS U/L 66   TOTAL PROTEIN g/dL 7.8   ALBUMIN g/dL 4.3   TOTAL BILIRUBIN mg/dL 0.50     Results from last 7 days   Lab Units 05/03/24  1103 05/03/24  0737  05/02/24  2107 05/02/24  1555 05/02/24  1101 05/02/24  0724 05/01/24  2049 05/01/24  1745   POC GLUCOSE mg/dl 156* 128 172* 132 142* 133 147* 111     Results from last 7 days   Lab Units 05/03/24  0457 05/02/24  0503 05/01/24  1623   GLUCOSE RANDOM mg/dL 123 140 111        Results from last 7 days   Lab Units 05/01/24  1623   HEMOGLOBIN A1C % 7.2*   EAG mg/dl 160        Results from last 7 days   Lab Units 05/02/24  0503   PROCALCITONIN ng/ml 0.06     Results from last 7 days   Lab Units 05/01/24  1950 05/01/24  1623   LACTIC ACID mmol/L 2.6* 3.4*        Results from last 7 days   Lab Units 05/02/24  1423 05/01/24  1623   BLOOD CULTURE   --  No Growth at 24 hrs.  No Growth at 24 hrs.   GRAM STAIN RESULT  No polys seen*  2+ Gram positive cocci in pairs*  1+ Gram negative rods*  No Polys or Bacteria seen  --    WOUND CULTURE  Culture too young- will reincubate  Culture too young- will reincubate  --           ED Treatment:   Medication Administration from 05/01/2024 1610 to 05/01/2024 1741         Date/Time Order Dose Route Action     05/01/2024 1627 EDT vancomycin (VANCOCIN) 1500 mg in sodium chloride 0.9% 250 mL IVPB 1,500 mg Intravenous New Bag     05/01/2024 1657 EDT fentaNYL injection 50 mcg 50 mcg Intravenous Given            Present on Admission:   Type 2 diabetes mellitus, without long-term current use of insulin ([facility])   Tobacco abuse      Admitting Diagnosis: Cellulitis [L03.90]  Bilateral leg edema [R60.0]  Cellulitis of left leg [L03.116]  Cellulitis of right leg [L03.115]    Age/Sex: 68 y.o. male    Admission Orders: SCDs; wound care; elevate heels; I/O; daily wts; cardiac / Consistent Carbohydrate Diet. Blood glucose checks ACHS. Fluid restriction 1800      Scheduled Medications:  atorvastatin, 40 mg, Oral, Daily  cefazolin, 2,000 mg, Intravenous, Q8H  enoxaparin, 40 mg, Subcutaneous, Daily  finasteride, 5 mg, Oral, Daily  Fluticasone Furoate-Vilanterol, 1 puff, Inhalation, Daily   And  umeclidinium,  1 puff, Inhalation, Daily  furosemide, 40 mg, Intravenous, BID  gabapentin, 100 mg, Oral, BID  insulin lispro, 1-6 Units, Subcutaneous, HS  insulin lispro, 2-12 Units, Subcutaneous, TID AC  nicotine, 1 patch, Transdermal, Daily  nystatin, , Topical, BID  tamsulosin, 0.4 mg, Oral, Daily    sodium chloride 0.9 % bolus 1,000 mL  Dose: 1,000 mL  Freq: Once Route: IV  Last Dose: 1,000 mL (05/01/24 1836)  Start: 05/01/24 1730 End: 05/01/24 2036     magnesium sulfate 2 g/50 mL IVPB (premix) 2 g  Dose: 2 g  Freq: Once Route: IV  Last Dose: 2 g (05/02/24 1245)  Start: 05/02/24 1230 End: 05/02/24 1445       Continuous IV Infusions: None       PRN Meds:  acetaminophen, 650 mg, Oral, Q4H PRN  (5/1 recd x1)    albuterol, 2 puff, Inhalation, Q6H PRN  calcium carbonate, 1,000 mg, Oral, Daily PRN  oxyCODONE-acetaminophen, 1 tablet, Oral, Q4H PRN   (5/1 recd x1)  (5/2 rec'd x6)   (5/3 rec'd x2 so far today)   fentaNYL injection 50 mcg, intravenous Q6 PRN  (5/3 rec'd x1 so far today)         IP CONSULT TO NUTRITION SERVICES  IP CONSULT TO PODIATRY    Network Utilization Review Department  ATTENTION: Please call with any questions or concerns to [phone] and carefully listen to the prompts so that you are directed to the right person. All voicemails are confidential.   For Discharge needs, contact Care Management DC Support Team at [phone] opt. [phone]  Send all requests for admission clinical reviews, approved or denied determinations and any other requests to dedicated fax number below belonging to the campus where the patient is receiving treatment. List of dedicated fax numbers for the Facilities:  FACILITY NAME UR FAX NUMBER   ADMISSION DENIALS (Administrative/Medical Necessity) [phone]   DISCHARGE SUPPORT TEAM (NETWORK) [phone]   PARENT CHILD HEALTH (Maternity/NICU/Pediatrics) [phone]   [facility] [phone]   [facility] [phone]   [facility]  [facility] [phone]   [facility] [phone]   [facility] [phone]   [facility] [phone]   [facility] [phone]   [facility] [phone]   [facility] [phone]   [facility] [phone]   [facility] [phone]   [facility] [phone]

## 2024-05-02 NOTE — ASSESSMENT & PLAN NOTE
Patient noted to have bilateral lower extremity cellulitis due to excessive edema and some blistering noted.  Will get wound cultures and MRSA nasal swab.  Received a dose of vancomycin in the ED already will continue on IV Ancef for now as podiatry for evaluation for some wounds in between his toes and dry scaly skin on both feet  Placed on Lac-Hydrin.  Patient definitely has venous stasis leading to a lot of skin changes and concerns for fluid overload on top of that due to CHF leading to blistering of the skin.  Procalcitonin level is negative he has mild leukocytosis.  Will continue IV Ancef for now and switch to oral Keflex to complete total 5-day course as he has previous significant erythema of bilateral lower extremity

## 2024-05-03 ENCOUNTER — APPOINTMENT (INPATIENT)
Dept: MRI IMAGING | Facility: HOSPITAL | Age: 69
DRG: 602 | End: 2024-05-03
Payer: COMMERCIAL

## 2024-05-03 ENCOUNTER — APPOINTMENT (INPATIENT)
Dept: NON INVASIVE DIAGNOSTICS | Facility: HOSPITAL | Age: 69
DRG: 602 | End: 2024-05-03
Payer: COMMERCIAL

## 2024-05-03 PROBLEM — R33.8 ACUTE URINARY RETENTION: Status: ACTIVE | Noted: 2024-05-03

## 2024-05-03 LAB
ANION GAP SERPL CALCULATED.3IONS-SCNC: 7 MMOL/L (ref 4–13)
BUN SERPL-MCNC: 16 MG/DL (ref 5–25)
CALCIUM SERPL-MCNC: 8.6 MG/DL (ref 8.4–10.2)
CHLORIDE SERPL-SCNC: 103 MMOL/L (ref 96–108)
CO2 SERPL-SCNC: 27 MMOL/L (ref 21–32)
CREAT SERPL-MCNC: 0.85 MG/DL (ref 0.6–1.3)
GFR SERPL CREATININE-BSD FRML MDRD: 88 ML/MIN/1.73SQ M
GLUCOSE SERPL-MCNC: 123 MG/DL (ref 65–140)
GLUCOSE SERPL-MCNC: 128 MG/DL (ref 65–140)
GLUCOSE SERPL-MCNC: 146 MG/DL (ref 65–140)
GLUCOSE SERPL-MCNC: 149 MG/DL (ref 65–140)
GLUCOSE SERPL-MCNC: 156 MG/DL (ref 65–140)
MAGNESIUM SERPL-MCNC: 1.9 MG/DL (ref 1.9–2.7)
MRSA NOSE QL CULT: NORMAL
POTASSIUM SERPL-SCNC: 3.7 MMOL/L (ref 3.5–5.3)
SODIUM SERPL-SCNC: 137 MMOL/L (ref 135–147)

## 2024-05-03 PROCEDURE — 99233 SBSQ HOSP IP/OBS HIGH 50: CPT | Performed by: FAMILY MEDICINE

## 2024-05-03 PROCEDURE — 73718 MRI LOWER EXTREMITY W/O DYE: CPT

## 2024-05-03 PROCEDURE — 80048 BASIC METABOLIC PNL TOTAL CA: CPT | Performed by: FAMILY MEDICINE

## 2024-05-03 PROCEDURE — 93970 EXTREMITY STUDY: CPT

## 2024-05-03 PROCEDURE — 82948 REAGENT STRIP/BLOOD GLUCOSE: CPT

## 2024-05-03 PROCEDURE — 93970 EXTREMITY STUDY: CPT | Performed by: SURGERY

## 2024-05-03 PROCEDURE — 83735 ASSAY OF MAGNESIUM: CPT | Performed by: FAMILY MEDICINE

## 2024-05-03 RX ORDER — OXYCODONE HYDROCHLORIDE AND ACETAMINOPHEN 5; 325 MG/1; MG/1
2 TABLET ORAL EVERY 4 HOURS PRN
Status: DISCONTINUED | OUTPATIENT
Start: 2024-05-03 | End: 2024-05-06 | Stop reason: HOSPADM

## 2024-05-03 RX ORDER — ONDANSETRON 2 MG/ML
4 INJECTION INTRAMUSCULAR; INTRAVENOUS EVERY 6 HOURS PRN
Status: DISCONTINUED | OUTPATIENT
Start: 2024-05-03 | End: 2024-05-06 | Stop reason: HOSPADM

## 2024-05-03 RX ORDER — POTASSIUM CHLORIDE 20 MEQ/1
40 TABLET, EXTENDED RELEASE ORAL ONCE
Status: COMPLETED | OUTPATIENT
Start: 2024-05-03 | End: 2024-05-03

## 2024-05-03 RX ORDER — TAMSULOSIN HYDROCHLORIDE 0.4 MG/1
0.4 CAPSULE ORAL 2 TIMES DAILY
Status: DISCONTINUED | OUTPATIENT
Start: 2024-05-03 | End: 2024-05-06 | Stop reason: HOSPADM

## 2024-05-03 RX ORDER — FENTANYL CITRATE 50 UG/ML
50 INJECTION, SOLUTION INTRAMUSCULAR; INTRAVENOUS EVERY 6 HOURS PRN
Status: DISCONTINUED | OUTPATIENT
Start: 2024-05-03 | End: 2024-05-06 | Stop reason: HOSPADM

## 2024-05-03 RX ORDER — CEFEPIME HYDROCHLORIDE 2 G/50ML
2000 INJECTION, SOLUTION INTRAVENOUS EVERY 12 HOURS
Status: DISCONTINUED | OUTPATIENT
Start: 2024-05-03 | End: 2024-05-05

## 2024-05-03 RX ADMIN — FENTANYL CITRATE 50 MCG: 50 INJECTION INTRAMUSCULAR; INTRAVENOUS at 19:12

## 2024-05-03 RX ADMIN — ONDANSETRON 4 MG: 2 INJECTION INTRAMUSCULAR; INTRAVENOUS at 16:39

## 2024-05-03 RX ADMIN — POTASSIUM CHLORIDE 40 MEQ: 1500 TABLET, EXTENDED RELEASE ORAL at 12:29

## 2024-05-03 RX ADMIN — FINASTERIDE 5 MG: 5 TABLET, FILM COATED ORAL at 08:25

## 2024-05-03 RX ADMIN — TAMSULOSIN HYDROCHLORIDE 0.4 MG: 0.4 CAPSULE ORAL at 08:25

## 2024-05-03 RX ADMIN — OXYCODONE HYDROCHLORIDE AND ACETAMINOPHEN 2 TABLET: 5; 325 TABLET ORAL at 16:07

## 2024-05-03 RX ADMIN — CEFAZOLIN SODIUM 2000 MG: 2 SOLUTION INTRAVENOUS at 04:46

## 2024-05-03 RX ADMIN — TAMSULOSIN HYDROCHLORIDE 0.4 MG: 0.4 CAPSULE ORAL at 17:09

## 2024-05-03 RX ADMIN — ENOXAPARIN SODIUM 40 MG: 40 INJECTION SUBCUTANEOUS at 08:25

## 2024-05-03 RX ADMIN — GABAPENTIN 100 MG: 100 CAPSULE ORAL at 08:25

## 2024-05-03 RX ADMIN — OXYCODONE HYDROCHLORIDE AND ACETAMINOPHEN 2 TABLET: 5; 325 TABLET ORAL at 09:13

## 2024-05-03 RX ADMIN — ATORVASTATIN CALCIUM 40 MG: 40 TABLET, FILM COATED ORAL at 08:25

## 2024-05-03 RX ADMIN — CEFAZOLIN SODIUM 2000 MG: 2 SOLUTION INTRAVENOUS at 11:57

## 2024-05-03 RX ADMIN — FENTANYL CITRATE 50 MCG: 50 INJECTION INTRAMUSCULAR; INTRAVENOUS at 11:19

## 2024-05-03 RX ADMIN — FUROSEMIDE 40 MG: 10 INJECTION, SOLUTION INTRAMUSCULAR; INTRAVENOUS at 17:08

## 2024-05-03 RX ADMIN — UMECLIDINIUM 1 PUFF: 62.5 AEROSOL, POWDER ORAL at 08:30

## 2024-05-03 RX ADMIN — INSULIN LISPRO 2 UNITS: 100 INJECTION, SOLUTION INTRAVENOUS; SUBCUTANEOUS at 11:57

## 2024-05-03 RX ADMIN — OXYCODONE HYDROCHLORIDE AND ACETAMINOPHEN 2 TABLET: 5; 325 TABLET ORAL at 22:48

## 2024-05-03 RX ADMIN — FLUTICASONE FUROATE AND VILANTEROL TRIFENATATE 1 PUFF: 100; 25 POWDER RESPIRATORY (INHALATION) at 08:30

## 2024-05-03 RX ADMIN — FUROSEMIDE 40 MG: 10 INJECTION, SOLUTION INTRAMUSCULAR; INTRAVENOUS at 08:25

## 2024-05-03 RX ADMIN — CEFEPIME HYDROCHLORIDE 2000 MG: 2 INJECTION, SOLUTION INTRAVENOUS at 16:06

## 2024-05-03 RX ADMIN — GABAPENTIN 100 MG: 100 CAPSULE ORAL at 17:09

## 2024-05-03 RX ADMIN — OXYCODONE HYDROCHLORIDE AND ACETAMINOPHEN 1 TABLET: 5; 325 TABLET ORAL at 04:46

## 2024-05-03 NOTE — ASSESSMENT & PLAN NOTE
His postvoid residual was over 500 x2 with 2 straight cath so far . If he Continues to have high residuals again will require Mnedes catheter  Cont flomax but  increased to 0.4 mg twice daily

## 2024-05-03 NOTE — PROGRESS NOTES
[facility]  Progress Note  Name: Peter Busch I  MRN: [id number]  Unit/Bed#: [facility] I Date of Admission: 5/1/2024   Date of Service: 5/3/2024 I Hospital Day: 2    Assessment/Plan   * Acute on chronic diastolic (congestive) heart failure (HCC)  Assessment & Plan  Wt Readings from Last 3 Encounters:   05/02/24 113 kg (250 lb 3.2 oz)   05/22/23 119 kg (262 lb)   04/26/23 119 kg (263 lb)     he has known history of chronic diastolic CHF.  Repeat echo ordered 5/2/24 EF of 70% with grade 1 diastolic dysfunction.  Was taking Lasix 40 mg oral daily at home.  Trouble with leg swelling and blistering on his legs due to edema.  Initially received gentle hydration as has lactic acidosis followed by Lasix 40 mg IV twice daily and monitor response  diuresing very well continue IV Lasix and monitor renal function and electrolytes closely.  Replace potassium and magnesium      Cellulitis of left lower extremity  Assessment & Plan  Patient noted to have bilateral lower extremity cellulitis due to excessive edema and some blistering noted.  Will get wound cultures and MRSA nasal swab.  Received a dose of vancomycin in the ED already will continue on IV Ancef for now as podiatry for evaluation for some wounds in between his toes and dry scaly skin on both feet  Placed on Lac-Hydrin.  Patient definitely has venous stasis leading to a lot of skin changes and concerns for fluid overload on top of that due to CHF leading to blistering of the skin.  Procalcitonin level is negative he has mild leukocytosis.   Was placed on IV Ancef however today noticed to have worsening drainage and sloughing between his left foot first and second toe.  Discussed with podiatry will get MRI of the foot to rule out underlying osteomyelitis and switched him from Ancef to cefepime.  Venous Duplex bilateral lower extremities negative for DVT patient is having a lot of cramping pain in his legs will also get arterial studies  done    Acute urinary retention  Assessment & Plan  His postvoid residual was over 500 x2 with 2 straight cath so far . If he Continues to have high residuals again will require Mendes catheter  Cont flomax but  increased to 0.4 mg twice daily    Tobacco abuse  Assessment & Plan  Counselled on cessation and placed on nicotine replacement therapy    Type 2 diabetes mellitus, without long-term current use of insulin (HCC)  Assessment & Plan  Lab Results   Component Value Date    HGBA1C 7.2 (H) 05/01/2024       Recent Labs     05/01/24  1745 05/01/24  2049 05/02/24  0724 05/02/24  1101   POCGLU 111 147* 133 142*         Blood Sugar Average: Last 72 hrs:  (P) 133.25Patient placed on insulin sliding scale and held oral agents for now  Blood sugars are well-controlled                 VTE Pharmacologic Prophylaxis:   Moderate Risk (Score 3-4) - Pharmacological DVT Prophylaxis Ordered: enoxaparin (Lovenox).    Mobility:   Basic Mobility Inpatient Raw Score: 19  JH-HLM Goal: 6: Walk 10 steps or more  JH-HLM Achieved: 3: Sit at edge of bed  JH-HLM Goal achieved. Continue to encourage appropriate mobility.    Patient Centered Rounds: I performed bedside rounds with nursing staff today.   Discussions with Specialists or Other Care Team Provider: podiatry    Education and Discussions with Family / Patient: will update    Total Time Spent on Date of Encounter in care of patient: 45 mins. This time was spent on one or more of the following: performing physical exam; counseling and coordination of care; obtaining or reviewing history; documenting in the medical record; reviewing/ordering tests, medications or procedures; communicating with other healthcare professionals and discussing with patient's family/caregivers.    Current Length of Stay: 2 day(s)  Current Patient Status: Inpatient   Certification Statement: The patient will continue to require additional inpatient hospital stay due to HF and bilateral lower extremity  cellulitis  Discharge Plan: Anticipate discharge in 24-48 hrs to home with home services.    Code Status: Level 1 - Full Code    Subjective:   Patient complains of pain and cramping in both of his legs.  Noted to have quite a bit of redness and swelling of both legs some increased purulence and drainage noted between his left first and second toe    Objective:     Vitals:   Temp (24hrs), Av.8 °F (36.6 °C), Min:97.5 °F (36.4 °C), Max:98.1 °F (36.7 °C)    Temp:  [97.5 °F (36.4 °C)-98.1 °F (36.7 °C)] 97.7 °F (36.5 °C)  HR:  [83-94] 87  Resp:  [18-20] 18  BP: (106-131)/(63-75) 118/73  SpO2:  [90 %-95 %] 93 %  Body mass index is 32.59 kg/m².     Input and Output Summary (last 24 hours):     Intake/Output Summary (Last 24 hours) at 5/3/2024 1230  Last data filed at 5/3/2024 1001  Gross per 24 hour   Intake 660 ml   Output 2900 ml   Net -2240 ml       Physical Exam:   Physical Exam  Vitals and nursing note reviewed.   Constitutional:       Appearance: He is ill-appearing.   HENT:      Head: Normocephalic and atraumatic.      Right Ear: External ear normal.      Left Ear: External ear normal.      Nose: Nose normal.      Mouth/Throat:      Pharynx: Oropharynx is clear.   Eyes:      Pupils: Pupils are equal, round, and reactive to light.   Cardiovascular:      Rate and Rhythm: Normal rate and regular rhythm.      Heart sounds: Normal heart sounds.   Pulmonary:      Effort: Pulmonary effort is normal.      Breath sounds: Normal breath sounds.   Abdominal:      General: Bowel sounds are normal.      Palpations: Abdomen is soft.      Tenderness: There is no abdominal tenderness.   Musculoskeletal:         General: Normal range of motion.      Cervical back: Normal range of motion and neck supple.      Right lower leg: Edema present.      Left lower leg: Edema present.      Comments: Wound noted between the left first and second toe with some purulence noted foul odor   Skin:     General: Skin is warm and dry.      Capillary  Refill: Capillary refill takes less than 2 seconds.      Findings: Erythema present.   Neurological:      General: No focal deficit present.      Mental Status: He is alert and oriented to person, place, and time.   Psychiatric:         Mood and Affect: Mood normal.            Additional Data:     Labs:  Results from last 7 days   Lab Units 05/02/24  0503 05/01/24  1623   WBC Thousand/uL 10.44* 10.69*   HEMOGLOBIN g/dL 15.5 16.4   HEMATOCRIT % 46.3 49.2   PLATELETS Thousands/uL 217 238   SEGS PCT %  --  69   LYMPHO PCT %  --  16   MONO PCT %  --  10   EOS PCT %  --  4     Results from last 7 days   Lab Units 05/03/24  0457 05/02/24  0503 05/01/24  1623   SODIUM mmol/L 137   < > 139   POTASSIUM mmol/L 3.7   < > 3.1*   CHLORIDE mmol/L 103   < > 98   CO2 mmol/L 27   < > 28   BUN mg/dL 16   < > 15   CREATININE mg/dL 0.85   < > 1.05   ANION GAP mmol/L 7   < > 13   CALCIUM mg/dL 8.6   < > 9.5   ALBUMIN g/dL  --   --  4.3   TOTAL BILIRUBIN mg/dL  --   --  0.50   ALK PHOS U/L  --   --  66   ALT U/L  --   --  33   AST U/L  --   --  21   GLUCOSE RANDOM mg/dL 123   < > 111    < > = values in this interval not displayed.         Results from last 7 days   Lab Units 05/03/24  1103 05/03/24  0737 05/02/24  2107 05/02/24  1555 05/02/24  1101 05/02/24  0724 05/01/24  2049 05/01/24  1745   POC GLUCOSE mg/dl 156* 128 172* 132 142* 133 147* 111     Results from last 7 days   Lab Units 05/01/24  1623   HEMOGLOBIN A1C % 7.2*     Results from last 7 days   Lab Units 05/02/24  0503 05/01/24  1950 05/01/24  1623   LACTIC ACID mmol/L  --  2.6* 3.4*   PROCALCITONIN ng/ml 0.06  --   --        Lines/Drains:  Invasive Devices       Peripheral Intravenous Line  Duration             Peripheral IV 05/03/24 Dorsal (posterior);Right Hand <1 day                          Imaging: Reviewed radiology reports from this admission including: ECHO venous Doppler's    Recent Cultures (last 7 days):   Results from last 7 days   Lab Units 05/02/24  2607  05/01/24  1623   BLOOD CULTURE   --  No Growth at 24 hrs.  No Growth at 24 hrs.   GRAM STAIN RESULT  No polys seen*  2+ Gram positive cocci in pairs*  1+ Gram negative rods*  No Polys or Bacteria seen  --    WOUND CULTURE  Culture too young- will reincubate  Culture too young- will reincubate  --        Last 24 Hours Medication List:   Current Facility-Administered Medications   Medication Dose Route Frequency Provider Last Rate    acetaminophen  650 mg Oral Q4H PRN Jaqueline Ferreira MD      albuterol  2 puff Inhalation Q6H PRN Jaqueline Ferreira MD      ammonium lactate   Topical BID PRN Jaqueline Ferreira MD      atorvastatin  40 mg Oral Daily Jaqueline Ferreira MD      calcium carbonate  1,000 mg Oral Daily PRN Jaqueline Ferreira MD      cefepime  2,000 mg Intravenous Q12H Jaqueline Ferreira MD      enoxaparin  40 mg Subcutaneous Daily Jaqueline Ferreira MD      fentaNYL  50 mcg Intravenous Q6H PRN Jaqueline Ferreira MD      finasteride  5 mg Oral Daily Jaqueline Ferreira MD      Fluticasone Furoate-Vilanterol  1 puff Inhalation Daily Jaqueline Ferreira MD      And    umeclidinium  1 puff Inhalation Daily Jaqueline Ferreira MD      furosemide  40 mg Intravenous BID Jaqueline Ferreira MD      gabapentin  100 mg Oral BID Jaqueline Ferreira MD      insulin lispro  1-6 Units Subcutaneous HS Jaqueline Ferreira MD      insulin lispro  2-12 Units Subcutaneous TID AC Jaqueline Ferreira MD      nicotine  1 patch Transdermal Daily Jaqueline Ferreira MD      nystatin   Topical BID Jaqueline Ferreira MD      oxyCODONE-acetaminophen  1 tablet Oral Q4H PRN Kvng Pérez MD      oxyCODONE-acetaminophen  2 tablet Oral Q4H PRN Jaqueline Ferreira MD      tamsulosin  0.4 mg Oral BID Jaqueline Ferreira MD          Today, Patient Was Seen By: Jaqueline Ferreira MD    **Please Note: This note may have been constructed using a voice recognition system.**

## 2024-05-03 NOTE — PLAN OF CARE

## 2024-05-03 NOTE — OCCUPATIONAL THERAPY NOTE
Occupational Therapy Cancel Note    Patient Name: Peter Busch  Today's Date: 5/3/2024         05/03/24 5654   Note Type   Note type Evaluation;Cancelled Session   Cancel Reasons Medical status       OT order received, chart review completed. Pt admitted to [facility] on 5/1 w/ Dx: Acute on chronic diastolic CHF. Pt is currently pending MRI of foot to r/o osteomyelitis. Will hold OT this date until MRI has resulted/pending podiatry recommendations, and will address pt as medically appropriate and available.       James Bustamante MS, OTR/L

## 2024-05-03 NOTE — PLAN OF CARE
Problem: PAIN - ADULT  Goal: Verbalizes/displays adequate comfort level or baseline comfort level  Description: Interventions:  - Encourage patient to monitor pain and request assistance  - Assess pain using appropriate pain scale  - Administer analgesics based on type and severity of pain and evaluate response  - Implement non-pharmacological measures as appropriate and evaluate response  - Consider cultural and social influences on pain and pain management  - Notify physician/advanced practitioner if interventions unsuccessful or patient reports new pain  Outcome: Progressing     Problem: INFECTION - ADULT  Goal: Absence or prevention of progression during hospitalization  Description: INTERVENTIONS:  - Assess and monitor for signs and symptoms of infection  - Monitor lab/diagnostic results  - Monitor all insertion sites, i.e. indwelling lines, tubes, and drains  - Monitor endotracheal if appropriate and nasal secretions for changes in amount and color  - [place] appropriate cooling/warming therapies per order  - Administer medications as ordered  - Instruct and encourage patient and family to use good hand hygiene technique  - Identify and instruct in appropriate isolation precautions for identified infection/condition  Outcome: Progressing

## 2024-05-03 NOTE — PHYSICAL THERAPY NOTE
PHYSICAL THERAPY NOTE      Patient Name: Peter Busch  Today's Date: 5/3/2024       05/03/24 1600   Note Type   Note type Cancelled Session;Evaluation   Cancel Reasons Medical status   Additional Comments pending MRI of L foot       Received order for PT consult. Chart reviewed. At this time, PT session cancelled due to pending status of L foot MRI. Will follow and see patient as medically appropriate at a later time.       Balbir Kulkarni, PT

## 2024-05-04 LAB
ANION GAP SERPL CALCULATED.3IONS-SCNC: 5 MMOL/L (ref 4–13)
BUN SERPL-MCNC: 16 MG/DL (ref 5–25)
CALCIUM SERPL-MCNC: 8.9 MG/DL (ref 8.4–10.2)
CHLORIDE SERPL-SCNC: 100 MMOL/L (ref 96–108)
CO2 SERPL-SCNC: 31 MMOL/L (ref 21–32)
CREAT SERPL-MCNC: 0.79 MG/DL (ref 0.6–1.3)
CRP SERPL QL: 49.7 MG/L
ERYTHROCYTE [DISTWIDTH] IN BLOOD BY AUTOMATED COUNT: 12.9 % (ref 11.6–15.1)
GFR SERPL CREATININE-BSD FRML MDRD: 91 ML/MIN/1.73SQ M
GLUCOSE SERPL-MCNC: 127 MG/DL (ref 65–140)
GLUCOSE SERPL-MCNC: 136 MG/DL (ref 65–140)
GLUCOSE SERPL-MCNC: 138 MG/DL (ref 65–140)
GLUCOSE SERPL-MCNC: 168 MG/DL (ref 65–140)
GLUCOSE SERPL-MCNC: 174 MG/DL (ref 65–140)
HCT VFR BLD AUTO: 47.3 % (ref 36.5–49.3)
HGB BLD-MCNC: 15.4 G/DL (ref 12–17)
MCH RBC QN AUTO: 30.6 PG (ref 26.8–34.3)
MCHC RBC AUTO-ENTMCNC: 32.6 G/DL (ref 31.4–37.4)
MCV RBC AUTO: 94 FL (ref 82–98)
PLATELET # BLD AUTO: 203 THOUSANDS/UL (ref 149–390)
PMV BLD AUTO: 9.1 FL (ref 8.9–12.7)
POTASSIUM SERPL-SCNC: 3.4 MMOL/L (ref 3.5–5.3)
RBC # BLD AUTO: 5.04 MILLION/UL (ref 3.88–5.62)
SODIUM SERPL-SCNC: 136 MMOL/L (ref 135–147)
URATE SERPL-MCNC: 7.6 MG/DL (ref 3.5–8.5)
WBC # BLD AUTO: 7.59 THOUSAND/UL (ref 4.31–10.16)

## 2024-05-04 PROCEDURE — 82948 REAGENT STRIP/BLOOD GLUCOSE: CPT

## 2024-05-04 PROCEDURE — 80048 BASIC METABOLIC PNL TOTAL CA: CPT | Performed by: FAMILY MEDICINE

## 2024-05-04 PROCEDURE — 86140 C-REACTIVE PROTEIN: CPT | Performed by: FAMILY MEDICINE

## 2024-05-04 PROCEDURE — 99232 SBSQ HOSP IP/OBS MODERATE 35: CPT | Performed by: FAMILY MEDICINE

## 2024-05-04 PROCEDURE — 85027 COMPLETE CBC AUTOMATED: CPT | Performed by: FAMILY MEDICINE

## 2024-05-04 PROCEDURE — 84550 ASSAY OF BLOOD/URIC ACID: CPT | Performed by: FAMILY MEDICINE

## 2024-05-04 PROCEDURE — 97167 OT EVAL HIGH COMPLEX 60 MIN: CPT

## 2024-05-04 RX ORDER — TORSEMIDE 20 MG/1
20 TABLET ORAL 2 TIMES DAILY
Status: DISCONTINUED | OUTPATIENT
Start: 2024-05-04 | End: 2024-05-06 | Stop reason: HOSPADM

## 2024-05-04 RX ORDER — ROPINIROLE 0.25 MG/1
0.5 TABLET, FILM COATED ORAL 3 TIMES DAILY
Status: DISCONTINUED | OUTPATIENT
Start: 2024-05-04 | End: 2024-05-04

## 2024-05-04 RX ORDER — ROPINIROLE 0.25 MG/1
0.25 TABLET, FILM COATED ORAL 3 TIMES DAILY
Status: DISCONTINUED | OUTPATIENT
Start: 2024-05-04 | End: 2024-05-05

## 2024-05-04 RX ORDER — GABAPENTIN 100 MG/1
200 CAPSULE ORAL 3 TIMES DAILY
Status: DISCONTINUED | OUTPATIENT
Start: 2024-05-04 | End: 2024-05-05

## 2024-05-04 RX ORDER — POTASSIUM CHLORIDE 20 MEQ/1
40 TABLET, EXTENDED RELEASE ORAL ONCE
Status: DISCONTINUED | OUTPATIENT
Start: 2024-05-04 | End: 2024-05-06 | Stop reason: HOSPADM

## 2024-05-04 RX ADMIN — OXYCODONE HYDROCHLORIDE AND ACETAMINOPHEN 1 TABLET: 5; 325 TABLET ORAL at 18:19

## 2024-05-04 RX ADMIN — ROPINIROLE 0.25 MG: 0.25 TABLET, FILM COATED ORAL at 17:39

## 2024-05-04 RX ADMIN — GABAPENTIN 200 MG: 100 CAPSULE ORAL at 21:23

## 2024-05-04 RX ADMIN — CEFEPIME HYDROCHLORIDE 2000 MG: 2 INJECTION, SOLUTION INTRAVENOUS at 03:13

## 2024-05-04 RX ADMIN — FLUTICASONE FUROATE AND VILANTEROL TRIFENATATE 1 PUFF: 100; 25 POWDER RESPIRATORY (INHALATION) at 09:09

## 2024-05-04 RX ADMIN — TORSEMIDE 20 MG: 20 TABLET ORAL at 17:39

## 2024-05-04 RX ADMIN — GABAPENTIN 200 MG: 100 CAPSULE ORAL at 17:39

## 2024-05-04 RX ADMIN — ROPINIROLE 0.25 MG: 0.25 TABLET, FILM COATED ORAL at 21:23

## 2024-05-04 RX ADMIN — TAMSULOSIN HYDROCHLORIDE 0.4 MG: 0.4 CAPSULE ORAL at 17:39

## 2024-05-04 RX ADMIN — FUROSEMIDE 40 MG: 10 INJECTION, SOLUTION INTRAMUSCULAR; INTRAVENOUS at 09:00

## 2024-05-04 RX ADMIN — ATORVASTATIN CALCIUM 40 MG: 40 TABLET, FILM COATED ORAL at 09:05

## 2024-05-04 RX ADMIN — FENTANYL CITRATE 50 MCG: 50 INJECTION INTRAMUSCULAR; INTRAVENOUS at 21:23

## 2024-05-04 RX ADMIN — FENTANYL CITRATE 50 MCG: 50 INJECTION INTRAMUSCULAR; INTRAVENOUS at 15:46

## 2024-05-04 RX ADMIN — CEFEPIME HYDROCHLORIDE 2000 MG: 2 INJECTION, SOLUTION INTRAVENOUS at 15:46

## 2024-05-04 RX ADMIN — UMECLIDINIUM 1 PUFF: 62.5 AEROSOL, POWDER ORAL at 09:09

## 2024-05-04 RX ADMIN — OXYCODONE HYDROCHLORIDE AND ACETAMINOPHEN 1 TABLET: 5; 325 TABLET ORAL at 23:54

## 2024-05-04 RX ADMIN — INSULIN LISPRO 2 UNITS: 100 INJECTION, SOLUTION INTRAVENOUS; SUBCUTANEOUS at 17:37

## 2024-05-04 RX ADMIN — ENOXAPARIN SODIUM 40 MG: 40 INJECTION SUBCUTANEOUS at 09:05

## 2024-05-04 RX ADMIN — INSULIN LISPRO 2 UNITS: 100 INJECTION, SOLUTION INTRAVENOUS; SUBCUTANEOUS at 11:54

## 2024-05-04 RX ADMIN — TAMSULOSIN HYDROCHLORIDE 0.4 MG: 0.4 CAPSULE ORAL at 09:05

## 2024-05-04 RX ADMIN — FINASTERIDE 5 MG: 5 TABLET, FILM COATED ORAL at 09:05

## 2024-05-04 RX ADMIN — FENTANYL CITRATE 50 MCG: 50 INJECTION INTRAMUSCULAR; INTRAVENOUS at 08:59

## 2024-05-04 RX ADMIN — GABAPENTIN 100 MG: 100 CAPSULE ORAL at 09:05

## 2024-05-04 RX ADMIN — OXYCODONE HYDROCHLORIDE AND ACETAMINOPHEN 2 TABLET: 5; 325 TABLET ORAL at 03:12

## 2024-05-04 NOTE — PLAN OF CARE

## 2024-05-04 NOTE — PLAN OF CARE
Problem: PAIN - ADULT  Goal: Verbalizes/displays adequate comfort level or baseline comfort level  Description: Interventions:  - Encourage patient to monitor pain and request assistance  - Assess pain using appropriate pain scale  - Administer analgesics based on type and severity of pain and evaluate response  - Implement non-pharmacological measures as appropriate and evaluate response  - Consider cultural and social influences on pain and pain management  - Notify physician/advanced practitioner if interventions unsuccessful or patient reports new pain  Outcome: Progressing     Problem: INFECTION - ADULT  Goal: Absence or prevention of progression during hospitalization  Description: INTERVENTIONS:  - Assess and monitor for signs and symptoms of infection  - Monitor lab/diagnostic results  - Monitor all insertion sites, i.e. indwelling lines, tubes, and drains  - Monitor endotracheal if appropriate and nasal secretions for changes in amount and color  - [place] appropriate cooling/warming therapies per order  - Administer medications as ordered  - Instruct and encourage patient and family to use good hand hygiene technique  - Identify and instruct in appropriate isolation precautions for identified infection/condition  Outcome: Progressing     Problem: Knowledge Deficit  Goal: Patient/family/caregiver demonstrates understanding of disease process, treatment plan, medications, and discharge instructions  Description: Complete learning assessment and assess knowledge base.  Interventions:  - Provide teaching at level of understanding  - Provide teaching via preferred learning methods  Outcome: Progressing     Problem: DISCHARGE PLANNING  Goal: Discharge to home or other facility with appropriate resources  Description: INTERVENTIONS:  - Identify barriers to discharge w/patient and caregiver  - Arrange for needed discharge resources and transportation as appropriate  - Identify discharge learning needs (meds,  wound care, etc.)  - Arrange for interpretive services to assist at discharge as needed  - Refer to Case Management Department for coordinating discharge planning if the patient needs post-hospital services based on physician/advanced practitioner order or complex needs related to functional status, cognitive ability, or social support system  Outcome: Progressing

## 2024-05-04 NOTE — PROGRESS NOTES
[facility]  Progress Note  Name: Peter Busch I  MRN: [id number]  Unit/Bed#: [facility] I Date of Admission: 5/1/2024   Date of Service: 5/4/2024 I Hospital Day: 3    Assessment/Plan   * Acute on chronic diastolic (congestive) heart failure (HCC)  Assessment & Plan  Wt Readings from Last 3 Encounters:   05/04/24 111 kg (245 lb)   05/22/23 119 kg (262 lb)   04/26/23 119 kg (263 lb)     he has known history of chronic diastolic CHF.  Repeat echo ordered 5/2/24 EF of 70% with grade 1 diastolic dysfunction.  Was taking Lasix 40 mg oral daily at home.  Trouble with leg swelling and blistering on his legs due to edema.  Initially received gentle hydration as has lactic acidosis followed by Lasix 40 mg IV twice daily and monitor response  diuresing very well . stop IV Lasix and switch to oral torsemide 20 mg twice daily and correct electrolytes      Cellulitis of left lower extremity  Assessment & Plan  Patient noted to have bilateral lower extremity cellulitis due to excessive edema and some blistering noted.  wound cultures gram neg brody and neg MRSA nasal swab.  Received a dose of vancomycin in the ED already will continue on IV Ancef for now as podiatry for evaluation for some wounds in between his toes and dry scaly skin on both feet  Patient definitely has venous stasis leading to a lot of skin changes and concerns for fluid overload on top of that due to CHF leading to blistering of the skin.  Procalcitonin level is negative he has mild leukocytosis.   Was placed on IV Ancef however today noticed to have worsening drainage and sloughing between his left foot first and second toe.  Discussed with podiatry will get MRI of the foot . It is neg for osteomyelitis and switched him from Ancef to cefepime.  Venous Duplex bilateral lower extremities negative for DVT .arterial circulation is triphasic  Continues to have a lot of neuropathic pain will increase Neurontin to 200 mg 3 times daily and  added Requip correct electrolytes and monitor for now  Will check uric acid level and CRP    Acute urinary retention  Assessment & Plan  His postvoid residual was over 500 x2 with 2 straight cath so far . If he Continues to have high residuals again will require Mendes catheter  Cont flomax but  increased to 0.4 mg twice daily    Tobacco abuse  Assessment & Plan  Counselled on cessation and placed on nicotine replacement therapy    Type 2 diabetes mellitus, without long-term current use of insulin (HCC)  Assessment & Plan  Lab Results   Component Value Date    HGBA1C 7.2 (H) 05/01/2024       Recent Labs     05/03/24  1708 05/03/24  2057 05/04/24  0750 05/04/24  1140   POCGLU 146* 149* 136 174*         Blood Sugar Average: Last 72 hrs:  (P) [id number] placed on insulin sliding scale and held oral agents for now  Blood sugars are well-controlled           VTE Pharmacologic Prophylaxis:   Moderate Risk (Score 3-4) - Pharmacological DVT Prophylaxis Ordered: enoxaparin (Lovenox).    Mobility:   Basic Mobility Inpatient Raw Score: 19  JH-HLM Goal: 6: Walk 10 steps or more  JH-HLM Achieved: 6: Walk 10 steps or more  JH-HLM Goal achieved. Continue to encourage appropriate mobility.    Patient Centered Rounds: I performed bedside rounds with nursing staff today.   Discussions with Specialists or Other Care Team Provider: none    Education and Discussions with Family / Patient:     Total Time Spent on Date of Encounter in care of patient: 35 mins. This time was spent on one or more of the following: performing physical exam; counseling and coordination of care; obtaining or reviewing history; documenting in the medical record; reviewing/ordering tests, medications or procedures; communicating with other healthcare professionals and discussing with patient's family/caregivers.    Current Length of Stay: 3 day(s)  Current Patient Status: Inpatient   Certification Statement: The patient will continue to require  additional inpatient hospital stay due to cellulitis  Discharge Plan: Anticipate discharge in 24-48 hrs to home with home services.    Code Status: Level 1 - Full Code    Subjective:   Continues to complain of a lot of pain in his legs and difficulty standing and walking and redness.    Objective:     Vitals:   Temp (24hrs), Av.9 °F (36.6 °C), Min:97.8 °F (36.6 °C), Max:97.9 °F (36.6 °C)    Temp:  [97.8 °F (36.6 °C)-97.9 °F (36.6 °C)] 97.8 °F (36.6 °C)  HR:  [70-88] 70  Resp:  [18-20] 18  BP: (109-111)/(75-77) 111/77  SpO2:  [86 %-88 %] 86 %  Body mass index is 32.32 kg/m².     Input and Output Summary (last 24 hours):     Intake/Output Summary (Last 24 hours) at 2024 1232  Last data filed at 2024 1157  Gross per 24 hour   Intake 240 ml   Output 2775 ml   Net -2535 ml       Physical Exam:   Physical Exam  Vitals and nursing note reviewed.   Constitutional:       Appearance: Normal appearance.   HENT:      Head: Normocephalic and atraumatic.      Right Ear: External ear normal.      Left Ear: External ear normal.      Nose: Nose normal.      Mouth/Throat:      Pharynx: Oropharynx is clear.   Cardiovascular:      Rate and Rhythm: Normal rate and regular rhythm.      Heart sounds: Normal heart sounds.   Pulmonary:      Effort: Pulmonary effort is normal.      Breath sounds: Normal breath sounds.   Abdominal:      General: Bowel sounds are normal.      Palpations: Abdomen is soft.      Tenderness: There is no abdominal tenderness.   Musculoskeletal:         General: Tenderness present. Normal range of motion.      Cervical back: Normal range of motion and neck supple.   Skin:     General: Skin is warm and dry.      Capillary Refill: Capillary refill takes less than 2 seconds.      Findings: Erythema present.   Neurological:      General: No focal deficit present.      Mental Status: He is alert and oriented to person, place, and time.   Psychiatric:         Mood and Affect: Mood normal.            Additional  Data:     Labs:  Results from last 7 days   Lab Units 05/04/24  0514 05/02/24  0503 05/01/24  1623   WBC Thousand/uL 7.59   < > 10.69*   HEMOGLOBIN g/dL 15.4   < > 16.4   HEMATOCRIT % 47.3   < > 49.2   PLATELETS Thousands/uL 203   < > 238   SEGS PCT %  --   --  69   LYMPHO PCT %  --   --  16   MONO PCT %  --   --  10   EOS PCT %  --   --  4    < > = values in this interval not displayed.     Results from last 7 days   Lab Units 05/04/24  0514 05/02/24  0503 05/01/24  1623   SODIUM mmol/L 136   < > 139   POTASSIUM mmol/L 3.4*   < > 3.1*   CHLORIDE mmol/L 100   < > 98   CO2 mmol/L 31   < > 28   BUN mg/dL 16   < > 15   CREATININE mg/dL 0.79   < > 1.05   ANION GAP mmol/L 5   < > 13   CALCIUM mg/dL 8.9   < > 9.5   ALBUMIN g/dL  --   --  4.3   TOTAL BILIRUBIN mg/dL  --   --  0.50   ALK PHOS U/L  --   --  66   ALT U/L  --   --  33   AST U/L  --   --  21   GLUCOSE RANDOM mg/dL 127   < > 111    < > = values in this interval not displayed.         Results from last 7 days   Lab Units 05/04/24  1140 05/04/24  0750 05/03/24 2057 05/03/24  1708 05/03/24  1103 05/03/24  0737 05/02/24  2107 05/02/24  1555 05/02/24  1101 05/02/24  0724 05/01/24  2049 05/01/24  1745   POC GLUCOSE mg/dl 174* 136 149* 146* 156* 128 172* 132 142* 133 147* 111     Results from last 7 days   Lab Units 05/01/24  1623   HEMOGLOBIN A1C % 7.2*     Results from last 7 days   Lab Units 05/02/24  0503 05/01/24  1950 05/01/24  1623   LACTIC ACID mmol/L  --  2.6* 3.4*   PROCALCITONIN ng/ml 0.06  --   --        Lines/Drains:  Invasive Devices       Peripheral Intravenous Line  Duration             Peripheral IV 05/03/24 Distal;Dorsal (posterior);Left Forearm 1 day                          Imaging: Reviewed radiology reports from this admission including: mri    Recent Cultures (last 7 days):   Results from last 7 days   Lab Units 05/02/24  1423 05/01/24  1623   BLOOD CULTURE   --  No Growth at 48 hrs.  No Growth at 48 hrs.   GRAM STAIN RESULT  No polys seen*   2+ Gram positive cocci in pairs*  1+ Gram negative rods*  No Polys or Bacteria seen  --    WOUND CULTURE  4+ Growth of Oxidase Positive gram negative brody*  3+ Growth of  3+ Growth of Non lactose fermenting gram negative brody*  1+ Growth of  --        Last 24 Hours Medication List:   Current Facility-Administered Medications   Medication Dose Route Frequency Provider Last Rate    acetaminophen  650 mg Oral Q4H PRN Jaqueline Ferreira MD      albuterol  2 puff Inhalation Q6H PRN Jaqueline Ferreira MD      atorvastatin  40 mg Oral Daily Jaqueline Ferreira MD      calcium carbonate  1,000 mg Oral Daily PRN Jaqueline Ferreira MD      cefepime  2,000 mg Intravenous Q12H Jaqueline Ferreira MD 2,000 mg (05/04/24 0313)    enoxaparin  40 mg Subcutaneous Daily Jaqueline Ferreira MD      fentaNYL  50 mcg Intravenous Q6H PRN Jaqueline Ferreira MD      finasteride  5 mg Oral Daily Jaqueline Ferreira MD      Fluticasone Furoate-Vilanterol  1 puff Inhalation Daily Jaqueline Ferreira MD      And    umeclidinium  1 puff Inhalation Daily Jaqueline Ferreira MD      gabapentin  200 mg Oral TID Jaqueline Ferreira MD      insulin lispro  1-6 Units Subcutaneous HS Jaqueline Ferreira MD      insulin lispro  2-12 Units Subcutaneous TID AC Jaqueline Ferreira MD      nicotine  1 patch Transdermal Daily Jaqueline Ferreira MD      nystatin   Topical BID Jaqueline Ferreira MD      ondansetron  4 mg Intravenous Q6H PRN Jaqueline Ferreira MD      oxyCODONE-acetaminophen  1 tablet Oral Q4H PRN Kvng Pérez MD      oxyCODONE-acetaminophen  2 tablet Oral Q4H PRN Jaqueline Ferreira MD      potassium chloride  40 mEq Oral Once Jaqueline Ferreira MD      rOPINIRole  0.25 mg Oral TID Jaqueline Ferreira MD      tamsulosin  0.4 mg Oral BID Jaqueline Ferreira MD      torsemide  20 mg Oral BID Jaqueline Ferreira MD          Today, Patient Was Seen By: Jaqueline Ferreira MD    **Please Note: This note may have been constructed using a voice recognition system.**

## 2024-05-04 NOTE — ASSESSMENT & PLAN NOTE
His postvoid residual was over 500 x2 with 2 straight cath so far . If he Continues to have high residuals again will require Mendes catheter  Cont flomax but  increased to 0.4 mg twice daily

## 2024-05-04 NOTE — NURSING NOTE
"Pt refuses straight cath or watson catheter placement for . Pt continues to void without difficulty, states he has no discomfort, and that he \"will have large amounts with every scan and that is normal for me\".  Pt educated by this RN. Dr. Cabrera notified.   "

## 2024-05-04 NOTE — OCCUPATIONAL THERAPY NOTE
Occupational Therapy Evaluation     Patient Name: Peter Busch  Today's Date: 5/4/2024  Problem List  Principal Problem:    Acute on chronic diastolic (congestive) heart failure (HCC)  Active Problems:    Type 2 diabetes mellitus, without long-term current use of insulin (HCC)    Tobacco abuse    Cellulitis of left lower extremity    Acute urinary retention    Past Medical History  Past Medical History:   Diagnosis Date    Bladder cancer (HCC)     BPH (benign prostatic hyperplasia)     COPD (chronic obstructive pulmonary disease) (HCC)     Diabetes mellitus (HCC)     Hyperlipidemia     Lung cancer (HCC)     Rib fractures      Past Surgical History  Past Surgical History:   Procedure Laterality Date    BRONCHOSCOPY      CATARACT EXTRACTION, BILATERAL      CYSTOSCOPY      TONSILLECTOMY      TRANSURETHRAL RESECTION OF PROSTATE           05/04/24 3944   Note Type   Note type Evaluation   Pain Assessment   Pain Assessment Tool 0-10   Pain Score 6   Pain Location/Orientation Orientation: Bilateral;Location: Leg   Restrictions/Precautions   Other Precautions Chair Alarm;Bed Alarm;Fall Risk;Pain;O2  (2L O2)   Home Living   Type of Home House  (3 RIOS no railing)   Home Layout Multi-level;1/2 bath on main level  (FF stairs to 2nd floor with full bath and bedroom)   Bathroom Shower/Tub Tub/shower unit   Bathroom Toilet Standard   Bathroom Equipment   (None)   Home Equipment Walker;Cane  (Did not use PTA)   Additional Comments Pt reporting living with significant other in 3 story home with 3 RIOS and FF stairs to 2nd floor of home with full bathroom and bed. Significant other works 2-3 days/wk but daughter comes to stay with patient when significant other is not there.   Prior Function   Level of [place] Independent with ADLs;Needs assistance with IADLS;Independent with functional mobility   Lives With Significant other   Receives Help From Family   IADLs Family/Friend/Other provides transportation;Family/Friend/Other  provides meals;Family/Friend/Other provides medication management   Falls in the last 6 months 0   Comments Pt reporting IND with ADLs, functional transfers and mobility without AD. Pt's significant other completes majority of IADLs.   ADL   LB Dressing Assistance 2  Maximal Assistance   LB Dressing Deficit Setup;Supervision/safety;Increased time to complete;Don/doff R sock;Don/doff L sock   Additional Comments Pt requiring maxA to don socks while sitting EOB. Pt reporting significant other dons at home due to BLE wraps. Based on functional abilities assessed, pt demo ability to complete UB ADLs with setup/SUP and LB ADLs with SBA-Lg.   Bed Mobility   Supine to Sit 5  Supervision   Additional items Verbal cues   Additional Comments Pt completed supine to sit bed mobility with HOB elevated to ~30 deg and use of bed rails with SUP.   Transfers   Sit to Stand   (SBA)   Additional items Assist x 1;Increased time required;Verbal cues   Stand to Sit   (SBA)   Additional items Assist x 1;Increased time required;Verbal cues   Stand pivot   (Steadying assist)   Additional items Assist x 1;Increased time required;Verbal cues   Additional Comments Pt completed all functional transfers without use of AD per patient request. Pt completing STS from EOB with SBA. SPT completed with steadying assist and cuing for safety. Pt impulsive during transfers. Pt encouraged to utilize AD for increased balance and to offload weight on BLEs to decrease pain. Pt completed all functional transfers on RA. SpO2 dropping to 86% without recovery. Pt recover to 88-90% on 2L O2.   Balance   Static Sitting Good   Dynamic Sitting Fair +   Static Standing Fair   Dynamic Standing Fair -   Activity Tolerance   Activity Tolerance Patient limited by fatigue;Patient limited by pain   Nurse Made Aware RN   RUE Assessment   RUE Assessment WFL  (4/5 MMT)   LUE Assessment   LUE Assessment WFL  (4/5 MMT)   Hand Function   Gross Motor Coordination Functional    Fine Motor Coordination Functional   Sensation   Light Touch Partial deficits in the LUE   Cognition   Overall Cognitive Status WFL   Arousal/Participation Alert;Responsive;Cooperative   Attention Attends with cues to redirect   Orientation Level Oriented X4   Memory Within functional limits   Following Commands Follows one step commands with increased time or repetition   Comments Pt impulsive during session requiring cuing for safety.   Assessment   Limitation Decreased ADL status;Decreased Safe judgement during ADL;Decreased endurance;Decreased self-care trans;Decreased high-level ADLs   Prognosis Fair   Assessment Pt is a 68 y.o. male, admitted to [facility] 5/1/2024 d/t experiencing BLE swelling, redness and drainage. Dx: acute on chronic diastolic (congestive) heart failure. Pt with PMHx impacting their performance during ADL tasks, including: bladder ca, BPH, COPD, DM, HLD, lung ca. Prior to admission to the hospital Pt was performing ADLs without physical assistance. IADLs with physical assistance. Functional transfers/ambulation without physical assistance. Cognitive status was PTA was WNL. OT order placed to assess Pt's ADLs, cognitive status, and performance during functional tasks in order to maximize safety and independence while making most appropriate d/c recommendations. Pt's clinical presentation is currently unstable/unpredictable given new onset deficits that effect Pt's occupational performance and ability to safely return to OF including decrease activity tolerance, decrease standing balance, decrease performance during ADL tasks, decrease safety awareness , increased pain, decrease generalized strength, decrease activity engagement, decrease performance during functional transfers, steps to enter home, high fall risk, and limited insight to deficits combined with medical complications of abnormal potassium values, edema/swelling, and decreased skin integrity. Personal factors affecting Pt at time  of initial evaluation include: step(s) to enter environment, multi-level environment, inability to perform ADLs, new need for AD, inability to ambulate household distances, inability to navigate community distances, limited insight into impairments, and tobacco use. Pt will benefit from continued skilled OT services to address deficits as defined above and to maximize level independence/participation during ADLs and functional tasks to facilitate return toward PLOF and improved quality of life. From an occupational therapy standpoint, recommendation at time of d/c would be Level III (Minimum Resource Intensity) therapy.   Plan   Treatment Interventions ADL retraining;Functional transfer training;UE strengthening/ROM;Endurance training;Patient/family training;Energy conservation;Activityengagement   Goal Expiration Date 05/18/24   OT Frequency 2-3x/wk   Discharge Recommendation   Rehab Resource Intensity Level, OT III (Minimum Resource Intensity)   AM-PAC Daily Activity Inpatient   Lower Body Dressing 3   Bathing 3   Toileting 3   Upper Body Dressing 3   Grooming 4   Eating 4   Daily Activity Raw Score 20   Daily Activity Standardized Score (Calc for Raw Score >=11) 42.03   AM-PAC Applied Cognition Inpatient   Following a Speech/Presentation 3   Understanding Ordinary Conversation 4   Taking Medications 2   Remembering Where Things Are Placed or Put Away 4   Remembering List of 4-5 Errands 3   Taking Care of Complicated Tasks 3   Applied Cognition Raw Score 19   Applied Cognition Standardized Score 39.77     The patient's raw score on the AM-PAC Daily Activity inpatient short form is 20, standardized score is 42.03, greater than 39.4. Patients at this level are likely to benefit from DC to home. Please refer to the recommendation of the Occupational Therapist for safe DC planning.    Pt goals to be met by 5/28/2024.    Pt will demonstrate ability to complete grooming/hygiene tasks @ MI after set-up.  Pt will  demonstrate ability to complete supine<>sit @ MI in order to increase safety and independence during ADL tasks.  Pt will demonstrate ability to complete UB ADLs including washing/dressing @ MI in order to increase performance and participation during meaningful tasks  Pt will demonstrate ability to complete LB dressing @ MI in order to increase safety and independence during meaningful tasks.   Pt will demonstrate ability to clarisa/doff socks/shoes while sitting EOB @ MI in order to increase safety and independence during meaningful tasks.   Pt will demonstrate ability to complete toileting tasks including CM and pericare @ MI in order to increase safety and independence during meaningful tasks.  Pt will demonstrate ability to complete EOB, chair, toilet/commode transfers @ MI in order to increase performance and participation during functional tasks.  Pt will demonstrate ability to stand for 8 minutes while maintaining G balance with no use of AD for UB support PRN.  Pt will demonstrate ability to tolerate 30-35 minute OT session with no vc'ing for deep breathing or use of energy conservation techniques in order to increase activity tolerance during functional tasks.   Pt will demonstrate Good carryover of use of energy conservation/compensatory strategies during ADLs and functional tasks in order to increase safety and reduce risk for falls.   Pt will demonstrate Good attention and participation in continued evaluation of functional ambulation house hold distances in order to assist with safe d/c planning.  Pt will identify 3 areas of interest/hobbies and 1 intervention on how to incorporate into daily life in order to increase interaction with environment and peers as well as increase participation in meaningful tasks.   Pt will demonstrate 100% carryover of BUE HEP in order to increase BUE MS and increase performance during functional tasks upon d/c home.    Pily Daniel, OTR/L

## 2024-05-04 NOTE — PLAN OF CARE
Problem: OCCUPATIONAL THERAPY ADULT  Goal: Performs self-care activities at highest level of function for planned discharge setting.  See evaluation for individualized goals.  Description: Treatment Interventions: ADL retraining, Functional transfer training, UE strengthening/ROM, Endurance training, Patient/family training, Energy conservation, Activityengagement          See flowsheet documentation for full assessment, interventions and recommendations.   Note: Limitation: Decreased ADL status, Decreased Safe judgement during ADL, Decreased endurance, Decreased self-care trans, Decreased high-level ADLs  Prognosis: Fair  Assessment: Pt is a 68 y.o. male, admitted to [facility]/1/2024 d/t experiencing BLE swelling, redness and drainage. Dx: acute on chronic diastolic (congestive) heart failure. Pt with PMHx impacting their performance during ADL tasks, including: bladder ca, BPH, COPD, DM, HLD, lung ca. Prior to admission to the hospital Pt was performing ADLs without physical assistance. IADLs with physical assistance. Functional transfers/ambulation without physical assistance. Cognitive status was PTA was WNL. OT order placed to assess Pt's ADLs, cognitive status, and performance during functional tasks in order to maximize safety and independence while making most appropriate d/c recommendations. Pt's clinical presentation is currently unstable/unpredictable given new onset deficits that effect Pt's occupational performance and ability to safely return to OF including decrease activity tolerance, decrease standing balance, decrease performance during ADL tasks, decrease safety awareness , increased pain, decrease generalized strength, decrease activity engagement, decrease performance during functional transfers, steps to enter home, high fall risk, and limited insight to deficits combined with medical complications of abnormal potassium values, edema/swelling, and decreased skin integrity. Personal factors  affecting Pt at time of initial evaluation include: step(s) to enter environment, multi-level environment, inability to perform ADLs, new need for AD, inability to ambulate household distances, inability to navigate community distances, limited insight into impairments, and tobacco use. Pt will benefit from continued skilled OT services to address deficits as defined above and to maximize level independence/participation during ADLs and functional tasks to facilitate return toward PLOF and improved quality of life. From an occupational therapy standpoint, recommendation at time of d/c would be Level III (Minimum Resource Intensity) therapy.     Rehab Resource Intensity Level, OT: III (Minimum Resource Intensity)

## 2024-05-04 NOTE — ASSESSMENT & PLAN NOTE
Wt Readings from Last 3 Encounters:   05/04/24 111 kg (245 lb)   05/22/23 119 kg (262 lb)   04/26/23 119 kg (263 lb)     he has known history of chronic diastolic CHF.  Repeat echo ordered 5/2/24 EF of 70% with grade 1 diastolic dysfunction.  Was taking Lasix 40 mg oral daily at home.  Trouble with leg swelling and blistering on his legs due to edema.  Initially received gentle hydration as has lactic acidosis followed by Lasix 40 mg IV twice daily and monitor response  diuresing very well . stop IV Lasix and switch to oral torsemide 20 mg twice daily and correct electrolytes

## 2024-05-04 NOTE — ASSESSMENT & PLAN NOTE
Patient noted to have bilateral lower extremity cellulitis due to excessive edema and some blistering noted.  wound cultures gram neg brody and neg MRSA nasal swab.  Received a dose of vancomycin in the ED already will continue on IV Ancef for now as podiatry for evaluation for some wounds in between his toes and dry scaly skin on both feet  Patient definitely has venous stasis leading to a lot of skin changes and concerns for fluid overload on top of that due to CHF leading to blistering of the skin.  Procalcitonin level is negative he has mild leukocytosis.   Was placed on IV Ancef however today noticed to have worsening drainage and sloughing between his left foot first and second toe.  Discussed with podiatry will get MRI of the foot . It is neg for osteomyelitis and switched him from Ancef to cefepime.  Venous Duplex bilateral lower extremities negative for DVT .arterial circulation is triphasic  Continues to have a lot of neuropathic pain will increase Neurontin to 200 mg 3 times daily and added Requip correct electrolytes and monitor for now  Will check uric acid level and CRP

## 2024-05-04 NOTE — ASSESSMENT & PLAN NOTE
Lab Results   Component Value Date    HGBA1C 7.2 (H) 05/01/2024       Recent Labs     05/03/24  1708 05/03/24 2057 05/04/24  0750 05/04/24  1140   POCGLU 146* 149* 136 174*         Blood Sugar Average: Last 72 hrs:  (P) [id number] placed on insulin sliding scale and held oral agents for now  Blood sugars are well-controlled

## 2024-05-04 NOTE — CASE MANAGEMENT
Case Management Discharge Planning Note    Patient name Peter Busch  Location /-01 MRN [id number]  : 1955 Date 2024       Current Admission Date: 2024  Current Admission Diagnosis:Acute on chronic diastolic (congestive) heart failure (HCC)   Patient Active Problem List    Diagnosis Date Noted    Acute urinary retention 2024    Acute on chronic diastolic (congestive) heart failure (HCC) 2024    Cellulitis of left lower extremity 2024    Leg swelling 2022    Acute respiratory failure with hypoxia (HCC) 2022    Type 2 diabetes mellitus, without long-term current use of insulin (HCC) 09/15/2021    Tobacco abuse 09/15/2021    COPD with acute exacerbation ([facility]) 09/15/2021    BPH with obstruction/lower urinary tract symptoms 09/15/2021    History of lung cancer 09/15/2021    Acute hypoxemic respiratory failure due to COVID-19 ([facility]) 2021      LOS (days): 3  Geometric Mean LOS (GMLOS) (days): 3.9  Days to GMLOS:1     OBJECTIVE:  Risk of Unplanned Readmission Score: 7.03         Current admission status: Inpatient   Preferred Pharmacy:   [id number] - [address] [address]  Phone: [phone] Fax: [phone]    Primary Care Provider: Regino Brooks DO    Primary Insurance: [facility] [employer]  Secondary Insurance: [place] [employer] to u [facility]    DISCHARGE DETAILS:        CM submitted [facility] referral for HHC based on OT recommendation.  CM to follow for acceptance and review options with patient.

## 2024-05-05 LAB
ANION GAP SERPL CALCULATED.3IONS-SCNC: 5 MMOL/L (ref 4–13)
BACTERIA WND AEROBE CULT: ABNORMAL
BACTERIA WND AEROBE CULT: ABNORMAL
BUN SERPL-MCNC: 17 MG/DL (ref 5–25)
CALCIUM SERPL-MCNC: 9.1 MG/DL (ref 8.4–10.2)
CHLORIDE SERPL-SCNC: 100 MMOL/L (ref 96–108)
CO2 SERPL-SCNC: 35 MMOL/L (ref 21–32)
CREAT SERPL-MCNC: 0.87 MG/DL (ref 0.6–1.3)
GFR SERPL CREATININE-BSD FRML MDRD: 88 ML/MIN/1.73SQ M
GLUCOSE SERPL-MCNC: 131 MG/DL (ref 65–140)
GLUCOSE SERPL-MCNC: 148 MG/DL (ref 65–140)
GLUCOSE SERPL-MCNC: 151 MG/DL (ref 65–140)
GLUCOSE SERPL-MCNC: 156 MG/DL (ref 65–140)
GLUCOSE SERPL-MCNC: 196 MG/DL (ref 65–140)
GRAM STN SPEC: ABNORMAL
POTASSIUM SERPL-SCNC: 3.2 MMOL/L (ref 3.5–5.3)
SODIUM SERPL-SCNC: 140 MMOL/L (ref 135–147)

## 2024-05-05 PROCEDURE — 99232 SBSQ HOSP IP/OBS MODERATE 35: CPT | Performed by: FAMILY MEDICINE

## 2024-05-05 PROCEDURE — 82948 REAGENT STRIP/BLOOD GLUCOSE: CPT

## 2024-05-05 PROCEDURE — 97163 PT EVAL HIGH COMPLEX 45 MIN: CPT

## 2024-05-05 PROCEDURE — 80048 BASIC METABOLIC PNL TOTAL CA: CPT | Performed by: FAMILY MEDICINE

## 2024-05-05 PROCEDURE — 97116 GAIT TRAINING THERAPY: CPT

## 2024-05-05 RX ORDER — AMOXICILLIN AND CLAVULANATE POTASSIUM 875; 125 MG/1; MG/1
1 TABLET, FILM COATED ORAL EVERY 12 HOURS SCHEDULED
Status: DISCONTINUED | OUTPATIENT
Start: 2024-05-05 | End: 2024-05-06

## 2024-05-05 RX ORDER — GABAPENTIN 300 MG/1
300 CAPSULE ORAL 3 TIMES DAILY
Status: DISCONTINUED | OUTPATIENT
Start: 2024-05-05 | End: 2024-05-06 | Stop reason: HOSPADM

## 2024-05-05 RX ORDER — ROPINIROLE 0.25 MG/1
0.5 TABLET, FILM COATED ORAL 3 TIMES DAILY
Status: DISCONTINUED | OUTPATIENT
Start: 2024-05-05 | End: 2024-05-06 | Stop reason: HOSPADM

## 2024-05-05 RX ADMIN — GABAPENTIN 300 MG: 300 CAPSULE ORAL at 17:32

## 2024-05-05 RX ADMIN — INSULIN LISPRO 2 UNITS: 100 INJECTION, SOLUTION INTRAVENOUS; SUBCUTANEOUS at 21:53

## 2024-05-05 RX ADMIN — CEFEPIME HYDROCHLORIDE 2000 MG: 2 INJECTION, SOLUTION INTRAVENOUS at 04:36

## 2024-05-05 RX ADMIN — UMECLIDINIUM 1 PUFF: 62.5 AEROSOL, POWDER ORAL at 08:54

## 2024-05-05 RX ADMIN — ACETAMINOPHEN 325MG 650 MG: 325 TABLET ORAL at 17:35

## 2024-05-05 RX ADMIN — AMOXICILLIN AND CLAVULANATE POTASSIUM 1 TABLET: 875; 125 TABLET, FILM COATED ORAL at 14:19

## 2024-05-05 RX ADMIN — INSULIN LISPRO 2 UNITS: 100 INJECTION, SOLUTION INTRAVENOUS; SUBCUTANEOUS at 08:52

## 2024-05-05 RX ADMIN — NYSTATIN: 100000 POWDER TOPICAL at 17:33

## 2024-05-05 RX ADMIN — AMOXICILLIN AND CLAVULANATE POTASSIUM 1 TABLET: 875; 125 TABLET, FILM COATED ORAL at 21:55

## 2024-05-05 RX ADMIN — GABAPENTIN 300 MG: 300 CAPSULE ORAL at 20:44

## 2024-05-05 RX ADMIN — TORSEMIDE 20 MG: 20 TABLET ORAL at 17:32

## 2024-05-05 RX ADMIN — TORSEMIDE 20 MG: 20 TABLET ORAL at 08:50

## 2024-05-05 RX ADMIN — OXYCODONE HYDROCHLORIDE AND ACETAMINOPHEN 2 TABLET: 5; 325 TABLET ORAL at 20:43

## 2024-05-05 RX ADMIN — ATORVASTATIN CALCIUM 40 MG: 40 TABLET, FILM COATED ORAL at 08:46

## 2024-05-05 RX ADMIN — FLUTICASONE FUROATE AND VILANTEROL TRIFENATATE 1 PUFF: 100; 25 POWDER RESPIRATORY (INHALATION) at 08:54

## 2024-05-05 RX ADMIN — ROPINIROLE 0.5 MG: 0.25 TABLET, FILM COATED ORAL at 20:44

## 2024-05-05 RX ADMIN — OXYCODONE HYDROCHLORIDE AND ACETAMINOPHEN 1 TABLET: 5; 325 TABLET ORAL at 08:50

## 2024-05-05 RX ADMIN — NYSTATIN: 100000 POWDER TOPICAL at 08:54

## 2024-05-05 RX ADMIN — TAMSULOSIN HYDROCHLORIDE 0.4 MG: 0.4 CAPSULE ORAL at 08:49

## 2024-05-05 RX ADMIN — ENOXAPARIN SODIUM 40 MG: 40 INJECTION SUBCUTANEOUS at 08:47

## 2024-05-05 RX ADMIN — ROPINIROLE 0.25 MG: 0.25 TABLET, FILM COATED ORAL at 08:49

## 2024-05-05 RX ADMIN — INSULIN LISPRO 2 UNITS: 100 INJECTION, SOLUTION INTRAVENOUS; SUBCUTANEOUS at 12:26

## 2024-05-05 RX ADMIN — FINASTERIDE 5 MG: 5 TABLET, FILM COATED ORAL at 08:47

## 2024-05-05 RX ADMIN — TAMSULOSIN HYDROCHLORIDE 0.4 MG: 0.4 CAPSULE ORAL at 17:32

## 2024-05-05 RX ADMIN — GABAPENTIN 200 MG: 100 CAPSULE ORAL at 08:48

## 2024-05-05 RX ADMIN — ROPINIROLE 0.5 MG: 0.25 TABLET, FILM COATED ORAL at 17:32

## 2024-05-05 RX ADMIN — OXYCODONE HYDROCHLORIDE AND ACETAMINOPHEN 1 TABLET: 5; 325 TABLET ORAL at 12:26

## 2024-05-05 NOTE — ASSESSMENT & PLAN NOTE
Lab Results   Component Value Date    HGBA1C 7.2 (H) 05/01/2024       Recent Labs     05/04/24  1639 05/04/24  2110 05/05/24  0724 05/05/24  1145   POCGLU 168* 138 156* 151*         Blood Sugar Average: Last 72 hrs:  (P) [id number] placed on insulin sliding scale and held oral agents for now  Blood sugars are well-controlled

## 2024-05-05 NOTE — PLAN OF CARE
Problem: PHYSICAL THERAPY ADULT  Goal: Performs mobility at highest level of function for planned discharge setting.  See evaluation for individualized goals.  Description: Treatment/Interventions: Functional transfer training, LE strengthening/ROM, Elevations, Therapeutic exercise, Endurance training, Patient/family training, Equipment eval/education, Bed mobility, Gait training, Compensatory technique education, Spoke to nursing, OT  Equipment Recommended:  (pt owns RW)       See flowsheet documentation for full assessment, interventions and recommendations.  Note: Prognosis: Good  Problem List: Decreased strength, Decreased endurance, Impaired balance, Decreased mobility, Impaired judgement, Decreased safety awareness, Obesity  Assessment: Pt is a 68 y.o. male seen for PT evaluation s/p admission to [facility] on 5/1/2024 with Acute on chronic diastolic (congestive) heart failure (HCC).  Order placed for PT services.  Upon evaluation: Pt is presenting with impaired functional mobility due to pain, decreased strength, decreased endurance, impaired balance, gait deviations, decreased safety awareness, impaired judgment, and fall risk requiring  supervision assistance for transfers and steadying assistance for transfers and ambulation with SPC . Pt's clinical presentation is currently unstable/unpredictable given the functional mobility deficits above coupled with fall risks as indicated by AM-PAC 6-Clicks: 17/24 as well as impaired balance, polypharmacy, impaired judgement, decreased safety awareness, and obesity and combined with medical complications of abnormal potassium values and abnormal CO2 values.  Pt's PMHx and comorbidities that may affect physical performance and progress include: CHF, COPD, DM, obesity, and cancer history and/or treatment. Personal factors affecting pt at time of IE include: step(s) to enter environment, multi-level environment, past experience, inability to perform  IADLs, inability to navigate level surfaces without external assistance, inability to ambulate household distances, inability to navigate community distances, and tobacco use. Pt will benefit from continued skilled PT services to address deficits as defined above and to maximize level of functional mobility to facilitate return toward PLOF and improved QOL. From PT/mobility standpoint, recommendation at time of d/c would be Level III (Minimum Resource Intensity) pending progress in order to reduce fall risk and maximize pt's functional independence and consistency with mobility in order to facilitate return to PLOF.  Recommend trial with walker next 1-2 sessions and ther ex next 1-2 sessions.     Barriers to Discharge Comments: RIOS  Rehab Resource Intensity Level, PT: III (Minimum Resource Intensity)    See flowsheet documentation for full assessment.

## 2024-05-05 NOTE — ASSESSMENT & PLAN NOTE
Spiculated opacity in the right upper lobe and nodule in the left upper lobe as noted on previous CT exam. These are better followed on CT versus PET/CT as clinically indicated.

## 2024-05-05 NOTE — PLAN OF CARE

## 2024-05-05 NOTE — ASSESSMENT & PLAN NOTE
Patient noted to have bilateral lower extremity cellulitis due to excessive edema and some blistering noted.  wound cultures gram neg brody and neg MRSA nasal swab.  Received a dose of vancomycin in the ED already will continue on IV Ancef for now as podiatry for evaluation for some wounds in between his toes and dry scaly skin on both feet  Patient definitely has venous stasis leading to a lot of skin changes and concerns for fluid overload on top of that due to CHF leading to blistering of the skin.  Procalcitonin level is negative he has mild leukocytosis.   Was placed on IV Ancef initially and then changed to cefepime.  MRI foot is negative for osteo.  Cellulitis appears to be slowly improving  Venous Duplex bilateral lower extremities negative for DVT .arterial circulation is triphasic  Continues to have a lot of neuropathic pain will increase Neurontin to 300 mg 3 times daily and added Requip correct electrolytes and monitor for now  uric acid normal  and CRP elevated  Culture shows Stenotrophomonas maltophilia, Staph aureus, Alcaligenes faecalis  IV cefepime and switch to Augmentin which will be continued at home and ask wound care for reevaluation tomorrow

## 2024-05-05 NOTE — PROGRESS NOTES
[facility]  Progress Note  Name: Peter Busch I  MRN: [id number]  Unit/Bed#: [facility] I Date of Admission: 5/1/2024   Date of Service: 5/5/2024 I Hospital Day: 4    Assessment/Plan   * Acute on chronic diastolic (congestive) heart failure (HCC)  Assessment & Plan  Wt Readings from Last 3 Encounters:   05/05/24 112 kg (247 lb)   05/22/23 119 kg (262 lb)   04/26/23 119 kg (263 lb)     he has known history of chronic diastolic CHF.  Repeat echo ordered 5/2/24 EF of 70% with grade 1 diastolic dysfunction.  Was taking Lasix 40 mg oral daily at home.  Trouble with leg swelling and blistering on his legs due to edema.  Initially received gentle hydration as has lactic acidosis followed by Lasix 40 mg IV twice daily and monitor response  diuresing very well . stop IV Lasix and switch to oral torsemide 20 mg twice daily and correct electrolytes      Cellulitis of left lower extremity  Assessment & Plan  Patient noted to have bilateral lower extremity cellulitis due to excessive edema and some blistering noted.  wound cultures gram neg brody and neg MRSA nasal swab.  Received a dose of vancomycin in the ED already will continue on IV Ancef for now as podiatry for evaluation for some wounds in between his toes and dry scaly skin on both feet  Patient definitely has venous stasis leading to a lot of skin changes and concerns for fluid overload on top of that due to CHF leading to blistering of the skin.  Procalcitonin level is negative he has mild leukocytosis.   Was placed on IV Ancef initially and then changed to cefepime.  MRI foot is negative for osteo.  Cellulitis appears to be slowly improving  Venous Duplex bilateral lower extremities negative for DVT .arterial circulation is triphasic  Continues to have a lot of neuropathic pain will increase Neurontin to 300 mg 3 times daily and added Requip correct electrolytes and monitor for now  uric acid normal  and CRP elevated  Culture shows  Stenotrophomonas maltophilia, Staph aureus, Alcaligenes faecalis  IV cefepime and switch to Augmentin which will be continued at home and ask wound care for reevaluation tomorrow    Acute urinary retention  Assessment & Plan  His postvoid residual was over 500 x2 with 2 straight cath so far . If he Continues to have high residuals again will require Mendes catheter  Cont flomax but  increased to 0.4 mg twice daily    History of lung cancer  Assessment & Plan  Spiculated opacity in the right upper lobe and nodule in the left upper lobe as noted on previous CT exam. These are better followed on CT versus PET/CT as clinically indicated.     Tobacco abuse  Assessment & Plan  Counselled on cessation and placed on nicotine replacement therapy    Type 2 diabetes mellitus, without long-term current use of insulin ([facility])  Assessment & Plan  Lab Results   Component Value Date    HGBA1C 7.2 (H) 05/01/2024       Recent Labs     05/04/24  1639 05/04/24  2110 05/05/24  0724 05/05/24  1145   POCGLU 168* 138 156* 151*         Blood Sugar Average: Last 72 hrs:  (P) [id number] placed on insulin sliding scale and held oral agents for now  Blood sugars are well-controlled         VTE Pharmacologic Prophylaxis:   Moderate Risk (Score 3-4) - Pharmacological DVT Prophylaxis Ordered: enoxaparin (Lovenox).    Mobility:   Basic Mobility Inpatient Raw Score: 17  JH-HLM Goal: 5: Stand one or more mins  JH-HLM Achieved: 6: Walk 10 steps or more  JH-HLM Goal achieved. Continue to encourage appropriate mobility.    Patient Centered Rounds: I performed bedside rounds with nursing staff today.   Discussions with Specialists or Other Care Team Provider: none    Education and Discussions with Family / Patient: will update wife    Total Time Spent on Date of Encounter in care of patient: 35 mins. This time was spent on one or more of the following: performing physical exam; counseling and coordination of care; obtaining or reviewing  history; documenting in the medical record; reviewing/ordering tests, medications or procedures; communicating with other healthcare professionals and discussing with patient's family/caregivers.    Current Length of Stay: 4 day(s)  Current Patient Status: Inpatient   Certification Statement: The patient will continue to require additional inpatient hospital stay due to cellulitis le  Discharge Plan: Anticipate discharge in 24-48 hrs to home with home services.    Code Status: Level 1 - Full Code    Subjective:   Patient denies any chest pain or shortness of breath still complains of leg pain especially on touching his legs requiring pain medications.  Redness and swelling is decreasing    Objective:     Vitals:   Temp (24hrs), Av.7 °F (36.5 °C), Min:97.7 °F (36.5 °C), Max:97.7 °F (36.5 °C)    Temp:  [97.7 °F (36.5 °C)] 97.7 °F (36.5 °C)  HR:  [71-85] 71  Resp:  [20] 20  BP: ()/(60-76) 121/67  Body mass index is 32.59 kg/m².     Input and Output Summary (last 24 hours):     Intake/Output Summary (Last 24 hours) at 2024 1259  Last data filed at 2024 1937  Gross per 24 hour   Intake --   Output 1000 ml   Net -1000 ml       Physical Exam:   Physical Exam  Vitals and nursing note reviewed.   Constitutional:       Appearance: Normal appearance.   HENT:      Head: Normocephalic and atraumatic.      Right Ear: External ear normal.      Left Ear: External ear normal.      Nose: Nose normal.      Mouth/Throat:      Pharynx: Oropharynx is clear.   Cardiovascular:      Rate and Rhythm: Normal rate and regular rhythm.      Heart sounds: Normal heart sounds.   Pulmonary:      Effort: Pulmonary effort is normal.      Breath sounds: Normal breath sounds.   Abdominal:      General: Bowel sounds are normal.      Palpations: Abdomen is soft.      Tenderness: There is no abdominal tenderness.   Musculoskeletal:         General: Tenderness present. Normal range of motion.      Cervical back: Normal range of motion and  neck supple.      Comments: decreasing erythema and edema noted on bilateral lower extremity with several areas of thickening of skin of bilateral feet and some dried crusting noted on the shin area   Skin:     General: Skin is warm and dry.      Capillary Refill: Capillary refill takes less than 2 seconds.   Neurological:      General: No focal deficit present.      Mental Status: He is alert and oriented to person, place, and time.   Psychiatric:         Mood and Affect: Mood normal.            Additional Data:     Labs:  Results from last 7 days   Lab Units 05/04/24  0514 05/02/24  0503 05/01/24  1623   WBC Thousand/uL 7.59   < > 10.69*   HEMOGLOBIN g/dL 15.4   < > 16.4   HEMATOCRIT % 47.3   < > 49.2   PLATELETS Thousands/uL 203   < > 238   SEGS PCT %  --   --  69   LYMPHO PCT %  --   --  16   MONO PCT %  --   --  10   EOS PCT %  --   --  4    < > = values in this interval not displayed.     Results from last 7 days   Lab Units 05/05/24  0434 05/02/24  0503 05/01/24  1623   SODIUM mmol/L 140   < > 139   POTASSIUM mmol/L 3.2*   < > 3.1*   CHLORIDE mmol/L 100   < > 98   CO2 mmol/L 35*   < > 28   BUN mg/dL 17   < > 15   CREATININE mg/dL 0.87   < > 1.05   ANION GAP mmol/L 5   < > 13   CALCIUM mg/dL 9.1   < > 9.5   ALBUMIN g/dL  --   --  4.3   TOTAL BILIRUBIN mg/dL  --   --  0.50   ALK PHOS U/L  --   --  66   ALT U/L  --   --  33   AST U/L  --   --  21   GLUCOSE RANDOM mg/dL 131   < > 111    < > = values in this interval not displayed.         Results from last 7 days   Lab Units 05/05/24  1145 05/05/24  0724 05/04/24  2110 05/04/24  1639 05/04/24  1140 05/04/24  0750 05/03/24  2057 05/03/24  1708 05/03/24  1103 05/03/24  0737 05/02/24  2107 05/02/24  1555   POC GLUCOSE mg/dl 151* 156* 138 168* 174* 136 149* 146* 156* 128 172* 132     Results from last 7 days   Lab Units 05/01/24  1623   HEMOGLOBIN A1C % 7.2*     Results from last 7 days   Lab Units 05/02/24  0503 05/01/24  1950 05/01/24  1623   LACTIC ACID mmol/L  --   2.6* 3.4*   PROCALCITONIN ng/ml 0.06  --   --        Lines/Drains:  Invasive Devices       Peripheral Intravenous Line  Duration             Peripheral IV 05/03/24 Distal;Dorsal (posterior);Left Forearm 2 days                          Imaging: Reviewed radiology reports from this admission including: chest CT scan and mri    Recent Cultures (last 7 days):   Results from last 7 days   Lab Units 05/02/24  1423 05/01/24  1623   BLOOD CULTURE   --  No Growth at 72 hrs.  No Growth at 72 hrs.   GRAM STAIN RESULT  No polys seen*  2+ Gram positive cocci in pairs*  1+ Gram negative rods*  No Polys or Bacteria seen  --    WOUND CULTURE  4+ Growth of Alcaligenes faecalis*  4+ Growth of  3+ Growth of Stenotrophomonas maltophilia*  Few Colonies of Staphylococcus aureus*  1+ Growth of Alcaligenes faecalis*  3+ Growth of  --        Last 24 Hours Medication List:   Current Facility-Administered Medications   Medication Dose Route Frequency Provider Last Rate    acetaminophen  650 mg Oral Q4H PRN Jaqueline Ferreira MD      albuterol  2 puff Inhalation Q6H PRN Jaqueline Ferreira MD      amoxicillin-clavulanate  1 tablet Oral Q12H KELI Jaqueline Ferreira MD      atorvastatin  40 mg Oral Daily Jaqueline Ferreira MD      calcium carbonate  1,000 mg Oral Daily PRN Jaqueline Ferreira MD      enoxaparin  40 mg Subcutaneous Daily Jaqueline Ferreira MD      fentaNYL  50 mcg Intravenous Q6H PRN Jaqueline Ferreira MD      finasteride  5 mg Oral Daily Jaqueline Ferreira MD      Fluticasone Furoate-Vilanterol  1 puff Inhalation Daily Jaqueline Ferreira MD      And    umeclidinium  1 puff Inhalation Daily Jaqueline Ferreira MD      gabapentin  300 mg Oral TID Jaqueline Ferreira MD      insulin lispro  1-6 Units Subcutaneous HS Jaqueline Ferreira MD      insulin lispro  2-12 Units Subcutaneous TID AC Jaqueline Ferreira MD      nicotine  1 patch Transdermal Daily Jaqueline Ferreira MD      nystatin   Topical BID Jaqueline Ferreira MD      ondansetron  4 mg Intravenous Q6H PRN Jaqueline Ferreira MD      oxyCODONE-acetaminophen  1  tablet Oral Q4H PRN Kvng Pérez MD      oxyCODONE-acetaminophen  2 tablet Oral Q4H PRN Jaqueline Ferreira MD      potassium chloride  40 mEq Oral Once Jaqueline Ferreira MD      rOPINIRole  0.5 mg Oral TID Jaqueline Ferreira MD      tamsulosin  0.4 mg Oral BID Jaqueline Ferreira MD      torsemide  20 mg Oral BID Jaqueline Ferreira MD          Today, Patient Was Seen By: Jaqueline Ferreira MD    **Please Note: This note may have been constructed using a voice recognition system.**

## 2024-05-05 NOTE — PLAN OF CARE
Problem: PHYSICAL THERAPY ADULT  Goal: Performs mobility at highest level of function for planned discharge setting.  See evaluation for individualized goals.  Description: Treatment/Interventions: Functional transfer training, LE strengthening/ROM, Elevations, Therapeutic exercise, Endurance training, Patient/family training, Equipment eval/education, Bed mobility, Gait training, Compensatory technique education, Spoke to nursing, OT  Equipment Recommended:  (pt owns RW)       See flowsheet documentation for full assessment, interventions and recommendations.  5/5/2024 1010 by Katie Rivera PT  Outcome: Progressing  Note: Prognosis: Good  Problem List: Decreased strength, Decreased endurance, Impaired balance, Decreased mobility, Impaired judgement, Decreased safety awareness, Obesity  Pt seen for PT treatment session this date with interventions consisting of gait training w/ emphasis on improving pt's ability to ambulate level surfaces x 35' with SBA provided by therapist with RW. Pt agreeable to PT treatment session upon arrival, pt found seated at EOB, in no apparent distress. In comparison to previous session, pt with improvements in pt with improved ambulatory balance with RW. Post session: pt returned back to recliner, chair alarm engaged, all needs in reach, and RN notified of session findings/recommendations Continue to recommend Level III Resource Intensity at time of d/c in order to maximize pt's functional independence and safety w/ mobility. Pt continues to be functioning below baseline level, and remains limited 2* factors listed above and including decreased strength, balance, mobility, and activity tolerance. PT will continue to see pt while here in order to address the deficits listed above and provide interventions consistent w/ POC in effort to achieve STGs.     Barriers to Discharge Comments: RIOS  Rehab Resource Intensity Level, PT: III (Minimum Resource Intensity)    See flowsheet  documentation for full assessment.

## 2024-05-05 NOTE — ASSESSMENT & PLAN NOTE
Wt Readings from Last 3 Encounters:   05/05/24 112 kg (247 lb)   05/22/23 119 kg (262 lb)   04/26/23 119 kg (263 lb)     he has known history of chronic diastolic CHF.  Repeat echo ordered 5/2/24 EF of 70% with grade 1 diastolic dysfunction.  Was taking Lasix 40 mg oral daily at home.  Trouble with leg swelling and blistering on his legs due to edema.  Initially received gentle hydration as has lactic acidosis followed by Lasix 40 mg IV twice daily and monitor response  diuresing very well . stop IV Lasix and switch to oral torsemide 20 mg twice daily and correct electrolytes

## 2024-05-05 NOTE — PLAN OF CARE
Problem: PAIN - ADULT  Goal: Verbalizes/displays adequate comfort level or baseline comfort level  Description: Interventions:  - Encourage patient to monitor pain and request assistance  - Assess pain using appropriate pain scale  - Administer analgesics based on type and severity of pain and evaluate response  - Implement non-pharmacological measures as appropriate and evaluate response  - Consider cultural and social influences on pain and pain management  - Notify physician/advanced practitioner if interventions unsuccessful or patient reports new pain  Outcome: Progressing     Problem: INFECTION - ADULT  Goal: Absence or prevention of progression during hospitalization  Description: INTERVENTIONS:  - Assess and monitor for signs and symptoms of infection  - Monitor lab/diagnostic results  - Monitor all insertion sites, i.e. indwelling lines, tubes, and drains  - Monitor endotracheal if appropriate and nasal secretions for changes in amount and color  - [place] appropriate cooling/warming therapies per order  - Administer medications as ordered  - Instruct and encourage patient and family to use good hand hygiene technique  - Identify and instruct in appropriate isolation precautions for identified infection/condition  Outcome: Progressing     Problem: Knowledge Deficit  Goal: Patient/family/caregiver demonstrates understanding of disease process, treatment plan, medications, and discharge instructions  Description: Complete learning assessment and assess knowledge base.  Interventions:  - Provide teaching at level of understanding  - Provide teaching via preferred learning methods  Outcome: Progressing

## 2024-05-05 NOTE — PHYSICAL THERAPY NOTE
PHYSICAL THERAPY EVALUATION  NAME:  Peter Busch  DATE: 05/05/24    AGE:   68 y.o.  Mrn:   [id number]  ADMIT DX:  Cellulitis [L03.90]  Bilateral leg edema [R60.0]  Cellulitis of left leg [L03.116]  Cellulitis of right leg [L03.115]    Past Medical History:   Diagnosis Date    Bladder cancer (HCC)     BPH (benign prostatic hyperplasia)     COPD (chronic obstructive pulmonary disease) (HCC)     Diabetes mellitus (HCC)     Hyperlipidemia     Lung cancer (HCC)     Rib fractures      Length Of Stay: 4  Performed at least 2 patient identifiers during session: Name and Birthday  PHYSICAL THERAPY EVALUATION :        05/05/24 0732   Note Type   Note type Evaluation   Pain Assessment   Pain Assessment Tool 0-10   Pain Score 6   Pain Location/Orientation Orientation: Bilateral;Location: Leg   Restrictions/Precautions   Other Precautions Chair Alarm;Bed Alarm;Fall Risk;Pain;O2  (2 lpm)   Home Living   Type of Home House  (3 RIOS no HR, walls both sides)   Home Layout Multi-level;Bed/bath upstairs;1/2 bath on main level   Bathroom Shower/Tub Tub/shower unit   Bathroom Toilet Standard   Bathroom Equipment   (denies owning DME)   Home Equipment Walker;Cane;Wheelchair-manual  (not using PTA)   Additional Comments Pt reports living with SO in 3 SH with 3 RIOS, no DME use at baseline   Prior Function   Level of [place] Independent with ADLs;Independent with functional mobility;Needs assistance with IADLS   Lives With Significant other   Receives Help From Family   IADLs Family/Friend/Other provides transportation;Family/Friend/Other provides meals;Family/Friend/Other provides medication management   Falls in the last 6 months 0   Comments Pt reports completing ADLs and mobility without AD at I, has assistance with IADLs and transportation   Cognition   Overall Cognitive Status WFL   Orientation Level Oriented X4   Following Commands Follows one step commands without difficulty   RLE Assessment   RLE Assessment WFL  (4+/5  strength)   LLE Assessment   LLE Assessment WFL  (4+/5 strength)   Light Touch   RLE Light Touch Grossly intact   LLE Light Touch Grossly intact   Bed Mobility   Supine to Sit 6  Modified independent   Additional items HOB elevated;Bedrails   Additional Comments HOB elevated, bedrails   Transfers   Sit to Stand   (SBA)   Additional items Increased time required;Verbal cues   Stand to Sit   (SBA)   Additional items Increased time required;Verbal cues   Stand pivot   (steadying assist)   Additional items Increased time required;Verbal cues   Additional Comments Pt initially completing transfers without AD, recommended trial with SPC as pt unsteady upon standing. See treatment for RW trial. VC for hand placement   Ambulation/Elevation   Gait pattern Improper Weight shift;Wide ANU;Decreased foot clearance;Short stride;Inconsistent claudia;Excessively slow   Gait Assistance   (steadying assist)   Additional items Verbal cues   Assistive Device Straight cane   Distance 40' with SPC and steadying assist with VC for sequencing, pt reaching for external support   Balance   Static Sitting Good   Dynamic Sitting Fair +   Static Standing Fair   Dynamic Standing Fair -   Ambulatory Fair -   Endurance Deficit   Endurance Deficit Yes   Endurance Deficit Description fatigue   Activity Tolerance   Activity Tolerance Patient limited by fatigue   Nurse Made Aware RN, Li   Assessment   Prognosis Good   Problem List Decreased strength;Decreased endurance;Impaired balance;Decreased mobility;Impaired judgement;Decreased safety awareness;Obesity   Barriers to Discharge Comments RIOS   Goals   Patient Goals go home   STG Expiration Date 05/19/24   Plan   Treatment/Interventions Functional transfer training;LE strengthening/ROM;Elevations;Therapeutic exercise;Endurance training;Patient/family training;Equipment eval/education;Bed mobility;Gait training;Compensatory technique education;Spoke to nursing;OT   PT Frequency 3-5x/wk   Discharge  Recommendation   Rehab Resource Intensity Level, PT III (Minimum Resource Intensity)   Equipment Recommended   (pt owns RW)   AM-PAC Basic Mobility Inpatient   Turning in Flat Bed Without Bedrails 3   Lying on Back to Sitting on Edge of Flat Bed Without Bedrails 3   Moving Bed to Chair 3   Standing Up From Chair Using Arms 3   Walk in Room 3   Climb 3-5 Stairs With Railing 2   Basic Mobility Inpatient Raw Score 17   Basic Mobility Standardized Score 39.67   [facility] Highest Level Of Mobility   -HL Goal 5: Stand one or more mins   -HL Achieved 7: Walk 25 feet or more   Additional Treatment Session   Start Time 0748   End Time 0756   Treatment Assessment Pt seen for PT treatment session this date with interventions consisting of gait training w/ emphasis on improving pt's ability to ambulate level surfaces x 35' with SBA provided by therapist with RW. Pt agreeable to PT treatment session upon arrival, pt found seated at EOB, in no apparent distress. In comparison to previous session, pt with improvements in pt with improved ambulatory balance with RW . Post session: pt returned back to recliner, chair alarm engaged, all needs in reach, and RN notified of session findings/recommendations Continue to recommend  Level III Resource Intensity  at time of d/c in order to maximize pt's functional independence and safety w/ mobility. Pt continues to be functioning below baseline level, and remains limited 2* factors listed above and including decreased strength, balance, mobility, and activity tolerance. PT will continue to see pt while here in order to address the deficits listed above and provide interventions consistent w/ POC in effort to achieve STGs.   Equipment Use Pt completed STS with RW with VC for hand placement and safety with SBA. Pt ambulated 35' with RW and SBA, VC for RW management. Pt limited by fatigue, no LOB, O2 >/= 90% throughout session on 2 lpm. Recommended pt use RW upon DC, pt hesitant  initially but agreeable   Additional Treatment Day 1   End of Consult   Patient Position at End of Consult Bedside chair;Bed/Chair alarm activated;All needs within reach     The patient's AM-PAC Basic Mobility Inpatient Short Form Raw Score is 17  . A Raw score of greater than 16 suggests the patient may benefit from discharge to home. Please also refer to the recommendation of the Physical Therapist for safe discharge planning.    (Please find full objective findings from PT assessment regarding body systems outlined above).     Assessment: Pt is a 68 y.o. male seen for PT evaluation s/p admission to [facility] on 5/1/2024 with Acute on chronic diastolic (congestive) heart failure (HCC).  Order placed for PT services.  Upon evaluation: Pt is presenting with impaired functional mobility due to pain, decreased strength, decreased endurance, impaired balance, gait deviations, decreased safety awareness, impaired judgment, and fall risk requiring  supervision assistance for transfers and steadying assistance for transfers and ambulation with SPC . Pt's clinical presentation is currently unstable/unpredictable given the functional mobility deficits above coupled with fall risks as indicated by AM-PAC 6-Clicks: 17/24 as well as impaired balance, polypharmacy, impaired judgement, decreased safety awareness, and obesity and combined with medical complications of abnormal potassium values and abnormal CO2 values.  Pt's PMHx and comorbidities that may affect physical performance and progress include: CHF, COPD, DM, obesity, and cancer history and/or treatment. Personal factors affecting pt at time of IE include: step(s) to enter environment, multi-level environment, past experience, inability to perform IADLs, inability to navigate level surfaces without external assistance, inability to ambulate household distances, inability to navigate community distances, and tobacco use. Pt will benefit from continued  skilled PT services to address deficits as defined above and to maximize level of functional mobility to facilitate return toward PLOF and improved QOL. From PT/mobility standpoint, recommendation at time of d/c would be Level III (Minimum Resource Intensity) pending progress in order to reduce fall risk and maximize pt's functional independence and consistency with mobility in order to facilitate return to PLOF.  Recommend trial with walker next 1-2 sessions and ther ex next 1-2 sessions.      Goals: Pt will: Perform bed mobility tasks with modified I to reposition in bed and prepare for transfers. Pt will perform transfers with modified I to increase Indep in home environment and prepare for ambulation. Pt will ambulate with LRAD for >/= 150' with  modified I  to improve gait quality and promote proper use of assistive device and to access home environment. Pt will complete >/= 12 steps with with unilateral handrail with Supervision to return to home with RIOS and return to multilevel home. Increase bilateral LE strength 1/2 grade to facilitate independent mobility and Increase all balance 1/2 grade to decrease risk for falls.        Katie Rivera, PT,DPT

## 2024-05-06 ENCOUNTER — TELEPHONE (OUTPATIENT)
Dept: OTHER | Facility: HOSPITAL | Age: 69
End: 2024-05-06

## 2024-05-06 VITALS
HEIGHT: 73 IN | BODY MASS INDEX: 32.87 KG/M2 | WEIGHT: 248 LBS | SYSTOLIC BLOOD PRESSURE: 114 MMHG | RESPIRATION RATE: 18 BRPM | DIASTOLIC BLOOD PRESSURE: 76 MMHG | OXYGEN SATURATION: 90 % | TEMPERATURE: 97.2 F | HEART RATE: 82 BPM

## 2024-05-06 LAB
ANION GAP SERPL CALCULATED.3IONS-SCNC: 7 MMOL/L (ref 4–13)
BACTERIA BLD CULT: NORMAL
BACTERIA BLD CULT: NORMAL
BACTERIA WND AEROBE CULT: ABNORMAL
BUN SERPL-MCNC: 20 MG/DL (ref 5–25)
CALCIUM SERPL-MCNC: 9.3 MG/DL (ref 8.4–10.2)
CHLORIDE SERPL-SCNC: 100 MMOL/L (ref 96–108)
CO2 SERPL-SCNC: 34 MMOL/L (ref 21–32)
CREAT SERPL-MCNC: 0.94 MG/DL (ref 0.6–1.3)
GFR SERPL CREATININE-BSD FRML MDRD: 82 ML/MIN/1.73SQ M
GLUCOSE SERPL-MCNC: 132 MG/DL (ref 65–140)
GLUCOSE SERPL-MCNC: 147 MG/DL (ref 65–140)
GLUCOSE SERPL-MCNC: 199 MG/DL (ref 65–140)
GRAM STN SPEC: ABNORMAL
POTASSIUM SERPL-SCNC: 3.5 MMOL/L (ref 3.5–5.3)
SODIUM SERPL-SCNC: 141 MMOL/L (ref 135–147)

## 2024-05-06 PROCEDURE — 99222 1ST HOSP IP/OBS MODERATE 55: CPT

## 2024-05-06 PROCEDURE — 80048 BASIC METABOLIC PNL TOTAL CA: CPT | Performed by: FAMILY MEDICINE

## 2024-05-06 PROCEDURE — NC001 PR NO CHARGE

## 2024-05-06 PROCEDURE — 99239 HOSP IP/OBS DSCHRG MGMT >30: CPT | Performed by: FAMILY MEDICINE

## 2024-05-06 PROCEDURE — 82948 REAGENT STRIP/BLOOD GLUCOSE: CPT

## 2024-05-06 RX ORDER — SULFAMETHOXAZOLE AND TRIMETHOPRIM 800; 160 MG/1; MG/1
1 TABLET ORAL EVERY 12 HOURS SCHEDULED
Qty: 8 TABLET | Refills: 0 | Status: SHIPPED | OUTPATIENT
Start: 2024-05-06 | End: 2024-05-10

## 2024-05-06 RX ORDER — NICOTINE 21 MG/24HR
1 PATCH, TRANSDERMAL 24 HOURS TRANSDERMAL DAILY
Qty: 28 PATCH | Refills: 0 | Status: SHIPPED | OUTPATIENT
Start: 2024-05-07

## 2024-05-06 RX ORDER — TAMSULOSIN HYDROCHLORIDE 0.4 MG/1
0.4 CAPSULE ORAL 2 TIMES DAILY
Qty: 60 CAPSULE | Refills: 0 | Status: SHIPPED | OUTPATIENT
Start: 2024-05-06 | End: 2024-06-05

## 2024-05-06 RX ORDER — SULFAMETHOXAZOLE AND TRIMETHOPRIM 800; 160 MG/1; MG/1
1 TABLET ORAL EVERY 12 HOURS SCHEDULED
Status: DISCONTINUED | OUTPATIENT
Start: 2024-05-06 | End: 2024-05-06 | Stop reason: HOSPADM

## 2024-05-06 RX ORDER — GLIPIZIDE 5 MG/1
5 TABLET, FILM COATED, EXTENDED RELEASE ORAL DAILY
Qty: 30 TABLET | Refills: 0 | Status: SHIPPED | OUTPATIENT
Start: 2024-05-06 | End: 2024-06-05

## 2024-05-06 RX ORDER — NYSTATIN 100000 [USP'U]/G
POWDER TOPICAL 2 TIMES DAILY
Qty: 30 G | Refills: 0 | Status: SHIPPED | OUTPATIENT
Start: 2024-05-06 | End: 2024-05-16

## 2024-05-06 RX ORDER — OXYCODONE HYDROCHLORIDE AND ACETAMINOPHEN 5; 325 MG/1; MG/1
1 TABLET ORAL EVERY 8 HOURS PRN
Qty: 20 TABLET | Refills: 0 | Status: SHIPPED | OUTPATIENT
Start: 2024-05-06 | End: 2024-05-16

## 2024-05-06 RX ORDER — TORSEMIDE 20 MG/1
30 TABLET ORAL DAILY
Qty: 45 TABLET | Refills: 0 | Status: SHIPPED | OUTPATIENT
Start: 2024-05-06 | End: 2024-06-05

## 2024-05-06 RX ORDER — ROPINIROLE 0.5 MG/1
0.5 TABLET, FILM COATED ORAL 3 TIMES DAILY
Qty: 90 TABLET | Refills: 0 | Status: SHIPPED | OUTPATIENT
Start: 2024-05-06 | End: 2024-06-05

## 2024-05-06 RX ORDER — POTASSIUM CHLORIDE 20 MEQ/1
20 TABLET, EXTENDED RELEASE ORAL DAILY
Qty: 30 TABLET | Refills: 0 | Status: SHIPPED | OUTPATIENT
Start: 2024-05-06 | End: 2024-06-05

## 2024-05-06 RX ORDER — ACETAMINOPHEN 325 MG/1
650 TABLET ORAL EVERY 6 HOURS PRN
Qty: 90 TABLET | Refills: 0 | Status: SHIPPED | OUTPATIENT
Start: 2024-05-06 | End: 2024-06-05

## 2024-05-06 RX ORDER — GABAPENTIN 300 MG/1
300 CAPSULE ORAL 3 TIMES DAILY
Qty: 90 CAPSULE | Refills: 0 | Status: SHIPPED | OUTPATIENT
Start: 2024-05-06 | End: 2024-06-05

## 2024-05-06 RX ADMIN — AMOXICILLIN AND CLAVULANATE POTASSIUM 1 TABLET: 875; 125 TABLET, FILM COATED ORAL at 08:00

## 2024-05-06 RX ADMIN — TAMSULOSIN HYDROCHLORIDE 0.4 MG: 0.4 CAPSULE ORAL at 08:00

## 2024-05-06 RX ADMIN — SULFAMETHOXAZOLE AND TRIMETHOPRIM 1 TABLET: 800; 160 TABLET ORAL at 12:08

## 2024-05-06 RX ADMIN — ENOXAPARIN SODIUM 40 MG: 40 INJECTION SUBCUTANEOUS at 08:02

## 2024-05-06 RX ADMIN — OXYCODONE HYDROCHLORIDE AND ACETAMINOPHEN 1 TABLET: 5; 325 TABLET ORAL at 11:39

## 2024-05-06 RX ADMIN — FLUTICASONE FUROATE AND VILANTEROL TRIFENATATE 1 PUFF: 100; 25 POWDER RESPIRATORY (INHALATION) at 08:10

## 2024-05-06 RX ADMIN — UMECLIDINIUM 1 PUFF: 62.5 AEROSOL, POWDER ORAL at 08:10

## 2024-05-06 RX ADMIN — ATORVASTATIN CALCIUM 40 MG: 40 TABLET, FILM COATED ORAL at 08:00

## 2024-05-06 RX ADMIN — INSULIN LISPRO 2 UNITS: 100 INJECTION, SOLUTION INTRAVENOUS; SUBCUTANEOUS at 12:08

## 2024-05-06 RX ADMIN — FINASTERIDE 5 MG: 5 TABLET, FILM COATED ORAL at 08:00

## 2024-05-06 RX ADMIN — ROPINIROLE 0.5 MG: 0.25 TABLET, FILM COATED ORAL at 08:00

## 2024-05-06 RX ADMIN — TORSEMIDE 20 MG: 20 TABLET ORAL at 08:00

## 2024-05-06 RX ADMIN — GABAPENTIN 300 MG: 300 CAPSULE ORAL at 08:00

## 2024-05-06 RX ADMIN — NYSTATIN: 100000 POWDER TOPICAL at 08:08

## 2024-05-06 NOTE — CASE MANAGEMENT
Case Management Discharge Planning Note    Patient name Peter Busch  Location /-01 MRN [id number]  : 1955 Date 2024       Current Admission Date: 2024  Current Admission Diagnosis:Acute on chronic diastolic (congestive) heart failure (HCC)   Patient Active Problem List    Diagnosis Date Noted    Acute urinary retention 2024    Acute on chronic diastolic (congestive) heart failure (HCC) 2024    Cellulitis of left lower extremity 2024    Leg swelling 2022    Acute respiratory failure with hypoxia ([facility]) 2022    Type 2 diabetes mellitus, without long-term current use of insulin ([facility]) 09/15/2021    Tobacco abuse 09/15/2021    COPD with acute exacerbation ([facility]) 09/15/2021    BPH with obstruction/lower urinary tract symptoms 09/15/2021    History of lung cancer 09/15/2021    Acute hypoxemic respiratory failure due to COVID-19 ([facility]) 2021      LOS (days): 5  Geometric Mean LOS (GMLOS) (days): 3.9  Days to GMLOS:-1     OBJECTIVE:  Risk of Unplanned Readmission Score: 8.15         Current admission status: Inpatient   Preferred Pharmacy:   [id number] [address] [address]  Phone: [phone] Fax: [phone]    Primary Care Provider: Regino Brooks DO    Primary Insurance: [employer] [facility]  Secondary Insurance: [place] [employer] [facility]    DISCHARGE DETAILS:    [facility] is aware of dc and AVS was forward to them.

## 2024-05-06 NOTE — DISCHARGE INSTR - AVS FIRST PAGE
Please change dressings on legs and keep legs elevated as much as possible.  Please do CT chest outpatient and follow-up with Dr. Wolfe as you had abnormal findings on chest x-ray while admitted  please check your weight daily and write it down.  Low-salt diet and do not drink more than 1500 mL of liquids in 24 hours    Take 1 tablet of torsemide 20 mg daily as long as your weight is 244 pounds or under.  If your weight goes above 244 pounds then take 1.5 tablet of torsemide daily

## 2024-05-06 NOTE — PLAN OF CARE
Problem: Potential for Falls  Goal: Patient will remain free of falls  Description: INTERVENTIONS:  - Educate patient/family on patient safety including physical limitations  - Instruct patient to call for assistance with activity   - Consult OT/PT to assist with strengthening/mobility   - Keep Call bell within reach  - Keep bed low and locked with side rails adjusted as appropriate  - Keep care items and personal belongings within reach  - Initiate and maintain comfort rounds  - Make Fall Risk Sign visible to staff  - Offer Toileting every 2 Hours, in advance of need  - Initiate/Maintain bed alarm  - Obtain necessary fall risk management equipment  - Apply yellow socks and bracelet for high fall risk patients  - Consider moving patient to room near nurses station  Outcome: Progressing     Problem: PAIN - ADULT  Goal: Verbalizes/displays adequate comfort level or baseline comfort level  Description: Interventions:  - Encourage patient to monitor pain and request assistance  - Assess pain using appropriate pain scale  - Administer analgesics based on type and severity of pain and evaluate response  - Implement non-pharmacological measures as appropriate and evaluate response  - Consider cultural and social influences on pain and pain management  - Notify physician/advanced practitioner if interventions unsuccessful or patient reports new pain  Outcome: Progressing     Problem: INFECTION - ADULT  Goal: Absence or prevention of progression during hospitalization  Description: INTERVENTIONS:  - Assess and monitor for signs and symptoms of infection  - Monitor lab/diagnostic results  - Monitor all insertion sites, i.e. indwelling lines, tubes, and drains  - Monitor endotracheal if appropriate and nasal secretions for changes in amount and color  - [place] appropriate cooling/warming therapies per order  - Administer medications as ordered  - Instruct and encourage patient and family to use good hand hygiene  technique  - Identify and instruct in appropriate isolation precautions for identified infection/condition  Outcome: Progressing  Goal: Absence of fever/infection during neutropenic period  Description: INTERVENTIONS:  - Monitor WBC    Outcome: Progressing     Problem: SAFETY ADULT  Goal: Patient will remain free of falls  Description: INTERVENTIONS:  - Educate patient/family on patient safety including physical limitations  - Instruct patient to call for assistance with activity   - Consult OT/PT to assist with strengthening/mobility   - Keep Call bell within reach  - Keep bed low and locked with side rails adjusted as appropriate  - Keep care items and personal belongings within reach  - Initiate and maintain comfort rounds  - Make Fall Risk Sign visible to staff  - Offer Toileting every 2 Hours, in advance of need  - Initiate/Maintain bed alarm  - Obtain necessary fall risk management equipment  - Apply yellow socks and bracelet for high fall risk patients  - Consider moving patient to room near nurses station  Outcome: Progressing  Goal: Maintain or return to baseline ADL function  Description: INTERVENTIONS:  -  Assess patient's ability to carry out ADLs; assess patient's baseline for ADL function and identify physical deficits which impact ability to perform ADLs (bathing, care of mouth/teeth, toileting, grooming, dressing, etc.)  - Assess/evaluate cause of self-care deficits   - Assess range of motion  - Assess patient's mobility; develop plan if impaired  - Assess patient's need for assistive devices and provide as appropriate  - Encourage maximum independence but intervene and supervise when necessary  - Involve family in performance of ADLs  - Assess for home care needs following discharge   - Consider OT consult to assist with ADL evaluation and planning for discharge  - Provide patient education as appropriate  Outcome: Progressing  Goal: Maintains/Returns to pre admission functional level  Description:  INTERVENTIONS:  - Perform AM-PAC 6 Click Basic Mobility/ Daily Activity assessment daily.  - Set and communicate daily mobility goal to care team and patient/family/caregiver.   - Collaborate with rehabilitation services on mobility goals if consulted  - Reposition patient every 2 hours.  - Stand patient 3 times a day  - Ambulate patient 3 times a day  - Out of bed to chair 3 times a day   - Out of bed for meals 3 times a day  - Out of bed for toileting  - Record patient progress and toleration of activity level   Outcome: Progressing     Problem: DISCHARGE PLANNING  Goal: Discharge to home or other facility with appropriate resources  Description: INTERVENTIONS:  - Identify barriers to discharge w/patient and caregiver  - Arrange for needed discharge resources and transportation as appropriate  - Identify discharge learning needs (meds, wound care, etc.)  - Arrange for interpretive services to assist at discharge as needed  - Refer to Case Management Department for coordinating discharge planning if the patient needs post-hospital services based on physician/advanced practitioner order or complex needs related to functional status, cognitive ability, or social support system  Outcome: Progressing     Problem: Knowledge Deficit  Goal: Patient/family/caregiver demonstrates understanding of disease process, treatment plan, medications, and discharge instructions  Description: Complete learning assessment and assess knowledge base.  Interventions:  - Provide teaching at level of understanding  - Provide teaching via preferred learning methods  Outcome: Progressing     Problem: Prexisting or High Potential for Compromised Skin Integrity  Goal: Skin integrity is maintained or improved  Description: INTERVENTIONS:  - Identify patients at risk for skin breakdown  - Assess and monitor skin integrity  - Assess and monitor nutrition and hydration status  - Monitor labs   - Assess for incontinence   - Turn and reposition  patient  - Assist with mobility/ambulation  - Relieve pressure over bony prominences  - Avoid friction and shearing  - Provide appropriate hygiene as needed including keeping skin clean and dry  - Evaluate need for skin moisturizer/barrier cream  - Collaborate with interdisciplinary team   - Patient/family teaching  - Consider wound care consult   Outcome: Progressing

## 2024-05-06 NOTE — CONSULTS
Consults: UROLOGY  Peter Bsuch 68 y.o. male [id number]   Unit/Bed #: -Ac  Encounter: [id number]        Assessment  & Plan  :    Acute urinary retention  Assessment & Plan  History of bladder cancer with radiation therapy  S/P cystoscopy, ureteroscopy with fulguration of large bladder tumor on July , 2014  Chronic intermittent urinary retention since that time  Creatinine 0.79 (5/4/24)  Retention protocol  Successful management CIC since July 2014--endorses monthly routine per prior Urologist  Understands  & acknowledges indication for more frequent CIC  History of BPH  Continue Flomax 0.4mg with evening meals  Maintain normal bowel movements  OP follow up for health maintenance  Last PSA April 2023---0.93               Subjective :    Peter Busch  is a 68 y.o. male who was admitted for acute on chronic diastolic congestive heart failure.  He has a history of BPH, urinary retention,  COPD, lung nodules, orchitis/epididymitis, T2DM.  He also has a history of bladder cancer for which he underwent cystoscopy, ureteroscopy with fulguration of large bladder tumor on July 31, 2014 with Dr. Cisse and Melony.  He states that after this procedure he has been managing his urinary retention at home.  He will do intermittent catheterization 1 time monthly as allowed by his prior urologist.    His PVR upon admission was noted to be greater than 500.  He states he is aware and continues to manage his intermittent catheterization as normal.  He does have supplies at home and understands to maintain sterility when doing his catheterization. We discussed the need for doing CIC on a more frequent basis due to the volume noted in ED. He acknowledges understanding and reports that he will do so if he begins to notice discomfort or concerns for retention. He denies any dysuria or hematuria and flank pain.  States he is on Flomax and his flow is normal but does endorse some hesitancy.      He denies chest pain, SOB,  abdominal pain, change in appetite or change in activity level.  He does endorse the need for a bowel movement and understands this could be contributing to recent retention.  He is unsure whether or not he will follow-up with his [facility] urology team versus seeing the [facility] urology team.  He does understand the importance of having regular follow-ups due to his history of bladder cancer and will decide when he can figure out transportation.             Allergies   Allergen Reactions    Tetracyclines & Related Other (See Comments)      Current Outpatient Medications   Medication Instructions    albuterol (PROVENTIL HFA,VENTOLIN HFA) 90 mcg/act inhaler 2 puffs, Inhalation, Every 6 hours PRN    Aspirin Buf,CaCarb-MgCarb-MgO, 81 MG TABS 81 mg, Oral    atorvastatin (LIPITOR) 40 mg, Oral, Daily    finasteride (PROSCAR) 5 mg tablet take 1 tablet by mouth once daily    fluticasone-umeclidinium-vilanterol (Trelegy Ellipta) 200-62.5-25 MCG/INH AEPB inhaler 1 puff, Inhalation, Daily, Rinse mouth after use.    furosemide (LASIX) 40 mg tablet take 1 tablet by mouth once daily    gabapentin (NEURONTIN) 100 mg, Oral, 3 times daily    glipiZIDE (GLUCOTROL XL) 5 mg, Oral, Daily    Jardiance 25 MG TABS take 1/2 tablet by mouth once daily every morning for 8 days then take 1 tablet daily THEREAFTER    levothyroxine 25 mcg, Oral, Daily    meloxicam (MOBIC) 15 mg tablet take 1 tablet by mouth once daily    metFORMIN (GLUCOPHAGE) 1,000 mg, Oral, Daily with breakfast    metFORMIN (GLUCOPHAGE-XR) 500 mg 24 hr tablet     Multiple Vitamin (One-A-Day Essential) TABS Oral    potassium chloride (MICRO-K) 10 MEQ CR capsule Take by mouth 1 Capsule in the morning.    tamsulosin (FLOMAX) 0.4 mg take 1 capsule by mouth once daily    TURMERIC-FISH OIL PO Oral      Past Medical History:   Diagnosis Date    Bladder cancer (HCC)     BPH (benign prostatic hyperplasia)     COPD (chronic obstructive pulmonary disease) (HCC)     Diabetes mellitus  (HCC)     Hyperlipidemia     Lung cancer (HCC)     Rib fractures      Past Surgical History:   Procedure Laterality Date    BRONCHOSCOPY      CATARACT EXTRACTION, BILATERAL      CYSTOSCOPY      TONSILLECTOMY      TRANSURETHRAL RESECTION OF PROSTATE       Family History   Problem Relation Age of Onset    Hypertension Mother     No Known Problems Father      Social History     Socioeconomic History    Marital status: Single     Spouse name: None    Number of children: None    Years of education: None    Highest education level: None   Occupational History    None   Tobacco Use    Smoking status: Every Day     Current packs/day: 1.00     Types: Cigarettes    Smokeless tobacco: Never   Vaping Use    Vaping status: Never Used   Substance and Sexual Activity    Alcohol use: Not Currently    Drug use: Not Currently    Sexual activity: None   Other Topics Concern    None   Social History Narrative    None     Social Determinants of Health     Financial Resource Strain: Not on file   Food Insecurity: No Food Insecurity (5/2/2024)    Hunger Vital Sign     Worried About Running Out of Food in the Last Year: Never true     Ran Out of Food in the Last Year: Never true   Transportation Needs: No Transportation Needs (5/2/2024)    PRAPARE - Transportation     Lack of Transportation (Medical): No     Lack of Transportation (Non-Medical): No   Physical Activity: Not on file   Stress: Not on file   Social Connections: Not on file   Intimate Partner Violence: Not on file   Housing Stability: Low Risk  (5/2/2024)    Housing Stability Vital Sign     Unable to Pay for Housing in the Last Year: No     Number of Places Lived in the Last Year: 1     Unstable Housing in the Last Year: No        Review of Systems   Constitutional:  Negative for activity change, appetite change and fever.   HENT: Negative.     Eyes: Negative.    Respiratory:  Negative for shortness of breath.    Cardiovascular:  Negative for chest pain.   Gastrointestinal:   Negative for abdominal pain.   Genitourinary:  Negative for difficulty urinating, dysuria, flank pain and hematuria.        Occasional hesitancy     Musculoskeletal:  Positive for back pain (attributes to laying in hospital bed).   Skin:  Positive for wound (left foot). Negative for rash.   Neurological:  Negative for dizziness, speech difficulty and headaches.   Psychiatric/Behavioral: Negative.          Objective     Physical Exam  Constitutional:       General: He is not in acute distress.     Appearance: Normal appearance. He is not ill-appearing or toxic-appearing.   HENT:      Head: Normocephalic and atraumatic.      Right Ear: External ear normal.      Left Ear: External ear normal.      Nose: Nose normal.      Mouth/Throat:      Mouth: Mucous membranes are moist.   Eyes:      Extraocular Movements: Extraocular movements intact.      Pupils: Pupils are equal, round, and reactive to light.   Cardiovascular:      Rate and Rhythm: Normal rate.      Pulses: Normal pulses.      Heart sounds: Normal heart sounds.   Pulmonary:      Effort: Pulmonary effort is normal. No respiratory distress.   Abdominal:      General: Abdomen is flat. Bowel sounds are normal. There is no distension.      Palpations: Abdomen is soft. There is no mass.      Tenderness: There is no guarding.   Genitourinary:     Comments: Uncircumcised penis, normal phallus, orthotopic patent meatus.  Testes smooth descended bilaterally into the scrotum nontender with no palpable mass.        Musculoskeletal:         General: Normal range of motion.      Cervical back: Normal range of motion and neck supple.   Skin:     General: Skin is warm and dry.   Neurological:      General: No focal deficit present.      Mental Status: He is alert and oriented to person, place, and time. Mental status is at baseline.   Psychiatric:         Mood and Affect: Mood normal.         Behavior: Behavior normal.         Thought Content: Thought content normal.          Judgment: Judgment normal.                Imaging:      Labs:  Lab Results   Component Value Date    SODIUM 141 05/06/2024    K 3.5 05/06/2024     05/06/2024    CO2 34 (H) 05/06/2024    BUN 20 05/06/2024    CREATININE 0.94 05/06/2024    GLUC 132 05/06/2024    CALCIUM 9.3 05/06/2024         Lab Results   Component Value Date    WBC 7.59 05/04/2024    HGB 15.4 05/04/2024    HCT 47.3 05/04/2024    MCV 94 05/04/2024     05/04/2024           JADEN Mehta

## 2024-05-06 NOTE — ASSESSMENT & PLAN NOTE
Wt Readings from Last 3 Encounters:   05/06/24 112 kg (248 lb)   05/22/23 119 kg (262 lb)   04/26/23 119 kg (263 lb)     he has known history of chronic diastolic CHF.  Repeat echo ordered 5/2/24 EF of 70% with grade 1 diastolic dysfunction.  Was taking Lasix 40 mg oral daily at home.  Trouble with leg swelling and blistering on his legs due to edema.  Initially received gentle hydration as has lactic acidosis followed by Lasix 40 mg IV twice daily and monitor response  diuresing very well . stop IV Lasix and switch to oral torsemide 20 mg twice daily and correct electrolytes  Will discharge on course of oral torsemide

## 2024-05-06 NOTE — ASSESSMENT & PLAN NOTE
Spiculated opacity in the right upper lobe and nodule in the left upper lobe as noted on previous CT exam. These are better followed on CT versus PET/CT as clinically indicated.   Discussed With wife patient has follow-up appointment coming up with [facility] oncology recommend to get CT chest prior to appointment and see them  History of lung cancer

## 2024-05-06 NOTE — DISCHARGE SUMMARY
[facility]  Discharge- Peter Busch 1955, 68 y.o. male MRN: [id number]  Unit/Bed#: [facility] Encounter: [id number]  Primary Care Provider: Regino Brooks DO   Date and time admitted to hospital: 5/1/2024  4:11 PM  * Acute on chronic diastolic (congestive) heart failure (HCC)  Assessment & Plan  Wt Readings from Last 3 Encounters:   05/06/24 112 kg (248 lb)   05/22/23 119 kg (262 lb)   04/26/23 119 kg (263 lb)     he has known history of chronic diastolic CHF.  Repeat echo ordered 5/2/24 EF of 70% with grade 1 diastolic dysfunction.  Was taking Lasix 40 mg oral daily at home.  Trouble with leg swelling and blistering on his legs due to edema.  Initially received gentle hydration as has lactic acidosis followed by Lasix 40 mg IV twice daily and monitor response  diuresing very well . stop IV Lasix and switch to oral torsemide 20 mg twice daily and correct electrolytes  Will discharge on course of oral torsemide      Cellulitis of left lower extremity  Assessment & Plan  Patient noted to have bilateral lower extremity cellulitis due to excessive edema and some blistering noted.  wound cultures gram neg brody and neg MRSA nasal swab.  Received a dose of vancomycin in the ED already will continue on IV Ancef for now as podiatry for evaluation for some wounds in between his toes and dry scaly skin on both feet  Patient definitely has venous stasis leading to a lot of skin changes and concerns for fluid overload on top of that due to CHF leading to blistering of the skin.  Procalcitonin level is negative he has mild leukocytosis.   Was placed on IV Ancef initially and then changed to cefepime.  MRI foot is negative for osteo.  Cellulitis appears to be slowly improving  Venous Duplex bilateral lower extremities negative for DVT .arterial circulation is triphasic  Continues to have a lot of neuropathic pain will increase Neurontin to 300 mg 3 times daily and added Requip correct electrolytes and  monitor for now  uric acid normal  and CRP elevated  Culture shows Stenotrophomonas maltophilia, Staph aureus, Alcaligenes faecalis  IV cefepime and switch to bactrim which will be continued at home and ask wound care for outpt follow up    Acute urinary retention  Assessment & Plan  His postvoid residual was over 500 x2 with 2 straight cath so far .  Patient continues to have high postvoid residuals however refusing straight cath or Mendes catheter.  Cont flomax but  increased to 0.4 mg twice daily.  Asked urology for evaluation    History of lung cancer  Assessment & Plan  Spiculated opacity in the right upper lobe and nodule in the left upper lobe as noted on previous CT exam. These are better followed on CT versus PET/CT as clinically indicated.   Discussed With wife patient has follow-up appointment coming up with [facility] oncology recommend to get CT chest prior to appointment and see them  History of lung cancer    Tobacco abuse  Assessment & Plan  Counselled on cessation and placed on nicotine replacement therapy    Type 2 diabetes mellitus, without long-term current use of insulin (HCC)  Assessment & Plan  Lab Results   Component Value Date    HGBA1C 7.2 (H) 05/01/2024       Recent Labs     05/05/24  1145 05/05/24  1616 05/05/24  2141 05/06/24  0739   POCGLU 151* 148* 196* 147*         Blood Sugar Average: Last 72 hrs:  (P) [id number] placed on insulin sliding scale and held oral agents for now  Blood sugars are well-controlled.resume home meds on discharge      Discharging Physician / Practitioner: Jaqueline Ferreira MD  PCP: Regino Brooks DO  Admission Date:   Admission Orders (From admission, onward)       Ordered        05/01/24 1659  INPATIENT ADMISSION  Once                          Discharge Date: 05/06/24    Medical Problems       Resolved Problems  Date Reviewed: 5/6/2024   None         Consultations During Hospital Stay:  Podiatry    Procedures Performed:   none    Significant Findings  / Test Results:   MRI foot/forefoot toes left wo contrast    Result Date: 5/3/2024  Impression: Limited evaluation secondary to motion artifact. No findings to suggest acute osteomyelitis or abscess. Workstation performed: [id number]     XR chest portable    Result Date: 5/3/2024  Impression: No acute cardiopulmonary disease. Spiculated opacity in the right upper lobe and nodule in the left upper lobe as noted on previous CT exam. These are better followed on CT versus PET/CT as clinically indicated. Workstation performed: [id number]     XR foot 3+ vw right    Result Date: 5/3/2024  Impression: No acute osseous abnormality. Workstation performed: [id number]     XR foot 3+ vw left    Result Date: 5/3/2024  Impression: No acute osseous abnormality. Workstation performed: [id number]      Incidental Findings:   none    Test Results Pending at Discharge (will require follow up):   none     Outpatient Tests Requested:  Bmp in 1 week  Outpt ct chest and fu with oncology    Complications:  none    Reason for Admission: Bilateral lower extremity cellulitis    Hospital Course:     Peter Busch is a 68 y.o. male patient who originally presented to the hospital on 5/1/2024 due to bilateral lower extremity cellulitis.  Patient also had acute on chronic diastolic CHF received IV Lasix for diuresis he improved with the swelling and then transition to oral torsemide.   Will place on torsemide 20 mg daily as long as weight is below 244 pounds.  If weight goes above 244 pounds then take 30 mg daily.  Repeat BMP in 1 week outpatient   also received a dose of IV vancomycin in the ED followed by IV Ancef but still having significant cellulitis and switch to IV cefepime.  Patient is now clinically improving and transition to oral Augmentin.  Will discharge with course of antibiotics on bactrim based on cultures .  Recommended wound care follow-up.    Patient has known history of lung cancer and had abnormal finding on chest  "x-ray recommend CT chest and follow-up with oncology.  He was also noted to have acute urinary retention and will ask urology to evaluate him prior to discharge.  Patient is refusing to have Mendes catheter placed.  Increased dose of Flomax for now    Significant neuropathic pain in the legs and creased Neurontin and will discharge on short course of Percocet in addition to Tylenol and follow-up further with oncology      Please see above list of diagnoses and related plan for additional information.     Condition at Discharge: good     Discharge Day Visit / Exam:     Subjective: he denies any chest pain or shortness of breath or abdominal pain  Vitals: Blood Pressure: 114/76 (05/06/24 0736)  Pulse: 82 (05/05/24 2223)  Temperature: (!) 97.2 °F (36.2 °C) (05/06/24 0736)  Temp Source: Temporal (05/06/24 0736)  Respirations: 18 (05/06/24 0736)  Height: 6' 1\" (185.4 cm) (05/02/24 1529)  Weight - Scale: 112 kg (248 lb) (05/06/24 0600)  SpO2: 90 % (05/05/24 2223)  Exam:   Physical Exam  Vitals and nursing note reviewed.   Constitutional:       Appearance: Normal appearance.   HENT:      Head: Normocephalic and atraumatic.      Right Ear: External ear normal.      Left Ear: External ear normal.      Nose: Nose normal.      Mouth/Throat:      Pharynx: Oropharynx is clear.   Eyes:      Pupils: Pupils are equal, round, and reactive to light.   Cardiovascular:      Rate and Rhythm: Normal rate and regular rhythm.      Heart sounds: Normal heart sounds.   Pulmonary:      Effort: Pulmonary effort is normal.      Breath sounds: Normal breath sounds.   Abdominal:      General: Bowel sounds are normal.      Palpations: Abdomen is soft.      Tenderness: There is no abdominal tenderness.   Musculoskeletal:         General: Normal range of motion.      Cervical back: Normal range of motion and neck supple.      Comments: Resolving erythema and edema of bilateral lower extremity and tenderness on palpation of both legs.   Skin:     " General: Skin is warm and dry.      Capillary Refill: Capillary refill takes less than 2 seconds.   Neurological:      General: No focal deficit present.      Mental Status: He is alert and oriented to person, place, and time.   Psychiatric:         Mood and Affect: Mood normal.         Discussion with Family: discussed with wife    Discharge instructions/Information to patient and family:   See after visit summary for information provided to patient and family.      Provisions for Follow-Up Care:  See after visit summary for information related to follow-up care and any pertinent home health orders.      Disposition:     Home with VNA Services (Reminder: Complete face to face encounter)    For Discharges to [facility]:   Not Applicable to this Patient - Not Applicable to this Patient    Planned Readmission: none     Discharge Statement:  I spent 35 minutes discharging the patient. This time was spent on the day of discharge. I had direct contact with the patient on the day of discharge. Greater than 50% of the total time was spent examining patient, answering all patient questions, arranging and discussing plan of care with patient as well as directly providing post-discharge instructions.  Additional time then spent on discharge activities.    Discharge Medications:  See after visit summary for reconciled discharge medications provided to patient and family.      ** Please Note: This note has been constructed using a voice recognition system **

## 2024-05-06 NOTE — ASSESSMENT & PLAN NOTE
His postvoid residual was over 500 x2 with 2 straight cath so far .  Patient continues to have high postvoid residuals however refusing straight cath or Mendes catheter.  Cont flomax but  increased to 0.4 mg twice daily.  Asked urology for evaluation

## 2024-05-06 NOTE — ASSESSMENT & PLAN NOTE
History of bladder cancer with radiation therapy  S/P cystoscopy, ureteroscopy with fulguration of large bladder tumor on July , 2014  Chronic intermittent urinary retention since that time  Creatinine 0.79 (5/4/24)  Retention protocol  Successful management CIC since July 2014--endorses monthly routine per prior Urologist  Understands  & acknowledges indication for more frequent CIC  History of BPH  Continue Flomax 0.4mg with evening meals  Maintain normal bowel movements  OP follow up for health maintenance  Last PSA April 2023---0.93

## 2024-05-06 NOTE — ASSESSMENT & PLAN NOTE
Patient noted to have bilateral lower extremity cellulitis due to excessive edema and some blistering noted.  wound cultures gram neg brody and neg MRSA nasal swab.  Received a dose of vancomycin in the ED already will continue on IV Ancef for now as podiatry for evaluation for some wounds in between his toes and dry scaly skin on both feet  Patient definitely has venous stasis leading to a lot of skin changes and concerns for fluid overload on top of that due to CHF leading to blistering of the skin.  Procalcitonin level is negative he has mild leukocytosis.   Was placed on IV Ancef initially and then changed to cefepime.  MRI foot is negative for osteo.  Cellulitis appears to be slowly improving  Venous Duplex bilateral lower extremities negative for DVT .arterial circulation is triphasic  Continues to have a lot of neuropathic pain will increase Neurontin to 300 mg 3 times daily and added Requip correct electrolytes and monitor for now  uric acid normal  and CRP elevated  Culture shows Stenotrophomonas maltophilia, Staph aureus, Alcaligenes faecalis  IV cefepime and switch to Augmentin which will be continued at home and ask wound care for outpt follow up

## 2024-05-06 NOTE — CASE MANAGEMENT
Case Management Discharge Planning Note    Patient name Peter Busch  Location /-01 MRN [id number]  : 1955 Date 2024       Current Admission Date: 2024  Current Admission Diagnosis:Acute on chronic diastolic (congestive) heart failure (HCC)   Patient Active Problem List    Diagnosis Date Noted    Acute urinary retention 2024    Acute on chronic diastolic (congestive) heart failure (HCC) 2024    Cellulitis of left lower extremity 2024    Leg swelling 2022    Acute respiratory failure with hypoxia (HCC) 2022    Type 2 diabetes mellitus, without long-term current use of insulin (HCC) 09/15/2021    Tobacco abuse 09/15/2021    COPD with acute exacerbation ([facility]) 09/15/2021    BPH with obstruction/lower urinary tract symptoms 09/15/2021    History of lung cancer 09/15/2021    Acute hypoxemic respiratory failure due to COVID-19 ([facility]) 2021      LOS (days): 5  Geometric Mean LOS (GMLOS) (days): 3.9  Days to GMLOS:-0.8     OBJECTIVE:  Risk of Unplanned Readmission Score: 7.24         Current admission status: Inpatient   Preferred Pharmacy:   [id number] [address] [address]  Phone: [phone] Fax: [phone]    Primary Care Provider: Regino Brooks DO    Primary Insurance: [employer]  Secondary Insurance: [place] [employer] [facility]    DISCHARGE DETAILS:    Recommendations for dc is HHC    Discharge planning discussed with:: patient  Freedom of Choice: Yes  Comments - Freedom of Choice: reviewed list of accepting HHC agencies from lee [place] is [facility]  CM contacted family/caregiver?: Yes (declined,)          Requested Home Health Care         Is the patient interested in HHC at discharge?: Yes  Home Health Discipline requested:: Nursing, Occupational Therapy, Physical Therapy  Home Health Agency Name:: [facility]  [facility]  External Referral Reason (only applicable if external HHA name selected): Patient refused suggestion for recommended provider  Home Health Follow-Up Provider:: PCP  Home Health Services Needed:: Heart Failure Management, Strengthening/Theraputic Exercises to Improve Function, Evaluate Functional Status and Safety  Homebound Criteria Met:: Uses an Assist Device (i.e. cane, walker, etc)  Supporting Clincal Findings:: Fatigues Easliy in Short Distances, Limited Endurance    DME Referral Provided  Referral made for DME?: No    Other Referral/Resources/Interventions Provided:  Interventions: HHC  Referral Comments: Accepted [facility] on [facility]         Treatment Team Recommendation: Home with Home Health Care  Discharge Destination Plan:: Home with Home Health Care ([facility])                 IMM Given (Date):: 05/06/24  IMM Given to:: Patient (verbal reviewed as pt is in isolation, a copy of the IMM was provided to patient.)        CM will continue to follow.

## 2024-05-06 NOTE — TELEPHONE ENCOUNTER
Please call patient for OP follow up.  Admitted with CHF, found to have urinary retention at admission.    History of bladder cancer, BPH, retention.  Self manages CIC, nonurgent appointment.  Diagnosed 2014, Did practice strict bladder cancer follow-up in the past.      Prior urologist Melony Funez.  Is considering seeing [facility] urology.    Would like a couple days after discharge to be settled before phone call.

## 2024-05-06 NOTE — NURSING NOTE
Patient's IV removed, catheter intact. AVS reviewed with patient and patient's s/o including medication changes, follow up appts, and salt/fluid restriction. All concerns addressed and verbalized understanding. Patient escorted out via wheelchair by this LPN.

## 2024-05-06 NOTE — PROGRESS NOTES
Patient:    MRN:  [id number]    Jef[place] Request ID:  [id number]    Level of care reserved:  Home Health Agency    Partner Reserved:  [facility] [place] [address] [phone]    Clinical needs requested:    Geography searched:  [id number]    Start of Service:    Request sent:  2:04pm EDT on 5/4/2024 by Corrina Marin    Partner reserved:  10:22am EDT on 5/6/2024 by Judi Jeronimo    Choice list shared:  6:57am EDT on 5/6/2024 by Judi Jeronimo

## 2024-05-06 NOTE — ASSESSMENT & PLAN NOTE
Lab Results   Component Value Date    HGBA1C 7.2 (H) 05/01/2024       Recent Labs     05/05/24  1145 05/05/24  1616 05/05/24  2141 05/06/24  0739   POCGLU 151* 148* 196* 147*         Blood Sugar Average: Last 72 hrs:  (P) [id number] placed on insulin sliding scale and held oral agents for now  Blood sugars are well-controlled.resume home meds on discharge

## 2024-05-07 NOTE — TELEPHONE ENCOUNTER
Tried calling patient to get scheduled with Dr. D'Amico on 5/23 at either 0900 or 1400, mailbox is full unable to leave a message will try again later

## 2024-05-07 NOTE — UTILIZATION REVIEW
NOTIFICATION OF ADMISSION DISCHARGE   This is a Notification of Discharge from [facility]. Please be advised that this patient has been discharge from our facility. Below you will find the admission and discharge date and time including the patient’s disposition.   UTILIZATION REVIEW CONTACT:  Kitty Smith  Utilization   Network Utilization Review Department  Phone: [phone] x carefully listen to the prompts. All voicemails are confidential.  Email: [email or web address]     ADMISSION INFORMATION  PRESENTATION DATE: 5/1/2024  4:11 PM  OBERVATION ADMISSION DATE:   INPATIENT ADMISSION DATE: 5/1/24  4:59 PM   DISCHARGE DATE: 5/6/2024  3:01 PM   DISPOSITION:Home with Home Health Care    Network Utilization Review Department  ATTENTION: Please call with any questions or concerns to [phone] and carefully listen to the prompts so that you are directed to the right person. All voicemails are confidential.   For Discharge needs, contact Care Management DC Support Team at [phone] opt. [phone]  Send all requests for admission clinical reviews, approved or denied determinations and any other requests to dedicated fax number below belonging to the campus where the patient is receiving treatment. List of dedicated fax numbers for the Facilities:  FACILITY NAME UR FAX NUMBER   ADMISSION DENIALS (Administrative/Medical Necessity) [phone]   DISCHARGE SUPPORT TEAM ([facility]) [phone]   PARENT CHILD HEALTH (Maternity/NICU/Pediatrics) [phone]   [facility] [phone]   [facility] [phone]   [facility] [phone]   [facility] [phone]   [facility] [phone]   General acute hospital [phone]   [facility] [phone]   [facility]  [place] [phone]   [facility] [phone]   [facility] [phone]   [facility] [phone]   [facility] [phone]

## 2024-05-08 LAB
ALBUMIN SERPL BCP-MCNC: 4.3 G/DL (ref 3.5–5)
ALP SERPL-CCNC: 66 U/L (ref 34–104)
ALT SERPL W P-5'-P-CCNC: 33 U/L (ref 7–52)
ANION GAP SERPL CALCULATED.3IONS-SCNC: 13 MMOL/L (ref 4–13)
AST SERPL W P-5'-P-CCNC: 21 U/L (ref 13–39)
BILIRUB SERPL-MCNC: 0.5 MG/DL (ref 0.2–1)
BUN SERPL-MCNC: 15 MG/DL (ref 5–25)
CALCIUM SERPL-MCNC: 9.5 MG/DL (ref 8.4–10.2)
CHLORIDE SERPL-SCNC: 98 MMOL/L (ref 96–108)
CO2 SERPL-SCNC: 28 MMOL/L (ref 21–32)
CREAT SERPL-MCNC: 1.05 MG/DL (ref 0.6–1.3)
GFR SERPL CREATININE-BSD FRML MDRD: 72 ML/MIN/1.73SQ M
GLUCOSE SERPL-MCNC: 111 MG/DL (ref 65–140)
POTASSIUM SERPL-SCNC: 3.1 MMOL/L (ref 3.5–5.3)
PROT SERPL-MCNC: 7.8 G/DL (ref 6.4–8.4)
SODIUM SERPL-SCNC: 139 MMOL/L (ref 135–147)

## 2024-05-13 NOTE — TELEPHONE ENCOUNTER
Tried calling patient to get scheduled with Dr. D'Amico on 5/23 at either 0900 or 1400, mailbox is full unable to leave a message will try again later             Attempted to reach patient 3x mailed letter

## 2024-05-30 ENCOUNTER — HOSPITAL ENCOUNTER (OUTPATIENT)
Dept: ULTRASOUND IMAGING | Facility: HOSPITAL | Age: 69
End: 2024-05-30
Payer: COMMERCIAL

## 2024-05-30 DIAGNOSIS — E04.1 THYROID NODULE: ICD-10-CM

## 2024-05-30 PROCEDURE — 76536 US EXAM OF HEAD AND NECK: CPT

## 2024-06-04 ENCOUNTER — HOSPITAL ENCOUNTER (OUTPATIENT)
Dept: CT IMAGING | Facility: HOSPITAL | Age: 69
Discharge: HOME | End: 2024-06-04
Payer: COMMERCIAL

## 2024-06-04 DIAGNOSIS — R91.1 SOLITARY PULMONARY NODULE: ICD-10-CM

## 2024-06-04 DIAGNOSIS — C34.11 MALIGNANT NEOPLASM OF UPPER LOBE, RIGHT BRONCHUS OR LUNG (HCC): ICD-10-CM

## 2024-06-04 PROCEDURE — 71250 CT THORAX DX C-: CPT

## 2024-06-18 RX ORDER — TRAMADOL HYDROCHLORIDE 50 MG/1
TABLET ORAL
COMMUNITY
Start: 2024-06-01

## 2024-06-18 RX ORDER — METFORMIN HYDROCHLORIDE 500 MG/1
TABLET, EXTENDED RELEASE ORAL
COMMUNITY
Start: 2024-05-17

## 2024-06-18 RX ORDER — DAPAGLIFLOZIN 10 MG/1
10 TABLET, FILM COATED ORAL DAILY
COMMUNITY
Start: 2024-05-30

## 2024-06-18 RX ORDER — POTASSIUM CHLORIDE 750 MG/1
TABLET, FILM COATED, EXTENDED RELEASE ORAL
COMMUNITY
Start: 2024-06-01

## 2024-06-24 ENCOUNTER — TELEPHONE (OUTPATIENT)
Dept: UROLOGY | Facility: CLINIC | Age: 69
End: 2024-06-24

## 2024-06-24 PROBLEM — R33.8 BENIGN PROSTATIC HYPERPLASIA WITH URINARY RETENTION: Status: ACTIVE | Noted: 2021-09-15

## 2024-06-24 PROBLEM — Z78.9 INTERMITTENT SELF-CATHETERIZATION OF BLADDER: Status: ACTIVE | Noted: 2024-06-24

## 2024-06-24 PROBLEM — Z85.51 HX OF BLADDER CANCER: Status: ACTIVE | Noted: 2024-06-24

## 2024-06-24 NOTE — PROGRESS NOTES
UROLOGY PROGRESS NOTE         NAME: Peter Busch  AGE: 68 y.o. SEX: male  : 1955   MRN: [id number]    DATE: 2024  TIME: 9:15 AM    Assessment and Plan   Procedures     Impression:   1. Hx of bladder cancer  2. Intermittent self-catheterization of bladder  3. Benign prostatic hyperplasia with urinary retention       Plan: Please see note below with explanation, that the patient wants to be seen with [facility] urology and was not sure why he was sent here from [facility].  I did not evaluate the patient today and therefore should not have an office charge.    I did tell the patient with his history of bladder cancer and incomplete emptying he should see the urology department and we are helping to facilitate his care to [facility] for an appointment.  Patient was satisfied.  Again no charge for the patient since no evaluation was complete      Chief Complaint   No chief complaint on file.    History of Present Illness     HPI: Peter Busch is a 68 y.o. year old male who presents with follow-up from physician assistant note signed by Dr. Gillette on 2024 and a full consult done by the physician assistant Alvina Murray.  Patient with a history of bladder cancer BPH and urinary retention.  Patient self caths once to see [facility] urology prior history with [facility].  Per physician assistant note on 2024 patient with history of bladder cancer with radiation therapy.  He had cystoscopy and laser of a large bladder tumor 2014.  He has been on chronic intermittent cath for retention.  Currently takes Flomax.  Last PSA 2023 0.93.  I do not see when his last cystoscopy was with [facility].  PSA 2024 1.1.    Also sees [facility] radiation oncology regarding neoplasm of the right upper lobe of the lung.    His last cystoscopy with [facility] was .  Will discuss with the patient regarding cystoscopies in the future and we will get a cytology as  well.    Upon discussion with the patient he states he prefers to have his urology care done with [facility] urology.  He stated he is not sure why he was sent here today from [facility] ,.  I told the patient I was fine as long as he gets proper follow-up with urology and if he has been seeing [facility] urology and always care there that is fine keeping his care there.  I recommended him seeing them to consider looking in his bladder for his history of bladder cancer and discussed with him his inability to empty his bladder with resuming intermittent catheterization.  Patient agrees.          The following portions of the patient's history were reviewed and updated as appropriate: allergies, current medications, past family history, past medical history, past social history, past surgical history and problem list.  Past Medical History:   Diagnosis Date    Bladder cancer (HCC)     BPH (benign prostatic hyperplasia)     COPD (chronic obstructive pulmonary disease) (HCC)     Diabetes mellitus (HCC)     Hyperlipidemia     Lung cancer (HCC)     Rib fractures      Past Surgical History:   Procedure Laterality Date    BLADDER INSTILLATION      Anticarcinogenic 7/31/2014    BRONCHOSCOPY      CATARACT EXTRACTION, BILATERAL      CYSTOSCOPY      HAND SURGERY Left     TONSILLECTOMY      TRANSURETHRAL RESECTION OF PROSTATE       shoulder  Review of Systems     Const: Denies chills, fever and weight loss.  CV: Denies chest pain.  Resp: Denies SOB.  GI: Denies abdominal pain, nausea and vomiting.  : Denies symptoms other than stated above.  Musculo: Denies back pain.    Objective   There were no vitals taken for this visit.    Physical Exam  Const: Appears healthy and well developed. No signs of acute distress present.  Resp: Respirations are regular and unlabored.   CV: Rate is regular. Rhythm is regular.  Abdomen: Abdomen is soft, nontender, and nondistended. Kidneys are not palpable.  : dnp  Psych: Patient's attitude is  cooperative. Mood is normal. Affect is normal.    Current Medications     Current Outpatient Medications:     albuterol (PROVENTIL HFA,VENTOLIN HFA) 90 mcg/act inhaler, Inhale 2 puffs every 6 (six) hours as needed, Disp: , Rfl:     Aspirin Buf,CaCarb-MgCarb-MgO, 81 MG TABS, Take 81 mg by mouth, Disp: , Rfl:     atorvastatin (LIPITOR) 40 mg tablet, Take 40 mg by mouth daily, Disp: , Rfl:     Farxiga 10 MG tablet, Take 10 mg by mouth daily, Disp: , Rfl:     finasteride (PROSCAR) 5 mg tablet, take 1 tablet by mouth once daily, Disp: , Rfl:     fluticasone-umeclidinium-vilanterol (Trelegy Ellipta) 200-62.5-25 MCG/INH AEPB inhaler, Inhale 1 puff daily Rinse mouth after use., Disp: 60 blister, Rfl: 0    gabapentin (NEURONTIN) 300 mg capsule, Take 1 capsule (300 mg total) by mouth 3 (three) times a day, Disp: 90 capsule, Rfl: 0    glipiZIDE (GLUCOTROL XL) 5 mg 24 hr tablet, Take 1 tablet (5 mg total) by mouth daily, Disp: 30 tablet, Rfl: 0    Jardiance 25 MG TABS, take 1/2 tablet by mouth once daily every morning for 8 days then take 1 tablet daily THEREAFTER, Disp: , Rfl:     Klor-Con 10 10 MEQ tablet, , Disp: , Rfl:     levothyroxine 25 mcg tablet, Take 25 mcg by mouth daily, Disp: , Rfl:     metFORMIN (GLUCOPHAGE-XR) 500 mg 24 hr tablet, Take 2 tablets by mouth every morning with breakfast and 2 tablets every evening with dinner, Disp: , Rfl:     Multiple Vitamin (One-A-Day Essential) TABS, Take by mouth, Disp: , Rfl:     nicotine (NICODERM CQ) 21 mg/24 hr TD 24 hr patch, Place 1 patch on the skin over 24 hours daily, Disp: 28 patch, Rfl: 0    nystatin (MYCOSTATIN) powder, Apply topically 2 (two) times a day for 10 days, Disp: 30 g, Rfl: 0    potassium chloride (Klor-Con M20) 20 mEq tablet, Take 1 tablet (20 mEq total) by mouth daily for 30 doses, Disp: 30 tablet, Rfl: 0    rOPINIRole (REQUIP) 0.5 mg tablet, Take 1 tablet (0.5 mg total) by mouth 3 (three) times a day, Disp: 90 tablet, Rfl: 0    tamsulosin (FLOMAX) 0.4  mg, Take 1 capsule (0.4 mg total) by mouth 2 (two) times a day, Disp: 60 capsule, Rfl: 0    torsemide (DEMADEX) 20 mg tablet, Take 1.5 tablets (30 mg total) by mouth daily, Disp: 45 tablet, Rfl: 0    traMADol (ULTRAM) 50 mg tablet, , Disp: , Rfl:     TURMERIC-FISH OIL PO, Take by mouth, Disp: , Rfl:         Kirk Hearn MD

## 2024-06-24 NOTE — TELEPHONE ENCOUNTER
Call placed to pt previous urologist to find out when his last cysto was. Per their office last cysto 2015, and per chart review 2017.

## 2024-07-03 ENCOUNTER — OFFICE VISIT (OUTPATIENT)
Dept: UROLOGY | Facility: CLINIC | Age: 69
End: 2024-07-03
Payer: COMMERCIAL

## 2024-07-03 VITALS
HEART RATE: 60 BPM | SYSTOLIC BLOOD PRESSURE: 122 MMHG | HEIGHT: 73 IN | TEMPERATURE: 98 F | WEIGHT: 249 LBS | OXYGEN SATURATION: 88 % | BODY MASS INDEX: 33 KG/M2 | DIASTOLIC BLOOD PRESSURE: 80 MMHG

## 2024-07-03 DIAGNOSIS — Z85.51 HX OF BLADDER CANCER: Primary | ICD-10-CM

## 2024-07-03 DIAGNOSIS — Z78.9 INTERMITTENT SELF-CATHETERIZATION OF BLADDER: ICD-10-CM

## 2024-07-03 DIAGNOSIS — R33.8 BENIGN PROSTATIC HYPERPLASIA WITH URINARY RETENTION: ICD-10-CM

## 2024-07-03 DIAGNOSIS — N40.1 BENIGN PROSTATIC HYPERPLASIA WITH URINARY RETENTION: ICD-10-CM

## 2024-07-03 LAB — POST-VOID RESIDUAL VOLUME, ML POC: 367 ML

## 2024-07-03 PROCEDURE — 51798 US URINE CAPACITY MEASURE: CPT

## 2024-07-03 RX ORDER — MELOXICAM 15 MG/1
15 TABLET ORAL DAILY
COMMUNITY

## 2024-07-03 RX ORDER — FUROSEMIDE 40 MG/1
40 TABLET ORAL 2 TIMES DAILY
COMMUNITY

## 2024-07-17 ENCOUNTER — OFFICE VISIT (OUTPATIENT)
Dept: PULMONOLOGY | Facility: CLINIC | Age: 69
End: 2024-07-17

## 2024-07-17 VITALS
TEMPERATURE: 97.2 F | HEART RATE: 110 BPM | WEIGHT: 249 LBS | BODY MASS INDEX: 33 KG/M2 | HEIGHT: 73 IN | OXYGEN SATURATION: 93 % | SYSTOLIC BLOOD PRESSURE: 128 MMHG | DIASTOLIC BLOOD PRESSURE: 78 MMHG

## 2024-07-17 DIAGNOSIS — J44.9 CHRONIC OBSTRUCTIVE PULMONARY DISEASE, UNSPECIFIED COPD TYPE (HCC): Primary | ICD-10-CM

## 2024-07-17 DIAGNOSIS — Z72.0 TOBACCO ABUSE: ICD-10-CM

## 2024-07-17 DIAGNOSIS — C34.90 ADENOCARCINOMA OF LUNG, UNSPECIFIED LATERALITY (HCC): ICD-10-CM

## 2024-07-17 NOTE — PROGRESS NOTES
Pulmonary follow-up  Peter Busch 68 y.o. male MRN: [id number]  7/17/2024      Assessment:  Very severe COPD  As per outside record last FEV1 of 42%  Seems chronic bronchitis phenotypes  Minimal a.m. productive cough on daily basis  Last exacerbation was in 4/2022  Stable no signs of exacerbation currently  On triple inhalers Trelegy Ellipta 200, noted less use of PRN albuterol since started it    Plan:  Continue Trelegy Ellipta 200, PRN albuterol  PRN DuoNeb  Up-to-date on immunization  6-minute walk test today showed no exertional hypoxemia, may use oxygen as PRN    Active tobacco abuse  For 50+ years, smoked 1-1.5 PPD  Currently smokes about 1.5-1 2 PPD's  Counseled extensively, not interested in quitting    Adenocarcinoma of the lung  Follows with radiation oncology  S/p SBRT  On surveillance CT chest    Return in about 6 months (around 1/17/2025).        History of Present Illness     Follow-up for: COPD      Background  68 y.o. male with a h/o bladder cancer, BPH, DM2, very severe COPD/FEV1 42%, rib fractures, dyslipidemia, lung cancer, tobacco abuse, lung cancer s/p radiation therapy in 2023 and 2020, chronic hypoxic respiratory failure, diastolic CHF    Used to be seen by pulmonary at [facility] Dr. Brooks.  Last seen by telephone encounter in 12/2023.  Known to have ARLEEN nodule, was empirically treated with radiation therapy until 9/2023.  Also known to have RUL carcinoma diagnosed in 2020 with radiation therapy as well.  Maintained on Trelegy Ellipta 200, PRN albuterol.  He was smoking at that time about 30 cigarettes daily.    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  S/p SBRT to the progressive ARLEEN nodule on 9/2023.  Considered to be doing well overall based on recent imaging, continue surveillance CT chest in 6 months.    Interval history  Last hospitalized for COPD exacerbation/pneumonia like symptoms associated with volume  "overload in 4/2022.  Since then hospitalized in 5/2024 for acute hypoxic respiratory failure from CHF exacerbation.    Over the past several weeks, has been stable.  Still with chronic productive cough to yellow/clear sputum.  Actively smokes 1.5-2 PPD.  Will to walk on a surface level long distance for 3 to 4 minutes before taking a break, going up steps/uphill is difficult but does not have to stop.  On Trelegy Ellipta 200 for several months, using PRN albuterol once a day does not use nebulizer for long time.    Review of Systems  As per hpi, all other systems reviewed and were negative.        Studies:  Imaging and other studies: I have personally reviewed pertinent films in PACS      Pulmonary function testing:   Reviewed as per outside records    6-minute walk test 7/17/2024-baseline SpO2 96% on room air, heart rate 108, able to walk 240 m in 6 minutes, lowest SpO2 at 90%, maximal heart rate 131.    EKG, Pathology, and Other Studies: I have personally reviewed pertinent reports.          Historical Information   Past Medical History:   Diagnosis Date    Bladder cancer (HCC)     BPH (benign prostatic hyperplasia)     COPD (chronic obstructive pulmonary disease) (HCC)     Diabetes mellitus (HCC)     Hyperlipidemia     Lung cancer (HCC)     Rib fractures      Past Surgical History:   Procedure Laterality Date    BLADDER INSTILLATION      Anticarcinogenic 7/31/2014    BRONCHOSCOPY      CATARACT EXTRACTION, BILATERAL      CYSTOSCOPY      HAND SURGERY Left     TONSILLECTOMY      TRANSURETHRAL RESECTION OF PROSTATE       Family History   Problem Relation Age of Onset    Hypertension Mother     No Known Problems Father          Medications/Allergies: Reviewed    Vitals: Blood pressure 128/78, pulse (!) 110, temperature (!) 97.2 °F (36.2 °C), height 6' 1\" (1.854 m), weight 113 kg (249 lb), SpO2 93%. Body mass index is 32.85 kg/m². Oxygen Therapy  SpO2: 93 %      Physical Exam  Body mass index is 32.85 kg/m².   Gen: not " "in acute distress, obese  Neck/Eyes: supple, moderate to severe thoracic kyphosis, PERRL  Ear: normal appearance, no significant hearing impairment  Nose:  normal nasal mucosa, no drainage  Chest: normal respiratory efforts, basilar coarse crackles, clear sounds at the mid/apex of both lungs  CV: Distant heart sounds, no murmurs appreciated, 2+ lower extremity lymphedema  Abdomen: soft, non tender  Extremities:  No observed deformity   Skin: unremarkable  Neuro: AAO X3, no focal motor deficit         Labs:  Lab Results   Component Value Date    WBC 7.59 05/04/2024    HGB 15.4 05/04/2024    HCT 47.3 05/04/2024    MCV 94 05/04/2024     05/04/2024     Lab Results   Component Value Date    CALCIUM 10.1 07/12/2024    K 3.3 (L) 07/12/2024    CO2 27 07/12/2024    CL 98 07/12/2024    BUN 14 07/12/2024    CREATININE 0.9 07/12/2024     No results found for: \"IGE\"  Lab Results   Component Value Date    ALT 37 06/18/2024    AST 23 06/18/2024    ALKPHOS 91 06/18/2024           Portions of the record may have been created with voice recognition software.  Occasional wrong word or \"sound a like\" substitutions may have occurred due to the inherent limitations of voice recognition software.  Read the chart carefully and recognize, using context, where substitutions have occurred    Laureen Joy M.D.  [facility]  "

## 2024-08-13 ENCOUNTER — DOCTOR'S OFFICE (OUTPATIENT)
Dept: URBAN - NONMETROPOLITAN AREA CLINIC 1 | Facility: CLINIC | Age: 69
Setting detail: OPHTHALMOLOGY
End: 2024-08-13
Payer: COMMERCIAL

## 2024-08-13 VITALS — HEIGHT: 60 IN

## 2024-08-13 DIAGNOSIS — H43.813: ICD-10-CM

## 2024-08-13 DIAGNOSIS — H35.373: ICD-10-CM

## 2024-08-13 PROBLEM — H59.811 CHORIORETINAL SCARS AFTER SURGERY FOR RD; RIGHT EYE: Status: ACTIVE | Noted: 2024-08-13

## 2024-08-13 PROBLEM — H26.491 POSTERIOR CAPSULAR OPACIFICATION; RIGHT EYE: Status: ACTIVE | Noted: 2024-08-13

## 2024-08-13 PROBLEM — H04.123 DRY EYE SYNDROME LACRIMAL GLAND; BOTH EYES: Status: ACTIVE | Noted: 2024-08-13

## 2024-08-13 PROBLEM — E11.9 TYPE 2 DIABETES MELLITUS WITHOUT COMPLICATIONS: Status: ACTIVE | Noted: 2024-08-13

## 2024-08-13 PROCEDURE — 92134 CPTRZ OPH DX IMG PST SGM RTA: CPT

## 2024-08-13 ASSESSMENT — LID EXAM ASSESSMENTS
OD_BLEPHARITIS: RLL RUL T
OS_BLEPHARITIS: LLL LUL T

## 2024-08-13 ASSESSMENT — LID POSITION - PTOSIS
OD_PTOSIS: T
OS_PTOSIS: T

## 2024-08-13 ASSESSMENT — CONFRONTATIONAL VISUAL FIELD TEST (CVF)
OS_FINDINGS: FULL
OD_FINDINGS: FULL

## 2024-09-03 NOTE — NURSING NOTE
"Pt PVR 666ml. Pt educated on urinary retention protocol by this RN. Pt stated multiple times \"I do not empty my bladder completely, my urologist said this is okay\". Explained and educated pt on the importance of monitoring for retention and the risks of not emptying bladder appropriately. Pt refusing to be bladder scanned and straight cathed. Provider made aware.   " Induction of labor-AROM/External electronic FM

## 2024-09-18 ENCOUNTER — HOSPITAL ENCOUNTER (EMERGENCY)
Facility: HOSPITAL | Age: 69
Discharge: HOME | End: 2024-09-18
Attending: EMERGENCY MEDICINE | Admitting: EMERGENCY MEDICINE
Payer: COMMERCIAL

## 2024-09-18 ENCOUNTER — APPOINTMENT (EMERGENCY)
Dept: RADIOLOGY | Facility: HOSPITAL | Age: 69
End: 2024-09-18
Payer: COMMERCIAL

## 2024-09-18 VITALS
OXYGEN SATURATION: 93 % | SYSTOLIC BLOOD PRESSURE: 127 MMHG | HEART RATE: 75 BPM | RESPIRATION RATE: 17 BRPM | DIASTOLIC BLOOD PRESSURE: 78 MMHG | TEMPERATURE: 98.4 F

## 2024-09-18 DIAGNOSIS — E87.6 HYPOKALEMIA: ICD-10-CM

## 2024-09-18 DIAGNOSIS — R60.0 BILATERAL LEG EDEMA: ICD-10-CM

## 2024-09-18 DIAGNOSIS — E83.42 HYPOMAGNESEMIA: ICD-10-CM

## 2024-09-18 DIAGNOSIS — L03.90 CELLULITIS: Primary | ICD-10-CM

## 2024-09-18 LAB
ALBUMIN SERPL BCG-MCNC: 4.3 G/DL (ref 3.5–5)
ALP SERPL-CCNC: 63 U/L (ref 34–104)
ALT SERPL W P-5'-P-CCNC: 23 U/L (ref 7–52)
ANION GAP SERPL CALCULATED.3IONS-SCNC: 11 MMOL/L (ref 4–13)
APTT PPP: 31 SECONDS (ref 23–34)
AST SERPL W P-5'-P-CCNC: 19 U/L (ref 13–39)
BASOPHILS # BLD AUTO: 0.11 THOUSANDS/ΜL (ref 0–0.1)
BASOPHILS NFR BLD AUTO: 1 % (ref 0–1)
BILIRUB SERPL-MCNC: 0.63 MG/DL (ref 0.2–1)
BNP SERPL-MCNC: 52 PG/ML (ref 0–100)
BUN SERPL-MCNC: 10 MG/DL (ref 5–25)
CALCIUM SERPL-MCNC: 9.5 MG/DL (ref 8.4–10.2)
CARDIAC TROPONIN I PNL SERPL HS: 5 NG/L
CHLORIDE SERPL-SCNC: 97 MMOL/L (ref 96–108)
CO2 SERPL-SCNC: 34 MMOL/L (ref 21–32)
CREAT SERPL-MCNC: 0.87 MG/DL (ref 0.6–1.3)
EOSINOPHIL # BLD AUTO: 0.32 THOUSAND/ΜL (ref 0–0.61)
EOSINOPHIL NFR BLD AUTO: 3 % (ref 0–6)
ERYTHROCYTE [DISTWIDTH] IN BLOOD BY AUTOMATED COUNT: 13.2 % (ref 11.6–15.1)
GFR SERPL CREATININE-BSD FRML MDRD: 88 ML/MIN/1.73SQ M
GLUCOSE SERPL-MCNC: 109 MG/DL (ref 65–140)
HCT VFR BLD AUTO: 47.6 % (ref 36.5–49.3)
HGB BLD-MCNC: 15.9 G/DL (ref 12–17)
IMM GRANULOCYTES # BLD AUTO: 0.03 THOUSAND/UL (ref 0–0.2)
IMM GRANULOCYTES NFR BLD AUTO: 0 % (ref 0–2)
INR PPP: 0.92 (ref 0.85–1.19)
LACTATE SERPL-SCNC: 2.5 MMOL/L (ref 0.5–2)
LIPASE SERPL-CCNC: 14 U/L (ref 11–82)
LYMPHOCYTES # BLD AUTO: 1.43 THOUSANDS/ΜL (ref 0.6–4.47)
LYMPHOCYTES NFR BLD AUTO: 15 % (ref 14–44)
MAGNESIUM SERPL-MCNC: 1.7 MG/DL (ref 1.9–2.7)
MCH RBC QN AUTO: 30.3 PG (ref 26.8–34.3)
MCHC RBC AUTO-ENTMCNC: 33.4 G/DL (ref 31.4–37.4)
MCV RBC AUTO: 91 FL (ref 82–98)
MONOCYTES # BLD AUTO: 0.95 THOUSAND/ΜL (ref 0.17–1.22)
MONOCYTES NFR BLD AUTO: 10 % (ref 4–12)
NEUTROPHILS # BLD AUTO: 6.69 THOUSANDS/ΜL (ref 1.85–7.62)
NEUTS SEG NFR BLD AUTO: 71 % (ref 43–75)
NRBC BLD AUTO-RTO: 0 /100 WBCS
PLATELET # BLD AUTO: 190 THOUSANDS/UL (ref 149–390)
PMV BLD AUTO: 9.2 FL (ref 8.9–12.7)
POTASSIUM SERPL-SCNC: 3.1 MMOL/L (ref 3.5–5.3)
PROT SERPL-MCNC: 7.6 G/DL (ref 6.4–8.4)
PROTHROMBIN TIME: 12.8 SECONDS (ref 12.3–15)
RBC # BLD AUTO: 5.25 MILLION/UL (ref 3.88–5.62)
SODIUM SERPL-SCNC: 142 MMOL/L (ref 135–147)
WBC # BLD AUTO: 9.53 THOUSAND/UL (ref 4.31–10.16)

## 2024-09-18 PROCEDURE — 71045 X-RAY EXAM CHEST 1 VIEW: CPT

## 2024-09-18 PROCEDURE — 83605 ASSAY OF LACTIC ACID: CPT | Performed by: EMERGENCY MEDICINE

## 2024-09-18 PROCEDURE — 85730 THROMBOPLASTIN TIME PARTIAL: CPT | Performed by: EMERGENCY MEDICINE

## 2024-09-18 PROCEDURE — 85025 COMPLETE CBC W/AUTO DIFF WBC: CPT | Performed by: EMERGENCY MEDICINE

## 2024-09-18 PROCEDURE — 36415 COLL VENOUS BLD VENIPUNCTURE: CPT | Performed by: EMERGENCY MEDICINE

## 2024-09-18 PROCEDURE — 93005 ELECTROCARDIOGRAM TRACING: CPT

## 2024-09-18 PROCEDURE — 83735 ASSAY OF MAGNESIUM: CPT | Performed by: EMERGENCY MEDICINE

## 2024-09-18 PROCEDURE — 84484 ASSAY OF TROPONIN QUANT: CPT | Performed by: EMERGENCY MEDICINE

## 2024-09-18 PROCEDURE — 83880 ASSAY OF NATRIURETIC PEPTIDE: CPT | Performed by: EMERGENCY MEDICINE

## 2024-09-18 PROCEDURE — 80053 COMPREHEN METABOLIC PANEL: CPT | Performed by: EMERGENCY MEDICINE

## 2024-09-18 PROCEDURE — 85610 PROTHROMBIN TIME: CPT | Performed by: EMERGENCY MEDICINE

## 2024-09-18 PROCEDURE — 83690 ASSAY OF LIPASE: CPT | Performed by: EMERGENCY MEDICINE

## 2024-09-18 PROCEDURE — 99285 EMERGENCY DEPT VISIT HI MDM: CPT

## 2024-09-18 PROCEDURE — 99285 EMERGENCY DEPT VISIT HI MDM: CPT | Performed by: EMERGENCY MEDICINE

## 2024-09-18 RX ORDER — POTASSIUM CHLORIDE 1500 MG/1
40 TABLET, EXTENDED RELEASE ORAL ONCE
Status: COMPLETED | OUTPATIENT
Start: 2024-09-18 | End: 2024-09-18

## 2024-09-18 RX ORDER — LANOLIN ALCOHOL/MO/W.PET/CERES
400 CREAM (GRAM) TOPICAL ONCE
Status: COMPLETED | OUTPATIENT
Start: 2024-09-18 | End: 2024-09-18

## 2024-09-18 RX ORDER — CALCIUM CARBONATE/VITAMIN D3 500-10/5ML
400 LIQUID (ML) ORAL DAILY
Qty: 10 TABLET | Refills: 0 | Status: SHIPPED | OUTPATIENT
Start: 2024-09-18 | End: 2024-09-28

## 2024-09-18 RX ORDER — CLINDAMYCIN HCL 150 MG
450 CAPSULE ORAL ONCE
Status: COMPLETED | OUTPATIENT
Start: 2024-09-18 | End: 2024-09-18

## 2024-09-18 RX ORDER — POTASSIUM CHLORIDE 1500 MG/1
20 TABLET, EXTENDED RELEASE ORAL 2 TIMES DAILY
Qty: 14 TABLET | Refills: 0 | Status: SHIPPED | OUTPATIENT
Start: 2024-09-18 | End: 2024-09-25

## 2024-09-18 RX ORDER — CLINDAMYCIN HCL 150 MG
450 CAPSULE ORAL EVERY 8 HOURS SCHEDULED
Qty: 90 CAPSULE | Refills: 0 | Status: SHIPPED | OUTPATIENT
Start: 2024-09-18 | End: 2024-09-28

## 2024-09-18 RX ADMIN — Medication 400 MG: at 16:17

## 2024-09-18 RX ADMIN — POTASSIUM CHLORIDE 40 MEQ: 1500 TABLET, EXTENDED RELEASE ORAL at 16:17

## 2024-09-18 RX ADMIN — CLINDAMYCIN HYDROCHLORIDE 450 MG: 150 CAPSULE ORAL at 16:30

## 2024-09-18 NOTE — ED PROVIDER NOTES
1. Cellulitis    2. Hypokalemia    3. Hypomagnesemia    4. Bilateral leg edema      ED Disposition       ED Disposition   Discharge    Condition   Stable    Date/Time   Wed Sep 18, 2024  4:22 PM    Comment   Peter Busch discharge to home/self care.                   Assessment & Plan       Medical Decision Making  Amount and/or Complexity of Data Reviewed  Labs: ordered. Decision-making details documented in ED Course.  Radiology: ordered and independent interpretation performed. Decision-making details documented in ED Course.  ECG/medicine tests: ordered and independent interpretation performed. Decision-making details documented in ED Course.  Discussion of management or test interpretation with external provider(s): At risk for but not excluding cellulitis, lymphedema, CHF, abscess    Risk  OTC drugs.  Prescription drug management.                       Medications   clindamycin (CLEOCIN) capsule 450 mg (has no administration in time range)   potassium chloride (Klor-Con M20) CR tablet 40 mEq (40 mEq Oral Given 9/18/24 1617)   magnesium Oxide (MAG-OX) tablet 400 mg (400 mg Oral Given 9/18/24 1617)       History of Present Illness       Patient complains of increasing leg swelling and redness to the legs.  History of leg infections.  No fevers or chills.  No nausea vomiting or diarrhea.  Has shortness of breath but no change from his normal baseline.  History of COPD.  Has been compliant with his water pills.  No rash.  No drainage.      History provided by:  Patient   used: No    Leg Pain  Location:  Leg  Time since incident:  1 week  Injury: no    Leg location:  L leg and R leg  Pain details:     Quality:  Aching    Radiates to:  Does not radiate    Severity:  Mild    Onset quality:  Gradual    Duration:  1 week    Timing:  Constant    Progression:  Worsening  Chronicity:  Recurrent  Relieved by:  Nothing  Worsened by:  Nothing  Ineffective treatments:  None tried  Associated symptoms:  swelling    Associated symptoms: no back pain, no decreased ROM, no fever, no muscle weakness and no neck pain            Review of Systems   Constitutional:  Negative for chills and fever.   HENT:  Negative for ear pain, hearing loss, sore throat, trouble swallowing and voice change.    Eyes:  Negative for pain and discharge.   Respiratory:  Negative for cough, shortness of breath and wheezing.    Cardiovascular:  Positive for leg swelling. Negative for chest pain and palpitations.   Gastrointestinal:  Negative for abdominal pain, blood in stool, constipation, diarrhea, nausea and vomiting.   Genitourinary:  Negative for dysuria, flank pain, frequency and hematuria.   Musculoskeletal:  Negative for back pain, joint swelling, neck pain and neck stiffness.   Skin:  Negative for rash and wound.   Neurological:  Negative for dizziness, seizures, syncope, facial asymmetry and headaches.   Psychiatric/Behavioral:  Negative for hallucinations, self-injury and suicidal ideas.    All other systems reviewed and are negative.          Objective     ED Triage Vitals [09/18/24 1524]   Temperature Pulse Blood Pressure Respirations SpO2 Patient Position - Orthostatic VS   98.4 °F (36.9 °C) 87 127/88 18 93 % --      Temp Source Heart Rate Source BP Location FiO2 (%) Pain Score    Oral Monitor -- -- --        Physical Exam  Vitals and nursing note reviewed.   Constitutional:       General: He is not in acute distress.     Appearance: He is well-developed.   HENT:      Head: Normocephalic and atraumatic.      Right Ear: External ear normal.      Left Ear: External ear normal.   Eyes:      General: No scleral icterus.        Right eye: No discharge.         Left eye: No discharge.      Extraocular Movements: Extraocular movements intact.      Conjunctiva/sclera: Conjunctivae normal.   Cardiovascular:      Rate and Rhythm: Normal rate and regular rhythm.      Heart sounds: Normal heart sounds. No murmur heard.  Pulmonary:      Effort:  Pulmonary effort is normal.      Breath sounds: Normal breath sounds. No wheezing or rales.   Abdominal:      General: Bowel sounds are normal. There is no distension.      Palpations: Abdomen is soft.      Tenderness: There is no abdominal tenderness. There is no guarding or rebound.   Musculoskeletal:         General: No deformity. Normal range of motion.      Cervical back: Normal range of motion and neck supple.      Right lower leg: Edema present.      Left lower leg: Edema present.      Comments: Bilateral lower extremities with erythema and warmth in the lower legs.  No fluctuance.  No drainage.   Skin:     General: Skin is warm and dry.      Findings: No rash.   Neurological:      General: No focal deficit present.      Mental Status: He is alert and oriented to person, place, and time.      Cranial Nerves: No cranial nerve deficit.   Psychiatric:         Mood and Affect: Mood normal.         Behavior: Behavior normal.         Thought Content: Thought content normal.         Judgment: Judgment normal.         Labs Reviewed   CBC AND DIFFERENTIAL - Abnormal       Result Value    WBC 9.53      RBC 5.25      Hemoglobin 15.9      Hematocrit 47.6      MCV 91      MCH 30.3      MCHC 33.4      RDW 13.2      MPV 9.2      Platelets 190      nRBC 0      Segmented % 71      Immature Grans % 0      Lymphocytes % 15      Monocytes % 10      Eosinophils Relative 3      Basophils Relative 1      Absolute Neutrophils 6.69      Absolute Immature Grans 0.03      Absolute Lymphocytes 1.43      Absolute Monocytes 0.95      Eosinophils Absolute 0.32      Basophils Absolute 0.11 (*)    COMPREHENSIVE METABOLIC PANEL - Abnormal    Sodium 142      Potassium 3.1 (*)     Chloride 97      CO2 34 (*)     ANION GAP 11      BUN 10      Creatinine 0.87      Glucose 109      Calcium 9.5      AST 19      ALT 23      Alkaline Phosphatase 63      Total Protein 7.6      Albumin 4.3      Total Bilirubin 0.63      eGFR 88      Narrative:      National Kidney Disease Foundation guidelines for Chronic Kidney Disease (CKD):     Stage 1 with normal or high GFR (GFR > 90 mL/min/1.73 square meters)    Stage 2 Mild CKD (GFR = 60-89 mL/min/1.73 square meters)    Stage 3A Moderate CKD (GFR = 45-59 mL/min/1.73 square meters)    Stage 3B Moderate CKD (GFR = 30-44 mL/min/1.73 square meters)    Stage 4 Severe CKD (GFR = 15-29 mL/min/1.73 square meters)    Stage 5 End Stage CKD (GFR <15 mL/min/1.73 square meters)  Note: GFR calculation is accurate only with a steady state creatinine   MAGNESIUM - Abnormal    Magnesium 1.7 (*)    LACTIC ACID, PLASMA (W/REFLEX IF RESULT > 2.0) - Abnormal    LACTIC ACID 2.5 (*)     Narrative:     Result may be elevated if tourniquet was used during collection.   PROTIME-INR - Normal    Protime 12.8      INR 0.92      Narrative:     INR Therapeutic Range    Indication                                             INR Range      Atrial Fibrillation                                               2.0-3.0  Hypercoagulable State                                    2.0.2.3  Left Ventricular Asist Device                            2.0-3.0  Mechanical Heart Valve                                  -    Aortic(with afib, MI, embolism, HF, LA enlargement,    and/or coagulopathy)                                     2.0-3.0 (2.5-3.5)     Mitral                                                             2.5-3.5  Prosthetic/Bioprosthetic Heart Valve               2.0-3.0  Venous thromboembolism (VTE: VT, PE        2.0-3.0   APTT - Normal    PTT 31     LIPASE - Normal    Lipase 14     HS TROPONIN I 0HR - Normal    hs TnI 0hr 5     B-TYPE NATRIURETIC PEPTIDE (BNP) - Normal    BNP 52     LACTIC ACID 2 HOUR     XR chest 1 view portable   ED Interpretation by Juaquin Song MD (09/18 1613)   Right upper lobe opacity seen on previous x-ray.      Final Interpretation by Yan Benítez MD (09/18 1616)      Spiculated lesion in the right upper lobe is similar to the  prior examination. No new focal airspace consolidation.            Workstation performed: [id number]             ECG 12 Lead Documentation Only    Date/Time: 9/18/2024 3:49 PM    Performed by: Juaquin Song MD  Authorized by: Juaquin Song MD    ECG reviewed by me, the ED Provider: yes    Patient location:  ED  Previous ECG:     Previous ECG:  Compared to current    Similarity:  No change  Rate:     ECG rate:  80  Rhythm:     Rhythm: sinus rhythm    Ectopy:     Ectopy: none    QRS:     QRS axis:  Normal         Juaquin Song MD  09/18/24 1625       Juaquin Song MD  09/19/24 1035

## 2024-09-19 LAB
ATRIAL RATE: 79 BPM
P AXIS: 59 DEGREES
PR INTERVAL: 148 MS
QRS AXIS: 84 DEGREES
QRSD INTERVAL: 96 MS
QT INTERVAL: 374 MS
QTC INTERVAL: 428 MS
T WAVE AXIS: 77 DEGREES
VENTRICULAR RATE: 79 BPM

## 2024-09-27 ENCOUNTER — PATIENT MESSAGE (OUTPATIENT)
Dept: PULMONOLOGY | Facility: CLINIC | Age: 69
End: 2024-09-27

## 2024-10-04 NOTE — PATIENT COMMUNICATION
Left message for patient to call back and schedule a follow up appointment with JADEN Inman in January of 2025.

## 2024-12-11 ENCOUNTER — HOSPITAL ENCOUNTER (OUTPATIENT)
Dept: CT IMAGING | Facility: HOSPITAL | Age: 69
Discharge: HOME | End: 2024-12-11
Payer: COMMERCIAL

## 2024-12-11 DIAGNOSIS — R91.1 SOLITARY PULMONARY NODULE: ICD-10-CM

## 2024-12-11 DIAGNOSIS — C34.11 MALIGNANT NEOPLASM OF UPPER LOBE, RIGHT BRONCHUS OR LUNG (HCC): ICD-10-CM

## 2024-12-11 PROCEDURE — 71250 CT THORAX DX C-: CPT

## 2024-12-17 DIAGNOSIS — C34.11 MALIGNANT NEOPLASM OF UPPER LOBE, RIGHT BRONCHUS OR LUNG (HCC): ICD-10-CM

## 2024-12-17 DIAGNOSIS — R91.1 SOLITARY PULMONARY NODULE: ICD-10-CM

## 2025-05-07 ENCOUNTER — APPOINTMENT (EMERGENCY)
Dept: CT IMAGING | Facility: HOSPITAL | Age: 70
DRG: 299 | End: 2025-05-07
Payer: COMMERCIAL

## 2025-05-07 ENCOUNTER — HOSPITAL ENCOUNTER (INPATIENT)
Facility: HOSPITAL | Age: 70
LOS: 2 days | Discharge: HOME | DRG: 299 | End: 2025-05-09
Attending: EMERGENCY MEDICINE | Admitting: INTERNAL MEDICINE
Payer: COMMERCIAL

## 2025-05-07 DIAGNOSIS — R06.02 SOB (SHORTNESS OF BREATH): ICD-10-CM

## 2025-05-07 DIAGNOSIS — I82.409 DVT (DEEP VENOUS THROMBOSIS) (HCC): ICD-10-CM

## 2025-05-07 DIAGNOSIS — I26.99 PULMONARY EMBOLI (HCC): Primary | ICD-10-CM

## 2025-05-07 PROBLEM — J96.11 CHRONIC RESPIRATORY FAILURE WITH HYPOXIA (HCC): Status: ACTIVE | Noted: 2022-03-30

## 2025-05-07 LAB
ALBUMIN SERPL BCG-MCNC: 4.1 G/DL (ref 3.5–5)
ALP SERPL-CCNC: 70 U/L (ref 34–104)
ALT SERPL W P-5'-P-CCNC: 25 U/L (ref 7–52)
ANION GAP SERPL CALCULATED.3IONS-SCNC: 7 MMOL/L (ref 4–13)
APTT PPP: 132 SECONDS (ref 23–34)
APTT PPP: 27 SECONDS (ref 23–34)
AST SERPL W P-5'-P-CCNC: 15 U/L (ref 13–39)
ATRIAL RATE: 78 BPM
BASOPHILS # BLD AUTO: 0.05 THOUSANDS/ÂΜL (ref 0–0.1)
BASOPHILS NFR BLD AUTO: 1 % (ref 0–1)
BILIRUB SERPL-MCNC: 0.52 MG/DL (ref 0.2–1)
BNP SERPL-MCNC: 61 PG/ML (ref 0–100)
BUN SERPL-MCNC: 13 MG/DL (ref 5–25)
CALCIUM SERPL-MCNC: 9 MG/DL (ref 8.4–10.2)
CARDIAC TROPONIN I PNL SERPL HS: 7 NG/L (ref ?–50)
CHLORIDE SERPL-SCNC: 99 MMOL/L (ref 96–108)
CO2 SERPL-SCNC: 31 MMOL/L (ref 21–32)
CREAT SERPL-MCNC: 0.83 MG/DL (ref 0.6–1.3)
EOSINOPHIL # BLD AUTO: 0.24 THOUSAND/ÂΜL (ref 0–0.61)
EOSINOPHIL NFR BLD AUTO: 3 % (ref 0–6)
ERYTHROCYTE [DISTWIDTH] IN BLOOD BY AUTOMATED COUNT: 12.5 % (ref 11.6–15.1)
GFR SERPL CREATININE-BSD FRML MDRD: 89 ML/MIN/1.73SQ M
GLUCOSE SERPL-MCNC: 250 MG/DL (ref 65–140)
GLUCOSE SERPL-MCNC: 334 MG/DL (ref 65–140)
HCT VFR BLD AUTO: 50.9 % (ref 36.5–49.3)
HGB BLD-MCNC: 16.5 G/DL (ref 12–17)
IMM GRANULOCYTES # BLD AUTO: 0.09 THOUSAND/UL (ref 0–0.2)
IMM GRANULOCYTES NFR BLD AUTO: 1 % (ref 0–2)
INR PPP: 0.88 (ref 0.85–1.19)
LYMPHOCYTES # BLD AUTO: 1.74 THOUSANDS/ÂΜL (ref 0.6–4.47)
LYMPHOCYTES NFR BLD AUTO: 19 % (ref 14–44)
MCH RBC QN AUTO: 29.8 PG (ref 26.8–34.3)
MCHC RBC AUTO-ENTMCNC: 32.4 G/DL (ref 31.4–37.4)
MCV RBC AUTO: 92 FL (ref 82–98)
MONOCYTES # BLD AUTO: 0.67 THOUSAND/ÂΜL (ref 0.17–1.22)
MONOCYTES NFR BLD AUTO: 7 % (ref 4–12)
NEUTROPHILS # BLD AUTO: 6.48 THOUSANDS/ÂΜL (ref 1.85–7.62)
NEUTS SEG NFR BLD AUTO: 69 % (ref 43–75)
NRBC BLD AUTO-RTO: 0 /100 WBCS
P AXIS: 55 DEGREES
PLATELET # BLD AUTO: 155 THOUSANDS/UL (ref 149–390)
PMV BLD AUTO: 9.5 FL (ref 8.9–12.7)
POTASSIUM SERPL-SCNC: 3.8 MMOL/L (ref 3.5–5.3)
PR INTERVAL: 148 MS
PROT SERPL-MCNC: 7.2 G/DL (ref 6.4–8.4)
PROTHROMBIN TIME: 12.3 SECONDS (ref 12.3–15)
QRS AXIS: 72 DEGREES
QRSD INTERVAL: 88 MS
QT INTERVAL: 358 MS
QTC INTERVAL: 408 MS
RBC # BLD AUTO: 5.53 MILLION/UL (ref 3.88–5.62)
SODIUM SERPL-SCNC: 137 MMOL/L (ref 135–147)
T WAVE AXIS: 99 DEGREES
VENTRICULAR RATE: 78 BPM
WBC # BLD AUTO: 9.27 THOUSAND/UL (ref 4.31–10.16)

## 2025-05-07 PROCEDURE — 85730 THROMBOPLASTIN TIME PARTIAL: CPT | Performed by: EMERGENCY MEDICINE

## 2025-05-07 PROCEDURE — 36415 COLL VENOUS BLD VENIPUNCTURE: CPT | Performed by: EMERGENCY MEDICINE

## 2025-05-07 PROCEDURE — 96365 THER/PROPH/DIAG IV INF INIT: CPT

## 2025-05-07 PROCEDURE — 99223 1ST HOSP IP/OBS HIGH 75: CPT | Performed by: INTERNAL MEDICINE

## 2025-05-07 PROCEDURE — 70450 CT HEAD/BRAIN W/O DYE: CPT

## 2025-05-07 PROCEDURE — 80053 COMPREHEN METABOLIC PANEL: CPT | Performed by: EMERGENCY MEDICINE

## 2025-05-07 PROCEDURE — 85610 PROTHROMBIN TIME: CPT | Performed by: EMERGENCY MEDICINE

## 2025-05-07 PROCEDURE — 71275 CT ANGIOGRAPHY CHEST: CPT

## 2025-05-07 PROCEDURE — 96375 TX/PRO/DX INJ NEW DRUG ADDON: CPT

## 2025-05-07 PROCEDURE — 96366 THER/PROPH/DIAG IV INF ADDON: CPT

## 2025-05-07 PROCEDURE — 84484 ASSAY OF TROPONIN QUANT: CPT | Performed by: EMERGENCY MEDICINE

## 2025-05-07 PROCEDURE — 83880 ASSAY OF NATRIURETIC PEPTIDE: CPT | Performed by: EMERGENCY MEDICINE

## 2025-05-07 PROCEDURE — 99285 EMERGENCY DEPT VISIT HI MDM: CPT

## 2025-05-07 PROCEDURE — 82948 REAGENT STRIP/BLOOD GLUCOSE: CPT

## 2025-05-07 PROCEDURE — 85025 COMPLETE CBC W/AUTO DIFF WBC: CPT | Performed by: EMERGENCY MEDICINE

## 2025-05-07 PROCEDURE — 99291 CRITICAL CARE FIRST HOUR: CPT | Performed by: EMERGENCY MEDICINE

## 2025-05-07 PROCEDURE — 85730 THROMBOPLASTIN TIME PARTIAL: CPT | Performed by: INTERNAL MEDICINE

## 2025-05-07 PROCEDURE — 93005 ELECTROCARDIOGRAM TRACING: CPT

## 2025-05-07 PROCEDURE — 93010 ELECTROCARDIOGRAM REPORT: CPT | Performed by: INTERNAL MEDICINE

## 2025-05-07 RX ORDER — FLUTICASONE FUROATE AND VILANTEROL 200; 25 UG/1; UG/1
1 POWDER RESPIRATORY (INHALATION) DAILY
Status: DISCONTINUED | OUTPATIENT
Start: 2025-05-08 | End: 2025-05-09 | Stop reason: HOSPADM

## 2025-05-07 RX ORDER — HEPARIN SODIUM 1000 [USP'U]/ML
4400 INJECTION, SOLUTION INTRAVENOUS; SUBCUTANEOUS EVERY 6 HOURS PRN
Status: DISCONTINUED | OUTPATIENT
Start: 2025-05-07 | End: 2025-05-09

## 2025-05-07 RX ORDER — NICOTINE 21 MG/24HR
1 PATCH, TRANSDERMAL 24 HOURS TRANSDERMAL DAILY
Status: DISCONTINUED | OUTPATIENT
Start: 2025-05-08 | End: 2025-05-07

## 2025-05-07 RX ORDER — LEVOTHYROXINE SODIUM 25 UG/1
25 TABLET ORAL
Status: DISCONTINUED | OUTPATIENT
Start: 2025-05-08 | End: 2025-05-09 | Stop reason: HOSPADM

## 2025-05-07 RX ORDER — POTASSIUM CHLORIDE 750 MG/1
10 TABLET, EXTENDED RELEASE ORAL 2 TIMES DAILY WITH MEALS
Status: DISCONTINUED | OUTPATIENT
Start: 2025-05-08 | End: 2025-05-09 | Stop reason: HOSPADM

## 2025-05-07 RX ORDER — INSULIN LISPRO 100 [IU]/ML
1-5 INJECTION, SOLUTION INTRAVENOUS; SUBCUTANEOUS
Status: DISCONTINUED | OUTPATIENT
Start: 2025-05-07 | End: 2025-05-09 | Stop reason: HOSPADM

## 2025-05-07 RX ORDER — INSULIN LISPRO 100 [IU]/ML
2-12 INJECTION, SOLUTION INTRAVENOUS; SUBCUTANEOUS
Status: DISCONTINUED | OUTPATIENT
Start: 2025-05-08 | End: 2025-05-09 | Stop reason: HOSPADM

## 2025-05-07 RX ORDER — HEPARIN SODIUM 1000 [USP'U]/ML
8800 INJECTION, SOLUTION INTRAVENOUS; SUBCUTANEOUS ONCE
Status: COMPLETED | OUTPATIENT
Start: 2025-05-07 | End: 2025-05-07

## 2025-05-07 RX ORDER — NICOTINE 21 MG/24HR
1 PATCH, TRANSDERMAL 24 HOURS TRANSDERMAL DAILY
Status: DISCONTINUED | OUTPATIENT
Start: 2025-05-08 | End: 2025-05-09 | Stop reason: HOSPADM

## 2025-05-07 RX ORDER — ALBUTEROL SULFATE 90 UG/1
2 INHALANT RESPIRATORY (INHALATION) EVERY 6 HOURS PRN
Status: DISCONTINUED | OUTPATIENT
Start: 2025-05-07 | End: 2025-05-09 | Stop reason: HOSPADM

## 2025-05-07 RX ORDER — ATORVASTATIN CALCIUM 40 MG/1
40 TABLET, FILM COATED ORAL DAILY
Status: DISCONTINUED | OUTPATIENT
Start: 2025-05-08 | End: 2025-05-09 | Stop reason: HOSPADM

## 2025-05-07 RX ORDER — TAMSULOSIN HYDROCHLORIDE 0.4 MG/1
0.4 CAPSULE ORAL 2 TIMES DAILY
Status: DISCONTINUED | OUTPATIENT
Start: 2025-05-07 | End: 2025-05-09 | Stop reason: HOSPADM

## 2025-05-07 RX ORDER — ASPIRIN 81 MG/1
81 TABLET ORAL DAILY
Status: DISCONTINUED | OUTPATIENT
Start: 2025-05-08 | End: 2025-05-09 | Stop reason: HOSPADM

## 2025-05-07 RX ORDER — HEPARIN SODIUM 10000 [USP'U]/100ML
3-30 INJECTION, SOLUTION INTRAVENOUS
Status: DISCONTINUED | OUTPATIENT
Start: 2025-05-07 | End: 2025-05-09

## 2025-05-07 RX ORDER — TORSEMIDE 20 MG/1
20 TABLET ORAL 2 TIMES DAILY
Status: DISCONTINUED | OUTPATIENT
Start: 2025-05-07 | End: 2025-05-09 | Stop reason: HOSPADM

## 2025-05-07 RX ORDER — FINASTERIDE 5 MG/1
5 TABLET, FILM COATED ORAL DAILY
Status: DISCONTINUED | OUTPATIENT
Start: 2025-05-08 | End: 2025-05-09 | Stop reason: HOSPADM

## 2025-05-07 RX ORDER — GABAPENTIN 300 MG/1
300 CAPSULE ORAL 3 TIMES DAILY
Status: DISCONTINUED | OUTPATIENT
Start: 2025-05-07 | End: 2025-05-09 | Stop reason: HOSPADM

## 2025-05-07 RX ORDER — HEPARIN SODIUM 1000 [USP'U]/ML
8800 INJECTION, SOLUTION INTRAVENOUS; SUBCUTANEOUS EVERY 6 HOURS PRN
Status: DISCONTINUED | OUTPATIENT
Start: 2025-05-07 | End: 2025-05-09

## 2025-05-07 RX ORDER — ACETAMINOPHEN 325 MG/1
650 TABLET ORAL EVERY 4 HOURS PRN
Status: DISCONTINUED | OUTPATIENT
Start: 2025-05-07 | End: 2025-05-09 | Stop reason: HOSPADM

## 2025-05-07 RX ADMIN — TORSEMIDE 20 MG: 20 TABLET ORAL at 20:16

## 2025-05-07 RX ADMIN — HEPARIN SODIUM 18 UNITS/KG/HR: 10000 INJECTION, SOLUTION INTRAVENOUS at 15:13

## 2025-05-07 RX ADMIN — IOHEXOL 100 ML: 350 INJECTION, SOLUTION INTRAVENOUS at 14:04

## 2025-05-07 RX ADMIN — GABAPENTIN 300 MG: 300 CAPSULE ORAL at 20:15

## 2025-05-07 RX ADMIN — HEPARIN SODIUM 8800 UNITS: 1000 INJECTION, SOLUTION INTRAVENOUS; SUBCUTANEOUS at 15:08

## 2025-05-07 RX ADMIN — INSULIN LISPRO 4 UNITS: 100 INJECTION, SOLUTION INTRAVENOUS; SUBCUTANEOUS at 22:09

## 2025-05-07 RX ADMIN — TAMSULOSIN HYDROCHLORIDE 0.4 MG: 0.4 CAPSULE ORAL at 20:00

## 2025-05-07 RX ADMIN — Medication 6 MG: at 22:49

## 2025-05-07 NOTE — ASSESSMENT & PLAN NOTE
Has history of adenocarcinoma of right upper lobe of the lung for which follows up with Dr. Wolfe.  Currently under active surveillance  Recently diagnosed with possible metastasis to the brain based on recent MRI.  Previously has received:  1. Definitive SBRT to the dominant nodule in the right upper lobe of lung to a dose of 54 cGy completed on 02/21/2020.  2. Pemetrexed & Carboplatin; start day 3/3/20. S/p 4 cycles. Last 5/5/20  3. S/p SBRT 54Gy completed 9/5/23 to ARLEEN nodule; 1.7 cm   Close outpatient oncology follow-up on discharge  In view of underlying malignancy will likely need to be on Lovenox 1 mg/kg subcutaneous twice daily for pulmonary embolism and DVT pending outpatient evaluation by primary oncologist.  Recently saw his radiation oncologist for brain lesion possible malignancy.  Patient scheduled to follow-up for possible stereotactic radiation treatment

## 2025-05-07 NOTE — ASSESSMENT & PLAN NOTE
Patient on 3 L of supplemental oxygen nocturnally.  Not requiring any additional oxygen.  Continue to monitor respiratory status and oxygenation closely with current diagnosis.

## 2025-05-07 NOTE — ASSESSMENT & PLAN NOTE
"Lab Results   Component Value Date    HGBA1C 8.4 (H) 02/10/2025       No results for input(s): \"POCGLU\" in the last 72 hours.    Blood Sugar Average: Last 72 hrs:    Hold oral hypoglycemic medications.  Continue with serial blood Leukos monitoring and sliding scale insulin coverage while in the hospital.  "

## 2025-05-07 NOTE — ASSESSMENT & PLAN NOTE
Continue with current outpatient inhaler and nebulizer treatment.  Does not appear to have any acute exacerbation.

## 2025-05-07 NOTE — ED NOTES
Patient states he wears 3L nasal oxygen at night when he is laying down. Oxygen currently 89-92% on room air. Reports dyspnea at rest. Placed on 3L nasal cannula at this time. Oxygen saturation at 94%.     Kwame Eaton  05/07/25 3298

## 2025-05-07 NOTE — H&P
"H&P - Hospitalist   Name: Peter Busch 69 y.o. male I MRN: [id number]  Unit/Bed#: TR 13A I Date of Admission: 5/7/2025   Date of Service: 5/7/2025 I Hospital Day: 0     Assessment & Plan  Acute pulmonary embolism (HCC)  Patient presents with worsening shortness of breath lower extremity swelling  CTA chest showsAcute PE bilaterally. On the right, thrombus is present in the right main pulmonary artery with extension into the the right middle and lower lobe lobar and segmental pulmonary arteries. On the left, thrombus is present in the left lower lobe lobar   and segmental pulmonary arteries.  2.  The calculated ratio of right ventricular to left ventricular diameter (RV/LV ratio) is greater than 1, which is suspicious for right heart strain. .  3.  Unchanged spiculated mass in the right upper lobe and spiculated pulmonary nodule in the left upper lobe. There is obstruction of the bronchi extending through the right upper lobe mass, unchanged from prior.  PESI score: 60  Venous Doppler ultrasound of the lower extremity showsAcute occlusive DVT in the right peroneal veins.   2.  No DVT identified in the left leg.   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 Wolfe and neurosurgery team at [facility] by ED physician  Cleared to be started on unfractionated heparin as CT head today was negative for any acute hemorrhage  Follow hemodynamics closely  Continue with telemetry monitoring  Follow-up on PERT priority echocardiogram  Likely will need to be transitioned to Lovenox upon discharge  Type 2 diabetes mellitus, without long-term current use of insulin (HCC)  Lab Results   Component Value Date    HGBA1C 8.4 (H) 02/10/2025       No results for input(s): \"POCGLU\" in the last 72 " hours.    Blood Sugar Average: Last 72 hrs:    Hold oral hypoglycemic medications.  Continue with serial blood Leukos monitoring and sliding scale insulin coverage while in the hospital.  Tobacco abuse  Counseled  regarding cessation  Chronic obstructive pulmonary disease (HCC)  Continue with current outpatient inhaler and nebulizer treatment.  Does not appear to have any acute exacerbation.  Adenocarcinoma of lung (HCC)  Has history of adenocarcinoma of right upper lobe of the lung for which follows up with Dr. Wolfe.  Currently under active surveillance  Recently diagnosed with possible metastasis to the brain based on recent MRI.  Previously has received:  1. Definitive SBRT to the dominant nodule in the right upper lobe of lung to a dose of 54 cGy completed on 02/21/2020.  2. Pemetrexed & Carboplatin; start day 3/3/20. S/p 4 cycles. Last 5/5/20  3. S/p SBRT 54Gy completed 9/5/23 to ARLEEN nodule; 1.7 cm   Close outpatient oncology follow-up on discharge  In view of underlying malignancy will likely need to be on Lovenox 1 mg/kg subcutaneous twice daily for pulmonary embolism and DVT pending outpatient evaluation by primary oncologist.  Recently saw his radiation oncologist for brain lesion possible malignancy.  Patient scheduled to follow-up for possible stereotactic radiation treatment  chronic diastolic (congestive) heart failure (HCC)  Wt Readings from Last 3 Encounters:   05/07/25 113 kg (249 lb 12.5 oz)   07/17/24 113 kg (249 lb)   07/03/24 113 kg (249 lb)       Utilizes torsemide 20 mg twice daily.  Currently does not appear to have any acute exacerbation.  Continue with current outpatient dose      Chronic respiratory failure with hypoxia (HCC)  Patient on 3 L of supplemental oxygen nocturnally.  Not requiring any additional oxygen.  Continue to monitor respiratory status and oxygenation closely with current diagnosis.      VTE Pharmacologic Prophylaxis:   High Risk (Score >/= 5) - Pharmacological DVT  Prophylaxis Ordered: heparin drip. Sequential Compression Devices Ordered.  Code Status: Prior Full Code   Discussion with family:     Anticipated Length of Stay: Patient will be admitted on an inpatient basis with an anticipated length of stay of greater than 2 midnights secondary to management of pulmonary embolism.    History of Present Illness   Chief Complaint: Shortness of breath and leg swelling    Peter Busch is a 69 y.o. male with a PMH of adenocarcinoma of the lung, COPD, tobacco abuse, type 2 diabetes, hyperlipidemia who presents with worsening leg swelling and shortness of breath.  Patient has history of heart failure with preserved ejection fraction, adenocarcinoma of the right upper lobe of the lung currently under active surveillance, recent diagnosis of lesion in the brain suggestive of possible metastatic disease, COPD recently saw her cardiologist for worsening lower extremity swelling and was recommended to get venous Doppler ultrasound.  Venous Doppler ultrasound showed right lower extremity DVT and was asked to come to the emergency room.  Also complained of progressively worsening shortness of breath for which CT scan was done which showed bilateral pulmonary embolism.  Patient denies any chest pain lightheadedness dizziness or syncopal episode.  Denies any headache, blurring of vision, speech or swallowing difficulty.  Review of Systems   Constitutional:  Positive for activity change and fatigue.   HENT: Negative.     Respiratory:  Positive for shortness of breath.    Cardiovascular:  Positive for leg swelling.   Gastrointestinal: Negative.    Endocrine: Negative.    Genitourinary: Negative.    Musculoskeletal: Negative.    Allergic/Immunologic: Negative.    Neurological: Negative.    Hematological: Negative.    Psychiatric/Behavioral: Negative.         Historical Information   Past Medical History:   Diagnosis Date    Bladder cancer (HCC)     BPH (benign prostatic hyperplasia)     COPD  (chronic obstructive pulmonary disease) (HCC)     Diabetes mellitus (HCC)     Hyperlipidemia     Lung cancer (HCC)     Rib fractures      Past Surgical History:   Procedure Laterality Date    BLADDER INSTILLATION      Anticarcinogenic 7/31/2014    BRONCHOSCOPY      CATARACT EXTRACTION, BILATERAL      CYSTOSCOPY      HAND SURGERY Left     TONSILLECTOMY      TRANSURETHRAL RESECTION OF PROSTATE       Social History     Tobacco Use    Smoking status: Every Day     Current packs/day: 2.00     Types: Cigarettes    Smokeless tobacco: Never   Vaping Use    Vaping status: Never Used   Substance and Sexual Activity    Alcohol use: Not Currently    Drug use: Not Currently    Sexual activity: Not on file     E-Cigarette/Vaping    E-Cigarette Use Never User      E-Cigarette/Vaping Substances       Social History:  Marital Status: Single     Meds/Allergies   I have reviewed home medications using recent Epic encounter.  Prior to Admission medications    Medication Sig Start Date End Date Taking? Authorizing Provider   albuterol (PROVENTIL HFA,VENTOLIN HFA) 90 mcg/act inhaler Inhale 2 puffs every 6 (six) hours as needed    Historical Provider, MD   Aspirin Buf,CaCarb-MgCarb-MgO, 81 MG TABS Take 81 mg by mouth    Historical Provider, MD   atorvastatin (LIPITOR) 40 mg tablet Take 40 mg by mouth daily    Historical Provider, MD   Farxiga 10 MG tablet Take 10 mg by mouth daily  Patient not taking: Reported on 7/17/2024 5/30/24   Historical Provider, MD   finasteride (PROSCAR) 5 mg tablet take 1 tablet by mouth once daily 7/7/21   Historical Provider, MD   fluticasone-umeclidinium-vilanterol (Trelegy Ellipta) 200-62.5-25 MCG/INH AEPB inhaler Inhale 1 puff daily Rinse mouth after use. 9/18/21 5/22/23  Yasmine Puente PA-C   furosemide (LASIX) 40 mg tablet Take 40 mg by mouth 2 (two) times a day    Historical Provider, MD   gabapentin (NEURONTIN) 300 mg capsule Take 1 capsule (300 mg total) by mouth 3 (three) times a day 5/6/24 7/17/24  Jaqueline  MD Jhon   glipiZIDE (GLUCOTROL XL) 5 mg 24 hr tablet Take 1 tablet (5 mg total) by mouth daily 5/6/24 7/17/24  Jaqueline Ferreira MD   Jardiance 25 MG TABS take 1/2 tablet by mouth once daily every morning for 8 days then take 1 tablet daily THEREAFTER 4/25/23   Historical Provider, MD   Klor-Con 10 10 MEQ tablet  6/1/24   Historical Provider, MD   levothyroxine 25 mcg tablet Take 25 mcg by mouth daily    Historical Provider, MD   meloxicam (MOBIC) 15 mg tablet Take 15 mg by mouth daily    Historical Provider, MD   metFORMIN (GLUCOPHAGE-XR) 500 mg 24 hr tablet Take 2 tablets by mouth every morning with breakfast and 2 tablets every evening with dinner 5/17/24   Historical Provider, MD   Multiple Vitamin (One-A-Day Essential) TABS Take by mouth    Historical Provider, MD   nicotine (NICODERM CQ) 21 mg/24 hr TD 24 hr patch Place 1 patch on the skin over 24 hours daily  Patient not taking: Reported on 7/3/2024 5/7/24   Jaqueline Ferreira MD   nystatin (MYCOSTATIN) powder Apply topically 2 (two) times a day for 10 days  Patient not taking: Reported on 7/3/2024 5/6/24 5/16/24  Jaqueline Ferreira MD   potassium chloride (Klor-Con M20) 20 mEq tablet Take 1 tablet (20 mEq total) by mouth daily for 30 doses  Patient not taking: Reported on 7/17/2024 5/6/24 7/3/24  Jaqueline Ferreira MD   potassium chloride (Klor-Con M20) 20 mEq tablet Take 1 tablet (20 mEq total) by mouth 2 (two) times a day for 7 days 9/18/24 9/25/24  Juaquin Song MD   rOPINIRole (REQUIP) 0.5 mg tablet Take 1 tablet (0.5 mg total) by mouth 3 (three) times a day  Patient not taking: Reported on 7/3/2024 5/6/24 6/5/24  Jaqueline Ferreira MD   tamsulosin (FLOMAX) 0.4 mg Take 1 capsule (0.4 mg total) by mouth 2 (two) times a day 5/6/24 7/3/24  Jaqueline Ferreira MD   torsemide (DEMADEX) 20 mg tablet Take 1.5 tablets (30 mg total) by mouth daily  Patient not taking: Reported on 7/3/2024 5/6/24 6/5/24  Jaqueline Ferreira MD   traMADol (ULTRAM) 50 mg tablet  6/1/24   Historical Provider, MD    TURMERIC-FISH OIL PO Take by mouth    Historical Provider, MD     Allergies   Allergen Reactions    Tetracyclines & Related Other (See Comments)       Objective :  Temp:  [97.4 °F (36.3 °C)] 97.4 °F (36.3 °C)  HR:  [74-92] 86  BP: (139-159)/() 159/92  Resp:  [18-23] 23  SpO2:  [92 %-95 %] 92 %  O2 Device: None (Room air)    Physical Exam  Constitutional:       General: He is not in acute distress.     Appearance: He is ill-appearing.   HENT:      Head: Normocephalic.      Nose: Nose normal.      Mouth/Throat:      Mouth: Mucous membranes are moist.   Eyes:      Extraocular Movements: Extraocular movements intact.      Pupils: Pupils are equal, round, and reactive to light.   Cardiovascular:      Rate and Rhythm: Normal rate and regular rhythm.   Pulmonary:      Effort: No respiratory distress.      Comments: Diminished breath sounds bilaterally.  No wheezing or rales appreciated    Abdominal:      General: Bowel sounds are normal.      Palpations: Abdomen is soft.   Musculoskeletal:      Right lower leg: Edema present.      Left lower leg: Edema present.      Comments: Chronic lymphedema with bilateral lower extremity edema   Neurological:      General: No focal deficit present.      Mental Status: He is alert and oriented to person, place, and time. Mental status is at baseline.          Lines/Drains:            Lab Results: I have reviewed the following results:  Results from last 7 days   Lab Units 05/07/25  1330   WBC Thousand/uL 9.27   HEMOGLOBIN g/dL 16.5   HEMATOCRIT % 50.9*   PLATELETS Thousands/uL 155   SEGS PCT % 69   LYMPHO PCT % 19   MONO PCT % 7   EOS PCT % 3     Results from last 7 days   Lab Units 05/07/25  1330   SODIUM mmol/L 137   POTASSIUM mmol/L 3.8   CHLORIDE mmol/L 99   CO2 mmol/L 31   BUN mg/dL 13   CREATININE mg/dL 0.83   ANION GAP mmol/L 7   CALCIUM mg/dL 9.0   ALBUMIN g/dL 4.1   TOTAL BILIRUBIN mg/dL 0.52   ALK PHOS U/L 70   ALT U/L 25   AST U/L 15   GLUCOSE RANDOM mg/dL 250*      Results from last 7 days   Lab Units 05/07/25  1330   INR  0.88         Lab Results   Component Value Date    HGBA1C 8.4 (H) 02/10/2025    HGBA1C 7.2 (H) 05/01/2024    HGBA1C 7.1 (H) 01/15/2024           Imaging Results Review: I reviewed radiology reports from this admission including: CT chest.  Other Study Results Review: EKG was reviewed.     Administrative Statements       ** Please Note: This note has been constructed using a voice recognition system. **

## 2025-05-07 NOTE — ASSESSMENT & PLAN NOTE
Wt Readings from Last 3 Encounters:   05/07/25 113 kg (249 lb 12.5 oz)   07/17/24 113 kg (249 lb)   07/03/24 113 kg (249 lb)       Utilizes torsemide 20 mg twice daily.  Currently does not appear to have any acute exacerbation.  Continue with current outpatient dose

## 2025-05-07 NOTE — ASSESSMENT & PLAN NOTE
Patient presents with worsening shortness of breath lower extremity swelling  CTA chest showsAcute PE bilaterally. On the right, thrombus is present in the right main pulmonary artery with extension into the the right middle and lower lobe lobar and segmental pulmonary arteries. On the left, thrombus is present in the left lower lobe lobar   and segmental pulmonary arteries.  2.  The calculated ratio of right ventricular to left ventricular diameter (RV/LV ratio) is greater than 1, which is suspicious for right heart strain. .  3.  Unchanged spiculated mass in the right upper lobe and spiculated pulmonary nodule in the left upper lobe. There is obstruction of the bronchi extending through the right upper lobe mass, unchanged from prior.  PESI score: 60  Venous Doppler ultrasound of the lower extremity showsAcute occlusive DVT in the right peroneal veins.   2.  No DVT identified in the left leg.   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 Wolfe and neurosurgery team at [facility] by ED physician  Cleared to be started on unfractionated heparin as CT head today was negative for any acute hemorrhage  Follow hemodynamics closely  Continue with telemetry monitoring  Follow-up on PERT priority echocardiogram  Likely will need to be transitioned to Lovenox upon discharge

## 2025-05-07 NOTE — TELEMEDICINE
e-Consult (IPC)     Consults     Contacted by Dr. Wade Sexton.    Peter Busch 69 y.o. male MRN: [id number]  Unit/Bed#: TR 13A Encounter: [id number]    Reason for Consult    Per provider report, patient with past medical history significant for adenocarcinoma of the right upper lobe with recent concerns for metastatic brain disease and left upper lobe lung mass, CHF and COPD presents with acute DVT.  Patient outpatient venous duplex ordered by his cardiologist for lower extremity swelling.  This demonstrated acute occlusive DVT of the right leg.  Patient presented to the emergency room after those findings.  He denied any chest pain but admits to chronic shortness of breath.  He was noted to have bilateral pulmonary embolism.  Plan is to start heparin drip and admit under medicine.  Case was reviewed with the patient's oncologist Dr. Wolfe. Given newly diagnosed brain mass on MRI at [facility] on 4/15/25, neurosurgical input requested.     Available past medical history,social history, surgical history, medication list, drug allergies and review of systems were reviewed.    /84 (BP Location: Left arm)   Pulse 74   Temp (!) 97.4 °F (36.3 °C) (Temporal)   Resp (!) 23   Wt 113 kg (249 lb 12.5 oz)   SpO2 93%   BMI 32.95 kg/m²      Clinical exam per chart review, no focal deficits.  AAO.  3+ pitting edema BLE.    Imaging report personally reviewed.   MRI Brain w 4/15/25: Enhancing multilevel lobulated lesion of the vermis measuring 1.6 cm, likely   reflecting a metastatic lesion in light of the history of bladder cancer. Primary glial neoplasm not excluded. No pathologic enhancement seen elsewhere in the brain. The imaging quality is degraded by patient motion.  Pathology may be obscured.     Assessment and Recommendations    Continue to monitor exam  We are unable to visualize MRI. Recommend CT head wo to ensure to hemorrhage.   If CT head without hemorrhage, no strict contraindication for Heparin gtt.    Discuss risk vs benefit for heparin gtt with patient.     All questions answered. Provider is in agreement with the course of action. 11-20 minutes, >50% of the total time devoted to medical consultative verbal/EMR discussion between providers. Written report will be generated in the EMR.

## 2025-05-07 NOTE — ED PROVIDER NOTES
Time reflects when diagnosis was documented in both MDM as applicable and the Disposition within this note       Time User Action Codes Description Comment    5/7/2025  2:48 PM Wade Bettencourt Add [I26.99] Pulmonary emboli (HCC)     5/7/2025  2:48 PM Wade Bettencourt Add [R06.02] SOB (shortness of breath)     5/7/2025  2:48 PM Wade Bettencourt Add [O22.30] DVT (deep vein thrombosis) in pregnancy     5/7/2025  2:48 PM Wade Bettencourt Remove [O22.30] DVT (deep vein thrombosis) in pregnancy     5/7/2025  2:48 PM Wade Bettencourt Add [I82.409] DVT (deep venous thrombosis) (HCC)           ED Disposition       ED Disposition   Admit    Condition   Stable    Date/Time   Wed May 7, 2025  2:47 PM    Comment                  Assessment & Plan       Medical Decision Making  1304: Patient appears chronically ill but in no acute distress.  The patient is noted to have acute occlusive DVT in the right lower extremity.  Although the patient has no chest pain complaints of chronic shortness of breath.  No new symptomatology.  Plan to complete CTA chest PE protocol.  Check basic labs including cardiac enzymes.  EKG nonischemic.  Ultimately, plan to place on anticoagulants.    1447: CT and labs reviewed.  Discussed hospitalist for admission.  After review of their chart they are recommending a consultation with Dr. Wolfe, whom the patient is known to, for recommendations on anticoagulant use with recent brain lesion.    1540: Discussed with Dr. Wolfe, recommends heparin drip.  Recommends neurosurgery input.    Critical Care Time Statement: Upon my evaluation, this patient had a high probability of imminent or life-threatening deterioration due to bilateral pulmonary emboli, which required my direct attention, intervention, and personal management.  I spent a total of 35 minutes directly providing critical care services, including interpretation of complex medical databases, evaluating for the presence of life-threatening injuries  or illnesses, management of organ system failure(s) , complex medical decision making (to support/prevent further life-threatening deterioration)., and interpretation of hemodynamic data. This time is exclusive of procedures, teaching, treating other patients, family meetings, and any prior time recorded by providers other than myself.       Amount and/or Complexity of Data Reviewed  External Data Reviewed: labs, radiology and notes.     Details: MRI brain 4/15/2025 1.6 cm lesion in the vermis  Venous duplex--occlusive right DVT  Labs: ordered.  Radiology: ordered.     Details: CTA chest PE protocol--bilateral pulmonary emboli  ECG/medicine tests: ordered and independent interpretation performed.     Details: Normal sinus rhythm 78 bpm, no acute ischemia  Discussion of management or test interpretation with external provider(s): DIANA Wolfe H/O  Jeremy HATCH neurosurgery    Risk  Prescription drug management.  Decision regarding hospitalization.             Medications   heparin (porcine) 25,000 units in 0.45% NaCl 250 mL infusion (premix) (18 Units/kg/hr × 110 kg (Order-Specific) Intravenous New Bag 5/7/25 1513)   heparin (porcine) injection 8,800 Units (has no administration in time range)   heparin (porcine) injection 4,400 Units (has no administration in time range)   iohexol (OMNIPAQUE) 350 MG/ML injection (MULTI-DOSE) 100 mL (100 mL Intravenous Given 5/7/25 1404)   heparin (porcine) injection 8,800 Units (8,800 Units Intravenous Given 5/7/25 1508)       ED Risk Strat Scores                    No data recorded        SBIRT 22yo+      Flowsheet Row Most Recent Value   Initial Alcohol Screen: US AUDIT-C     1. How often do you have a drink containing alcohol? 0 Filed at: 05/07/2025 1311   2. How many drinks containing alcohol do you have on a typical day you are drinking?  0 Filed at: 05/07/2025 1311   3b. FEMALE Any Age, or MALE 65+: How often do you have 4 or more drinks on one occassion? 0 Filed at:  2025 1311   Audit-C Score 0 Filed at: 2025 1311   WILDA: How many times in the past year have you...    Used an illegal drug or used a prescription medication for non-medical reasons? Never Filed at: 2025 1311                  PESI  Sex: 10  History of Cancer: 30  History of Heart Failure: 10  History of Chronic Lung Disease: 10  Heart rate greater than or equal to 110: 0  Systolic BP < 100 mmH  Respiratory rate greater than or equal to 30: 0  Temperature <36°C/96.8°F: 0  Altered Mental Status (Disorientation, lethargy, stupor, or coma): 0  O2 saturation <90%: 0  PESI Score Results: 60             History of Present Illness       Chief Complaint   Patient presents with    Evaluation of Abnormal Diagnostic Test     Reports DVT on outpatient imaging in [facility]. Reports he had imaging completed d/t increased swelling.   +sob, has hx of copd reports sob is not increased from baseline   Denies chest pain         Past Medical History:   Diagnosis Date    Bladder cancer (HCC)     BPH (benign prostatic hyperplasia)     COPD (chronic obstructive pulmonary disease) (HCC)     Diabetes mellitus (HCC)     Hyperlipidemia     Lung cancer (HCC)     Rib fractures       Past Surgical History:   Procedure Laterality Date    BLADDER INSTILLATION      Anticarcinogenic 2014    BRONCHOSCOPY      CATARACT EXTRACTION, BILATERAL      CYSTOSCOPY      HAND SURGERY Left     TONSILLECTOMY      TRANSURETHRAL RESECTION OF PROSTATE        Family History   Problem Relation Age of Onset    Hypertension Mother     No Known Problems Father       Social History     Tobacco Use    Smoking status: Every Day     Current packs/day: 2.00     Types: Cigarettes    Smokeless tobacco: Never   Vaping Use    Vaping status: Never Used   Substance Use Topics    Alcohol use: Not Currently    Drug use: Not Currently      E-Cigarette/Vaping    E-Cigarette Use Never User       E-Cigarette/Vaping Substances      I have reviewed and agree with the  history as documented.       History provided by:  Patient, medical records and significant other  Medical Problem  Location:  Positive DVT on ultrasound  Severity:  Moderate  Onset quality:  Gradual  Duration:  12 months  Timing:  Constant  Progression:  Unchanged  Chronicity:  Chronic  Context:  Patient reports chronic lower extremity swelling, history of COPD and CHF, seen by cardiologist recently, placed in for outpatient ultrasound of his bilateral lower extremity.  Patient was noted to have DVT right leg.  Relieved by:  Nothing  Worsened by:  Nothing  Ineffective treatments:  None tried  Associated symptoms: shortness of breath    Associated symptoms: no abdominal pain, no chest pain, no congestion, no cough, no diarrhea, no ear pain, no fatigue, no fever, no headaches, no loss of consciousness, no myalgias, no nausea, no rash, no rhinorrhea, no sore throat, no vomiting and no wheezing    Associated symptoms comment:  Patient has a history of COPD and CHF, chronic shortness of breath, he states there is no new symptoms to his chronic dyspnea, denies chest pain      Review of Systems   Constitutional:  Negative for appetite change, chills, fatigue and fever.   HENT:  Negative for congestion, ear pain, rhinorrhea, sore throat and trouble swallowing.    Eyes:  Negative for pain, discharge and visual disturbance.   Respiratory:  Positive for shortness of breath. Negative for cough, chest tightness and wheezing.    Cardiovascular:  Positive for leg swelling. Negative for chest pain and palpitations.   Gastrointestinal:  Negative for abdominal pain, diarrhea, nausea and vomiting.   Endocrine: Negative for polydipsia, polyphagia and polyuria.   Genitourinary:  Negative for difficulty urinating, dysuria, hematuria and testicular pain.   Musculoskeletal:  Negative for arthralgias, back pain and myalgias.   Skin:  Negative for color change and rash.   Allergic/Immunologic: Negative for immunocompromised state.    Neurological:  Negative for dizziness, seizures, loss of consciousness, syncope, weakness and headaches.   Hematological:  Negative for adenopathy.   Psychiatric/Behavioral:  Negative for confusion and dysphoric mood.    All other systems reviewed and are negative.          Objective       ED Triage Vitals   Temperature Pulse Blood Pressure Respirations SpO2 Patient Position - Orthostatic VS   05/07/25 1311 05/07/25 1311 05/07/25 1311 05/07/25 1311 05/07/25 1311 05/07/25 1320   (!) 97.4 °F (36.3 °C) 92 (!) 158/101 18 94 % Lying      Temp Source Heart Rate Source BP Location FiO2 (%) Pain Score    05/07/25 1311 05/07/25 1311 05/07/25 1311 -- 05/07/25 1311    Temporal Monitor Left arm  6      Vitals      Date and Time Temp Pulse SpO2 Resp BP Pain Score FACES Pain Rating User   05/07/25 1530 -- 75 92 % 23 158/76 -- -- PK   05/07/25 1520 -- 80 92 % 19 158/76 -- -- PK   05/07/25 1343 -- -- -- -- -- 8 -- PK   05/07/25 1320 -- 85 95 % 21 139/87 -- -- PK   05/07/25 1311 97.4 °F (36.3 °C) 92 94 % 18 158/101 6 -- KK            Physical Exam  Vitals and nursing note reviewed.   Constitutional:       General: He is not in acute distress.     Appearance: Normal appearance. He is not ill-appearing, toxic-appearing or diaphoretic.   HENT:      Head: Normocephalic and atraumatic.      Nose: Nose normal. No congestion or rhinorrhea.      Mouth/Throat:      Mouth: Mucous membranes are moist.      Pharynx: Oropharynx is clear. No oropharyngeal exudate or posterior oropharyngeal erythema.   Eyes:      General:         Right eye: No discharge.         Left eye: No discharge.   Cardiovascular:      Rate and Rhythm: Normal rate and regular rhythm.      Pulses: Normal pulses.      Heart sounds: Normal heart sounds. No murmur heard.     No gallop.   Pulmonary:      Effort: Pulmonary effort is normal. No respiratory distress.      Breath sounds: Normal breath sounds. No stridor. No wheezing, rhonchi or rales.      Comments: Slightly  diminished breath sounds bilateral bases  Chest:      Chest wall: No tenderness.   Abdominal:      General: Bowel sounds are normal. There is no distension.      Palpations: Abdomen is soft. There is no mass.      Tenderness: There is no abdominal tenderness. There is no right CVA tenderness, left CVA tenderness, guarding or rebound.      Hernia: No hernia is present.   Musculoskeletal:         General: Normal range of motion.      Cervical back: Normal range of motion and neck supple.      Right lower leg: Edema present.      Left lower leg: Edema present.      Comments: +3 pitting edema bilateral lower extremities with chronic venous insufficiency dermatitis bilaterally   Skin:     General: Skin is warm and dry.      Capillary Refill: Capillary refill takes less than 2 seconds.   Neurological:      General: No focal deficit present.      Mental Status: He is alert and oriented to person, place, and time.      Cranial Nerves: No cranial nerve deficit.      Sensory: No sensory deficit.      Motor: No weakness.      Coordination: Coordination normal.      Gait: Gait normal.      Deep Tendon Reflexes: Reflexes normal.   Psychiatric:         Mood and Affect: Mood normal.         Behavior: Behavior normal.         Thought Content: Thought content normal.         Judgment: Judgment normal.         Results Reviewed       Procedure Component Value Units Date/Time    B-Type Natriuretic Peptide(BNP) [[id number]]  (Normal) Collected: 05/07/25 1330    Lab Status: Final result Specimen: Blood from Arm, Right Updated: 05/07/25 1402     BNP 61 pg/mL     HS Troponin 0hr (reflex protocol) [[id number]]  (Normal) Collected: 05/07/25 1330    Lab Status: Final result Specimen: Blood from Arm, Right Updated: 05/07/25 1400     hs TnI 0hr 7 ng/L     Comprehensive metabolic panel [[id number]]  (Abnormal) Collected: 05/07/25 1330    Lab Status: Final result Specimen: Blood from Arm, Right Updated: 05/07/25 1356     Sodium 137 mmol/L       Potassium 3.8 mmol/L      Chloride 99 mmol/L      CO2 31 mmol/L      ANION GAP 7 mmol/L      BUN 13 mg/dL      Creatinine 0.83 mg/dL      Glucose 250 mg/dL      Calcium 9.0 mg/dL      AST 15 U/L      ALT 25 U/L      Alkaline Phosphatase 70 U/L      Total Protein 7.2 g/dL      Albumin 4.1 g/dL      Total Bilirubin 0.52 mg/dL      eGFR 89 ml/min/1.73sq m     Narrative:      [employer] guidelines for Chronic Kidney Disease (CKD):     Stage 1 with normal or high GFR (GFR > 90 mL/min/1.73 square meters)    Stage 2 Mild CKD (GFR = 60-89 mL/min/1.73 square meters)    Stage 3A Moderate CKD (GFR = 45-59 mL/min/1.73 square meters)    Stage 3B Moderate CKD (GFR = 30-44 mL/min/1.73 square meters)    Stage 4 Severe CKD (GFR = 15-29 mL/min/1.73 square meters)    Stage 5 End Stage CKD (GFR <15 mL/min/1.73 square meters)  Note: GFR calculation is accurate only with a steady state creatinine    Protime-INR [[id number]]  (Normal) Collected: 05/07/25 1330    Lab Status: Final result Specimen: Blood from Arm, Right Updated: 05/07/25 1349     Protime 12.3 seconds      INR 0.88    Narrative:      INR Therapeutic Range    Indication                                             INR Range      Atrial Fibrillation                                               2.0-3.0  Hypercoagulable State                                    2.0.2.3  Left Ventricular Asist Device                            2.0-3.0  Mechanical Heart Valve                                  -    Aortic(with afib, MI, embolism, HF, LA enlargement,    and/or coagulopathy)                                     2.0-3.0 (2.5-3.5)     Mitral                                                             2.5-3.5  Prosthetic/Bioprosthetic Heart Valve               2.0-3.0  Venous thromboembolism (VTE: VT, PE        2.0-3.0    APTT [[id number]]  (Normal) Collected: 05/07/25 1330    Lab Status: Final result Specimen: Blood from Arm, Right Updated: 05/07/25 1349     PTT 27  seconds     CBC and differential [[id number]]  (Abnormal) Collected: 05/07/25 1330    Lab Status: Final result Specimen: Blood from Arm, Right Updated: 05/07/25 1336     WBC 9.27 Thousand/uL      RBC 5.53 Million/uL      Hemoglobin 16.5 g/dL      Hematocrit 50.9 %      MCV 92 fL      MCH 29.8 pg      MCHC 32.4 g/dL      RDW 12.5 %      MPV 9.5 fL      Platelets 155 Thousands/uL      nRBC 0 /100 WBCs      Segmented % 69 %      Immature Grans % 1 %      Lymphocytes % 19 %      Monocytes % 7 %      Eosinophils Relative 3 %      Basophils Relative 1 %      Absolute Neutrophils 6.48 Thousands/µL      Absolute Immature Grans 0.09 Thousand/uL      Absolute Lymphocytes 1.74 Thousands/µL      Absolute Monocytes 0.67 Thousand/µL      Eosinophils Absolute 0.24 Thousand/µL      Basophils Absolute 0.05 Thousands/µL             CTA chest pe study   Final Interpretation by Denzel Tapia MD (05/07 1433)      1.  Acute PE bilaterally. On the right, thrombus is present in the right main pulmonary artery with extension into the the right middle and lower lobe lobar and segmental pulmonary arteries. On the left, thrombus is present in the left lower lobe lobar    and segmental pulmonary arteries.   2.  The calculated ratio of right ventricular to left ventricular diameter (RV/LV ratio) is greater than 1, which is suspicious for right heart strain. .   3.  Unchanged spiculated mass in the right upper lobe and spiculated pulmonary nodule in the left upper lobe. There is obstruction of the bronchi extending through the right upper lobe mass, unchanged from prior.   4.  Severe spinal canal stenosis at T7-T8.         Workstation performed: [id number]             Procedures    ED Medication and Procedure Management   Prior to Admission Medications   Prescriptions Last Dose Informant Patient Reported? Taking?   Aspirin Buf,CaCarb-MgCarb-MgO, 81 MG TABS   Yes No   Sig: Take 81 mg by mouth   Farxiga 10 MG tablet   Yes No   Sig: Take 10 mg by  mouth daily   Patient not taking: Reported on 7/17/2024   Jardiance 25 MG TABS   Yes No   Sig: take 1/2 tablet by mouth once daily every morning for 8 days then take 1 tablet daily THEREAFTER   Klor-Con 10 10 MEQ tablet   Yes No   Multiple Vitamin (One-A-Day Essential) TABS   Yes No   Sig: Take by mouth   TURMERIC-FISH OIL PO   Yes No   Sig: Take by mouth   albuterol (PROVENTIL HFA,VENTOLIN HFA) 90 mcg/act inhaler   Yes No   Sig: Inhale 2 puffs every 6 (six) hours as needed   atorvastatin (LIPITOR) 40 mg tablet   Yes No   Sig: Take 40 mg by mouth daily   finasteride (PROSCAR) 5 mg tablet   Yes No   Sig: take 1 tablet by mouth once daily   fluticasone-umeclidinium-vilanterol (Trelegy Ellipta) 200-62.5-25 MCG/INH AEPB inhaler   No No   Sig: Inhale 1 puff daily Rinse mouth after use.   furosemide (LASIX) 40 mg tablet   Yes No   Sig: Take 40 mg by mouth 2 (two) times a day   gabapentin (NEURONTIN) 300 mg capsule   No No   Sig: Take 1 capsule (300 mg total) by mouth 3 (three) times a day   glipiZIDE (GLUCOTROL XL) 5 mg 24 hr tablet   No No   Sig: Take 1 tablet (5 mg total) by mouth daily   levothyroxine 25 mcg tablet   Yes No   Sig: Take 25 mcg by mouth daily   meloxicam (MOBIC) 15 mg tablet   Yes No   Sig: Take 15 mg by mouth daily   metFORMIN (GLUCOPHAGE-XR) 500 mg 24 hr tablet   Yes No   Sig: Take 2 tablets by mouth every morning with breakfast and 2 tablets every evening with dinner   nicotine (NICODERM CQ) 21 mg/24 hr TD 24 hr patch   No No   Sig: Place 1 patch on the skin over 24 hours daily   Patient not taking: Reported on 7/3/2024   nystatin (MYCOSTATIN) powder   No No   Sig: Apply topically 2 (two) times a day for 10 days   Patient not taking: Reported on 7/3/2024   potassium chloride (Klor-Con M20) 20 mEq tablet   No No   Sig: Take 1 tablet (20 mEq total) by mouth daily for 30 doses   Patient not taking: Reported on 7/17/2024   potassium chloride (Klor-Con M20) 20 mEq tablet   No No   Sig: Take 1 tablet (20  mEq total) by mouth 2 (two) times a day for 7 days   rOPINIRole (REQUIP) 0.5 mg tablet   No No   Sig: Take 1 tablet (0.5 mg total) by mouth 3 (three) times a day   Patient not taking: Reported on 7/3/2024   tamsulosin (FLOMAX) 0.4 mg   No No   Sig: Take 1 capsule (0.4 mg total) by mouth 2 (two) times a day   torsemide (DEMADEX) 20 mg tablet   No No   Sig: Take 1.5 tablets (30 mg total) by mouth daily   Patient not taking: Reported on 7/3/2024   traMADol (ULTRAM) 50 mg tablet   Yes No      Facility-Administered Medications: None     Patient's Medications   Discharge Prescriptions    No medications on file     No discharge procedures on file.  ED SEPSIS DOCUMENTATION   Time reflects when diagnosis was documented in both MDM as applicable and the Disposition within this note       Time User Action Codes Description Comment    5/7/2025  2:48 PM Wade Bettencourt [I26.99] Pulmonary emboli (HCC)     5/7/2025  2:48 PM Wade Bettencourt [R06.02] SOB (shortness of breath)     5/7/2025  2:48 PM Wade Bettencourt Add [O22.30] DVT (deep vein thrombosis) in pregnancy     5/7/2025  2:48 PM Wade Bettencourt Remove [O22.30] DVT (deep vein thrombosis) in pregnancy     5/7/2025  2:48 PM Wade Bettencourt Add [I82.409] DVT (deep venous thrombosis) (HCC)                  aWde Bettencourt MD  05/07/25 3194

## 2025-05-08 ENCOUNTER — APPOINTMENT (INPATIENT)
Dept: NON INVASIVE DIAGNOSTICS | Facility: HOSPITAL | Age: 70
DRG: 299 | End: 2025-05-08
Payer: COMMERCIAL

## 2025-05-08 LAB
ANION GAP SERPL CALCULATED.3IONS-SCNC: 7 MMOL/L (ref 4–13)
AORTIC ROOT: 3.8 CM
APTT PPP: 72 SECONDS (ref 23–34)
APTT PPP: 76 SECONDS (ref 23–34)
BASOPHILS # BLD AUTO: 0.06 THOUSANDS/ÂΜL (ref 0–0.1)
BASOPHILS NFR BLD AUTO: 1 % (ref 0–1)
BSA FOR ECHO PROCEDURE: 2.32 M2
BUN SERPL-MCNC: 12 MG/DL (ref 5–25)
CALCIUM SERPL-MCNC: 8.7 MG/DL (ref 8.4–10.2)
CHLORIDE SERPL-SCNC: 99 MMOL/L (ref 96–108)
CO2 SERPL-SCNC: 32 MMOL/L (ref 21–32)
CREAT SERPL-MCNC: 0.87 MG/DL (ref 0.6–1.3)
EOSINOPHIL # BLD AUTO: 0.29 THOUSAND/ÂΜL (ref 0–0.61)
EOSINOPHIL NFR BLD AUTO: 3 % (ref 0–6)
ERYTHROCYTE [DISTWIDTH] IN BLOOD BY AUTOMATED COUNT: 12.5 % (ref 11.6–15.1)
FRACTIONAL SHORTENING: 46 (ref 28–44)
GFR SERPL CREATININE-BSD FRML MDRD: 87 ML/MIN/1.73SQ M
GLUCOSE SERPL-MCNC: 202 MG/DL (ref 65–140)
GLUCOSE SERPL-MCNC: 240 MG/DL (ref 65–140)
GLUCOSE SERPL-MCNC: 269 MG/DL (ref 65–140)
GLUCOSE SERPL-MCNC: 271 MG/DL (ref 65–140)
GLUCOSE SERPL-MCNC: 297 MG/DL (ref 65–140)
HCT VFR BLD AUTO: 46.1 % (ref 36.5–49.3)
HGB BLD-MCNC: 15.2 G/DL (ref 12–17)
IMM GRANULOCYTES # BLD AUTO: 0.1 THOUSAND/UL (ref 0–0.2)
IMM GRANULOCYTES NFR BLD AUTO: 1 % (ref 0–2)
INTERVENTRICULAR SEPTUM IN DIASTOLE (PARASTERNAL SHORT AXIS VIEW): 1.1 CM
INTERVENTRICULAR SEPTUM: 1.1 CM (ref 0.6–1.1)
LEFT ATRIUM SIZE: 3.2 CM
LEFT INTERNAL DIMENSION IN SYSTOLE: 2.2 CM (ref 2.1–4)
LEFT VENTRICULAR INTERNAL DIMENSION IN DIASTOLE: 4.1 CM (ref 3.5–6)
LEFT VENTRICULAR POSTERIOR WALL IN END DIASTOLE: 1.1 CM
LEFT VENTRICULAR STROKE VOLUME: 58 ML
LV EF US.2D.A4C+ESTIMATED: 75 %
LVSV (TEICH): 58 ML
LYMPHOCYTES # BLD AUTO: 1.66 THOUSANDS/ÂΜL (ref 0.6–4.47)
LYMPHOCYTES NFR BLD AUTO: 20 % (ref 14–44)
MCH RBC QN AUTO: 30 PG (ref 26.8–34.3)
MCHC RBC AUTO-ENTMCNC: 33 G/DL (ref 31.4–37.4)
MCV RBC AUTO: 91 FL (ref 82–98)
MONOCYTES # BLD AUTO: 0.68 THOUSAND/ÂΜL (ref 0.17–1.22)
MONOCYTES NFR BLD AUTO: 8 % (ref 4–12)
NEUTROPHILS # BLD AUTO: 5.71 THOUSANDS/ÂΜL (ref 1.85–7.62)
NEUTS SEG NFR BLD AUTO: 67 % (ref 43–75)
NRBC BLD AUTO-RTO: 0 /100 WBCS
PLATELET # BLD AUTO: 146 THOUSANDS/UL (ref 149–390)
PMV BLD AUTO: 10 FL (ref 8.9–12.7)
POTASSIUM SERPL-SCNC: 3.6 MMOL/L (ref 3.5–5.3)
RBC # BLD AUTO: 5.06 MILLION/UL (ref 3.88–5.62)
RIGHT VENTRICLE ID DIMENSION: 3.6 CM
SL CV LV EF: 65
SL CV PED ECHO LEFT VENTRICLE DIASTOLIC VOLUME (MOD BIPLANE) 2D: 75 ML
SL CV PED ECHO LEFT VENTRICLE SYSTOLIC VOLUME (MOD BIPLANE) 2D: 17 ML
SODIUM SERPL-SCNC: 138 MMOL/L (ref 135–147)
TRICUSPID ANNULAR PLANE SYSTOLIC EXCURSION: 1.7 CM
WBC # BLD AUTO: 8.5 THOUSAND/UL (ref 4.31–10.16)

## 2025-05-08 PROCEDURE — 93325 DOPPLER ECHO COLOR FLOW MAPG: CPT | Performed by: INTERNAL MEDICINE

## 2025-05-08 PROCEDURE — 93321 DOPPLER ECHO F-UP/LMTD STD: CPT

## 2025-05-08 PROCEDURE — 85730 THROMBOPLASTIN TIME PARTIAL: CPT | Performed by: INTERNAL MEDICINE

## 2025-05-08 PROCEDURE — 97167 OT EVAL HIGH COMPLEX 60 MIN: CPT

## 2025-05-08 PROCEDURE — 93308 TTE F-UP OR LMTD: CPT

## 2025-05-08 PROCEDURE — 97116 GAIT TRAINING THERAPY: CPT

## 2025-05-08 PROCEDURE — 93325 DOPPLER ECHO COLOR FLOW MAPG: CPT

## 2025-05-08 PROCEDURE — 93321 DOPPLER ECHO F-UP/LMTD STD: CPT | Performed by: INTERNAL MEDICINE

## 2025-05-08 PROCEDURE — 85025 COMPLETE CBC W/AUTO DIFF WBC: CPT | Performed by: INTERNAL MEDICINE

## 2025-05-08 PROCEDURE — 93308 TTE F-UP OR LMTD: CPT | Performed by: INTERNAL MEDICINE

## 2025-05-08 PROCEDURE — 80048 BASIC METABOLIC PNL TOTAL CA: CPT | Performed by: INTERNAL MEDICINE

## 2025-05-08 PROCEDURE — 82948 REAGENT STRIP/BLOOD GLUCOSE: CPT

## 2025-05-08 PROCEDURE — 97129 THER IVNTJ 1ST 15 MIN: CPT

## 2025-05-08 PROCEDURE — 97163 PT EVAL HIGH COMPLEX 45 MIN: CPT

## 2025-05-08 PROCEDURE — 99232 SBSQ HOSP IP/OBS MODERATE 35: CPT | Performed by: INTERNAL MEDICINE

## 2025-05-08 RX ORDER — TRAMADOL HYDROCHLORIDE 50 MG/1
50 TABLET ORAL EVERY 6 HOURS PRN
Status: DISCONTINUED | OUTPATIENT
Start: 2025-05-08 | End: 2025-05-09 | Stop reason: HOSPADM

## 2025-05-08 RX ORDER — TRAMADOL HYDROCHLORIDE 50 MG/1
50 TABLET ORAL EVERY 6 HOURS PRN
Status: DISCONTINUED | OUTPATIENT
Start: 2025-05-08 | End: 2025-05-08

## 2025-05-08 RX ORDER — HYDROMORPHONE HCL/PF 1 MG/ML
0.5 SYRINGE (ML) INJECTION ONCE
Refills: 0 | Status: COMPLETED | OUTPATIENT
Start: 2025-05-08 | End: 2025-05-08

## 2025-05-08 RX ORDER — ENOXAPARIN SODIUM 100 MG/ML
105 INJECTION SUBCUTANEOUS 2 TIMES DAILY
Qty: 72 ML | Refills: 0 | Status: SHIPPED | OUTPATIENT
Start: 2025-05-08 | End: 2025-05-08

## 2025-05-08 RX ORDER — ENOXAPARIN SODIUM 100 MG/ML
100 INJECTION SUBCUTANEOUS EVERY 12 HOURS
Qty: 60 ML | Refills: 0 | Status: SHIPPED | OUTPATIENT
Start: 2025-05-08 | End: 2025-06-07

## 2025-05-08 RX ORDER — OXYCODONE HYDROCHLORIDE 5 MG/1
5 TABLET ORAL EVERY 6 HOURS PRN
Refills: 0 | Status: DISCONTINUED | OUTPATIENT
Start: 2025-05-08 | End: 2025-05-09 | Stop reason: HOSPADM

## 2025-05-08 RX ORDER — ENOXAPARIN SODIUM 100 MG/ML
100 INJECTION SUBCUTANEOUS 2 TIMES DAILY
Qty: 72 ML | Refills: 0 | Status: SHIPPED | OUTPATIENT
Start: 2025-05-08 | End: 2025-05-08

## 2025-05-08 RX ADMIN — HEPARIN SODIUM 15 UNITS/KG/HR: 10000 INJECTION, SOLUTION INTRAVENOUS at 05:35

## 2025-05-08 RX ADMIN — INSULIN LISPRO 6 UNITS: 100 INJECTION, SOLUTION INTRAVENOUS; SUBCUTANEOUS at 08:08

## 2025-05-08 RX ADMIN — TRAMADOL HYDROCHLORIDE 50 MG: 50 TABLET, COATED ORAL at 18:22

## 2025-05-08 RX ADMIN — TRAMADOL HYDROCHLORIDE 50 MG: 50 TABLET, COATED ORAL at 11:24

## 2025-05-08 RX ADMIN — UMECLIDINIUM 1 PUFF: 62.5 AEROSOL, POWDER ORAL at 08:14

## 2025-05-08 RX ADMIN — GABAPENTIN 300 MG: 300 CAPSULE ORAL at 16:19

## 2025-05-08 RX ADMIN — INSULIN LISPRO 4 UNITS: 100 INJECTION, SOLUTION INTRAVENOUS; SUBCUTANEOUS at 16:20

## 2025-05-08 RX ADMIN — FINASTERIDE 5 MG: 5 TABLET, FILM COATED ORAL at 08:07

## 2025-05-08 RX ADMIN — HYDROMORPHONE HYDROCHLORIDE 0.5 MG: 1 INJECTION, SOLUTION INTRAMUSCULAR; INTRAVENOUS; SUBCUTANEOUS at 21:07

## 2025-05-08 RX ADMIN — FLUTICASONE FUROATE AND VILANTEROL TRIFENATATE 1 PUFF: 200; 25 POWDER RESPIRATORY (INHALATION) at 08:14

## 2025-05-08 RX ADMIN — ASPIRIN 81 MG: 81 TABLET, COATED ORAL at 08:07

## 2025-05-08 RX ADMIN — HEPARIN SODIUM 15 UNITS/KG/HR: 10000 INJECTION, SOLUTION INTRAVENOUS at 21:02

## 2025-05-08 RX ADMIN — TORSEMIDE 20 MG: 20 TABLET ORAL at 08:07

## 2025-05-08 RX ADMIN — TORSEMIDE 20 MG: 20 TABLET ORAL at 21:02

## 2025-05-08 RX ADMIN — GABAPENTIN 300 MG: 300 CAPSULE ORAL at 08:07

## 2025-05-08 RX ADMIN — POTASSIUM CHLORIDE 10 MEQ: 750 TABLET, EXTENDED RELEASE ORAL at 08:07

## 2025-05-08 RX ADMIN — LEVOTHYROXINE SODIUM 25 MCG: 0.03 TABLET ORAL at 05:33

## 2025-05-08 RX ADMIN — TAMSULOSIN HYDROCHLORIDE 0.4 MG: 0.4 CAPSULE ORAL at 08:07

## 2025-05-08 RX ADMIN — TRAMADOL HYDROCHLORIDE 50 MG: 50 TABLET, COATED ORAL at 03:04

## 2025-05-08 RX ADMIN — INSULIN LISPRO 3 UNITS: 100 INJECTION, SOLUTION INTRAVENOUS; SUBCUTANEOUS at 21:03

## 2025-05-08 RX ADMIN — POTASSIUM CHLORIDE 10 MEQ: 750 TABLET, EXTENDED RELEASE ORAL at 16:19

## 2025-05-08 RX ADMIN — GABAPENTIN 300 MG: 300 CAPSULE ORAL at 21:02

## 2025-05-08 RX ADMIN — ATORVASTATIN CALCIUM 40 MG: 40 TABLET, FILM COATED ORAL at 08:07

## 2025-05-08 RX ADMIN — TAMSULOSIN HYDROCHLORIDE 0.4 MG: 0.4 CAPSULE ORAL at 18:22

## 2025-05-08 RX ADMIN — INSULIN LISPRO 6 UNITS: 100 INJECTION, SOLUTION INTRAVENOUS; SUBCUTANEOUS at 11:24

## 2025-05-08 NOTE — ASSESSMENT & PLAN NOTE
Lab Results   Component Value Date    HGBA1C 8.4 (H) 02/10/2025       Recent Labs     05/07/25  2112 05/08/25  0732 05/08/25  1116   POCGLU 334* 269* 297*       Blood Sugar Average: Last 72 hrs:  (P) 300  Hold oral hypoglycemic medications.  Continue with serial blood Leukos monitoring and sliding scale insulin coverage while in the hospital.

## 2025-05-08 NOTE — UTILIZATION REVIEW
Initial Clinical Review    Admission: Date/Time/Statement:   Admission Orders (From admission, onward)       Ordered        05/07/25 1731  INPATIENT ADMISSION  Once                          Orders Placed This Encounter   Procedures    INPATIENT ADMISSION     Standing Status:   Standing     Number of Occurrences:   1     Level of Care:   Med Surg [16]     Estimated length of stay:   More than 2 Midnights     Certification:   I certify that inpatient services are medically necessary for this patient for a duration of greater than two midnights. See H&P and MD Progress Notes for additional information about the patient's course of treatment.     ED Arrival Information       Expected   -    Arrival   5/7/2025 13:00    Acuity   Urgent              Means of arrival   Walk-In    Escorted by   Self    Service   Hospitalist    Admission type   Emergency              Arrival complaint   Blood clot in Right leg sent from [facility] imaging             Chief Complaint   Patient presents with    Evaluation of Abnormal Diagnostic Test     Reports DVT on outpatient imaging in E. Reports he had imaging completed d/t increased swelling.   +sob, has hx of copd reports sob is not increased from baseline   Denies chest pain         Initial Presentation: 69 y.o. male to ED via walk-in from home  Present to ED with worsening leg swelling and shortness of breath.  recommended to get venous Doppler ultrasound. Venous Doppler ultrasound showed right lower extremity DVT and was asked to come to the emergency room.   PMHX adenocarcinoma of the lung with recent diagnosis of lesion in the brain suggestive of possible metastatic disease, COPD, tobacco abuse, type 2 diabetes, hyperlipidemia, CHF   Admitted to [facility] with DX: Acute pulmonary embolism   on exam: hypertensive; tachypnea  CT scan was done which showed bilateral pulmonary embolism.   PLAN: cont heparin gtt; monitor labs; tele monitoring; Accuchecks with ssic; f/u echo      Anticipated  Length of Stay/Certification Statement: Patient will be admitted on an inpatient basis with an anticipated length of stay of greater than 2 midnights secondary to management of pulmonary embolism.       Date: 5/8/25      Day 2: Inpatient    No increased oxygen requirement overnight. EF 65% with no right ventricular strain. Likely can transition to subcutaneous enoxaparin in the next 24 hours   Plan: cont heparin gtt; monitor labs; tele monitoring; Accuchecks with ssic      Date: 5/9/25    Day 3: Has surpassed a 2nd midnight with active treatments and services.  Discharge Summary     Hospital Course:   Peter Busch is a 69 y.o. male patient who originally presented to the hospital on 5/7/2025 due to leg swelling and positive DVT seen on ultrasound.  Patient had an outpatient ultrasound the day of his admission which showed acute occlusive DVT of the right peroneal veins.  He was asked to come to the emergency room.  Reports also complaining of shortness of breath beyond his baseline COPD.  CT of the chest showed bilateral pulmonary embolism with ratio of right ventricle to left ventricular diameter greater than 1.  Pasi score on admission was 60.  Troponin and BNP were within normal limits.  Of note patient has history of metastatic adenocarcinoma of the lung and recently found to have a lesion in his brain for which he is scheduled to see radiation oncology for stereotactic radiation treatment.  Neurosurgery was consulted in view of patient need to be started on anticoagulation in the setting of brain metastatic disease.  Patient underwent CT head which was negative for hemorrhage and after risk-benefit conversation was started on anticoagulation.  Initially maintained on heparin GTT.  Echocardiogram was done which did not show any right ventricular strain.  He remained hemodynamically stable with no escalating oxygen requirement.  In view of his history of malignancy, he was transition to Lovenox injections which  were set up as an outpatient prior to discharge.  Stable for discharge home with close outpatient follow-up with primary oncologist Dr. Wolfe, radiation oncology and primary care physician.      Disposition:   Home / Self      ED Treatment-Medication Administration from 05/07/2025 1300 to 05/07/2025 1834         Date/Time Order Dose Route Action     05/07/2025 1404 iohexol (OMNIPAQUE) 350 MG/ML injection (MULTI-DOSE) 100 mL 100 mL Intravenous Given     05/07/2025 1508 heparin (porcine) injection 8,800 Units 8,800 Units Intravenous Given     05/07/2025 1513 heparin (porcine) 25,000 units in 0.45% NaCl 250 mL infusion (premix) 18 Units/kg/hr Intravenous New Bag            Scheduled Medications:  aspirin, 81 mg, Oral, Daily  atorvastatin, 40 mg, Oral, Daily  finasteride, 5 mg, Oral, Daily  fluticasone-vilanterol, 1 puff, Inhalation, Daily   And  umeclidinium, 1 puff, Inhalation, Daily  gabapentin, 300 mg, Oral, TID  insulin lispro, 1-5 Units, Subcutaneous, HS  insulin lispro, 2-12 Units, Subcutaneous, TID AC  levothyroxine, 25 mcg, Oral, Early Morning  nicotine, 1 patch, Transdermal, Daily  potassium chloride, 10 mEq, Oral, BID With Meals  tamsulosin, 0.4 mg, Oral, BID  torsemide, 20 mg, Oral, BID      Continuous IV Infusions:  heparin (porcine), 3-30 Units/kg/hr (Order-Specific), Intravenous, Titrated      PRN Meds:  acetaminophen, 650 mg, Oral, Q4H PRN  albuterol, 2 puff, Inhalation, Q6H PRN  heparin (porcine), 4,400 Units, Intravenous, Q6H PRN  heparin (porcine), 8,800 Units, Intravenous, Q6H PRN  melatonin, 6 mg, Oral, HS PRN  traMADol, 50 mg, Oral, Q6H PRN      ED Triage Vitals [05/07/25 1311]   Temperature Pulse Respirations Blood Pressure SpO2 Pain Score   (!) 97.4 °F (36.3 °C) 92 18 (!) 158/101 94 % 6     Weight (last 2 days)       Date/Time Weight    05/08/25 07:35:01 111 (245)    05/07/25 2018 111 (245.4)    05/07/25 1448 113 (249.78)            Vital Signs (last 3 days)       Date/Time Temp Pulse Resp BP  MAP (mmHg) SpO2 Calculated FIO2 (%) - Nasal Cannula Nasal Cannula O2 Flow Rate (L/min) O2 Device Patient Position - Orthostatic VS [place] Coma Scale Score Pain    05/09/25 0800 -- -- -- -- -- 91 % 32 3 L/min Nasal cannula -- -- 5    05/09/25 07:34:32 97.2 °F (36.2 °C) 84 18 110/73 85 91 % -- -- -- -- -- --    05/09/25 01:51:33 97 °F (36.1 °C) 98 18 104/72 83 93 % -- -- -- -- -- --    05/08/25 2107 -- -- -- -- -- 92 % 32 3 L/min Nasal cannula -- -- 7    05/08/25 21:03:29 97 °F (36.1 °C) 88 19 116/79 91 92 % -- -- -- -- -- --    05/08/25 1916 -- -- -- -- -- 92 % 32 3 L/min Nasal cannula -- -- No Pain    05/08/25 1822 -- -- -- -- -- -- -- -- -- -- -- 8 05/08/25 1614 -- -- -- -- -- 92 % 32 3 L/min Nasal cannula -- -- 6    05/08/25 16:07:48 96.8 °F (36 °C) 94 18 124/81 95 90 % -- -- -- -- -- --    05/08/25 14:56:29 -- 95 -- -- -- 90 % -- -- -- -- -- --    05/08/25 1449 -- -- -- -- -- -- -- -- -- -- -- 6 05/08/25 1448 -- -- -- -- -- -- -- -- -- -- -- 6 05/08/25 1124 -- -- -- -- -- -- -- -- -- -- -- 6 05/08/25 11:18:31 97.3 °F (36.3 °C) 81 17 110/63 79 91 % -- -- -- -- -- --    05/08/25 0930 -- -- -- -- -- 91 % 32 3 L/min Nasal cannula -- -- --    05/08/25 07:35:01 97.3 °F (36.3 °C) 91 18 111/62 78 92 % -- -- -- -- -- --    05/08/25 04:40:59 97.2 °F (36.2 °C) 85 18 111/61 78 89 % -- -- -- -- -- --    05/08/25 0304 -- -- -- -- -- -- -- -- -- -- -- 9    05/08/25 0300 -- 86 18 -- -- 90 % -- -- -- -- -- --    05/07/25 21:14:39 97.5 °F (36.4 °C) 85 18 137/83 101 93 % -- -- -- -- -- --    05/07/25 2100 -- -- -- -- -- 91 % 32 3 L/min Nasal cannula -- -- --    05/07/25 2030 -- -- -- -- -- -- -- -- -- -- 15 --    05/07/25 2018 -- -- -- -- -- -- -- -- -- -- -- 5 05/07/25 1900 -- 84 18 146/90 109 -- -- -- -- -- -- --    05/07/25 18:45:50 97.5 °F (36.4 °C) -- 16 146/90 109 -- -- -- -- -- -- --    05/07/25 1630 -- 86 23 159/92 121 92 % -- -- None (Room air) Lying -- --    05/07/25 1600 -- 74 23 152/84 112 93 % -- --  None (Room air) Sitting -- --    05/07/25 1545 -- 75 21 155/89 117 94 % -- -- None (Room air) Sitting -- --    05/07/25 1530 -- 75 23 158/76 109 92 % -- -- None (Room air) Sitting -- --    05/07/25 1520 -- 80 19 158/76 109 92 % -- -- None (Room air) Sitting -- --    05/07/25 1343 -- -- -- -- -- -- -- -- None (Room air) -- 15 8    05/07/25 1320 -- 85 21 139/87 106 95 % -- -- None (Room air) Lying -- --    05/07/25 1311 97.4 °F (36.3 °C) 92 18 158/101 -- 94 % -- -- None (Room air) -- -- 6              Pertinent Labs/Diagnostic Test Results:   Radiology:  CT head without contrast   Final Interpretation by Duke Arciniega MD (05/07 2318)      1. No hemorrhage seen.   2. Inferior vermian mass as described on outside brain MRI. There is mild mass effect on the fourth ventricle without hydrocephalus.                  Workstation performed: [id number]         CTA chest pe study   Final Interpretation by Denzel Tapia MD (05/07 7403)      1.  Acute PE bilaterally. On the right, thrombus is present in the right main pulmonary artery with extension into the the right middle and lower lobe lobar and segmental pulmonary arteries. On the left, thrombus is present in the left lower lobe lobar    and segmental pulmonary arteries.   2.  The calculated ratio of right ventricular to left ventricular diameter (RV/LV ratio) is greater than 1, which is suspicious for right heart strain. .   3.  Unchanged spiculated mass in the right upper lobe and spiculated pulmonary nodule in the left upper lobe. There is obstruction of the bronchi extending through the right upper lobe mass, unchanged from prior.   4.  Severe spinal canal stenosis at T7-T8.         Workstation performed: [id number]           Cardiology:  Echo follow up/limited w/ contrast if indicated   Final Result by Juanjose Walls MD (05/08 0901)        This is a limited echocardiogram for evaluation of right heart strain    in the setting of pulmonary embolism.     Left  Ventricle: Left ventricular cavity size is normal. The left    ventricular ejection fraction is 65%. Systolic function is normal. Wall    motion is normal.     Right Ventricle: Right ventricular cavity size is normal. Systolic    function is normal.  No echocardiographic evidence of hemodynamically    significant pulmonary embolism.         ECG 12 lead   Final Result by Juanjose Walls MD (05/07 1400)   Normal sinus rhythm   Nonspecific ST abnormality   Abnormal ECG   When compared with ECG of 18-Sep-2024 15:45,   No significant change was found   Confirmed by Juanjose Walls ([id number]) on 5/7/2025 2:00:31 PM           Results from last 7 days   Lab Units 05/08/25  0439 05/07/25  1330   WBC Thousand/uL 8.50 9.27   HEMOGLOBIN g/dL 15.2 16.5   HEMATOCRIT % 46.1 50.9*   PLATELETS Thousands/uL 146* 155   TOTAL NEUT ABS Thousands/µL 5.71 6.48        Results from last 7 days   Lab Units 05/08/25  0439 05/07/25  1330   SODIUM mmol/L 138 137   POTASSIUM mmol/L 3.6 3.8   CHLORIDE mmol/L 99 99   CO2 mmol/L 32 31   ANION GAP mmol/L 7 7   BUN mg/dL 12 13   CREATININE mg/dL 0.87 0.83   EGFR ml/min/1.73sq m 87 89   CALCIUM mg/dL 8.7 9.0     Results from last 7 days   Lab Units 05/07/25  1330   AST U/L 15   ALT U/L 25   ALK PHOS U/L 70   TOTAL PROTEIN g/dL 7.2   ALBUMIN g/dL 4.1   TOTAL BILIRUBIN mg/dL 0.52     Results from last 7 days   Lab Units 05/09/25  1100 05/09/25  0735 05/08/25  2102 05/08/25  1605 05/08/25  1116 05/08/25  0732 05/07/25  2112   POC GLUCOSE mg/dl 354* 320* 271* 202* 297* 269* 334*     Results from last 7 days   Lab Units 05/08/25  0439 05/07/25  1330   GLUCOSE RANDOM mg/dL 240* 250*        Results from last 7 days   Lab Units 05/07/25  1330   HS TNI 0HR ng/L 7        Results from last 7 days   Lab Units 05/09/25  0617 05/08/25  1134 05/08/25  0439 05/07/25 2101 05/07/25  1330   PROTIME seconds  --   --   --   --  12.3   INR   --   --   --   --  0.88   PTT seconds 68* 72* 76*   < > 27    < > = values in this  interval not displayed.        Results from last 7 days   Lab Units 05/07/25  1330   BNP pg/mL 61           Past Medical History:   Diagnosis Date    Bladder cancer (HCC)     BPH (benign prostatic hyperplasia)     COPD (chronic obstructive pulmonary disease) (HCC)     Diabetes mellitus (HCC)     Hyperlipidemia     Lung cancer (HCC)     Rib fractures      Present on Admission:   Adenocarcinoma of lung (HCC)   Chronic obstructive pulmonary disease (HCC)   Tobacco abuse   Type 2 diabetes mellitus, without long-term current use of insulin (HCC)   chronic diastolic (congestive) heart failure (HCC)   Chronic respiratory failure with hypoxia (HCC)      Admitting Diagnosis: SOB (shortness of breath) [R06.02]  DVT (deep venous thrombosis) (HCC) [I82.409]  Abnormal laboratory test [R89.9]  Pulmonary emboli (HCC) [I26.99]  Age/Sex: 69 y.o. male    Network Utilization Review Department  ATTENTION: Please call with any questions or concerns to [phone] and carefully listen to the prompts so that you are directed to the right person. All voicemails are confidential.   For Discharge needs, contact Care Management DC Support Team at [phone] opt. [phone]  Send all requests for admission clinical reviews, approved or denied determinations and any other requests to dedicated fax number below belonging to the campus where the patient is receiving treatment. List of dedicated fax numbers for the Facilities:  FACILITY NAME UR FAX NUMBER   ADMISSION DENIALS (Administrative/Medical Necessity) [phone]   DISCHARGE SUPPORT TEAM (NETWORK) [phone]   PARENT CHILD HEALTH (Maternity/NICU/Pediatrics) [phone]   [facility] [phone]   [facility] [phone]   [facility] [phone]   [facility] [phone]   [facility] [phone]   [facility] [phone]   [facility]  [facility] [phone]   [facility] [phone]   [facility] [phone]   [facility] [phone]   [facility] [phone]   [facility] [phone]

## 2025-05-08 NOTE — PHYSICAL THERAPY NOTE
PHYSICAL THERAPY EVALUATION        NAME:  Peter Busch  DATE: 05/08/25    AGE:   69 y.o.  Mrn:   [id number]  ADMIT DX:  SOB (shortness of breath) [R06.02]  DVT (deep venous thrombosis) (HCC) [I82.409]  Abnormal laboratory test [R89.9]  Pulmonary emboli (HCC) [I26.99]    Past Medical History:   Diagnosis Date    Bladder cancer (HCC)     BPH (benign prostatic hyperplasia)     COPD (chronic obstructive pulmonary disease) (HCC)     Diabetes mellitus (HCC)     Hyperlipidemia     Lung cancer (HCC)     Rib fractures      Length Of Stay: 1  Performed at least 2 patient identifiers during session: Name and Birthday    PHYSICAL THERAPY EVALUATION:        05/08/25 1448   PT Last Visit   PT Visit Date 05/08/25   Note Type   Note type Evaluation   Pain Assessment   Pain Assessment Tool 0-10   Pain Score 6   Pain Location/Orientation Orientation: Bilateral;Orientation: Lower;Location: Leg   Restrictions/Precautions   Weight Bearing Precautions Per Order No   Other Precautions Bed Alarm;Chair Alarm;Fall Risk;O2;Multiple lines;Telemetry  (3L O2; higher level cognitive deficits)   Home Living   Type of Home House   Home Layout Multi-level;1/2 bath on main level;Bed/bath upstairs  (5 RIOS no rails;  FF stairs to bed & bath single railing)   Bathroom Shower/Tub Tub/shower unit   Bathroom Toilet Standard   Bathroom Equipment Grab bars in shower   Home Equipment Walker;Cane  (Home O2 (only uses it at night))   Additional Comments sleeps in double bed vs. recliner chair   Prior Function   Level of [place] Independent with ADLs;Independent with functional mobility   Lives With Significant other  (sig. other's dtr)   Receives Help From Family;Friend(s)   IADLs Family/Friend/Other provides transportation;Independent with meal prep;Family/Friend/Other provides medication management  (friend drives or he uses [employer] bus)   Falls in the last 6 months 0   Comments IND no AD.  Walks a block & a half in town to get his coffee with his sig. other  "  General   Additional Pertinent History 6'  245 lbs.  Has smoked for 53 years and is anxious to return home to have cigarettes.   Family/Caregiver Present No   Cognition   Overall Cognitive Status Impaired   Orientation Level Oriented to person;Oriented to situation;Disoriented to place;Oriented to time  (Place:  \"[facility] [place]\")   Following Commands Follows one step commands with increased time or repetition   Subjective   Subjective \"I need a cigarette.  Is that an exit sign?   Do you have a cigarette?\"   RLE Assessment   RLE Assessment WFL   LLE Assessment   LLE Assessment WFL   Light Touch   RLE Light Touch Impaired   RLE Light Touch Comments distally   LLE Light Touch Impaired   LLE Light Touch Comments distally   Proprioception   RLE Proprioception Impaired  (great toe)   LLE Proprioception Impaired  (great toe)   Bed Mobility   Supine to Sit 6  Modified independent   Additional items HOB elevated   Transfers   Sit to Stand 5  Supervision   Stand to Sit 5  Supervision   Additional items Verbal cues   Stand pivot 5  Supervision   Additional items Verbal cues   Additional Comments No AD   Ambulation/Elevation   Gait pattern Improper Weight shift;Inconsistent claudia  (fwd head noted with thoracic kyphosis and rounded shoulders in stance)   Gait Assistance   (primarily SPV with one instance light min A)   Additional items Assist x 1;Verbal cues   Assistive Device None   Distance 115 ft   Stair Management Assistance 5  Supervision   Additional items Verbal cues   Stair Management Technique Two rails;Alternating pattern   Number of Stairs 5   Balance   Static Sitting Normal   Dynamic Sitting Good   Static Standing Fair   Dynamic Standing Fair -   Ambulatory Fair   Endurance Deficit   Endurance Deficit Yes   Endurance Deficit Description RA trial at rest with SpO2 dec to 87%.  HR elevates to 129 bpm with exertion.   Activity Tolerance   Activity Tolerance Patient limited by fatigue   Medical " Staff Made Aware OT Scott   Nurse Made Aware NATAN Manuel   Assessment   Prognosis Good   Problem List Decreased strength;Decreased endurance;Impaired balance;Decreased mobility;Obesity;Impaired sensation;Impaired judgement;Decreased cognition   Barriers to Discharge   (medical status, impaired insight, questionable health literacy, O2 needs with such an urge to smoke)   Barriers to Discharge Comments has good support from sig. other   Goals   Patient Goals go home and have a cigarette   STG Expiration Date 05/22/25   PT Treatment Day 1   Plan   Treatment/Interventions Functional transfer training;LE strengthening/ROM;Elevations;Endurance training;Therapeutic exercise;Cognitive reorientation;Equipment eval/education;Patient/family training;Gait training;Compensatory technique education;OT   PT Frequency 3-5x/wk   Discharge Recommendation   Rehab Resource Intensity Level, PT III (Minimum Resource Intensity)  (pt currently reporting that he will decline any post acute therapy)   Additional Comments Using cane (pt owns) with SPV, especially outdoors.  Also have SPV - assist as needed for stairs.   AM-PAC Basic Mobility Inpatient   Turning in Flat Bed Without Bedrails 4   Lying on Back to Sitting on Edge of Flat Bed Without Bedrails 3   Moving Bed to Chair 3   Standing Up From Chair Using Arms 3   Walk in Room 3   Climb 3-5 Stairs With Railing 3   Basic Mobility Inpatient Raw Score 19   Basic Mobility Standardized Score 42.48   [facility] Highest Level Of Mobility   -HLM Goal 6: Walk 10 steps or more   -HLM Achieved 7: Walk 25 feet or more   Additional Treatment Session   Start Time 1532   End Time 1541   Treatment Assessment Pt tolerates tx session fairly.  He declines a trial of a cane for increasing his stabilization during dynamic mobility.  He denies any dizziness or lightheadedness throughout.   Equipment Use Pt ambulates 121 ft x 1 no AD, SPV.   End of Consult   Patient Position at End of Consult Bedside  chair;All needs within reach;Bed/Chair alarm activated  (with OT)   End of Consult Comments 3L O2     (Please find full objective findings from PT assessment regarding body systems outlined above).     Assessment: Pt is a 69 y.o. male seen for PT evaluation s/p admission to [facility] on 5/7/2025 with Acute pulmonary embolism (HCC).  Order placed for PT services.  Upon evaluation: Pt is presenting with impaired functional mobility due to pain, decreased strength, decreased endurance, impaired balance, impaired proprioception, gait deviations, altered sensation, impaired judgment, fall risk, LE edema, and impaired skin integrity requiring  SPV assistance for transfers and ambulation with no AD . Pt's clinical presentation is currently unstable/unpredictable given the functional mobility deficits above, especially decreased activity tolerance, coupled with fall risks as indicated by AM-PAC 6-Clicks: 19/24 as well as polypharmacy, limited sensation/neuropathy, and obesity and combined with medical complications of abnormal blood sugars, increased O2 needs from baseline, and need for input for mobility technique/safety.  Pt's PMHx and comorbidities that may affect physical performance and progress include: COPD, DM, and cancer history and/or treatment. Personal factors affecting pt at time of IE include: questionable non-compliance, anxiety, step(s) to enter environment, multi-level environment, behavioral pattern, inability to perform IADLs, inability to navigate community distances, and limited insight into impairments. Pt will benefit from continued skilled PT services to address deficits as defined above and to maximize level of functional mobility to facilitate return toward PLOF and improved QOL. From PT/mobility standpoint, recommendation at time of d/c would be Level III (Minimum Resource Intensity), with family and/or caregiver support, and with cane pending progress in order to reduce fall  risk and maximize pt's functional independence and consistency with mobility in order to facilitate return to PLOF.  Recommend trial with cane next 1-2 sessions.     The patient's AM-PAC Basic Mobility Inpatient Short Form Raw Score is 19. A Raw score of greater than 16 suggests the patient may benefit from discharge to home. Please also refer to the recommendation of the Physical Therapist for safe discharge planning.       Goals:  Pt will perform transfers with modified I to increase Indep in home environment and prepare for ambulation. Pt will ambulate with LRAD for >/= 150 ft with  modified I  to decrease risk for falls, improve activity tolerance, and improve gait quality and to access home environment. Pt will complete >/= 12 steps with with unilateral handrail with modified I to return to home with RIOS and return to multilevel home.          Balbir Kulkarni, PT,DPT

## 2025-05-08 NOTE — OCCUPATIONAL THERAPY NOTE
Occupational Therapy Evaluation     Patient Name: Peter Busch  Today's Date: 5/8/2025  Problem List  Principal Problem:    Acute pulmonary embolism (HCC)  Active Problems:    Type 2 diabetes mellitus, without long-term current use of insulin (HCC)    Tobacco abuse    Chronic obstructive pulmonary disease (HCC)    Chronic respiratory failure with hypoxia (HCC)    chronic diastolic (congestive) heart failure (HCC)    Adenocarcinoma of lung (HCC)    Past Medical History  Past Medical History:   Diagnosis Date    Bladder cancer (HCC)     BPH (benign prostatic hyperplasia)     COPD (chronic obstructive pulmonary disease) (HCC)     Diabetes mellitus (HCC)     Hyperlipidemia     Lung cancer (HCC)     Rib fractures      Past Surgical History  Past Surgical History:   Procedure Laterality Date    BLADDER INSTILLATION      Anticarcinogenic 7/31/2014    BRONCHOSCOPY      CATARACT EXTRACTION, BILATERAL      CYSTOSCOPY      HAND SURGERY Left     TONSILLECTOMY      TRANSURETHRAL RESECTION OF PROSTATE             05/08/25 1449   OT Last Visit   OT Visit Date 05/08/25   Note Type   Note type Evaluation   Pain Assessment   Pain Assessment Tool 0-10   Pain Score 6   Pain Location/Orientation Orientation: Bilateral;Orientation: Lower;Location: Leg   Restrictions/Precautions   Weight Bearing Precautions Per Order No   Other Precautions Chair Alarm;Bed Alarm;O2;Fall Risk;Pain  (3L O2)   Home Living   Type of Home House  (5 RIOS no rails)   Home Layout Multi-level;1/2 bath on main level;Bed/bath upstairs  (FF stairs to bed & bath single railing)   Bathroom Shower/Tub Tub/shower unit   Bathroom Toilet Standard   Bathroom Equipment Grab bars in shower   Home Equipment Walker;Cane  (Home O2 (only uses it at night))   Additional Comments sleeps in double bed vs. recliner chair   Prior Function   Level of [place] Independent with ADLs;Independent with functional mobility;Needs assistance with IADLS   Lives With Significant  other  (sig. other's dtr)   Receives Help From Family;Friend(s)   IADLs Family/Friend/Other provides transportation;Independent with meal prep;Family/Friend/Other provides medication management  (friend drives or he uses [employer] bus)   Falls in the last 6 months 0   General   Additional Pertinent History Pt is primarily concerned about how his brain cancere is being managed and his neuroligical deficits.   ADL   Where Assessed Edge of bed   UB Dressing Assistance 7  Independent   LB Dressing Assistance 3  Moderate Assistance   LB Dressing Deficit   (Pt doffed B socks, required assistance donning B socks)   Bed Mobility   Supine to Sit 6  Modified independent   Additional items HOB elevated   Transfers   Sit to Stand 5  Supervision   Additional items Verbal cues   Stand to Sit 5  Supervision   Additional items Verbal cues   Stand pivot 5  Supervision   Additional items Verbal cues   Additional Comments Pt walked short community distance with UE support on IV pole.   Functional Mobility   Functional Mobility 5  Supervision   Additional Comments Pt walked short community distance with UE support on IV pole.   Balance   Static Sitting Normal   Dynamic Sitting Good   Static Standing Fair   Dynamic Standing Fair -   Activity Tolerance   Activity Tolerance Patient limited by fatigue   Medical Staff Made Aware PT Balbir   Nurse Made Aware NATAN TALAVERA Assessment   RUE Assessment WFL  (strength grossly 4+/5)   LUE Assessment   LUE Assessment WFL  (strength grossly 4+/5)   Hand Function   Gross Motor Coordination Functional   Fine Motor Coordination Functional   Hand Function Comments R Hand Dominant   Vision-Basic Assessment   Current Vision Does not wear glasses  (Reports he used to wear glasses)   Visual History Cataracts   Vision - Complex Assessment   Ocular Range of Motion Intact   Tracking Impaired   Cognition   Overall Cognitive Status Impaired   Arousal/Participation Alert;Responsive;Cooperative   Attention Attends with  "cues to redirect   Orientation Level Oriented to person;Oriented to situation;Oriented to time;Disoriented to place  (Place:  \"[facility]\")   Memory Decreased short term memory   Following Commands Follows one step commands with increased time or repetition   Comments Mild cognitive impairments.   Cognition Assessment Tools SLUMS  (completed during treatment session)   Assessment   Limitation Decreased ADL status;Decreased Safe judgement during ADL;Decreased cognition;Decreased endurance;Decreased self-care trans;Decreased high-level ADLs   Prognosis Good   Assessment Pt is a 69 y.o. male, admitted to [facility] 5/7/2025 d/t experiencing worsening leg swelling and SOB. Dx: acute pulmonary embolism. Pt with PMHx impacting their performance during ADL tasks, including: bladder cancer, lung cancer, COPD, DM II, hand surgery. Prior to admission to the hospital Pt was performing ADLs without physical assistance. IADLs with physical assistance. Functional transfers/ambulation without physical assistance. Cognitive status was PTA was WFL. OT order placed to assess Pt's ADLs, cognitive status, and performance during functional tasks in order to maximize safety and independence while making most appropriate d/c recommendations. PT/OT co-evaluation completed at this time d/t significant mobility deficits and safety concerns. Pt's clinical presentation is currently unstable/unpredictable given new onset deficits that effect Pt's occupational performance and ability to safely return to OF including decrease activity tolerance, decrease standing balance, decrease sitting balance, decrease performance during ADL tasks, decrease cognition, decrease generalized strength, decrease activity engagement, decrease performance during functional transfers, and limited insight to deficits combined with medical complications of abnormal blood sugars, new onset O2 use, and need for input for mobility technique/safety. " Personal factors affecting Pt at time of initial evaluation include: step(s) to enter environment, multi-level environment, past experience, behavioral pattern, inability to perform IADLs, inability to perform ADLs, inability to ambulate household distances, inability to navigate community distances, limited insight into impairments, and decreased initiation and engagement. Pt will benefit from continued skilled OT services to address deficits as defined above and to maximize level independence/participation during ADLs and functional tasks to facilitate return toward PLOF and improved quality of life. From an occupational therapy standpoint, recommendation at time of d/c would be Level III: Minimum Resource Intensity.   Goals   Patient Goals go home and have a cigarette   Plan   Treatment Interventions ADL retraining;Visual perceptual retraining;Functional transfer training;Endurance training;Cognitive reorientation;Patient/family training;Equipment evaluation/education;Compensatory technique education;Energy conservation;Activityengagement;Neuromuscular reeducation   Goal Expiration Date 05/22/25   OT Treatment Day 1   OT Frequency 2-3x/wk   Discharge Recommendation   Rehab Resource Intensity Level, OT III (Minimum Resource Intensity)   AM-PAC Daily Activity Inpatient   Lower Body Dressing 2   Bathing 3   Toileting 3   Upper Body Dressing 4   Grooming 4   Eating 4   Daily Activity Raw Score 20   Daily Activity Standardized Score (Calc for Raw Score >=11) 42.03   SLUMS   What day of the week is it? 1   What is the year? 1   What state are we in? 1   How much did you spend? 1   How much do you have left? 2   Name animals? 2  (13 animals)   Oject recall? 3   Repeat numbers backwards? 1   Draw a clock? 2   Identify a triangle 1   Determine size? 1   What was the females name? 2   When did she go back to work? 2   What work did she do? 2   What state did she live in? 0   Calculated SLUMS Score 22   SLUMS Comments Pt  participated in the [employer] Mental Status (UMS) Examination on this date to further assess cognition. Pt scored a total of 22/30 which indicates mild neurocognitive disorder. Pt demonstrated deficits in the following categories: attention, immediate recall, delayed recall, numeric calculation and registration, visual spatial functioning, and executive functioning.   Additional Treatment Session   Start Time 1542   End Time 1600   Treatment Assessment SLUMS completed during treatment session. Pt ed on findings of SLUMS and OT eval. Pt verbalized understanding.   Additional Treatment Day 1   End of Consult   Education Provided Yes   Patient Position at End of Consult Bedside chair;Bed/Chair alarm activated;All needs within reach   Nurse Communication Nurse aware of consult     The patient's raw score on the AM-PAC Daily Activity Inpatient Short Form is 20. A raw score of greater than or equal to 19 suggests the patient may benefit from discharge to home. Please refer to the recommendation of the Occupational Therapist for safe discharge planning.    Pt goals to be met by 5/22/2025    Pt will demonstrate ability to complete LB dressing @ independent in order to increase safety and independence during meaningful tasks.   Pt will demonstrate ability to complete toileting tasks including CM and pericare @ independent in order to increase safety and independence during meaningful tasks.  Pt will demonstrate ability to complete EOB, chair, toilet/commode transfers @ independent in order to increase performance and participation during functional tasks.  Pt will demonstrate ability to stand for 15 minutes while maintaining good balance with use of no device for UB support PRN.  Pt will demonstrate ability to tolerate 30-35 minute OT session with no vc'ing for deep breathing or use of energy conservation techniques in order to increase activity tolerance during functional tasks.   Pt will demonstrate Good  carryover of use of energy conservation/compensatory strategies during ADLs and functional tasks in order to increase safety and reduce risk for falls.   Pt will demonstrate Good attention and participation in continued evaluation of functional ambulation house hold distances in order to assist with safe d/c planning.  Pt will attend to continued cognitive assessments 100% of the time in order to provide most appropriate d/c recommendations.   Pt will follow 100% simple 2-step commands and be A&O x4 consistently with environmental cues to increase participation in functional activities.   Pt will identify 3 areas of interest/hobbies and 1 intervention on how to incorporate into daily life in order to increase interaction with environment and peers as well as increase participation in meaningful tasks.   Pt will demonstrate 100% carryover of BUE HEP in order to increase BUE MS and increase performance during functional tasks upon d/c home.    Scott Branch, OTR/L

## 2025-05-08 NOTE — PLAN OF CARE
Problem: PHYSICAL THERAPY ADULT  Goal: Performs mobility at highest level of function for planned discharge setting.  See evaluation for individualized goals.  Description: Treatment/Interventions: Functional transfer training, LE strengthening/ROM, Elevations, Endurance training, Therapeutic exercise, Cognitive reorientation, Equipment eval/education, Patient/family training, Gait training, Compensatory technique education, OT          See flowsheet documentation for full assessment, interventions and recommendations.  Note: Prognosis: Good  Problem List: Decreased strength, Decreased endurance, Impaired balance, Decreased mobility, Obesity, Impaired sensation, Impaired judgement, Decreased cognition  Assessment: Pt is a 69 y.o. male seen for PT evaluation s/p admission to [facility] on 5/7/2025 with Acute pulmonary embolism (HCC).  Order placed for PT services.  Upon evaluation: Pt is presenting with impaired functional mobility due to pain, decreased strength, decreased endurance, impaired balance, impaired proprioception, gait deviations, altered sensation, impaired judgment, fall risk, LE edema, and impaired skin integrity requiring  SPV assistance for transfers and ambulation with no AD . Pt's clinical presentation is currently unstable/unpredictable given the functional mobility deficits above, especially decreased activity tolerance, coupled with fall risks as indicated by AM-PAC 6-Clicks: 19/24 as well as polypharmacy, limited sensation/neuropathy, and obesity and combined with medical complications of abnormal blood sugars, increased O2 needs from baseline, and need for input for mobility technique/safety.  Pt's PMHx and comorbidities that may affect physical performance and progress include: COPD, DM, and cancer history and/or treatment. Personal factors affecting pt at time of IE include: questionable non-compliance, anxiety, step(s) to enter environment, multi-level environment,  behavioral pattern, inability to perform IADLs, inability to navigate community distances, and limited insight into impairments. Pt will benefit from continued skilled PT services to address deficits as defined above and to maximize level of functional mobility to facilitate return toward PLOF and improved QOL. From PT/mobility standpoint, recommendation at time of d/c would be Level III (Minimum Resource Intensity), with family and/or caregiver support, and with cane pending progress in order to reduce fall risk and maximize pt's functional independence and consistency with mobility in order to facilitate return to PLOF.  Recommend trial with cane next 1-2 sessions.  Barriers to Discharge:  (medical status, impaired insight, questionable health literacy, O2 needs with such an urge to smoke)  Barriers to Discharge Comments: has good support from sig. other  Rehab Resource Intensity Level, PT: III (Minimum Resource Intensity) (pt currently reporting that he will decline any post acute therapy)    See flowsheet documentation for full assessment.

## 2025-05-08 NOTE — CASE MANAGEMENT
Case Management Assessment & Discharge Planning Note    Patient name Peter Busch  Location /-01 MRN [id number]  : 1955 Date 2025       Current Admission Date: 2025  Current Admission Diagnosis:Acute pulmonary embolism (HCC)   Patient Active Problem List    Diagnosis Date Noted Date Diagnosed    Acute pulmonary embolism (HCC) 2025     Adenocarcinoma of lung (HCC) 2024     Hx of bladder cancer 2024     Intermittent self-catheterization of bladder 2024     Acute urinary retention 2024     chronic diastolic (congestive) heart failure (HCC) 2024     Cellulitis of left lower extremity 2024     Leg swelling 2022     Chronic respiratory failure with hypoxia (HCC) 2022     Type 2 diabetes mellitus, without long-term current use of insulin (HCC) 09/15/2021     Tobacco abuse 09/15/2021     Chronic obstructive pulmonary disease (HCC) 09/15/2021     Benign prostatic hyperplasia with urinary retention 09/15/2021     History of lung cancer 09/15/2021     Acute hypoxemic respiratory failure due to COVID-19 (HCC) 2021       LOS (days): 1  Geometric Mean LOS (GMLOS) (days): 4  Days to GMLOS:3.3     OBJECTIVE:    Risk of Unplanned Readmission Score: 13.86         Current admission status: Inpatient  Referral Reason: Other (Disposition Planning)    Preferred Pharmacy:   [id number] [address] [address]  Phone: [phone] Fax: [phone]    Primary Care Provider: Regino Brooks DO    Primary Insurance: [employer]  Secondary Insurance: [place] [employer] [facility]    ASSESSMENT:  Active Health Care Proxies       Hosler, Gwen Health Care Representative - Significant Other   Primary Phone: [phone] (Mobile)  Home Phone: [phone]                 Advance Directives  Does patient have a Health Care POA?: No  Was patient offered paperwork?:  Yes (pt declined)  Does patient currently have a Health Care decision maker?: Yes, please see Health Care Proxy section  Does patient have Advance Directives?: No  Was patient offered paperwork?: Yes (pt declined)  Primary Contact: Gwen Hosler, SO         Readmission Root Cause  30 Day Readmission: No    Patient Information  Admitted from:: Home  Mental Status: Alert  During Assessment patient was accompanied by: Not accompanied during assessment  Assessment information provided by:: Patient  Primary Caregiver: Self  Support Systems: Spouse/significant other, Family members  County of Residence: [facility]  What city do you live in?: [place]  Home entry access options. Select all that apply.: Stairs  Number of steps to enter home.: 3  Do the steps have railings?: Yes  Type of Current Residence: 3 story home  Upon entering residence, is there a bedroom on the main floor (no further steps)?: No  A bedroom is located on the following floor levels of residence (select all that apply):: 2nd Floor  Upon entering residence, is there a bathroom on the main floor (no further steps)?: Yes (1/2 bath)  Number of steps to 2nd floor from main floor: One Flight  Living Arrangements: Lives w/ Spouse/significant other, Other (Comment) (SO daughter, Ca)  Is patient a ?: Yes (7.5 years [employer])  Is patient active with [facility] ( [facility])?: No    Activities of Daily Living Prior to Admission  Functional Status: Independent  Completes ADLs independently?: Yes  Ambulates independently?: Yes  Does patient use assisted devices?: Yes  Assisted Devices (DME) used: Home Oxygen concentrator  DME Company Name (respiratory supplies): [employer]  O2 Rate(s): 3L at night  Does patient currently own DME?: Yes  What DME does the patient currently own?: Home Oxygen concentrator, Straight Cane, Walker  Does patient have a history of Outpatient Therapy (PT/OT)?: No  Does the patient have a history of Short-Term Rehab?: No  Does patient have  a history of HHC?: No (pt reports he was referred to [facility] but did not accept services)  Does patient currently have HHC?: No         Patient Information Continued  Income Source: SSI/SSD  Does patient have prescription coverage?: Yes  Can the patient afford their medications and any related supplies (such as glucometers or test strips)?: Yes  Does patient receive dialysis treatments?: No  Does patient have a history of substance abuse?: No  Does patient have a history of Mental Health Diagnosis?: No         Means of Transportation  Means of Transport to [facility]:: Public Transportation - Bus          DISCHARGE DETAILS:    Discharge planning discussed with:: patient  Freedom of Choice: Yes     CM contacted family/caregiver?: No- see comments (pt declined)  Were Treatment Team discharge recommendations reviewed with patient/caregiver?: Yes  Did patient/caregiver verbalize understanding of patient care needs?: Yes               CM met with patient at the bedside, baseline information was obtained. CM discussed the role of CM in helping the patient develop a discharge plan and assist the patient in carry out their plan.     Patient lives in 3 story home with BE and her daughter, Ca age 36. SO daughter resides on 3rd floor of home. Patient uses [facility] for transportation to/from [facility].     Patient has home O2 concentrator, [employer].    CM to follow for CM discharge referral needs.

## 2025-05-08 NOTE — PLAN OF CARE
Problem: OCCUPATIONAL THERAPY ADULT  Goal: Performs self-care activities at highest level of function for planned discharge setting.  See evaluation for individualized goals.  Description: Treatment Interventions: ADL retraining, Visual perceptual retraining, Functional transfer training, Endurance training, Cognitive reorientation, Patient/family training, Equipment evaluation/education, Compensatory technique education, Energy conservation, Activityengagement, Neuromuscular reeducation          See flowsheet documentation for full assessment, interventions and recommendations.   Note: Limitation: Decreased ADL status, Decreased Safe judgement during ADL, Decreased cognition, Decreased endurance, Decreased self-care trans, Decreased high-level ADLs  Prognosis: Good  Assessment: Pt is a 69 y.o. male, admitted to [facility] 5/7/2025 d/t experiencing worsening leg swelling and SOB. Dx: acute pulmonary embolism. Pt with PMHx impacting their performance during ADL tasks, including: bladder cancer, lung cancer, COPD, DM II, hand surgery. Prior to admission to the hospital Pt was performing ADLs without physical assistance. IADLs with physical assistance. Functional transfers/ambulation without physical assistance. Cognitive status was PTA was WFL. OT order placed to assess Pt's ADLs, cognitive status, and performance during functional tasks in order to maximize safety and independence while making most appropriate d/c recommendations. PT/OT co-evaluation completed at this time d/t significant mobility deficits and safety concerns. Pt's clinical presentation is currently unstable/unpredictable given new onset deficits that effect Pt's occupational performance and ability to safely return to PLOF including decrease activity tolerance, decrease standing balance, decrease sitting balance, decrease performance during ADL tasks, decrease cognition, decrease generalized strength, decrease activity engagement, decrease performance  during functional transfers, and limited insight to deficits combined with medical complications of abnormal blood sugars, new onset O2 use, and need for input for mobility technique/safety. Personal factors affecting Pt at time of initial evaluation include: step(s) to enter environment, multi-level environment, past experience, behavioral pattern, inability to perform IADLs, inability to perform ADLs, inability to ambulate household distances, inability to navigate community distances, limited insight into impairments, and decreased initiation and engagement. Pt will benefit from continued skilled OT services to address deficits as defined above and to maximize level independence/participation during ADLs and functional tasks to facilitate return toward PLOF and improved quality of life. From an occupational therapy standpoint, recommendation at time of d/c would be Level III: Minimum Resource Intensity.     Rehab Resource Intensity Level, OT: III (Minimum Resource Intensity)

## 2025-05-08 NOTE — PROGRESS NOTES
Progress Note - Hospitalist   Name: Peter Busch 69 y.o. male I MRN: [id number]  Unit/Bed#: -01 I Date of Admission: 5/7/2025   Date of Service: 5/8/2025 I Hospital Day: 1    Assessment & Plan  Acute pulmonary embolism (HCC)  Patient presents with worsening shortness of breath lower extremity swelling  CTA chest showsAcute PE bilaterally. On the right, thrombus is present in the right main pulmonary artery with extension into the the right middle and lower lobe lobar and segmental pulmonary arteries. On the left, thrombus is present in the left lower lobe lobar   and segmental pulmonary arteries.  2.  The calculated ratio of right ventricular to left ventricular diameter (RV/LV ratio) is greater than 1, which is suspicious for right heart strain. .  3.  Unchanged spiculated mass in the right upper lobe and spiculated pulmonary nodule in the left upper lobe. There is obstruction of the bronchi extending through the right upper lobe mass, unchanged from prior.  PESI score: 60  Venous Doppler ultrasound of the lower extremity showsAcute occlusive DVT in the right peroneal veins.   2.  No DVT identified in the left leg.   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 Wolfe and neurosurgery team at [facility] by ED physician  Cleared to be started on unfractionated heparin as CT head today was negative for any acute hemorrhage  Follow hemodynamics closely  Continue with telemetry monitoring  Follow-up on PERT priority echocardiogram--> EF 65% with no right ventricular strain  Likely will need to be transitioned to Lovenox upon discharge.  Prescription sent to outpatient pharmacy for price check.  Likely can transition to subcutaneous enoxaparin in the next 24 hours  Type 2 diabetes  mellitus, without long-term current use of insulin (HCC)  Lab Results   Component Value Date    HGBA1C 8.4 (H) 02/10/2025       Recent Labs     05/07/25  2112 05/08/25  0732 05/08/25  1116   POCGLU 334* 269* 297*       Blood Sugar Average: Last 72 hrs:  (P) 300  Hold oral hypoglycemic medications.  Continue with serial blood Leukos monitoring and sliding scale insulin coverage while in the hospital.  Tobacco abuse  Counseled  regarding cessation  Chronic obstructive pulmonary disease (HCC)  Continue with current outpatient inhaler and nebulizer treatment.  Does not appear to have any acute exacerbation.  Adenocarcinoma of lung (HCC)  Has history of adenocarcinoma of right upper lobe of the lung for which follows up with Dr. Wolfe.  Currently under active surveillance  Recently diagnosed with possible metastasis to the brain based on recent MRI.  Previously has received:  1. Definitive SBRT to the dominant nodule in the right upper lobe of lung to a dose of 54 cGy completed on 02/21/2020.  2. Pemetrexed & Carboplatin; start day 3/3/20. S/p 4 cycles. Last 5/5/20  3. S/p SBRT 54Gy completed 9/5/23 to ARLEEN nodule; 1.7 cm   Close outpatient oncology follow-up on discharge  In view of underlying malignancy will likely need to be on Lovenox 1 mg/kg subcutaneous twice daily for pulmonary embolism and DVT pending outpatient evaluation by primary oncologist.  Recently saw his radiation oncologist for brain lesion possible malignancy.  Patient scheduled to follow-up for possible stereotactic radiation treatment  chronic diastolic (congestive) heart failure (HCC)  Wt Readings from Last 3 Encounters:   05/08/25 111 kg (245 lb)   07/17/24 113 kg (249 lb)   07/03/24 113 kg (249 lb)       Utilizes torsemide 20 mg twice daily.  Currently does not appear to have any acute exacerbation.  Continue with current outpatient dose      Chronic respiratory failure with hypoxia (HCC)  Patient on 3 L of supplemental oxygen nocturnally.  Not  requiring any additional oxygen.  Continue to monitor respiratory status and oxygenation closely with current diagnosis.    VTE Pharmacologic Prophylaxis: VTE Score: 4 High Risk (Score >/= 5) - Pharmacological DVT Prophylaxis Ordered: enoxaparin (Lovenox). Sequential Compression Devices Ordered.    Mobility:   Basic Mobility Inpatient Raw Score: 21  JH-HLM Goal: 6: Walk 10 steps or more  JH-HLM Achieved: 5: Stand (1 or more minutes)  JH-HLM Goal achieved. Continue to encourage appropriate mobility.    Patient Centered Rounds: I performed bedside rounds with nursing staff today.   Discussions with Specialists or Other Care Team Provider: Discussed with case management    Education and Discussions with Family / Patient: Patient declined call to .     Current Length of Stay: 1 day(s)  Current Patient Status: Inpatient   Certification Statement: The patient will continue to require additional inpatient hospital stay due to ongoing management of pulmonary embolism, outpatient Lovenox set up.  Discharge Plan: Anticipate discharge tomorrow to home.    Code Status: Level 1 - Full Code    Subjective   Seen and examined at bedside.  Denies any worsening shortness of breath.  Denies any lightheadedness dizziness chest pain.  No increased oxygen requirement overnight.    Objective :  Temp:  [97.2 °F (36.2 °C)-97.5 °F (36.4 °C)] 97.3 °F (36.3 °C)  HR:  [74-92] 81  BP: (110-159)/() 110/63  Resp:  [16-23] 17  SpO2:  [89 %-95 %] 91 %  O2 Device: Nasal cannula  Nasal Cannula O2 Flow Rate (L/min):  [3 L/min] 3 L/min    Body mass index is 33.23 kg/m².     Input and Output Summary (last 24 hours):     Intake/Output Summary (Last 24 hours) at 5/8/2025 1204  Last data filed at 5/8/2025 0809  Gross per 24 hour   Intake 300 ml   Output 2100 ml   Net -1800 ml       Physical Exam  Constitutional:       Appearance: He is ill-appearing.   HENT:      Head: Normocephalic.      Nose: Nose normal.   Eyes:      Pupils: Pupils  are equal, round, and reactive to light.   Cardiovascular:      Rate and Rhythm: Normal rate and regular rhythm.   Pulmonary:      Effort: Pulmonary effort is normal.      Comments: Diminished breath sounds at bases.  No wheezing or rales appreciated.  Abdominal:      General: Abdomen is flat. Bowel sounds are normal.      Palpations: Abdomen is soft.   Musculoskeletal:      Cervical back: Neck supple.      Comments: Chronic venous insufficiency with bilateral lower extremity edema.   Skin:     General: Skin is warm.   Neurological:      General: No focal deficit present.      Mental Status: He is alert and oriented to person, place, and time. Mental status is at baseline.           Lines/Drains:  Lines/Drains/Airways       Active Status       Name Placement date Placement time Site Days    External Urinary Catheter 05/08/25  0023  -- less than 1                      Telemetry:  Telemetry Orders (From admission, onward)               24 Hour Telemetry Monitoring  Continuous x 24 Hours (Telem)        Expiring   Question:  Reason for 24 Hour Telemetry  Answer:  Pulmonary Embolism                     Telemetry Reviewed: Normal Sinus Rhythm  Indication for Continued Telemetry Use: PE               Lab Results: I have reviewed the following results:   Results from last 7 days   Lab Units 05/08/25  0439   WBC Thousand/uL 8.50   HEMOGLOBIN g/dL 15.2   HEMATOCRIT % 46.1   PLATELETS Thousands/uL 146*   SEGS PCT % 67   LYMPHO PCT % 20   MONO PCT % 8   EOS PCT % 3     Results from last 7 days   Lab Units 05/08/25  0439 05/07/25  1330   SODIUM mmol/L 138 137   POTASSIUM mmol/L 3.6 3.8   CHLORIDE mmol/L 99 99   CO2 mmol/L 32 31   BUN mg/dL 12 13   CREATININE mg/dL 0.87 0.83   ANION GAP mmol/L 7 7   CALCIUM mg/dL 8.7 9.0   ALBUMIN g/dL  --  4.1   TOTAL BILIRUBIN mg/dL  --  0.52   ALK PHOS U/L  --  70   ALT U/L  --  25   AST U/L  --  15   GLUCOSE RANDOM mg/dL 240* 250*     Results from last 7 days   Lab Units 05/07/25  1330   INR   0.88     Results from last 7 days   Lab Units 05/08/25  1116 05/08/25  0732 05/07/25  2112   POC GLUCOSE mg/dl 297* 269* 334*               Recent Cultures (last 7 days):               Last 24 Hours Medication List:     Current Facility-Administered Medications:     acetaminophen (TYLENOL) tablet 650 mg, Q4H PRN    albuterol (PROVENTIL HFA,VENTOLIN HFA) inhaler 2 puff, Q6H PRN    aspirin (ECOTRIN LOW STRENGTH) EC tablet 81 mg, Daily    atorvastatin (LIPITOR) tablet 40 mg, Daily    finasteride (PROSCAR) tablet 5 mg, Daily    fluticasone-vilanterol 200-25 mcg/actuation 1 puff, Daily **AND** umeclidinium 62.5 mcg/actuation inhaler AEPB 1 puff, Daily    gabapentin (NEURONTIN) capsule 300 mg, TID    heparin (porcine) 25,000 units in 0.45% NaCl 250 mL infusion (premix), Titrated, Last Rate: 15 Units/kg/hr (05/08/25 0545)    heparin (porcine) injection 4,400 Units, Q6H PRN    heparin (porcine) injection 8,800 Units, Q6H PRN    insulin lispro (HumALOG/ADMELOG) 100 units/mL subcutaneous injection 1-5 Units, HS    insulin lispro (HumALOG/ADMELOG) 100 units/mL subcutaneous injection 2-12 Units, TID AC **AND** Fingerstick Glucose (POCT), TID AC    levothyroxine tablet 25 mcg, Early Morning    melatonin tablet 6 mg, HS PRN    nicotine (NICODERM CQ) 21 mg/24 hr TD 24 hr patch 1 patch, Daily    potassium chloride (Klor-Con M10) CR tablet 10 mEq, BID With Meals    tamsulosin (FLOMAX) capsule 0.4 mg, BID    torsemide (DEMADEX) tablet 20 mg, BID    traMADol (ULTRAM) tablet 50 mg, Q6H PRN    Administrative Statements   Today, Patient Was Seen By: Kerri Bae MD      **Please Note: This note may have been constructed using a voice recognition system.**

## 2025-05-08 NOTE — PLAN OF CARE
Problem: Prexisting or High Potential for Compromised Skin Integrity  Goal: Skin integrity is maintained or improved  Description: INTERVENTIONS:- Identify patients at risk for skin breakdown- Assess and monitor skin integrity- Assess and monitor nutrition and hydration status- Monitor labs - Assess for incontinence - Turn and reposition patient- Assist with mobility/ambulation- Relieve pressure over bony prominences- Avoid friction and shearing- Provide appropriate hygiene as needed including keeping skin clean and dry- Evaluate need for skin moisturizer/barrier cream- Collaborate with interdisciplinary team - Patient/family teaching- Consider wound care consult   Outcome: Progressing     Problem: PAIN - ADULT  Goal: Verbalizes/displays adequate comfort level or baseline comfort level  Description: Interventions:- Encourage patient to monitor pain and request assistance- Assess pain using appropriate pain scale- Administer analgesics based on type and severity of pain and evaluate response- Implement non-pharmacological measures as appropriate and evaluate response- Consider cultural and social influences on pain and pain management- Notify physician/advanced practitioner if interventions unsuccessful or patient reports new pain  Outcome: Progressing     Problem: INFECTION - ADULT  Goal: Absence or prevention of progression during hospitalization  Description: INTERVENTIONS:- Assess and monitor for signs and symptoms of infection- Monitor lab/diagnostic results- Monitor all insertion sites, i.e. indwelling lines, tubes, and drains- Monitor endotracheal if appropriate and nasal secretions for changes in amount and color- [place] appropriate cooling/warming therapies per order- Administer medications as ordered- Instruct and encourage patient and family to use good hand hygiene technique- Identify and instruct in appropriate isolation precautions for identified infection/condition  Outcome: Progressing  Goal:  Absence of fever/infection during neutropenic period  Description: INTERVENTIONS:- Monitor WBC  Outcome: Progressing     Problem: SAFETY ADULT  Goal: Patient will remain free of falls  Description: INTERVENTIONS:- Educate patient/family on patient safety including physical limitations- Instruct patient to call for assistance with activity - Consult OT/PT to assist with strengthening/mobility - Keep Call bell within reach- Keep bed low and locked with side rails adjusted as appropriate- Keep care items and personal belongings within reach- Initiate and maintain comfort rounds- Make Fall Risk Sign visible to staff- Offer Toileting every 2 Hours, in advance of need- Initiate/Maintain bed alarm- Obtain necessary fall risk management equipment: non-skid socks- Apply yellow socks and bracelet for high fall risk patients- Consider moving patient to room near nurses station  Outcome: Progressing  Goal: Maintain or return to baseline ADL function  Description: INTERVENTIONS:-  Assess patient's ability to carry out ADLs; assess patient's baseline for ADL function and identify physical deficits which impact ability to perform ADLs (bathing, care of mouth/teeth, toileting, grooming, dressing, etc.)- Assess/evaluate cause of self-care deficits - Assess range of motion- Assess patient's mobility; develop plan if impaired- Assess patient's need for assistive devices and provide as appropriate- Encourage maximum independence but intervene and supervise when necessary- Involve family in performance of ADLs- Assess for home care needs following discharge - Consider OT consult to assist with ADL evaluation and planning for discharge- Provide patient education as appropriate  Outcome: Progressing  Goal: Maintains/Returns to pre admission functional level  Description: INTERVENTIONS:- Perform AM-PAC 6 Click Basic Mobility/ Daily Activity assessment daily.- Set and communicate daily mobility goal to care team and  patient/family/caregiver. - Collaborate with rehabilitation services on mobility goals if consulted- Perform Range of Motion 2 times a day.- Reposition patient every 2 hours.- Dangle patient 3 times a day- Stand patient 3 times a day- Ambulate patient 3 times a day- Out of bed to chair 3 times a day - Out of bed for meals 3 times a day- Out of bed for toileting- Record patient progress and toleration of activity level   Outcome: Progressing     Problem: DISCHARGE PLANNING  Goal: Discharge to home or other facility with appropriate resources  Description: INTERVENTIONS:- Identify barriers to discharge w/patient and caregiver- Arrange for needed discharge resources and transportation as appropriate- Identify discharge learning needs (meds, wound care, etc.)- Arrange for interpretive services to assist at discharge as needed- Refer to Case Management Department for coordinating discharge planning if the patient needs post-hospital services based on physician/advanced practitioner order or complex needs related to functional status, cognitive ability, or social support system  Outcome: Progressing     Problem: Knowledge Deficit  Goal: Patient/family/caregiver demonstrates understanding of disease process, treatment plan, medications, and discharge instructions  Description: Complete learning assessment and assess knowledge base.Interventions:- Provide teaching at level of understanding- Provide teaching via preferred learning methods  Outcome: Progressing     Problem: RESPIRATORY - ADULT  Goal: Achieves optimal ventilation and oxygenation  Description: INTERVENTIONS:- Assess for changes in respiratory status- Assess for changes in mentation and behavior- Position to facilitate oxygenation and minimize respiratory effort- Oxygen administered by appropriate delivery if ordered- Initiate smoking cessation education as indicated- Encourage broncho-pulmonary hygiene including cough, deep breathe, Incentive Spirometry- Assess  the need for suctioning and aspirate as needed- Assess and instruct to report SOB or any respiratory difficulty- Respiratory Therapy support as indicated  Outcome: Progressing

## 2025-05-08 NOTE — PLAN OF CARE
Problem: Prexisting or High Potential for Compromised Skin Integrity  Goal: Skin integrity is maintained or improved  Description: INTERVENTIONS:- Identify patients at risk for skin breakdown- Assess and monitor skin integrity- Assess and monitor nutrition and hydration status- Monitor labs - Assess for incontinence - Turn and reposition patient- Assist with mobility/ambulation- Relieve pressure over bony prominences- Avoid friction and shearing- Provide appropriate hygiene as needed including keeping skin clean and dry- Evaluate need for skin moisturizer/barrier cream- Collaborate with interdisciplinary team - Patient/family teaching- Consider wound care consult   Outcome: Progressing     Problem: PAIN - ADULT  Goal: Verbalizes/displays adequate comfort level or baseline comfort level  Description: Interventions:- Encourage patient to monitor pain and request assistance- Assess pain using appropriate pain scale- Administer analgesics based on type and severity of pain and evaluate response- Implement non-pharmacological measures as appropriate and evaluate response- Consider cultural and social influences on pain and pain management- Notify physician/advanced practitioner if interventions unsuccessful or patient reports new pain  Outcome: Progressing     Problem: INFECTION - ADULT  Goal: Absence or prevention of progression during hospitalization  Description: INTERVENTIONS:- Assess and monitor for signs and symptoms of infection- Monitor lab/diagnostic results- Monitor all insertion sites, i.e. indwelling lines, tubes, and drains- Monitor endotracheal if appropriate and nasal secretions for changes in amount and color- [place] appropriate cooling/warming therapies per order- Administer medications as ordered- Instruct and encourage patient and family to use good hand hygiene technique- Identify and instruct in appropriate isolation precautions for identified infection/condition  Outcome: Progressing  Goal:  Absence of fever/infection during neutropenic period  Description: INTERVENTIONS:- Monitor WBC  Outcome: Progressing     Problem: SAFETY ADULT  Goal: Patient will remain free of falls  Description: INTERVENTIONS:- Educate patient/family on patient safety including physical limitations- Instruct patient to call for assistance with activity - Consult OT/PT to assist with strengthening/mobility - Keep Call bell within reach- Keep bed low and locked with side rails adjusted as appropriate- Keep care items and personal belongings within reach- Initiate and maintain comfort rounds- Make Fall Risk Sign visible to staff- Offer Toileting every 2 Hours, in advance of need- Initiate/Maintain bed alarm- Obtain necessary fall risk management equipment: non-skid socks- Apply yellow socks and bracelet for high fall risk patients- Consider moving patient to room near nurses station  Outcome: Progressing  Goal: Maintain or return to baseline ADL function  Description: INTERVENTIONS:-  Assess patient's ability to carry out ADLs; assess patient's baseline for ADL function and identify physical deficits which impact ability to perform ADLs (bathing, care of mouth/teeth, toileting, grooming, dressing, etc.)- Assess/evaluate cause of self-care deficits - Assess range of motion- Assess patient's mobility; develop plan if impaired- Assess patient's need for assistive devices and provide as appropriate- Encourage maximum independence but intervene and supervise when necessary- Involve family in performance of ADLs- Assess for home care needs following discharge - Consider OT consult to assist with ADL evaluation and planning for discharge- Provide patient education as appropriate  Outcome: Progressing  Goal: Maintains/Returns to pre admission functional level  Description: INTERVENTIONS:- Perform AM-PAC 6 Click Basic Mobility/ Daily Activity assessment daily.- Set and communicate daily mobility goal to care team and  patient/family/caregiver. - Collaborate with rehabilitation services on mobility goals if consulted- Perform Range of Motion 2 times a day.- Reposition patient every 2 hours.- Dangle patient 3 times a day- Stand patient 3 times a day- Ambulate patient 3 times a day- Out of bed to chair 3 times a day - Out of bed for meals 3 times a day- Out of bed for toileting- Record patient progress and toleration of activity level   Outcome: Progressing     Problem: DISCHARGE PLANNING  Goal: Discharge to home or other facility with appropriate resources  Description: INTERVENTIONS:- Identify barriers to discharge w/patient and caregiver- Arrange for needed discharge resources and transportation as appropriate- Identify discharge learning needs (meds, wound care, etc.)- Arrange for interpretive services to assist at discharge as needed- Refer to Case Management Department for coordinating discharge planning if the patient needs post-hospital services based on physician/advanced practitioner order or complex needs related to functional status, cognitive ability, or social support system  Outcome: Progressing     Problem: Knowledge Deficit  Goal: Patient/family/caregiver demonstrates understanding of disease process, treatment plan, medications, and discharge instructions  Description: Complete learning assessment and assess knowledge base.Interventions:- Provide teaching at level of understanding- Provide teaching via preferred learning methods  Outcome: Progressing     Problem: RESPIRATORY - ADULT  Goal: Achieves optimal ventilation and oxygenation  Description: INTERVENTIONS:- Assess for changes in respiratory status- Assess for changes in mentation and behavior- Position to facilitate oxygenation and minimize respiratory effort- Oxygen administered by appropriate delivery if ordered- Initiate smoking cessation education as indicated- Encourage broncho-pulmonary hygiene including cough, deep breathe, Incentive Spirometry- Assess  the need for suctioning and aspirate as needed- Assess and instruct to report SOB or any respiratory difficulty- Respiratory Therapy support as indicated  Outcome: Progressing

## 2025-05-08 NOTE — RESPIRATORY THERAPY NOTE
RT Protocol Note  Peter Busch 69 y.o. male MRN: [id number]  Unit/Bed#: -01 Encounter: [id number]    Assessment    Principal Problem:    Acute pulmonary embolism (HCC)  Active Problems:    Type 2 diabetes mellitus, without long-term current use of insulin (HCC)    Tobacco abuse    Chronic obstructive pulmonary disease (HCC)    Chronic respiratory failure with hypoxia (HCC)    chronic diastolic (congestive) heart failure (HCC)    Adenocarcinoma of lung (HCC)      Home Pulmonary Medications:         Past Medical History:   Diagnosis Date    Bladder cancer (HCC)     BPH (benign prostatic hyperplasia)     COPD (chronic obstructive pulmonary disease) (HCC)     Diabetes mellitus (HCC)     Hyperlipidemia     Lung cancer (HCC)     Rib fractures      Social History     Socioeconomic History    Marital status: Single     Spouse name: None    Number of children: None    Years of education: None    Highest education level: None   Occupational History    None   Tobacco Use    Smoking status: Every Day     Current packs/day: 2.00     Types: Cigarettes    Smokeless tobacco: Never   Vaping Use    Vaping status: Never Used   Substance and Sexual Activity    Alcohol use: Not Currently    Drug use: Not Currently    Sexual activity: None   Other Topics Concern    None   Social History Narrative    None     Social Drivers of Health     Financial Resource Strain: Low Risk  (4/17/2025)    Received from [facility]    Financial Resource Strain     Do you have any trouble paying for your medications, or do you think you might in the future?: No     Does your family have trouble paying for medicine? (Household - for ages 0-17 years): Not on file   Food Insecurity: No Food Insecurity (5/7/2025)    Nursing - Inadequate Food Risk Classification     Worried About Running Out of Food in the Last Year: Not on file     Ran Out of Food in the Last Year: Not on file     Ran Out of Food in the Last Year: Never true   Transportation Needs: No  Transportation Needs (2025)    Nursing - Transportation Risk Classification     Lack of Transportation: Not on file     Lack of Transportation: No   Physical Activity: Not on file   Stress: Not on file   Social Connections: Socially Integrated (2025)    Received from [facility]     How often do you feel lonely or isolated from those around you?: Never   Intimate Partner Violence: Unknown (2025)    Nursing IPS     Feels Physically and Emotionally Safe: Not on file     Physically Hurt by Someone: Not on file     Humiliated or Emotionally Abused by Someone: Not on file     Physically Hurt by Someone: No     Hurt or Threatened by Someone: No   Housing Stability: Unknown (2025)    Nursing: Inadequate Housing Risk Classification     Has Housing: Not on file     Worried About Losing Housing: Not on file     Unable to Get Utilities: Not on file     Unable to Pay for Housing in the Last Year: No     Has Housin       Subjective         Objective    Physical Exam:   Assessment Type: Assess only  General Appearance: Sleeping  Respiratory Pattern: Dyspnea with exertion  Chest Assessment: Chest expansion symmetrical  O2 Device: 3lpm    Vitals:  Blood pressure 137/83, pulse 86, temperature 97.5 °F (36.4 °C), resp. rate 18, height 6' (1.829 m), weight 111 kg (245 lb 6.4 oz), SpO2 90%.          Imaging and other studies: Results Review Statement: No pertinent imaging studies reviewed.    O2 Device: 3lpm     Plan    Respiratory Plan: No distress/Pulmonary history        Resp Comments: 69 yr old pt, Dx of acute DVT, Hx of COPD, lung CA, tabacco abuse, chronic SOB, pt utilizes prn inhalers as needed. no respiratory interventions at this time. Pt resting at this time with supplemental 02 of 3lpm.

## 2025-05-08 NOTE — ASSESSMENT & PLAN NOTE
Patient presents with worsening shortness of breath lower extremity swelling  CTA chest showsAcute PE bilaterally. On the right, thrombus is present in the right main pulmonary artery with extension into the the right middle and lower lobe lobar and segmental pulmonary arteries. On the left, thrombus is present in the left lower lobe lobar   and segmental pulmonary arteries.  2.  The calculated ratio of right ventricular to left ventricular diameter (RV/LV ratio) is greater than 1, which is suspicious for right heart strain. .  3.  Unchanged spiculated mass in the right upper lobe and spiculated pulmonary nodule in the left upper lobe. There is obstruction of the bronchi extending through the right upper lobe mass, unchanged from prior.  PESI score: 60  Venous Doppler ultrasound of the lower extremity showsAcute occlusive DVT in the right peroneal veins.   2.  No DVT identified in the left leg.   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 Wolfe and neurosurgery team at [facility] by ED physician  Cleared to be started on unfractionated heparin as CT head today was negative for any acute hemorrhage  Follow hemodynamics closely  Continue with telemetry monitoring  Follow-up on PERT priority echocardiogram--> EF 65% with no right ventricular strain  Likely will need to be transitioned to Lovenox upon discharge.  Prescription sent to outpatient pharmacy for price check.  Likely can transition to subcutaneous enoxaparin in the next 24 hours

## 2025-05-08 NOTE — WOUND OSTOMY CARE
Consult Note - Wound   Peter E Jo-Ann 69 y.o. male MRN: [id number]  Unit/Bed#: -01 Encounter: [id number]        History and Present Illness:  Patient is a 70yo male that was admitted to [facility]  for treatment of acute pulmonary embolism. Patient has a PMH of adenocarcinoma of lung. Patient is alert and oriented times agreeable to assessment. Patient is independent for turning and repositioning. Patient is continent of bowel and bladder.   Assessment Findings:   B/L heels are dry intact and airam with no skin loss or wounds present. Recommend preventative Hydraguard Cream and proper offloading/ repositioning.    B/L sacro-buttocks is dry, intact, pink in color and blanches. No skin loss or wounds present. Recommend preventative hydragaurd to area.   1.Chronic dry intact surgical site   No induration, fluctuance, odor, warmth/temperature differences, redness, or purulence noted to the above noted wounds and skin areas assessed. New dressings applied per orders listed below. Patient tolerated well- no s/s of non-verbal pain or discomfort observed during the encounter. Bedside nurse aware of plan of care. See flow sheets for more detailed assessment findings.    Skin care plans:  1-Hydraguard to bilateral sacrum, buttock and heels BID and PRN  2-Elevate heels to offload pressure.  3-Ehob cushion in chair when out of bed.  4-Moisturize skin daily with skin nourishing cream.  5-Turn/reposition q2h for pressure re-distribution on skin.  Wound Care will sign off  Call or Secure Chat with any questions  Maria Dolores SANCHEZN RN CWON

## 2025-05-08 NOTE — ASSESSMENT & PLAN NOTE
Wt Readings from Last 3 Encounters:   05/08/25 111 kg (245 lb)   07/17/24 113 kg (249 lb)   07/03/24 113 kg (249 lb)       Utilizes torsemide 20 mg twice daily.  Currently does not appear to have any acute exacerbation.  Continue with current outpatient dose

## 2025-05-09 VITALS
OXYGEN SATURATION: 91 % | BODY MASS INDEX: 33.18 KG/M2 | DIASTOLIC BLOOD PRESSURE: 73 MMHG | HEIGHT: 72 IN | SYSTOLIC BLOOD PRESSURE: 110 MMHG | TEMPERATURE: 97.2 F | WEIGHT: 245 LBS | HEART RATE: 84 BPM | RESPIRATION RATE: 18 BRPM

## 2025-05-09 LAB
APTT PPP: 68 SECONDS (ref 23–34)
GLUCOSE SERPL-MCNC: 320 MG/DL (ref 65–140)
GLUCOSE SERPL-MCNC: 354 MG/DL (ref 65–140)

## 2025-05-09 PROCEDURE — 82948 REAGENT STRIP/BLOOD GLUCOSE: CPT

## 2025-05-09 PROCEDURE — 99239 HOSP IP/OBS DSCHRG MGMT >30: CPT | Performed by: INTERNAL MEDICINE

## 2025-05-09 PROCEDURE — 85730 THROMBOPLASTIN TIME PARTIAL: CPT | Performed by: INTERNAL MEDICINE

## 2025-05-09 RX ORDER — ENOXAPARIN SODIUM 150 MG/ML
1 INJECTION SUBCUTANEOUS EVERY 12 HOURS SCHEDULED
Status: DISCONTINUED | OUTPATIENT
Start: 2025-05-09 | End: 2025-05-09 | Stop reason: HOSPADM

## 2025-05-09 RX ADMIN — FINASTERIDE 5 MG: 5 TABLET, FILM COATED ORAL at 08:27

## 2025-05-09 RX ADMIN — INSULIN LISPRO 8 UNITS: 100 INJECTION, SOLUTION INTRAVENOUS; SUBCUTANEOUS at 08:27

## 2025-05-09 RX ADMIN — ASPIRIN 81 MG: 81 TABLET, COATED ORAL at 08:26

## 2025-05-09 RX ADMIN — TAMSULOSIN HYDROCHLORIDE 0.4 MG: 0.4 CAPSULE ORAL at 08:27

## 2025-05-09 RX ADMIN — ATORVASTATIN CALCIUM 40 MG: 40 TABLET, FILM COATED ORAL at 08:26

## 2025-05-09 RX ADMIN — UMECLIDINIUM 1 PUFF: 62.5 AEROSOL, POWDER ORAL at 08:30

## 2025-05-09 RX ADMIN — LEVOTHYROXINE SODIUM 25 MCG: 0.03 TABLET ORAL at 06:07

## 2025-05-09 RX ADMIN — POTASSIUM CHLORIDE 10 MEQ: 750 TABLET, EXTENDED RELEASE ORAL at 08:26

## 2025-05-09 RX ADMIN — FLUTICASONE FUROATE AND VILANTEROL TRIFENATATE 1 PUFF: 200; 25 POWDER RESPIRATORY (INHALATION) at 08:30

## 2025-05-09 RX ADMIN — GABAPENTIN 300 MG: 300 CAPSULE ORAL at 08:26

## 2025-05-09 RX ADMIN — TORSEMIDE 20 MG: 20 TABLET ORAL at 08:26

## 2025-05-09 RX ADMIN — TRAMADOL HYDROCHLORIDE 50 MG: 50 TABLET, COATED ORAL at 08:26

## 2025-05-09 RX ADMIN — INSULIN LISPRO 10 UNITS: 100 INJECTION, SOLUTION INTRAVENOUS; SUBCUTANEOUS at 11:52

## 2025-05-09 RX ADMIN — ENOXAPARIN SODIUM 105 MG: 120 INJECTION SUBCUTANEOUS at 09:45

## 2025-05-09 NOTE — DISCHARGE SUMMARY
Discharge Summary - Hospitalist   Name: Peter Busch 69 y.o. male I MRN: [id number]  Unit/Bed#: -01 I Date of Admission: 5/7/2025   Date of Service: 5/9/2025 I Hospital Day: 2     Assessment & Plan  Acute pulmonary embolism (HCC)  Patient presents with worsening shortness of breath lower extremity swelling  CTA chest showsAcute PE bilaterally. On the right, thrombus is present in the right main pulmonary artery with extension into the the right middle and lower lobe lobar and segmental pulmonary arteries. On the left, thrombus is present in the left lower lobe lobar   and segmental pulmonary arteries.  2.  The calculated ratio of right ventricular to left ventricular diameter (RV/LV ratio) is greater than 1, which is suspicious for right heart strain. .  3.  Unchanged spiculated mass in the right upper lobe and spiculated pulmonary nodule in the left upper lobe. There is obstruction of the bronchi extending through the right upper lobe mass, unchanged from prior.  PESI score: 60  Venous Doppler ultrasound of the lower extremity showsAcute occlusive DVT in the right peroneal veins.   2.  No DVT identified in the left leg.   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 Wolfe and neurosurgery team at [facility] by ED physician  Cleared to be started on unfractionated heparin as CT head today was negative for any acute hemorrhage  Follow hemodynamics closely  Continue with telemetry monitoring  Follow-up on PERT priority echocardiogram--> EF 65% with no right ventricular strain  Likely will need to be transitioned to Lovenox upon discharge.  Prescription sent to outpatient pharmacy for price check.  Likely can transition to subcutaneous enoxaparin in the next 24 hours  Type 2  diabetes mellitus, without long-term current use of insulin (HCC)  Lab Results   Component Value Date    HGBA1C 8.4 (H) 02/10/2025       Recent Labs     05/08/25  1116 05/08/25  1605 05/08/25  2102 05/09/25  0735   POCGLU 297* 202* 271* 320*       Blood Sugar Average: Last 72 hrs:  (P) 282.7830629875324440  Hold oral hypoglycemic medications.  Continue with serial blood Leukos monitoring and sliding scale insulin coverage while in the hospital.  Tobacco abuse  Counseled  regarding cessation  Chronic obstructive pulmonary disease (HCC)  Continue with current outpatient inhaler and nebulizer treatment.  Does not appear to have any acute exacerbation.  Adenocarcinoma of lung (HCC)  Has history of adenocarcinoma of right upper lobe of the lung for which follows up with Dr. Wolfe.  Currently under active surveillance  Recently diagnosed with possible metastasis to the brain based on recent MRI.  Previously has received:  1. Definitive SBRT to the dominant nodule in the right upper lobe of lung to a dose of 54 cGy completed on 02/21/2020.  2. Pemetrexed & Carboplatin; start day 3/3/20. S/p 4 cycles. Last 5/5/20  3. S/p SBRT 54Gy completed 9/5/23 to ARLEEN nodule; 1.7 cm   Close outpatient oncology follow-up on discharge  In view of underlying malignancy will likely need to be on Lovenox 1 mg/kg subcutaneous twice daily for pulmonary embolism and DVT pending outpatient evaluation by primary oncologist.  Recently saw his radiation oncologist for brain lesion possible malignancy.  Patient scheduled to follow-up for possible stereotactic radiation treatment  chronic diastolic (congestive) heart failure (HCC)  Wt Readings from Last 3 Encounters:   05/08/25 111 kg (245 lb)   07/17/24 113 kg (249 lb)   07/03/24 113 kg (249 lb)       Utilizes torsemide 20 mg twice daily.  Currently does not appear to have any acute exacerbation.  Continue with current outpatient dose      Chronic respiratory failure with hypoxia (HCC)  Patient on  3 L of supplemental oxygen nocturnally.  Not requiring any additional oxygen.  Continue to monitor respiratory status and oxygenation closely with current diagnosis.     Medical Problems       Resolved Problems  Date Reviewed: 9/18/2024   None         MESSAGE TO PCP (Regino Brooks DO) FOR FOLLOW UP:   Thank you for allowing us to participate in the care of your patient, Peter Busch, who was hospitalized from 5/7/2025 through 05/09/25 with the admitting diagnosis of .  Right lower extremity DVT and bilateral pulmonary embolism    Medication Changes:  Lovenox 100 mg subcutaneous twice daily  Outpatient testing recommended:  Oncology, radiation oncology follow-up  If you have any additional questions or would like to discuss further, please feel free to contact me.  Kerri Bae MD  [facility] Internal Medicine, Hospitalist, [phone]     Admission Date:   Admission Orders (From admission, onward)       Ordered        05/07/25 1731  INPATIENT ADMISSION  Once                          Discharge Date: 05/09/25    Consultations During Hospital Stay:  Neurosurgery    Procedures Performed:   CTA chest acute PE bilaterally. On the right, thrombus is present in the right main pulmonary artery with extension into the the right middle and lower lobe lobar and segmental pulmonary arteries. On the left, thrombus is present in the left lower lobe lobar   and segmental pulmonary arteries.  2.  The calculated ratio of right ventricular to left ventricular diameter (RV/LV ratio) is greater than 1, which is suspicious for right heart strain. .  3.  Unchanged spiculated mass in the right upper lobe and spiculated pulmonary nodule in the left upper lobe. There is obstruction of the bronchi extending through the right upper lobe mass, unchanged from prior.  4.  Severe spinal canal stenosis at T7-T8.    Significant Findings / Test Results:   CT headNo hemorrhage seen.  2. Inferior vermian mass as described on outside brain MRI.  There is mild mass effect on the fourth ventricle without hydrocephalus    Incidental Findings:   NA       Test Results Pending at Discharge (will require follow up):   NA     Complications:  None     Reason for Admission: Shortness of breath.  Leg swelling    Hospital Course:   Peter Busch is a 69 y.o. male patient who originally presented to the hospital on 5/7/2025 due to leg swelling and positive DVT seen on ultrasound.  Patient had an outpatient ultrasound the day of his admission which showed acute occlusive DVT of the right peroneal veins.  He was asked to come to the emergency room.  Reports also complaining of shortness of breath beyond his baseline COPD.  CT of the chest showed bilateral pulmonary embolism with ratio of right ventricle to left ventricular diameter greater than 1.  Pasi score on admission was 60.  Troponin and BNP were within normal limits.  Of note patient has history of metastatic adenocarcinoma of the lung and recently found to have a lesion in his brain for which he is scheduled to see radiation oncology for stereotactic radiation treatment.  Neurosurgery was consulted in view of patient need to be started on anticoagulation in the setting of brain metastatic disease.  Patient underwent CT head which was negative for hemorrhage and after risk-benefit conversation was started on anticoagulation.  Initially maintained on heparin GTT.  Echocardiogram was done which did not show any right ventricular strain.  He remained hemodynamically stable with no escalating oxygen requirement.  In view of his history of malignancy, he was transition to Lovenox injections which were set up as an outpatient prior to discharge.  Stable for discharge home with close outpatient follow-up with primary oncologist Dr. Wolfe, radiation oncology and primary care physician.          Please see above list of diagnoses and related plan for additional information.     Condition at Discharge: stable    Discharge  Day Visit / Exam:   Subjective: Patient seen and examined at bedside.  Denies any worsening shortness of breath, chest discomfort lightheadedness dizziness.  No acute events overnight.  Vitals: Blood Pressure: 110/73 (05/09/25 0734)  Pulse: 84 (05/09/25 0734)  Temperature: (!) 97.2 °F (36.2 °C) (05/09/25 0734)  Temp Source: Temporal (05/07/25 1845)  Respirations: 18 (05/09/25 0734)  Height: 6' (182.9 cm) (05/08/25 0735)  Weight - Scale: 111 kg (245 lb) (05/08/25 0735)  SpO2: 91 % (05/09/25 0800)  Physical Exam  Constitutional:       Appearance: He is obese. He is ill-appearing.   HENT:      Head: Normocephalic.      Nose: Nose normal.      Mouth/Throat:      Mouth: Mucous membranes are moist.   Eyes:      Pupils: Pupils are equal, round, and reactive to light.   Cardiovascular:      Rate and Rhythm: Normal rate and regular rhythm.   Pulmonary:      Comments: Diminished breath sounds bilaterally with no wheezing or rales appreciated.  Abdominal:      General: Bowel sounds are normal. There is no distension.      Palpations: Abdomen is soft.      Tenderness: There is no abdominal tenderness.   Musculoskeletal:      Comments: Chronic swelling and stasis dermatitis bilateral lower extremity.   Neurological:      General: No focal deficit present.      Mental Status: He is alert and oriented to person, place, and time. Mental status is at baseline.      Cranial Nerves: No cranial nerve deficit.      Motor: No weakness.          Discussion with Family: Patient declined call to .     Discharge instructions/Information to patient and family:   See after visit summary for information provided to patient and family.      Provisions for Follow-Up Care:  See after visit summary for information related to follow-up care and any pertinent home health orders.      Mobility at time of Discharge:   Basic Mobility Inpatient Raw Score: 19  JH-HLM Goal: 6: Walk 10 steps or more  JH-HLM Achieved: 6: Walk 10 steps or  more  HLM Goal achieved. Continue to encourage appropriate mobility.     Disposition:   Home    Planned Readmission: no    Discharge Medications:  See after visit summary for reconciled discharge medications provided to patient and/or family.      Administrative Statements   Discharge Statement:  I have spent a total time of >30 minutes in caring for this patient on the day of the visit/encounter. >30 minutes of time was spent on: Diagnostic results, Prognosis, Risks and benefits of tx options, Instructions for management, Patient and family education, Importance of tx compliance, Risk factor reductions, Impressions, Counseling / Coordination of care, Documenting in the medical record, Reviewing / ordering tests, medicine, procedures  , and Communicating with other healthcare professionals .    **Please Note: This note may have been constructed using a voice recognition system**

## 2025-05-09 NOTE — CASE MANAGEMENT
Case Management Discharge Planning Note    Patient name Peter Busch  Location /-01 MRN [id number]  : 1955 Date 2025       Current Admission Date: 2025  Current Admission Diagnosis:Acute pulmonary embolism (HCC)   Patient Active Problem List    Diagnosis Date Noted Date Diagnosed    Acute pulmonary embolism (HCC) 2025     Adenocarcinoma of lung (HCC) 2024     Hx of bladder cancer 2024     Intermittent self-catheterization of bladder 2024     Acute urinary retention 2024     chronic diastolic (congestive) heart failure (HCC) 2024     Cellulitis of left lower extremity 2024     Leg swelling 2022     Chronic respiratory failure with hypoxia (HCC) 2022     Type 2 diabetes mellitus, without long-term current use of insulin (HCC) 09/15/2021     Tobacco abuse 09/15/2021     Chronic obstructive pulmonary disease (HCC) 09/15/2021     Benign prostatic hyperplasia with urinary retention 09/15/2021     History of lung cancer 09/15/2021     Acute hypoxemic respiratory failure due to COVID-19 (HCC) 2021       LOS (days): 2  Geometric Mean LOS (GMLOS) (days): 4  Days to GMLOS:2.3     OBJECTIVE:  Risk of Unplanned Readmission Score: 14.23         Current admission status: Inpatient   Preferred Pharmacy:   [id number] [address] [address]  Phone: [phone] Fax: [phone]    Primary Care Provider: Regino Brooks DO    Primary Insurance: [employer]  Secondary Insurance: [place] [employer] AND [employer] [facility]    DISCHARGE DETAILS:              CM met with patient to discuss therapy recommendation for [facility]. Patient indicated he is not in agreement with HHC as his SO has medical background.  Patient indicated when admitted prior they set up HHC and he did not allow in home.     Patient indicated he has a ride home after 1300.     CM called PCP to schedule  hospital discharge follow up appt. CM Made aware next available appt is 5/22/25 so message was sen to office to reach out to patient with sooner appt. CM placed information on AVS.                                          Treatment Team Recommendation: Home with Home Health Care  Discharge Destination Plan:: Home  Transport at Discharge : Family

## 2025-05-09 NOTE — ASSESSMENT & PLAN NOTE
Lab Results   Component Value Date    HGBA1C 8.4 (H) 02/10/2025       Recent Labs     05/08/25  1116 05/08/25  1605 05/08/25  2102 05/09/25  0735   POCGLU 297* 202* 271* 320*       Blood Sugar Average: Last 72 hrs:  (P) 282.5549399952947728  Hold oral hypoglycemic medications.  Continue with serial blood Leukos monitoring and sliding scale insulin coverage while in the hospital.

## 2025-05-09 NOTE — PLAN OF CARE
Problem: Prexisting or High Potential for Compromised Skin Integrity  Goal: Skin integrity is maintained or improved  Description: INTERVENTIONS:- Identify patients at risk for skin breakdown- Assess and monitor skin integrity- Assess and monitor nutrition and hydration status- Monitor labs - Assess for incontinence - Turn and reposition patient- Assist with mobility/ambulation- Relieve pressure over bony prominences- Avoid friction and shearing- Provide appropriate hygiene as needed including keeping skin clean and dry- Evaluate need for skin moisturizer/barrier cream- Collaborate with interdisciplinary team - Patient/family teaching- Consider wound care consult   Outcome: Progressing     Problem: PAIN - ADULT  Goal: Verbalizes/displays adequate comfort level or baseline comfort level  Description: Interventions:- Encourage patient to monitor pain and request assistance- Assess pain using appropriate pain scale- Administer analgesics based on type and severity of pain and evaluate response- Implement non-pharmacological measures as appropriate and evaluate response- Consider cultural and social influences on pain and pain management- Notify physician/advanced practitioner if interventions unsuccessful or patient reports new pain  Outcome: Progressing     Problem: INFECTION - ADULT  Goal: Absence or prevention of progression during hospitalization  Description: INTERVENTIONS:- Assess and monitor for signs and symptoms of infection- Monitor lab/diagnostic results- Monitor all insertion sites, i.e. indwelling lines, tubes, and drains- Monitor endotracheal if appropriate and nasal secretions for changes in amount and color- [place] appropriate cooling/warming therapies per order- Administer medications as ordered- Instruct and encourage patient and family to use good hand hygiene technique- Identify and instruct in appropriate isolation precautions for identified infection/condition  Outcome: Progressing  Goal:  Absence of fever/infection during neutropenic period  Description: INTERVENTIONS:- Monitor WBC  Outcome: Progressing     Problem: SAFETY ADULT  Goal: Patient will remain free of falls  Description: INTERVENTIONS:- Educate patient/family on patient safety including physical limitations- Instruct patient to call for assistance with activity - Consult OT/PT to assist with strengthening/mobility - Keep Call bell within reach- Keep bed low and locked with side rails adjusted as appropriate- Keep care items and personal belongings within reach- Initiate and maintain comfort rounds- Make Fall Risk Sign visible to staff- Offer Toileting every 2 Hours, in advance of need- Initiate/Maintain bed alarm- Obtain necessary fall risk management equipment: non-skid socks- Apply yellow socks and bracelet for high fall risk patients- Consider moving patient to room near nurses station  Outcome: Progressing  Goal: Maintain or return to baseline ADL function  Description: INTERVENTIONS:-  Assess patient's ability to carry out ADLs; assess patient's baseline for ADL function and identify physical deficits which impact ability to perform ADLs (bathing, care of mouth/teeth, toileting, grooming, dressing, etc.)- Assess/evaluate cause of self-care deficits - Assess range of motion- Assess patient's mobility; develop plan if impaired- Assess patient's need for assistive devices and provide as appropriate- Encourage maximum independence but intervene and supervise when necessary- Involve family in performance of ADLs- Assess for home care needs following discharge - Consider OT consult to assist with ADL evaluation and planning for discharge- Provide patient education as appropriate  Outcome: Progressing  Goal: Maintains/Returns to pre admission functional level  Description: INTERVENTIONS:- Perform AM-PAC 6 Click Basic Mobility/ Daily Activity assessment daily.- Set and communicate daily mobility goal to care team and  patient/family/caregiver. - Collaborate with rehabilitation services on mobility goals if consulted- Perform Range of Motion 2 times a day.- Reposition patient every 2 hours.- Dangle patient 3 times a day- Stand patient 3 times a day- Ambulate patient 3 times a day- Out of bed to chair 3 times a day - Out of bed for meals 3 times a day- Out of bed for toileting- Record patient progress and toleration of activity level   Outcome: Progressing     Problem: DISCHARGE PLANNING  Goal: Discharge to home or other facility with appropriate resources  Description: INTERVENTIONS:- Identify barriers to discharge w/patient and caregiver- Arrange for needed discharge resources and transportation as appropriate- Identify discharge learning needs (meds, wound care, etc.)- Arrange for interpretive services to assist at discharge as needed- Refer to Case Management Department for coordinating discharge planning if the patient needs post-hospital services based on physician/advanced practitioner order or complex needs related to functional status, cognitive ability, or social support system  Outcome: Progressing     Problem: Knowledge Deficit  Goal: Patient/family/caregiver demonstrates understanding of disease process, treatment plan, medications, and discharge instructions  Description: Complete learning assessment and assess knowledge base.Interventions:- Provide teaching at level of understanding- Provide teaching via preferred learning methods  Outcome: Progressing     Problem: RESPIRATORY - ADULT  Goal: Achieves optimal ventilation and oxygenation  Description: INTERVENTIONS:- Assess for changes in respiratory status- Assess for changes in mentation and behavior- Position to facilitate oxygenation and minimize respiratory effort- Oxygen administered by appropriate delivery if ordered- Initiate smoking cessation education as indicated- Encourage broncho-pulmonary hygiene including cough, deep breathe, Incentive Spirometry- Assess  the need for suctioning and aspirate as needed- Assess and instruct to report SOB or any respiratory difficulty- Respiratory Therapy support as indicated  Outcome: Progressing

## 2025-05-09 NOTE — CASE MANAGEMENT
Case Management Discharge Planning Note    Patient name Peter Busch  Location /-01 MRN [id number]  : 1955 Date 2025       Current Admission Date: 2025  Current Admission Diagnosis:Acute pulmonary embolism (HCC)   Patient Active Problem List    Diagnosis Date Noted Date Diagnosed    Acute pulmonary embolism (HCC) 2025     Adenocarcinoma of lung (HCC) 2024     Hx of bladder cancer 2024     Intermittent self-catheterization of bladder 2024     Acute urinary retention 2024     chronic diastolic (congestive) heart failure (HCC) 2024     Cellulitis of left lower extremity 2024     Leg swelling 2022     Chronic respiratory failure with hypoxia (HCC) 2022     Type 2 diabetes mellitus, without long-term current use of insulin (HCC) 09/15/2021     Tobacco abuse 09/15/2021     Chronic obstructive pulmonary disease (HCC) 09/15/2021     Benign prostatic hyperplasia with urinary retention 09/15/2021     History of lung cancer 09/15/2021     Acute hypoxemic respiratory failure due to COVID-19 (HCC) 2021       LOS (days): 2  Geometric Mean LOS (GMLOS) (days): 4  Days to GMLOS:2.3     OBJECTIVE:  Risk of Unplanned Readmission Score: 14.23         Current admission status: Inpatient   Preferred Pharmacy:   [id number] [address] [address]  Phone: [phone] Fax: [phone]    Primary Care Provider: Regino Brooks DO    Primary Insurance: [facility]  Secondary Insurance: [place] [employer] AND [employer] [facility]    DISCHARGE DETAILS:           CM called Pharmacy to price check Lovenox, $0 copay.                       Requested Home Health Care         Is the patient interested in HHC at discharge?: No    DME Referral Provided  Referral made for DME?: No              Treatment Team Recommendation: Home  Discharge Destination Plan:: Home  Transport at  Discharge : Family

## 2025-05-09 NOTE — ASSESSMENT & PLAN NOTE
Patient presents with worsening shortness of breath lower extremity swelling  CTA chest showsAcute PE bilaterally. On the right, thrombus is present in the right main pulmonary artery with extension into the the right middle and lower lobe lobar and segmental pulmonary arteries. On the left, thrombus is present in the left lower lobe lobar   and segmental pulmonary arteries.  2.  The calculated ratio of right ventricular to left ventricular diameter (RV/LV ratio) is greater than 1, which is suspicious for right heart strain. .  3.  Unchanged spiculated mass in the right upper lobe and spiculated pulmonary nodule in the left upper lobe. There is obstruction of the bronchi extending through the right upper lobe mass, unchanged from prior.  PESI score: 60  Venous Doppler ultrasound of the lower extremity showsAcute occlusive DVT in the right peroneal veins.   2.  No DVT identified in the left leg.   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 Wolfe and neurosurgery team at [facility] by ED physician  Cleared to be started on unfractionated heparin as CT head today was negative for any acute hemorrhage  Follow hemodynamics closely  Continue with telemetry monitoring  Follow-up on PERT priority echocardiogram--> EF 65% with no right ventricular strain  Likely will need to be transitioned to Lovenox upon discharge.  Prescription sent to outpatient pharmacy for price check.  Likely can transition to subcutaneous enoxaparin in the next 24 hours

## 2025-05-09 NOTE — PLAN OF CARE
Problem: Prexisting or High Potential for Compromised Skin Integrity  Goal: Skin integrity is maintained or improved  Description: INTERVENTIONS:- Identify patients at risk for skin breakdown- Assess and monitor skin integrity- Assess and monitor nutrition and hydration status- Monitor labs - Assess for incontinence - Turn and reposition patient- Assist with mobility/ambulation- Relieve pressure over bony prominences- Avoid friction and shearing- Provide appropriate hygiene as needed including keeping skin clean and dry- Evaluate need for skin moisturizer/barrier cream- Collaborate with interdisciplinary team - Patient/family teaching- Consider wound care consult   Outcome: Progressing     Problem: PAIN - ADULT  Goal: Verbalizes/displays adequate comfort level or baseline comfort level  Description: Interventions:- Encourage patient to monitor pain and request assistance- Assess pain using appropriate pain scale- Administer analgesics based on type and severity of pain and evaluate response- Implement non-pharmacological measures as appropriate and evaluate response- Consider cultural and social influences on pain and pain management- Notify physician/advanced practitioner if interventions unsuccessful or patient reports new pain  Outcome: Progressing     Problem: INFECTION - ADULT  Goal: Absence or prevention of progression during hospitalization  Description: INTERVENTIONS:- Assess and monitor for signs and symptoms of infection- Monitor lab/diagnostic results- Monitor all insertion sites, i.e. indwelling lines, tubes, and drains- Monitor endotracheal if appropriate and nasal secretions for changes in amount and color- [place] appropriate cooling/warming therapies per order- Administer medications as ordered- Instruct and encourage patient and family to use good hand hygiene technique- Identify and instruct in appropriate isolation precautions for identified infection/condition  Outcome: Progressing  Goal:  Absence of fever/infection during neutropenic period  Description: INTERVENTIONS:- Monitor WBC  Outcome: Progressing     Problem: SAFETY ADULT  Goal: Patient will remain free of falls  Description: INTERVENTIONS:- Educate patient/family on patient safety including physical limitations- Instruct patient to call for assistance with activity - Consult OT/PT to assist with strengthening/mobility - Keep Call bell within reach- Keep bed low and locked with side rails adjusted as appropriate- Keep care items and personal belongings within reach- Initiate and maintain comfort rounds- Make Fall Risk Sign visible to staff- Offer Toileting every 2 Hours, in advance of need- Initiate/Maintain bed alarm- Obtain necessary fall risk management equipment: non-skid socks- Apply yellow socks and bracelet for high fall risk patients- Consider moving patient to room near nurses station  Outcome: Progressing  Goal: Maintain or return to baseline ADL function  Description: INTERVENTIONS:-  Assess patient's ability to carry out ADLs; assess patient's baseline for ADL function and identify physical deficits which impact ability to perform ADLs (bathing, care of mouth/teeth, toileting, grooming, dressing, etc.)- Assess/evaluate cause of self-care deficits - Assess range of motion- Assess patient's mobility; develop plan if impaired- Assess patient's need for assistive devices and provide as appropriate- Encourage maximum independence but intervene and supervise when necessary- Involve family in performance of ADLs- Assess for home care needs following discharge - Consider OT consult to assist with ADL evaluation and planning for discharge- Provide patient education as appropriate  Outcome: Progressing  Goal: Maintains/Returns to pre admission functional level  Description: INTERVENTIONS:- Perform AM-PAC 6 Click Basic Mobility/ Daily Activity assessment daily.- Set and communicate daily mobility goal to care team and  patient/family/caregiver. - Collaborate with rehabilitation services on mobility goals if consulted- Perform Range of Motion 2 times a day.- Reposition patient every 2 hours.- Dangle patient 3 times a day- Stand patient 3 times a day- Ambulate patient 3 times a day- Out of bed to chair 3 times a day - Out of bed for meals 3 times a day- Out of bed for toileting- Record patient progress and toleration of activity level   Outcome: Progressing     Problem: DISCHARGE PLANNING  Goal: Discharge to home or other facility with appropriate resources  Description: INTERVENTIONS:- Identify barriers to discharge w/patient and caregiver- Arrange for needed discharge resources and transportation as appropriate- Identify discharge learning needs (meds, wound care, etc.)- Arrange for interpretive services to assist at discharge as needed- Refer to Case Management Department for coordinating discharge planning if the patient needs post-hospital services based on physician/advanced practitioner order or complex needs related to functional status, cognitive ability, or social support system  Outcome: Progressing     Problem: Knowledge Deficit  Goal: Patient/family/caregiver demonstrates understanding of disease process, treatment plan, medications, and discharge instructions  Description: Complete learning assessment and assess knowledge base.Interventions:- Provide teaching at level of understanding- Provide teaching via preferred learning methods  Outcome: Progressing     Problem: RESPIRATORY - ADULT  Goal: Achieves optimal ventilation and oxygenation  Description: INTERVENTIONS:- Assess for changes in respiratory status- Assess for changes in mentation and behavior- Position to facilitate oxygenation and minimize respiratory effort- Oxygen administered by appropriate delivery if ordered- Initiate smoking cessation education as indicated- Encourage broncho-pulmonary hygiene including cough, deep breathe, Incentive Spirometry- Assess  the need for suctioning and aspirate as needed- Assess and instruct to report SOB or any respiratory difficulty- Respiratory Therapy support as indicated  Outcome: Progressing

## 2025-05-12 NOTE — UTILIZATION REVIEW
NOTIFICATION OF ADMISSION DISCHARGE   This is a Notification of Discharge from [facility]. Please be advised that this patient has been discharge from our facility. Below you will find the admission and discharge date and time including the patient’s disposition.   UTILIZATION REVIEW CONTACT:  Utilization Review Assistants  Network Utilization Review Department  Phone: [phone] x carefully listen to the prompts. All voicemails are confidential.  Email: [email or web address]     ADMISSION INFORMATION  PRESENTATION DATE: 5/7/2025  1:14 PM  OBERVATION ADMISSION DATE: N/A  INPATIENT ADMISSION DATE: 5/7/25  5:31 PM   DISCHARGE DATE: 5/9/2025  1:51 PM   DISPOSITION:Home/Self Care    Network Utilization Review Department  ATTENTION: Please call with any questions or concerns to [phone] and carefully listen to the prompts so that you are directed to the right person. All voicemails are confidential.   For Discharge needs, contact Care Management DC Support Team at [phone] opt. [phone]  Send all requests for admission clinical reviews, approved or denied determinations and any other requests to dedicated fax number below belonging to the campus where the patient is receiving treatment. List of dedicated fax numbers for the Facilities:  FACILITY NAME UR FAX NUMBER   ADMISSION DENIALS (Administrative/Medical Necessity) [phone]   DISCHARGE SUPPORT TEAM ([facility]) [phone]   PARENT CHILD HEALTH (Maternity/NICU/Pediatrics) [phone]   [facility] [phone]   [facility] [phone]   [facility] [phone]   [facility] [phone]   Psychiatric hospital [phone]   [facility] [phone]   [facility] [phone]   [facility] [phone]   [facility]  [facility] [phone]   [facility] [phone]   [facility] [phone]   [facility] [phone]

## 2025-06-24 ENCOUNTER — HOSPITAL ENCOUNTER (OUTPATIENT)
Dept: MRI IMAGING | Facility: HOSPITAL | Age: 70
Discharge: HOME | End: 2025-06-24
Attending: RADIOLOGY
Payer: COMMERCIAL

## 2025-06-24 DIAGNOSIS — C79.31 SECONDARY MALIGNANT NEOPLASM OF BRAIN AND SPINAL CORD (HCC): ICD-10-CM

## 2025-06-24 DIAGNOSIS — C79.49 SECONDARY MALIGNANT NEOPLASM OF BRAIN AND SPINAL CORD (HCC): ICD-10-CM

## 2025-06-24 PROCEDURE — A9585 GADOBUTROL INJECTION: HCPCS | Performed by: RADIOLOGY

## 2025-06-24 PROCEDURE — 70553 MRI BRAIN STEM W/O & W/DYE: CPT

## 2025-06-24 RX ORDER — GADOBUTROL 604.72 MG/ML
11 INJECTION INTRAVENOUS
Status: COMPLETED | OUTPATIENT
Start: 2025-06-24 | End: 2025-06-24

## 2025-06-24 RX ADMIN — GADOBUTROL 11 ML: 604.72 INJECTION INTRAVENOUS at 12:47

## 2025-07-30 ENCOUNTER — APPOINTMENT (EMERGENCY)
Dept: CT IMAGING | Facility: HOSPITAL | Age: 70
End: 2025-07-30
Payer: COMMERCIAL

## 2025-07-30 ENCOUNTER — HOSPITAL ENCOUNTER (EMERGENCY)
Facility: HOSPITAL | Age: 70
Discharge: HOME | End: 2025-07-30
Attending: EMERGENCY MEDICINE | Admitting: EMERGENCY MEDICINE
Payer: COMMERCIAL

## 2025-07-30 VITALS
RESPIRATION RATE: 18 BRPM | SYSTOLIC BLOOD PRESSURE: 131 MMHG | BODY MASS INDEX: 35.74 KG/M2 | HEART RATE: 79 BPM | OXYGEN SATURATION: 91 % | WEIGHT: 263.89 LBS | TEMPERATURE: 98.6 F | HEIGHT: 72 IN | DIASTOLIC BLOOD PRESSURE: 74 MMHG

## 2025-07-30 DIAGNOSIS — R07.9 CHEST PAIN, UNSPECIFIED: Primary | ICD-10-CM

## 2025-07-30 LAB
ALBUMIN SERPL BCG-MCNC: 4.2 G/DL (ref 3.5–5)
ALP SERPL-CCNC: 61 U/L (ref 34–104)
ALT SERPL W P-5'-P-CCNC: 21 U/L (ref 7–52)
ANION GAP SERPL CALCULATED.3IONS-SCNC: 9 MMOL/L (ref 4–13)
APTT PPP: 24 SECONDS (ref 23–34)
AST SERPL W P-5'-P-CCNC: 13 U/L (ref 13–39)
BASOPHILS # BLD AUTO: 0.09 THOUSANDS/ÂΜL (ref 0–0.1)
BASOPHILS NFR BLD AUTO: 1 % (ref 0–1)
BILIRUB SERPL-MCNC: 0.37 MG/DL (ref 0.2–1)
BNP SERPL-MCNC: 72 PG/ML (ref 0–100)
BUN SERPL-MCNC: 15 MG/DL (ref 5–25)
CALCIUM SERPL-MCNC: 9.2 MG/DL (ref 8.4–10.2)
CARDIAC TROPONIN I PNL SERPL HS: 5 NG/L (ref ?–50)
CHLORIDE SERPL-SCNC: 104 MMOL/L (ref 96–108)
CO2 SERPL-SCNC: 26 MMOL/L (ref 21–32)
CREAT SERPL-MCNC: 0.74 MG/DL (ref 0.6–1.3)
EOSINOPHIL # BLD AUTO: 0.33 THOUSAND/ÂΜL (ref 0–0.61)
EOSINOPHIL NFR BLD AUTO: 4 % (ref 0–6)
ERYTHROCYTE [DISTWIDTH] IN BLOOD BY AUTOMATED COUNT: 13.3 % (ref 11.6–15.1)
GFR SERPL CREATININE-BSD FRML MDRD: 93 ML/MIN/1.73SQ M
GLUCOSE SERPL-MCNC: 152 MG/DL (ref 65–140)
HCT VFR BLD AUTO: 46 % (ref 36.5–49.3)
HGB BLD-MCNC: 15 G/DL (ref 12–17)
IMM GRANULOCYTES # BLD AUTO: 0.08 THOUSAND/UL (ref 0–0.2)
IMM GRANULOCYTES NFR BLD AUTO: 1 % (ref 0–2)
INR PPP: 0.82 (ref 0.85–1.19)
LYMPHOCYTES # BLD AUTO: 2.22 THOUSANDS/ÂΜL (ref 0.6–4.47)
LYMPHOCYTES NFR BLD AUTO: 25 % (ref 14–44)
MCH RBC QN AUTO: 30.3 PG (ref 26.8–34.3)
MCHC RBC AUTO-ENTMCNC: 32.6 G/DL (ref 31.4–37.4)
MCV RBC AUTO: 93 FL (ref 82–98)
MONOCYTES # BLD AUTO: 0.85 THOUSAND/ÂΜL (ref 0.17–1.22)
MONOCYTES NFR BLD AUTO: 10 % (ref 4–12)
NEUTROPHILS # BLD AUTO: 5.32 THOUSANDS/ÂΜL (ref 1.85–7.62)
NEUTS SEG NFR BLD AUTO: 59 % (ref 43–75)
NRBC BLD AUTO-RTO: 0 /100 WBCS
PLATELET # BLD AUTO: 178 THOUSANDS/UL (ref 149–390)
PMV BLD AUTO: 9.5 FL (ref 8.9–12.7)
POTASSIUM SERPL-SCNC: 3.7 MMOL/L (ref 3.5–5.3)
PROT SERPL-MCNC: 6.7 G/DL (ref 6.4–8.4)
PROTHROMBIN TIME: 11.7 SECONDS (ref 12.3–15)
RBC # BLD AUTO: 4.95 MILLION/UL (ref 3.88–5.62)
SODIUM SERPL-SCNC: 139 MMOL/L (ref 135–147)
WBC # BLD AUTO: 8.89 THOUSAND/UL (ref 4.31–10.16)

## 2025-07-30 PROCEDURE — 93005 ELECTROCARDIOGRAM TRACING: CPT

## 2025-07-30 PROCEDURE — 80053 COMPREHEN METABOLIC PANEL: CPT | Performed by: EMERGENCY MEDICINE

## 2025-07-30 PROCEDURE — 85730 THROMBOPLASTIN TIME PARTIAL: CPT | Performed by: EMERGENCY MEDICINE

## 2025-07-30 PROCEDURE — 71275 CT ANGIOGRAPHY CHEST: CPT

## 2025-07-30 PROCEDURE — 36415 COLL VENOUS BLD VENIPUNCTURE: CPT | Performed by: EMERGENCY MEDICINE

## 2025-07-30 PROCEDURE — 85610 PROTHROMBIN TIME: CPT | Performed by: EMERGENCY MEDICINE

## 2025-07-30 PROCEDURE — 84484 ASSAY OF TROPONIN QUANT: CPT | Performed by: EMERGENCY MEDICINE

## 2025-07-30 PROCEDURE — 83880 ASSAY OF NATRIURETIC PEPTIDE: CPT | Performed by: EMERGENCY MEDICINE

## 2025-07-30 PROCEDURE — 99285 EMERGENCY DEPT VISIT HI MDM: CPT | Performed by: EMERGENCY MEDICINE

## 2025-07-30 PROCEDURE — 99285 EMERGENCY DEPT VISIT HI MDM: CPT

## 2025-07-30 PROCEDURE — 85025 COMPLETE CBC W/AUTO DIFF WBC: CPT | Performed by: EMERGENCY MEDICINE

## 2025-07-30 RX ORDER — ACETAMINOPHEN 325 MG/1
650 TABLET ORAL ONCE
Status: DISCONTINUED | OUTPATIENT
Start: 2025-07-30 | End: 2025-07-30 | Stop reason: HOSPADM

## 2025-07-30 RX ORDER — LIDOCAINE 50 MG/G
1 PATCH TOPICAL ONCE
Status: DISCONTINUED | OUTPATIENT
Start: 2025-07-30 | End: 2025-07-30 | Stop reason: HOSPADM

## 2025-07-30 RX ORDER — TRAMADOL HYDROCHLORIDE 50 MG/1
50 TABLET ORAL ONCE
Status: COMPLETED | OUTPATIENT
Start: 2025-07-30 | End: 2025-07-30

## 2025-07-30 RX ADMIN — TRAMADOL HYDROCHLORIDE 50 MG: 50 TABLET, COATED ORAL at 20:29

## 2025-07-30 RX ADMIN — IOHEXOL 100 ML: 350 INJECTION, SOLUTION INTRAVENOUS at 17:49

## 2025-07-30 RX ADMIN — LIDOCAINE 5% 1 PATCH: 700 PATCH TOPICAL at 19:08

## 2025-07-31 LAB
ATRIAL RATE: 69 BPM
P AXIS: 50 DEGREES
PR INTERVAL: 146 MS
QRS AXIS: 76 DEGREES
QRSD INTERVAL: 86 MS
QT INTERVAL: 374 MS
QTC INTERVAL: 400 MS
T WAVE AXIS: 89 DEGREES
VENTRICULAR RATE: 69 BPM

## 2025-07-31 PROCEDURE — 93010 ELECTROCARDIOGRAM REPORT: CPT | Performed by: INTERNAL MEDICINE
